# Patient Record
Sex: FEMALE | HISPANIC OR LATINO | Employment: UNEMPLOYED | URBAN - METROPOLITAN AREA
[De-identification: names, ages, dates, MRNs, and addresses within clinical notes are randomized per-mention and may not be internally consistent; named-entity substitution may affect disease eponyms.]

---
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
## 2020-01-16 ENCOUNTER — OFFICE VISIT (OUTPATIENT)
Dept: PEDIATRICS | Facility: CLINIC | Age: 20
End: 2020-01-16
Attending: PEDIATRICS
Payer: COMMERCIAL

## 2020-01-16 VITALS
HEIGHT: 64 IN | HEART RATE: 84 BPM | DIASTOLIC BLOOD PRESSURE: 66 MMHG | SYSTOLIC BLOOD PRESSURE: 102 MMHG | WEIGHT: 104.28 LBS | BODY MASS INDEX: 17.8 KG/M2

## 2020-01-16 DIAGNOSIS — R73.9 HYPERGLYCEMIA: ICD-10-CM

## 2020-01-16 DIAGNOSIS — E10.65 TYPE 1 DIABETES MELLITUS WITH HYPERGLYCEMIA (H): Primary | ICD-10-CM

## 2020-01-16 LAB — HBA1C MFR BLD: 14.3 % (ref 0–5.6)

## 2020-01-16 PROCEDURE — 83036 HEMOGLOBIN GLYCOSYLATED A1C: CPT | Mod: ZF | Performed by: PEDIATRICS

## 2020-01-16 PROCEDURE — G0463 HOSPITAL OUTPT CLINIC VISIT: HCPCS | Mod: ZF

## 2020-01-16 ASSESSMENT — PAIN SCALES - GENERAL: PAINLEVEL: NO PAIN (0)

## 2020-01-16 ASSESSMENT — MIFFLIN-ST. JEOR: SCORE: 1230.75

## 2020-01-16 ASSESSMENT — PATIENT HEALTH QUESTIONNAIRE - PHQ9: SUM OF ALL RESPONSES TO PHQ QUESTIONS 1-9: 16

## 2020-01-16 NOTE — NURSING NOTE
"Informant-    Kevin is accompanied by mother    Reason for Visit-  Diabetes    Vitals signs-  /66   Pulse 84   Ht 1.622 m (5' 3.86\")   Wt 47.3 kg (104 lb 4.4 oz)   BMI 17.98 kg/m      There are concerns about the child's exposure to violence in the home: No    Face to Face time: 5 minutes  Debbie Parikh MA      "

## 2020-01-16 NOTE — LETTER
1/16/2020      RE: Kevin Stephens  [address] [place] [address]       Pediatric Endocrinology Follow-up Consultation: Diabetes    Patient: Kevin Stephens MRN# [id number]   YOB: 2000 Age: 19 year old   Date of Visit: 1/16/20    Dear Ms. Wright:    I had the pleasure of seeing your patient, Kevin Stephens in the Pediatric Endocrinology Clinic, [facility], on 1/16/20 for a follow-up consultation of diabetes mellitus following recurrent pancreatitis and a history for GSD III.           Problem list:     Patient Active Problem List    Diagnosis Date Noted     Hyperglycemia 04/18/2018     Priority: Medium     Depression with suicidal ideation 05/18/2017     Priority: Medium     Secondary diabetes mellitus (H) 11/30/2014     Priority: Medium     Problem list name updated by automated process. Provider to review       Family history of congenital hearing loss 09/26/2012     Priority: Medium     Kevin's father has bilateral, congenital hearing loss. There is no known cause, and no genetic testing has been completed.       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  My last visit with Kevin was on 12/3/19. Kevin's diabetes care has been extremely poor for quite some time as indicated by her A1c values.  We have tried multiple different insulin types, approaches, and extra clinic visits which have not altered her course.   At our last visit, she had no monitoring data.  She was more depressed though she had no active SI.  She was not taking any insulin or  doing any testing.  I got her back in touch with her therapist and encouraged her to at least restart her basal insulin.      She did see her counselor.  She had an hour session.  She states she chose not to speak at the session so she sat their in silence with her therapist for 45 minutes.  Her therapist told her to come back only if she chose to communicate with her.  She started back on tresiba.  She states she is taking it 5/7 days.  She started the tresiba during the week of [place].  She randomly chose a dose of 29 units rather than the 22 units prescribed.  She started this because she had an appointment reminder and her mom told her to get on it.  She takes between 7-830.  She reports feeling no different.  Not testing her BG levels or taking any short acting insulin.     Feels safe today, no active SI.  No active SI for the past month.  No hypoglycemia.  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    Today's concerns include:needs needles    Hypoglycemia: Kevin is having 0 hypoglycemic readings per week.   Hyperglycemia:  DKA:   Elevated BG values tend to occur unknown    Exercise: None    Blood Glucose Data: none available to review.    A1c:  Today s hemoglobin A1c: 14.3  Lab Results   Component Value Date    A1C 14.3 01/16/2020    A1C 13.7 12/03/2019    A1C 12.0 08/27/2019    A1C 10.7 05/28/2019    A1C 13.4 10/30/2018         Result was discussed at today's visit.     Current insulin regimen:   Continue Tresiba 22 units daily in the morning  Not receiving any of her short acting insulin but this is her plan:  Must test blood glucose twice a day (morning and evening) and give correction doses as noted below using rapid acting insulin.  Carb ratio 1 per 15 grams    Blood Glucose    Units of Insulin             181 - 220         + 1 units             221 - 260         + 2 units             261 - 300         + 3 units             301 - 340         + 4 units             341 - 380          + 5 units             381 - 420         + 6 units             421 - 460         + 7 units     461-500  + 8 units            >500         + 9 units       Insulin administration site(s): arms/buttocks    I reviewed new history from the patient and the medical record.  I have reviewed previous lab results and records, patient BMI and the growth chart at today's visit.      History was obtained from patient and patient's mother.          Social History:     Social History     Social History Narrative    Lives with parents and younger brother.  Currently in 12th grade.  Loves to sing   Going back to school to finish high school - back at [employer].  Working in [employer] again in a month.  Living at home.         Family History:     Family History   Problem Relation Age of Onset     Hearing Loss Father      Family history was reviewed and is unchanged. Refer to the initial note.         Allergies:     Allergies   Allergen Reactions     Morphine Sulfate      No exposure but grandfather has the allergy to it     Tylenol [Acetaminophen]      Has glycogen storage disease and should not receive Tylenol, per GI.             Medications:     Current Outpatient Medications   Medication Sig Dispense Refill     acetone, Urine, test STRP Check urine ketones when two consecutive blood sugars are greater than 300 and at times of illness/vomiting. 100 each 11     blood glucose monitoring (ONE TOUCH DELICA) lancets Use to test blood sugars 6 times daily. 1 Box 12     blood glucose monitoring (ONETOUCH VERIO IQ) test strip Use to test blood sugars 6 times daily or as directed. 200 strip 11     Continuous Blood Gluc Sensor (DEXCOM G6 SENSOR) MISC 1 each See Admin Instructions Change every 10 days 9 each 3     Continuous Blood Gluc Transmit (DEXCOM G6 TRANSMITTER) MISC 1 each every 3 months 1 each 3     insulin aspart (NOVOLOG PEN) 100 UNIT/ML injection Inject 1 unit for every 10 grams of carbs, uses up to 50 units daily. 45 mL 3      "insulin degludec (TRESIBA FLEXTOUCH) 100 UNIT/ML pen Inject 30 units daily. 30 mL 3     insulin pen needle (BD CALLI U/F) 32G X 4 MM Use pen needles 6 times daily. 200 each 6     melatonin 3 MG tablet Take 1 tablet (3 mg) by mouth At Bedtime       sertraline (ZOLOFT) 50 MG tablet Take 1 tablet (50 mg) by mouth daily (Patient not taking: Reported on 10/30/2018) 30 tablet 0             Review of Systems:   GENERAL: Weight loss  EYE: No visual disturbance.  ENT: No hearing loss.  No nasal discharge.  No sore throat.  RESPIRATORY: No cough or wheezing  CARDIO: No chest pain. No palpitations.  No rapid heart rate. No hypertension.  GASTROINTESTINAL: No recent vomiting or diarrhea. No constipation. No abdominal pain.  HEMATOLOGIC: No bleeding disorders. No amemia.  GENITOURINARY: No dysuria or hematuria.  MUSCOLOSKELETAL: No joint pain. No muscular weakness.  PSYCHIATRIC: +depression  NEURO: No seizures.  No headaches. No focal deficits noted.  SKIN: No rashes or skin changes.  ENDOCRINE: see HPI - no metabolic crises         Physical Exam:   Blood pressure 102/66, pulse 84, height 1.622 m (5' 3.86\"), weight 47.3 kg (104 lb 4.4 oz).  Blood pressure percentiles are not available for patients who are 18 years or older.  Height: 5' 3.858\", 43 %ile based on CDC (Girls, 2-20 Years) Stature-for-age data based on Stature recorded on 1/16/2020.  Weight: 104 lbs 4.44 oz, 7 %ile based on CDC (Girls, 2-20 Years) weight-for-age data based on Weight recorded on 1/16/2020.  BMI: Body mass index is 17.98 kg/m ., 6 %ile based on CDC (Girls, 2-20 Years) BMI-for-age based on body measurements available as of 1/16/2020.      CONSTITUTIONAL:   Awake, alert, and in no apparent distress.  Very thin in appearance.    HEAD: Normocephalic, without obvious abnormality.  EYES: Lids and lashes normal, sclera clear, conjunctiva normal.  ENT: external ears without lesions, nares clear, oral pharynx with moist mucus membranes.  NECK: Supple, " symmetrical, trachea midline.  THYROID: symmetric, not enlarged and no tenderness.  HEMATOLOGIC/LYMPHATIC: No cervical lymphadenopathy.  LUNGS: No increased work of breathing, clear to auscultation bilaterally with good air entry.  CARDIOVASCULAR: Regular rate and rhythm, no murmurs.  ABDOMEN: Normal bowel sounds, soft, non-distended, non-tender, no masses palpated, + hepatomegaly  PSYCHIATRIC: Cooperative, no agitation.  SKIN: Insulin administration sites intact without lipohypertrophy. No acanthosis nigricans.  Recent cutting marks  MUSCULOSKELETAL: There is no redness, warmth, or swelling of the joints.  Full range of motion noted.  Motor strength and tone are normal.            Health Maintenance:     No DKA  Labs: 5/2017  Flu vaccine: [id number]  PHQ-2 Score: 4  PHQ-A: 16 (today)    PHQ-2 ( 1999 Pf[employer]) 1/16/2020 12/3/2019   Q1: Little interest or pleasure in doing things 2 3   Q2: Feeling down, depressed or hopeless 2 3   PHQ-2 Score 4 6     [facility] Follow-up to PHQ 8/27/2019 12/3/2019 1/16/2020   PHQ-9 9. Suicide Ideation past 2 weeks Several days More than half the days Not at all   Thoughts of suicide or self harm in past 2 weeks - No -   PHQ-A Suicide Ideation past month No - -   PHQ-A Previous suicide attempt in past year Yes - -     No active SI.  Safety plan discussed.  No need to go to ED.  Will reschedule with therapist.  Discussed my preference for her to restart her anti-depressants.        Laboratory results:     Hemoglobin A1C   Date Value Ref Range Status   01/16/2020 14.3 (A) 0 - 5.6 % Final     TSH   Date Value Ref Range Status   06/17/2019 2.81 mcU/mL Final     T4 Free   Date Value Ref Range Status   02/17/2012 1.03 0.70 - 1.85 ng/dL Final     Vitamin D 1,25   Date Value Ref Range Status   01/30/2009 26  Final     Comment:     Reference range: 15 to 75  Unit: pg/mL  (Note)  Performed by [facility],  [address] [place] [id number] [phone]  [email or web address], Nima Orellana MD - Lab.  Director     Insulin Antibodies   Date Value Ref Range Status   11/21/2014   Final    <0.4  Reference range: 0.0 to 0.4  Unit: U/mL  (Note)  INTERPRETIVE INFORMATION: Insulin Antibody  This assay quantitatively measures human serum  autoantibodies to endogenous insulin or antibodies to  exogenous insulin.  A value of greater than 0.4 Kronus  Units/mL is considered positive for Insulin Antibody.  Kronus Units are arbitrary. Kronus Units = U/mL  Performed by [facility],  [address] [facility] [place] [address] [phone]  [email or web address], Campos Mims MD, Lab. Director       Islet Cell Antibody IgG   Date Value Ref Range Status   11/21/2014   Final    <1:4  Reference range: <1:4  (Note)  INTERPRETIVE INFORMATION: Islet Cell Ab, IgG  Islet cell antibodies (ICAs) are associated with type 1  diabetes (TID), an autoimmune endocrine disorder. These  antibodies may be present in individuals years before the  onset of clinical symptoms. To calculate  Juvenile Diabetes Foundation (JDF) units: multiply the  titer x 5 (1:8  8 x 5 = 40 JDF Units).  Performed by [facility],  [address] [facility], [place] [address] [phone]  [email or web address], Campos Mims MD, Lab. Director       Cholesterol   Date Value Ref Range Status   06/17/2019 203 mg/dL Final     Albumin Urine mg/L   Date Value Ref Range Status   03/27/2018 <5 mg/L Final     Triglycerides   Date Value Ref Range Status   06/17/2019 383 mg/dL Final     HDL Cholesterol   Date Value Ref Range Status   06/17/2019 36 mg/dL Final     LDL Cholesterol Calculated   Date Value Ref Range Status   06/17/2019 112 mg/dL Final     Cholesterol/HDL Ratio   Date Value Ref Range Status   11/21/2014 7.6 (H) 0.0 - 5.0 Final     Non HDL Cholesterol   Date Value Ref Range Status   06/17/2019 167 mg/dl Final            Assessment and Plan:   Kevin  is a 19 year old female with diabetes mellitus associated with GSDIII following recurrent pancreatitis episodes.  I remain very concerned about  Kevin and her lack of attention to her overall health.  She again denied any active SI and we discussed a safety plan (talking with mom which she felt comfortable doing).  Kevin has not gotten to the stage of acceptance.  I am hopeful with her recent change in restarting basal insulin and going back to school.  She must continue to build off these changes.  I outlined 1-2 small things we can do to try and improve her health moving forward.    Patient Instructions     If not giving any corrections, you are probably ok using 30 units of tresiba each day.  If you start testing your blood and giving corrections, you should derease your dose down to at least 25 and possibly closer to 20 units  Please give your tresiba daily  Give Correction dose using Novolog insulin once a day  Test BG once a day  Blood Glucose    Units of Insulin             181 - 220         + 1 units             221 - 260         + 2 units             261 - 300         + 3 units             301 - 340         + 4 units             341 - 380         + 5 units             381 - 420         + 6 units             421 - 460         + 7 units     461-500  + 8 units            >500         + 9 units     Call your therapist and reschedule and be an  Active participant.  Follow-up in 3 months or we can transition you to an adult provider.    Sincerely,    Vincent Garner MD    Pager [phone]      CCPatient Care Team:  Vanessa Wright as PCP - General (Physician Assistant)  [facility] as PCP - Mental Health/Behavioral Medicine  Avani Oliver MD as MD (Pediatric Metabolism)  Vincent Garner MD as MD (Pediatric Endocrinology)  Pearl Reyes MD as MD (Pediatrics)  PEARL REYES    Copy to patient  [facility]  [address] [place] [address]    Vincent Garner MD

## 2020-01-16 NOTE — PATIENT INSTRUCTIONS
If not giving any corrections, you are probably ok using 30 units of tresiba each day.  If you start testing your blood and giving corrections, you should derease your dose down to at least 25 and possibly closer to 20 units  Please give your tresiba daily  Give Correction dose using Novolog insulin once a day  Test BG once a day  Blood Glucose    Units of Insulin             181 - 220         + 1 units             221 - 260         + 2 units             261 - 300         + 3 units             301 - 340         + 4 units             341 - 380         + 5 units             381 - 420         + 6 units             421 - 460         + 7 units     461-500  + 8 units            >500         + 9 units     Call your therapist and reschedule and be an  Active participant.  Follow-up in 3 months or we can transition you to an adult provider.

## 2020-01-16 NOTE — PROGRESS NOTES
Pediatric Endocrinology Follow-up Consultation: Diabetes    Patient: Kevin Stephens MRN# [id number]   YOB: 2000 Age: 19 year old   Date of Visit: 1/16/20    Dear Ms. Wright:    I had the pleasure of seeing your patient, Kevin Stephens in the Pediatric Endocrinology Clinic, [facility], on 1/16/20 for a follow-up consultation of diabetes mellitus following recurrent pancreatitis and a history for GSD III.           Problem list:     Patient Active Problem List    Diagnosis Date Noted     Hyperglycemia 04/18/2018     Priority: Medium     Depression with suicidal ideation 05/18/2017     Priority: Medium     Secondary diabetes mellitus (H) 11/30/2014     Priority: Medium     Problem list name updated by automated process. Provider to review       Family history of congenital hearing loss 09/26/2012     Priority: Medium     Kevin's father has bilateral, congenital hearing loss. There is no known cause, and no genetic testing has been completed.       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  My last visit with Kevin was on 12/3/19. Kevin's diabetes care has been extremely poor for quite some time as indicated by her A1c values.  We have tried multiple different insulin types, approaches, and extra clinic visits which have not altered her course.   At our last visit, she had no monitoring data.  She was more depressed though she had no active SI.  She was not taking any insulin or doing any testing.  I got her back in touch with her therapist and encouraged her  to at least restart her basal insulin.      She did see her counselor.  She had an hour session.  She states she chose not to speak at the session so she sat their in silence with her therapist for 45 minutes.  Her therapist told her to come back only if she chose to communicate with her.  She started back on tresiba.  She states she is taking it 5/7 days.  She started the tresiba during the week of Christmas.  She randomly chose a dose of 29 units rather than the 22 units prescribed.  She started this because she had an appointment reminder and her mom told her to get on it.  She takes between 7-830.  She reports feeling no different.  Not testing her BG levels or taking any short acting insulin.     Feels safe today, no active SI.  No active SI for the past month.  No hypoglycemia.  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    Today's concerns include:needs needles    Hypoglycemia: Kevin is having 0 hypoglycemic readings per week.   Hyperglycemia:  DKA:   Elevated BG values tend to occur unknown    Exercise: None    Blood Glucose Data: none available to review.    A1c:  Today s hemoglobin A1c: 14.3  Lab Results   Component Value Date    A1C 14.3 01/16/2020    A1C 13.7 12/03/2019    A1C 12.0 08/27/2019    A1C 10.7 05/28/2019    A1C 13.4 10/30/2018         Result was discussed at today's visit.     Current insulin regimen:   Continue Tresiba 22 units daily in the morning  Not receiving any of her short acting insulin but this is her plan:  Must test blood glucose twice a day (morning and evening) and give correction doses as noted below using rapid acting insulin.  Carb ratio 1 per 15 grams    Blood Glucose    Units of Insulin             181 - 220         + 1 units             221 - 260         + 2 units             261 - 300         + 3 units             301 - 340         + 4 units             341 - 380         + 5 units             381 - 420         + 6 units             421 - 460          + 7 units     461-500  + 8 units            >500         + 9 units       Insulin administration site(s): arms/buttocks    I reviewed new history from the patient and the medical record.  I have reviewed previous lab results and records, patient BMI and the growth chart at today's visit.      History was obtained from patient and patient's mother.          Social History:     Social History     Social History Narrative    Lives with parents and younger brother.  Currently in 12th grade.  Loves to sing   Going back to school to finish high school - back at [employer].  Working in [employer] again in a month.  Living at home.         Family History:     Family History   Problem Relation Age of Onset     Hearing Loss Father      Family history was reviewed and is unchanged. Refer to the initial note.         Allergies:     Allergies   Allergen Reactions     Morphine Sulfate      No exposure but grandfather has the allergy to it     Tylenol [Acetaminophen]      Has glycogen storage disease and should not receive Tylenol, per GI.             Medications:     Current Outpatient Medications   Medication Sig Dispense Refill     acetone, Urine, test STRP Check urine ketones when two consecutive blood sugars are greater than 300 and at times of illness/vomiting. 100 each 11     blood glucose monitoring (ONE TOUCH DELICA) lancets Use to test blood sugars 6 times daily. 1 Box 12     blood glucose monitoring (ONETOUCH VERIO IQ) test strip Use to test blood sugars 6 times daily or as directed. 200 strip 11     Continuous Blood Gluc Sensor (DEXCOM G6 SENSOR) MISC 1 each See Admin Instructions Change every 10 days 9 each 3     Continuous Blood Gluc Transmit (DEXCOM G6 TRANSMITTER) MISC 1 each every 3 months 1 each 3     insulin aspart (NOVOLOG PEN) 100 UNIT/ML injection Inject 1 unit for every 10 grams of carbs, uses up to 50 units daily. 45 mL 3     insulin degludec (TRESIBA FLEXTOUCH) 100 UNIT/ML pen Inject 30 units daily. 30  "mL 3     insulin pen needle (BD CALLI U/F) 32G X 4 MM Use pen needles 6 times daily. 200 each 6     melatonin 3 MG tablet Take 1 tablet (3 mg) by mouth At Bedtime       sertraline (ZOLOFT) 50 MG tablet Take 1 tablet (50 mg) by mouth daily (Patient not taking: Reported on 10/30/2018) 30 tablet 0             Review of Systems:   GENERAL: Weight loss  EYE: No visual disturbance.  ENT: No hearing loss.  No nasal discharge.  No sore throat.  RESPIRATORY: No cough or wheezing  CARDIO: No chest pain. No palpitations.  No rapid heart rate. No hypertension.  GASTROINTESTINAL: No recent vomiting or diarrhea. No constipation. No abdominal pain.  HEMATOLOGIC: No bleeding disorders. No amemia.  GENITOURINARY: No dysuria or hematuria.  MUSCOLOSKELETAL: No joint pain. No muscular weakness.  PSYCHIATRIC: +depression  NEURO: No seizures.  No headaches. No focal deficits noted.  SKIN: No rashes or skin changes.  ENDOCRINE: see HPI - no metabolic crises         Physical Exam:   Blood pressure 102/66, pulse 84, height 1.622 m (5' 3.86\"), weight 47.3 kg (104 lb 4.4 oz).  Blood pressure percentiles are not available for patients who are 18 years or older.  Height: 5' 3.858\", 43 %ile based on CDC (Girls, 2-20 Years) Stature-for-age data based on Stature recorded on 1/16/2020.  Weight: 104 lbs 4.44 oz, 7 %ile based on CDC (Girls, 2-20 Years) weight-for-age data based on Weight recorded on 1/16/2020.  BMI: Body mass index is 17.98 kg/m ., 6 %ile based on CDC (Girls, 2-20 Years) BMI-for-age based on body measurements available as of 1/16/2020.      CONSTITUTIONAL:   Awake, alert, and in no apparent distress.  Very thin in appearance.    HEAD: Normocephalic, without obvious abnormality.  EYES: Lids and lashes normal, sclera clear, conjunctiva normal.  ENT: external ears without lesions, nares clear, oral pharynx with moist mucus membranes.  NECK: Supple, symmetrical, trachea midline.  THYROID: symmetric, not enlarged and no " tenderness.  HEMATOLOGIC/LYMPHATIC: No cervical lymphadenopathy.  LUNGS: No increased work of breathing, clear to auscultation bilaterally with good air entry.  CARDIOVASCULAR: Regular rate and rhythm, no murmurs.  ABDOMEN: Normal bowel sounds, soft, non-distended, non-tender, no masses palpated, + hepatomegaly  PSYCHIATRIC: Cooperative, no agitation.  SKIN: Insulin administration sites intact without lipohypertrophy. No acanthosis nigricans.  Recent cutting marks  MUSCULOSKELETAL: There is no redness, warmth, or swelling of the joints.  Full range of motion noted.  Motor strength and tone are normal.            Health Maintenance:     No DKA  Labs: 5/2017  Flu vaccine: [id number]  PHQ-2 Score: 4  PHQ-A: 16 (today)    PHQ-2 ( 1999 Pf[employer]) 1/16/2020 12/3/2019   Q1: Little interest or pleasure in doing things 2 3   Q2: Feeling down, depressed or hopeless 2 3   PHQ-2 Score 4 6     [facility] Follow-up to PHQ 8/27/2019 12/3/2019 1/16/2020   PHQ-9 9. Suicide Ideation past 2 weeks Several days More than half the days Not at all   Thoughts of suicide or self harm in past 2 weeks - No -   PHQ-A Suicide Ideation past month No - -   PHQ-A Previous suicide attempt in past year Yes - -     No active SI.  Safety plan discussed.  No need to go to ED.  Will reschedule with therapist.  Discussed my preference for her to restart her anti-depressants.        Laboratory results:     Hemoglobin A1C   Date Value Ref Range Status   01/16/2020 14.3 (A) 0 - 5.6 % Final     TSH   Date Value Ref Range Status   06/17/2019 2.81 mcU/mL Final     T4 Free   Date Value Ref Range Status   02/17/2012 1.03 0.70 - 1.85 ng/dL Final     Vitamin D 1,25   Date Value Ref Range Status   01/30/2009 26  Final     Comment:     Reference range: 15 to 75  Unit: pg/mL  (Note)  Performed by [facility],  [address] [place] [id number] [phone]  [email or web address], Nima Orellana MD - Lab. Director     Insulin Antibodies   Date Value Ref Range Status   11/21/2014    Final    <0.4  Reference range: 0.0 to 0.4  Unit: U/mL  (Note)  INTERPRETIVE INFORMATION: Insulin Antibody  This assay quantitatively measures human serum  autoantibodies to endogenous insulin or antibodies to  exogenous insulin.  A value of greater than 0.4 Kronus  Units/mL is considered positive for Insulin Antibody.  Kronus Units are arbitrary. Kronus Units = U/mL  Performed by [facility],  [address] [place] [address] [phone]  [email or web address], Campos Mims MD, Lab. Director       Islet Cell Antibody IgG   Date Value Ref Range Status   11/21/2014   Final    <1:4  Reference range: <1:4  (Note)  INTERPRETIVE INFORMATION: Islet Cell Ab, IgG  Islet cell antibodies (ICAs) are associated with type 1  diabetes (TID), an autoimmune endocrine disorder. These  antibodies may be present in individuals years before the  onset of clinical symptoms. To calculate  Juvenile Diabetes Foundation (JDF) units: multiply the  titer x 5 (1:8  8 x 5 = 40 JDF Units).  Performed by [facility],  [address] [place] [place] [address] [phone]  [email or web address], Campos Mims MD, Lab. Director       Cholesterol   Date Value Ref Range Status   06/17/2019 203 mg/dL Final     Albumin Urine mg/L   Date Value Ref Range Status   03/27/2018 <5 mg/L Final     Triglycerides   Date Value Ref Range Status   06/17/2019 383 mg/dL Final     HDL Cholesterol   Date Value Ref Range Status   06/17/2019 36 mg/dL Final     LDL Cholesterol Calculated   Date Value Ref Range Status   06/17/2019 112 mg/dL Final     Cholesterol/HDL Ratio   Date Value Ref Range Status   11/21/2014 7.6 (H) 0.0 - 5.0 Final     Non HDL Cholesterol   Date Value Ref Range Status   06/17/2019 167 mg/dl Final            Assessment and Plan:   Kevin  is a 19 year old female with diabetes mellitus associated with GSDIII following recurrent pancreatitis episodes.  I remain very concerned about Kevin and her lack of attention to her overall health.  She again denied  any active SI and we discussed a safety plan (talking with mom which she felt comfortable doing).  Kevin has not gotten to the stage of acceptance.  I am hopeful with her recent change in restarting basal insulin and going back to school.  She must continue to build off these changes.  I outlined 1-2 small things we can do to try and improve her health moving forward.    Patient Instructions     If not giving any corrections, you are probably ok using 30 units of tresiba each day.  If you start testing your blood and giving corrections, you should derease your dose down to at least 25 and possibly closer to 20 units  Please give your tresiba daily  Give Correction dose using Novolog insulin once a day  Test BG once a day  Blood Glucose    Units of Insulin             181 - 220         + 1 units             221 - 260         + 2 units             261 - 300         + 3 units             301 - 340         + 4 units             341 - 380         + 5 units             381 - 420         + 6 units             421 - 460         + 7 units     461-500  + 8 units            >500         + 9 units     Call your therapist and reschedule and be an  Active participant.  Follow-up in 3 months or we can transition you to an adult provider.    Sincerely,    Vincent Garner MD    Pager [phone]      CCPatient Care Team:  Vanessa Wright as PCP - General (Physician Assistant)  [facility] as PCP - Mental Health/Behavioral Medicine  Avani Oliver MD as MD (Pediatric Metabolism)  Vincent Garner MD as MD (Pediatric Endocrinology)  Pearl Reyes MD as MD (Pediatrics)  PEARL REYES    Copy to patient  [facility]  [address] [place] [address]

## 2020-02-10 ENCOUNTER — HEALTH MAINTENANCE LETTER (OUTPATIENT)
Age: 20
End: 2020-02-10

## 2020-04-16 ENCOUNTER — VIRTUAL VISIT (OUTPATIENT)
Dept: PEDIATRICS | Facility: CLINIC | Age: 20
End: 2020-04-16
Attending: PEDIATRICS
Payer: COMMERCIAL

## 2020-04-16 DIAGNOSIS — E10.65 TYPE 1 DIABETES MELLITUS WITH HYPERGLYCEMIA (H): ICD-10-CM

## 2020-04-16 DIAGNOSIS — R73.9 HYPERGLYCEMIA: ICD-10-CM

## 2020-04-16 DIAGNOSIS — E74.03 GLYCOGEN STORAGE DISEASE III (H): Primary | ICD-10-CM

## 2020-04-16 NOTE — PATIENT INSTRUCTIONS
Restart Tresiba at 25 units daily  Restart CGM - Viviane to work on helping with supplies if needed  Give correction doses once CGM restarted  Follow-up phone call in 2 weeks with Tarsha and in 4 weeks with me.

## 2020-04-16 NOTE — PROGRESS NOTES
"Kevin Stephens is a 20 year old adult who is being evaluated via a billable telephone visit.      The patient has been notified of following:     \"This telephone visit will be conducted via a call between you and your physician/provider. We have found that certain health care needs can be provided without the need for a physical exam.  This service lets us provide the care you need with a short phone conversation.  If a prescription is necessary we can send it directly to your pharmacy.  If lab work is needed we can place an order for that and you can then stop by our lab to have the test done at a later time.    Telephone visits are billed at different rates depending on your insurance coverage. During this emergency period, for some insurers they may be billed the same as an in-person visit.  Please reach out to your insurance provider with any questions.    If during the course of the call the physician/provider feels a telephone visit is not appropriate, you will not be charged for this service.\"    Patient has given verbal consent for Telephone visit?  Yes    First Number to try (Mom): [phone]  Back up Number: Kevin's cell at [phone].    How would you like to obtain your AVS? E-Mail (inform patient AVS not encrypted)    Additional provider notes:   Pediatric Endocrinology Follow-up Consultation: Diabetes    Patient: Kevin Stephens MRN# [id number]   YOB: 2000 Age: 20 year old   Date of Visit: April 16, 2020    Dear Ms. Wright:    I had the pleasure of seeing your patient, Kevin Stephens for a phone visit through the Pediatric Endocrinology Clinic, [facility], on April 16, 2020  for a follow-up consultation of diabetes mellitus following recurrent pancreatitis and a history for GSD III.           Problem list:     Patient Active Problem List    Diagnosis Date Noted     Hyperglycemia 04/18/2018     Priority: Medium     Depression with " suicidal ideation 05/18/2017     Priority: Medium     Secondary diabetes mellitus (H) 11/30/2014     Priority: Medium     Problem list name updated by automated process. Provider to review       Family history of congenital hearing loss 09/26/2012     Priority: Medium     Kevin's father has bilateral, congenital hearing loss. There is no known cause, and no genetic testing has been completed.       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  My last visit with Kevin was on 1/16/20. Kevin's diabetes care has been extremely poor for quite some time as indicated by her past A1c values.  They has not reached the stage of acceptance of her diabetes.  We have tried multiple different insulin types, approaches, and extra clinic visits which have not altered her course.   At our last visit, they had no monitoring data.  I was encouraged that she had seen her counselor but they did not speak for her entire visit and she left (by report).  They had stopped her antidepressants on her own.  She reported that they was taking her long acting insulin 5/7 days (in am) at that visit.  They was not taking any rapid acting insulin.  We discussed having a goal of one test per day and giving correction at that time.     She states she has not been giving any insulin or testing BG levels.  She can't explain why this happened.  She states she was not staying on top of things.  She has not been using her dexcom.  She is waking frequently to urinate.  Increased urinary frerquency.  Increaed thirst.  Weight has gone down.  Last time she took any insulin  was a month ago (29 units).  She was not having any lows.  She never took any rapid acting insulin.  Feels like she is in an OK place from a depression.  She did restart her antidepressants.  They have a new therapist through [employer] - they are working on getting this set up virtually.    Today's concerns include:      Hypoglycemia: Kevin is having hypoglycemic readings per week.   Hyperglycemia:  DKA:   Elevated BG values tend to occur     Exercise: None    Blood Glucose Data: none available to review. Has not checked for two weeks    A1c:  Today s hemoglobin A1c: n/a  Lab Results   Component Value Date    A1C 14.3 01/16/2020    A1C 13.7 12/03/2019    A1C 12.0 08/27/2019    A1C 10.7 05/28/2019    A1C 13.4 10/30/2018       Result was discussed at today's visit.     Current insulin regimen:   Continue Tresiba 22 units daily in the morning  Not receiving any of her short acting insulin but this is her plan:  Must test blood glucose twice a day (morning and evening) and give correction doses as noted below using rapid acting insulin.  Carb ratio 1 per 15 grams    Blood Glucose    Units of Insulin             181 - 220         + 1 units             221 - 260         + 2 units             261 - 300         + 3 units             301 - 340         + 4 units             341 - 380         + 5 units             381 - 420         + 6 units             421 - 460         + 7 units     461-500  + 8 units            >500         + 9 units       Insulin administration site(s): arms/buttocks    I reviewed new history from the patient and the medical record.  I have reviewed previous lab results and records, patient BMI and the growth chart at today's visit.      History was obtained from patient and patient's mother.          Social History:     Social History     Social History Narrative    Lives with parents and younger brother.  Currently in 12th grade.  Loves to sing   Went back to finish high school - graduated  In March.  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         Family History:     Family History   Problem Relation Age of Onset     Hearing Loss Father      Family history was reviewed and is unchanged. Refer to the initial note.         Allergies:     Allergies   Allergen Reactions     Morphine Sulfate      No exposure but grandfather has the allergy to it     Tylenol [Acetaminophen]      Has glycogen storage disease and should not receive Tylenol, per GI.             Medications:     Current Outpatient Medications   Medication Sig Dispense Refill     acetone, Urine, test STRP Check urine ketones when two consecutive blood sugars are greater than 300 and at times of illness/vomiting. 100 each 11     blood glucose monitoring (ONE TOUCH DELICA) lancets Use to test blood sugars 6 times daily. 1 Box 12     blood glucose monitoring (ONETOUCH VERIO IQ) test strip Use to test blood sugars 6 times daily or as directed. 200 strip 11     Continuous Blood Gluc Sensor (DEXCOM G6 SENSOR) MISC 1 each See Admin Instructions Change every 10 days 9 each 3     Continuous Blood Gluc Transmit (DEXCOM G6 TRANSMITTER) MISC 1 each every 3 months 1 each 3     insulin aspart (NOVOLOG PEN) 100 UNIT/ML injection Inject 1 unit for every 10 grams of carbs, uses up to 50 units daily. 45 mL 3     insulin degludec (TRESIBA FLEXTOUCH) 100 UNIT/ML pen Inject 30 units daily. 15 mL 6     insulin pen needle (BD CALLI U/F) 32G X 4 MM miscellaneous Use pen needles 6 times daily. 200 each 6     melatonin 3 MG tablet Take 1 tablet (3 mg) by mouth At Bedtime       sertraline (ZOLOFT) 50 MG tablet Take 1 tablet (50 mg) by mouth daily (Patient not taking: Reported on 10/30/2018) 30 tablet 0             Review of Systems:   GENERAL: Weight loss  EYE: No visual disturbance.  ENT: No hearing loss.  No nasal discharge.  No sore throat.  RESPIRATORY: No cough or wheezing  CARDIO: No chest pain. No palpitations.  No rapid heart rate. No  hypertension.  GASTROINTESTINAL: No recent vomiting or diarrhea. No constipation. No abdominal pain.  HEMATOLOGIC: No bleeding disorders. No amemia.  GENITOURINARY: No dysuria or hematuria.  MUSCOLOSKELETAL: No joint pain. No muscular weakness.  PSYCHIATRIC: +depression - more stable on SSRI  NEURO: No seizures.  No headaches. No focal deficits noted.  SKIN: No rashes or skin changes.  ENDOCRINE: see HPI - no metabolic crises         Physical Exam:   There were no vitals taken for this visit.  Blood pressure percentiles are not available for patients who are 18 years or older.  Height: Data Unavailable, Normalized stature-for-age data not available for patients older than 20 years.  Weight: 0 lbs 0 oz, Normalized weight-for-age data not available for patients older than 20 years.  BMI: There is no height or weight on file to calculate BMI., No height and weight on file for this encounter.      No exam              Health Maintenance:     No DKA  Labs: 5/2017  Flu vaccine: [id number]    PHQ-2 ( 1999 Pfizer) 1/16/2020 12/3/2019   Q1: Little interest or pleasure in doing things 2 3   Q2: Feeling down, depressed or hopeless 2 3   PHQ-2 Score 4 6     [facility] [place] Follow-up to PHQ 8/27/2019 12/3/2019 1/16/2020   PHQ-9 9. Suicide Ideation past 2 weeks Several days More than half the days Not at all   Thoughts of suicide or self harm in past 2 weeks - No -   PHQ-A Suicide Ideation past month No - -   PHQ-A Previous suicide attempt in past year Yes - -         Laboratory results:     Hemoglobin A1C   Date Value Ref Range Status   01/16/2020 14.3 (A) 0 - 5.6 % Final     TSH   Date Value Ref Range Status   06/17/2019 2.81 mcU/mL Final     T4 Free   Date Value Ref Range Status   02/17/2012 1.03 0.70 - 1.85 ng/dL Final     Vitamin D 1,25   Date Value Ref Range Status   01/30/2009 26  Final     Comment:     Reference range: 15 to 75  Unit: pg/mL  (Note)  Performed by [facility],  [address] [place] [id number]  [phone]  [email or web address], Nima Orellana MD - Lab. Director     Insulin Antibodies   Date Value Ref Range Status   11/21/2014   Final    <0.4  Reference range: 0.0 to 0.4  Unit: U/mL  (Note)  INTERPRETIVE INFORMATION: Insulin Antibody  This assay quantitatively measures human serum  autoantibodies to endogenous insulin or antibodies to  exogenous insulin.  A value of greater than 0.4 Kronus  Units/mL is considered positive for Insulin Antibody.  Kronus Units are arbitrary. Kronus Units = U/mL  Performed by [facility],  [address] [place] [address] [phone]  [email or web address], Campos Mims MD, Lab. Director       Islet Cell Antibody IgG   Date Value Ref Range Status   11/21/2014   Final    <1:4  Reference range: <1:4  (Note)  INTERPRETIVE INFORMATION: Islet Cell Ab, IgG  Islet cell antibodies (ICAs) are associated with type 1  diabetes (TID), an autoimmune endocrine disorder. These  antibodies may be present in individuals years before the  onset of clinical symptoms. To calculate  Juvenile Diabetes Foundation (JDF) units: multiply the  titer x 5 (1:8  8 x 5 = 40 JDF Units).  Performed by [facility],  [address] [place] [address] [phone]  [email or web address], Campos Mims MD, Lab. Director       Cholesterol   Date Value Ref Range Status   06/17/2019 203 mg/dL Final     Albumin Urine mg/L   Date Value Ref Range Status   03/27/2018 <5 mg/L Final     Triglycerides   Date Value Ref Range Status   06/17/2019 383 mg/dL Final     HDL Cholesterol   Date Value Ref Range Status   06/17/2019 36 mg/dL Final     LDL Cholesterol Calculated   Date Value Ref Range Status   06/17/2019 112 mg/dL Final     Cholesterol/HDL Ratio   Date Value Ref Range Status   11/21/2014 7.6 (H) 0.0 - 5.0 Final     Non HDL Cholesterol   Date Value Ref Range Status   06/17/2019 167 mg/dl Final     Results for CLINT BETANCOURT (MRN [id number])    Ref. Range 6/17/2019 00:00   Albumin Urine mg/spec Unknown <0.2           Assessment and Plan:   Kevin  is a 20 year old female with diabetes mellitus associated with GSDIII following recurrent pancreatitis episodes.  Kevin has made no attempts at managing any component of her diabetes for the past month and is symptomatic (without Dka) of persistent hyperglycemia.  She agreed to try and get back on track with the changes we outlined below.  We have set up close phone follow-up to hopefully keep her on track.  I again reminded her of the consequences of long-term poor glycemic control.      Patient Instructions   Restart Tresiba at 25 units daily  Restart CGM - Viviane to work on helping with supplies if needed  Give correction doses once CGM restarted  Follow-up phone call in 2 weeks with Tarsha and in 4 weeks with me.    Sincerely,    Vincent Garner MD    Pager [phone]      CCPatient Care Team:  Vanessa Wright as PCP - General (Physician Assistant)  [facility] as PCP - Mental Health/Behavioral Medicine  Avani Oliver MD as MD (Pediatric Metabolism)  Vincent Garner MD as MD (Pediatric Endocrinology)  Pearl Reyes MD as MD (Pediatrics)  PEARL REYES    Copy to patient  [facility]ILLIP BETANCOURT [facility]  [address] [place] [address]      Phone call duration: 22 minutes    Vincent Garner MD    Pager [phone]

## 2020-04-16 NOTE — LETTER
"4/16/2020       RE: Kevin Stephens  [address] [place] [address]     Dear Colleague,    Thank you for referring your patient, Kevin Stephens, to the [facility] at [facility]. Please see a copy of my visit note below.    Kevin Stephens is a 20 year old adult who is being evaluated via a billable telephone visit.      The patient has been notified of following:     \"This telephone visit will be conducted via a call between you and your physician/provider. We have found that certain health care needs can be provided without the need for a physical exam.  This service lets us provide the care you need with a short phone conversation.  If a prescription is necessary we can send it directly to your pharmacy.  If lab work is needed we can place an order for that and you can then stop by our lab to have the test done at a later time.    Telephone visits are billed at different rates depending on your insurance coverage. During this emergency period, for some insurers they may be billed the same as an in-person visit.  Please reach out to your insurance provider with any questions.    If during the course of the call the physician/provider feels a telephone visit is not appropriate, you will not be charged for this service.\"    Patient has given verbal consent for Telephone visit?  Yes    First Number to try (Mom): [phone]  Back up Number: Kevin's cell at [phone].    How would you like to obtain your AVS? E-Mail (inform patient AVS not encrypted)    Additional provider notes:   Pediatric Endocrinology Follow-up Consultation: Diabetes    Patient: Kevin Stephens MRN# [id number]   YOB: 2000 Age: 20 year old   Date of Visit: April 16, 2020    Dear Ms. Wright:    I had the pleasure of seeing your patient, Kevin Stephens for a video visit through the Pediatric Endocrinology Clinic, [facility], on April 16, 2020  for a follow-up consultation of diabetes mellitus following recurrent pancreatitis and a history for GSD III.           Problem list:     Patient Active Problem List    Diagnosis Date Noted     Hyperglycemia 04/18/2018     Priority: Medium     Depression with suicidal ideation 05/18/2017     Priority: Medium     Secondary diabetes mellitus (H) 11/30/2014     Priority: Medium     Problem list name updated by automated process. Provider to review       Family history of congenital hearing loss 09/26/2012     Priority: Medium     Kevin's father has bilateral, congenital hearing loss. There is no known cause, and no genetic testing has been completed.       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  My last visit with Kevin was on 1/16/20. Kevin's diabetes care has been extremely poor for quite some time as indicated by her past A1c values.  They has not reached the stage of acceptance of her diabetes.  We have tried multiple different insulin types, approaches, and extra clinic visits which have not altered her course.   At our last visit, they had no monitoring data.  I was encouraged that she had seen her counselor but they did not speak for her entire visit and she left (by report).  They had stopped her antidepressants on her own.  She reported that they was taking her long acting insulin 5/7 days (in am) at that visit.  They was not taking any rapid acting insulin.  We discussed having a goal of one test per day and giving correction at that time.     Feels safe today, no active  SI.  No active SI for the past month.  No hypoglycemia. 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    Today's concerns include:    Hypoglycemia: Kevin is having hypoglycemic readings per week.   Hyperglycemia:  DKA:   Elevated BG values tend to occur     Exercise: None    Blood Glucose Data: none available to review. Has not checked for two weeks    A1c:  Today s hemoglobin A1c: n/a  Lab Results   Component Value Date    A1C 14.3 01/16/2020    A1C 13.7 12/03/2019    A1C 12.0 08/27/2019    A1C 10.7 05/28/2019    A1C 13.4 10/30/2018       Result was discussed at today's visit.     Current insulin regimen:   Continue Tresiba 22 units daily in the morning  Not receiving any of her short acting insulin but this is her plan:  Must test blood glucose twice a day (morning and evening) and give correction doses as noted below using rapid acting insulin.  Carb ratio 1 per 15 grams    Blood Glucose    Units of Insulin             181 - 220         + 1 units             221 - 260         + 2 units             261 - 300         + 3 units             301 - 340         + 4 units             341 - 380         + 5 units             381 - 420         + 6 units             421 - 460         + 7 units     461-500  + 8 units            >500         + 9 units       Insulin administration site(s): arms/buttocks    I reviewed new history from the patient and the medical record.  I have reviewed previous lab results and records, patient BMI and the growth chart at today's visit.      History was obtained from patient and patient's mother.          Social History:     Social History     Social History Narrative    Lives with parents and younger brother.  Currently in 12th grade.  Loves to sing   Going back to school to finish high school - back at [employer].  Was working in cafeteria  Living at home.         Family History:     Family History   Problem Relation Age of Onset     Hearing Loss Father      Family  history was reviewed and is unchanged. Refer to the initial note.         Allergies:     Allergies   Allergen Reactions     Morphine Sulfate      No exposure but grandfather has the allergy to it     Tylenol [Acetaminophen]      Has glycogen storage disease and should not receive Tylenol, per GI.             Medications:     Current Outpatient Medications   Medication Sig Dispense Refill     acetone, Urine, test STRP Check urine ketones when two consecutive blood sugars are greater than 300 and at times of illness/vomiting. 100 each 11     blood glucose monitoring (ONE TOUCH DELICA) lancets Use to test blood sugars 6 times daily. 1 Box 12     blood glucose monitoring (ONETOUCH VERIO IQ) test strip Use to test blood sugars 6 times daily or as directed. 200 strip 11     Continuous Blood Gluc Sensor (DEXCOM G6 SENSOR) MISC 1 each See Admin Instructions Change every 10 days 9 each 3     Continuous Blood Gluc Transmit (DEXCOM G6 TRANSMITTER) MISC 1 each every 3 months 1 each 3     insulin aspart (NOVOLOG PEN) 100 UNIT/ML injection Inject 1 unit for every 10 grams of carbs, uses up to 50 units daily. 45 mL 3     insulin degludec (TRESIBA FLEXTOUCH) 100 UNIT/ML pen Inject 30 units daily. 15 mL 6     insulin pen needle (BD CALLI U/F) 32G X 4 MM miscellaneous Use pen needles 6 times daily. 200 each 6     melatonin 3 MG tablet Take 1 tablet (3 mg) by mouth At Bedtime       sertraline (ZOLOFT) 50 MG tablet Take 1 tablet (50 mg) by mouth daily (Patient not taking: Reported on 10/30/2018) 30 tablet 0             Review of Systems:   GENERAL: Weight loss  EYE: No visual disturbance.  ENT: No hearing loss.  No nasal discharge.  No sore throat.  RESPIRATORY: No cough or wheezing  CARDIO: No chest pain. No palpitations.  No rapid heart rate. No hypertension.  GASTROINTESTINAL: No recent vomiting or diarrhea. No constipation. No abdominal pain.  HEMATOLOGIC: No bleeding disorders. No amemia.  GENITOURINARY: No dysuria or  hematuria.  MUSCOLOSKELETAL: No joint pain. No muscular weakness.  PSYCHIATRIC: +depression  NEURO: No seizures.  No headaches. No focal deficits noted.  SKIN: No rashes or skin changes.  ENDOCRINE: see HPI - no metabolic crises         Physical Exam:   There were no vitals taken for this visit.  Blood pressure percentiles are not available for patients who are 18 years or older.  Height: Data Unavailable, Normalized stature-for-age data not available for patients older than 20 years.  Weight: 0 lbs 0 oz, Normalized weight-for-age data not available for patients older than 20 years.  BMI: There is no height or weight on file to calculate BMI., No height and weight on file for this encounter.      CONSTITUTIONAL:   Awake, alert, and in no apparent distress.  Very thin in appearance.              Health Maintenance:     No DKA  Labs: 5/2017  Flu vaccine: [id number]    PHQ-2 ( 1999 Pfizer) 1/16/2020 12/3/2019   Q1: Little interest or pleasure in doing things 2 3   Q2: Feeling down, depressed or hopeless 2 3   PHQ-2 Score 4 6     [facility] Follow-up to PHQ 8/27/2019 12/3/2019 1/16/2020   PHQ-9 9. Suicide Ideation past 2 weeks Several days More than half the days Not at all   Thoughts of suicide or self harm in past 2 weeks - No -   PHQ-A Suicide Ideation past month No - -   PHQ-A Previous suicide attempt in past year Yes - -         Laboratory results:     Hemoglobin A1C   Date Value Ref Range Status   01/16/2020 14.3 (A) 0 - 5.6 % Final     TSH   Date Value Ref Range Status   06/17/2019 2.81 mcU/mL Final     T4 Free   Date Value Ref Range Status   02/17/2012 1.03 0.70 - 1.85 ng/dL Final     Vitamin D 1,25   Date Value Ref Range Status   01/30/2009 26  Final     Comment:     Reference range: 15 to 75  Unit: pg/mL  (Note)  Performed by [facility],  [address] [place] [id number] [phone]  www."[email or web address] Nima Orellana MD - Lab. Director     Insulin Antibodies   Date Value Ref Range Status   11/21/2014   Final     <0.4  Reference range: 0.0 to 0.4  Unit: U/mL  (Note)  INTERPRETIVE INFORMATION: Insulin Antibody  This assay quantitatively measures human serum  autoantibodies to endogenous insulin or antibodies to  exogenous insulin.  A value of greater than 0.4 Kronus  Units/mL is considered positive for Insulin Antibody.  Kronus Units are arbitrary. Kronus Units = U/mL  Performed by [facility],  [address] [place] [address] [phone]  [email or web address], Campos Mims MD, Lab. Director       Islet Cell Antibody IgG   Date Value Ref Range Status   11/21/2014   Final    <1:4  Reference range: <1:4  (Note)  INTERPRETIVE INFORMATION: Islet Cell Ab, IgG  Islet cell antibodies (ICAs) are associated with type 1  diabetes (TID), an autoimmune endocrine disorder. These  antibodies may be present in individuals years before the  onset of clinical symptoms. To calculate  Juvenile Diabetes Foundation (JDF) units: multiply the  titer x 5 (1:8  8 x 5 = 40 JDF Units).  Performed by [facility],  [address] [facility] [address] [phone]  [email or web address], Campos Mims MD, Lab. Director       Cholesterol   Date Value Ref Range Status   06/17/2019 203 mg/dL Final     Albumin Urine mg/L   Date Value Ref Range Status   03/27/2018 <5 mg/L Final     Triglycerides   Date Value Ref Range Status   06/17/2019 383 mg/dL Final     HDL Cholesterol   Date Value Ref Range Status   06/17/2019 36 mg/dL Final     LDL Cholesterol Calculated   Date Value Ref Range Status   06/17/2019 112 mg/dL Final     Cholesterol/HDL Ratio   Date Value Ref Range Status   11/21/2014 7.6 (H) 0.0 - 5.0 Final     Non HDL Cholesterol   Date Value Ref Range Status   06/17/2019 167 mg/dl Final     Results for CLINT BETANCOURT (MRN [id number])    Ref. Range 6/17/2019 00:00   Albumin Urine mg/spec Unknown <0.2          Assessment and Plan:   Clint  is a 20 year old female with diabetes mellitus associated with GSDIII following recurrent pancreatitis episodes.   "        Sincerely,    Vincent Garner MD    Pager [phone]      CCPatient Care Team:  Vanessa Wright as PCP - General (Physician Assistant)  Cl[facility], [facility] as PCP - Mental Health/Behavioral Medicine  Avani Oliver MD as MD (Pediatric Metabolism)  Vincent Garner MD as MD (Pediatric Endocrinology)  Pearl Reyes MD as MD (Pediatrics)  PEARL REYES    Copy to patient  PHILLIP STEPHENS EDUARDO  [address] [place] [address]      Phone call duration: *** minutes    Vincent Garner MD    Pager [phone]        Kevin Stephens is a 20 year old adult who is being evaluated via a billable telephone visit.      The patient has been notified of following:     \"This telephone visit will be conducted via a call between you and your physician/provider. We have found that certain health care needs can be provided without the need for a physical exam.  This service lets us provide the care you need with a short phone conversation.  If a prescription is necessary we can send it directly to your pharmacy.  If lab work is needed we can place an order for that and you can then stop by our lab to have the test done at a later time.    Telephone visits are billed at different rates depending on your insurance coverage. During this emergency period, for some insurers they may be billed the same as an in-person visit.  Please reach out to your insurance provider with any questions.    If during the course of the call the physician/provider feels a telephone visit is not appropriate, you will not be charged for this service.\"    Patient has given verbal consent for Telephone visit?  Yes    First Number to try (Mom): [phone]  Back up Number: Kevin's cell at [phone].    How would you like to obtain your AVS? E-Mail (inform patient AVS not encrypted)    Additional provider notes:   Pediatric Endocrinology Follow-up Consultation: Diabetes    Patient: " Kevin Stephens MRN# [id number]   YOB: 2000 Age: 20 year old   Date of Visit: April 16, 2020    Dear Ms. Wright:    I had the pleasure of seeing your patient, Kevin Stephens for a phone visit through the Pediatric Endocrinology Clinic, [facility], on April 16, 2020  for a follow-up consultation of diabetes mellitus following recurrent pancreatitis and a history for GSD III.           Problem list:     Patient Active Problem List    Diagnosis Date Noted     Hyperglycemia 04/18/2018     Priority: Medium     Depression with suicidal ideation 05/18/2017     Priority: Medium     Secondary diabetes mellitus (H) 11/30/2014     Priority: Medium     Problem list name updated by automated process. Provider to review       Family history of congenital hearing loss 09/26/2012     Priority: Medium     Kevin's father has bilateral, congenital hearing loss. There is no known cause, and no genetic testing has been completed.       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  My last visit with Kevin was on 1/16/20. Kevin's diabetes care has been extremely poor for quite some time as indicated by her past A1c values.  They has not reached the stage of acceptance of her diabetes.  We have tried multiple different insulin types, approaches, and extra clinic visits which have not altered her course.   At our last visit, they had no monitoring data.  I was encouraged that she had seen her counselor but they did not speak for her entire visit and she left (by report).  They had  stopped her antidepressants on her own.  She reported that they was taking her long acting insulin 5/7 days (in am) at that visit.  They was not taking any rapid acting insulin.  We discussed having a goal of one test per day and giving correction at that time.     She states she has not been giving any insulin or testing BG levels.  She can't explain why this happened.  She states she was not staying on top of things.  She has not been using her dexcom.  She is waking frequently to urinate.  Increased urinary frerquency.  Increaed thirst.  Weight has gone down.  Last time she took any insulin was a month ago (29 units).  She was not having any lows.  She never took any rapid acting insulin.  Feels like she is in an OK place from a depression.  She did restart her antidepressants.  They have a new therapist through [facility] - they are working on getting this set up virtually.    Today's concerns include:      Hypoglycemia: Kevin is having hypoglycemic readings per week.   Hyperglycemia:  DKA:   Elevated BG values tend to occur     Exercise: None    Blood Glucose Data: none available to review. Has not checked for two weeks    A1c:  Today s hemoglobin A1c: n/a  Lab Results   Component Value Date    A1C 14.3 01/16/2020    A1C 13.7 12/03/2019    A1C 12.0 08/27/2019    A1C 10.7 05/28/2019    A1C 13.4 10/30/2018       Result was discussed at today's visit.     Current insulin regimen:   Continue Tresiba 22 units daily in the morning  Not receiving any of her short acting insulin but this is her plan:  Must test blood glucose twice a day (morning and evening) and give correction doses as noted below using rapid acting insulin.  Carb ratio 1 per 15 grams    Blood Glucose    Units of Insulin             181 - 220         + 1 units             221 - 260         + 2 units             261 - 300         + 3 units             301 - 340         + 4 units             341 - 380         + 5 units             381 - 420         + 6  units             421 - 460         + 7 units     461-500  + 8 units            >500         + 9 units       Insulin administration site(s): arms/buttocks    I reviewed new history from the patient and the medical record.  I have reviewed previous lab results and records, patient BMI and the growth chart at today's visit.      History was obtained from patient and patient's mother.          Social History:     Social History     Social History Narrative    Lives with parents and younger brother.  Currently in 12th grade.  Loves to sing   Went back to finish high school - graduated  In March.  Has been able to continue working in the [employer]  Living at home.         Family History:     Family History   Problem Relation Age of Onset     Hearing Loss Father      Family history was reviewed and is unchanged. Refer to the initial note.         Allergies:     Allergies   Allergen Reactions     Morphine Sulfate      No exposure but grandfather has the allergy to it     Tylenol [Acetaminophen]      Has glycogen storage disease and should not receive Tylenol, per GI.             Medications:     Current Outpatient Medications   Medication Sig Dispense Refill     acetone, Urine, test STRP Check urine ketones when two consecutive blood sugars are greater than 300 and at times of illness/vomiting. 100 each 11     blood glucose monitoring (ONE TOUCH DELICA) lancets Use to test blood sugars 6 times daily. 1 Box 12     blood glucose monitoring (ONETOUCH VERIO IQ) test strip Use to test blood sugars 6 times daily or as directed. 200 strip 11     Continuous Blood Gluc Sensor (DEXCOM G6 SENSOR) MISC 1 each See Admin Instructions Change every 10 days 9 each 3     Continuous Blood Gluc Transmit (DEXCOM G6 TRANSMITTER) MISC 1 each every 3 months 1 each 3     insulin aspart (NOVOLOG PEN) 100 UNIT/ML injection Inject 1 unit for every 10 grams of carbs, uses up to 50 units daily. 45 mL 3     insulin degludec (TRESIBA FLEXTOUCH) 100  UNIT/ML pen Inject 30 units daily. 15 mL 6     insulin pen needle (BD CALLI U/F) 32G X 4 MM miscellaneous Use pen needles 6 times daily. 200 each 6     melatonin 3 MG tablet Take 1 tablet (3 mg) by mouth At Bedtime       sertraline (ZOLOFT) 50 MG tablet Take 1 tablet (50 mg) by mouth daily (Patient not taking: Reported on 10/30/2018) 30 tablet 0             Review of Systems:   GENERAL: Weight loss  EYE: No visual disturbance.  ENT: No hearing loss.  No nasal discharge.  No sore throat.  RESPIRATORY: No cough or wheezing  CARDIO: No chest pain. No palpitations.  No rapid heart rate. No hypertension.  GASTROINTESTINAL: No recent vomiting or diarrhea. No constipation. No abdominal pain.  HEMATOLOGIC: No bleeding disorders. No amemia.  GENITOURINARY: No dysuria or hematuria.  MUSCOLOSKELETAL: No joint pain. No muscular weakness.  PSYCHIATRIC: +depression - more stable on SSRI  NEURO: No seizures.  No headaches. No focal deficits noted.  SKIN: No rashes or skin changes.  ENDOCRINE: see HPI - no metabolic crises         Physical Exam:   There were no vitals taken for this visit.  Blood pressure percentiles are not available for patients who are 18 years or older.  Height: Data Unavailable, Normalized stature-for-age data not available for patients older than 20 years.  Weight: 0 lbs 0 oz, Normalized weight-for-age data not available for patients older than 20 years.  BMI: There is no height or weight on file to calculate BMI., No height and weight on file for this encounter.      No exam              Health Maintenance:     No DKA  Labs: 5/2017  Flu vaccine: [id number]    PHQ-2 ( 1999 Pfizer) 1/16/2020 12/3/2019   Q1: Little interest or pleasure in doing things 2 3   Q2: Feeling down, depressed or hopeless 2 3   PHQ-2 Score 4 6     [facility] Follow-up to PHQ 8/27/2019 12/3/2019 1/16/2020   PHQ-9 9. Suicide Ideation past 2 weeks Several days More than half the days Not at all   Thoughts of suicide or self harm in past 2 weeks -  No -   PHQ-A Suicide Ideation past month No - -   PHQ-A Previous suicide attempt in past year Yes - -         Laboratory results:     Hemoglobin A1C   Date Value Ref Range Status   01/16/2020 14.3 (A) 0 - 5.6 % Final     TSH   Date Value Ref Range Status   06/17/2019 2.81 mcU/mL Final     T4 Free   Date Value Ref Range Status   02/17/2012 1.03 0.70 - 1.85 ng/dL Final     Vitamin D 1,25   Date Value Ref Range Status   01/30/2009 26  Final     Comment:     Reference range: 15 to 75  Unit: pg/mL  (Note)  Performed by [facility],  [address] [facility] [place] [address] [phone]  [email or web address], Nima Orellana MD - Lab. Director     Insulin Antibodies   Date Value Ref Range Status   11/21/2014   Final    <0.4  Reference range: 0.0 to 0.4  Unit: U/mL  (Note)  INTERPRETIVE INFORMATION: Insulin Antibody  This assay quantitatively measures human serum  autoantibodies to endogenous insulin or antibodies to  exogenous insulin.  A value of greater than 0.4 Kronus  Units/mL is considered positive for Insulin Antibody.  Kronus Units are arbitrary. Kronus Units = U/mL  Performed by [facility],  [address] [facility] [place] [address] [phone]  [email or web address], Campos Mims MD, Lab. Director       Islet Cell Antibody IgG   Date Value Ref Range Status   11/21/2014   Final    <1:4  Reference range: <1:4  (Note)  INTERPRETIVE INFORMATION: Islet Cell Ab, IgG  Islet cell antibodies (ICAs) are associated with type 1  diabetes (TID), an autoimmune endocrine disorder. These  antibodies may be present in individuals years before the  onset of clinical symptoms. To calculate  Juvenile Diabetes Foundation (JDF) units: multiply the  titer x 5 (1:8  8 x 5 = 40 JDF Units).  Performed by [facility],  [address] [facility] [place] [address] [phone]  [email or web address], Campos Mims MD, Lab. Director       Cholesterol   Date Value Ref Range Status   06/17/2019 203 mg/dL Final     Albumin Urine mg/L   Date Value Ref Range Status    03/27/2018 <5 mg/L Final     Triglycerides   Date Value Ref Range Status   06/17/2019 383 mg/dL Final     HDL Cholesterol   Date Value Ref Range Status   06/17/2019 36 mg/dL Final     LDL Cholesterol Calculated   Date Value Ref Range Status   06/17/2019 112 mg/dL Final     Cholesterol/HDL Ratio   Date Value Ref Range Status   11/21/2014 7.6 (H) 0.0 - 5.0 Final     Non HDL Cholesterol   Date Value Ref Range Status   06/17/2019 167 mg/dl Final     Results for CLINT BETANCOURT (MRN [id number])    Ref. Range 6/17/2019 00:00   Albumin Urine mg/spec Unknown <0.2          Assessment and Plan:   Clint  is a 20 year old female with diabetes mellitus associated with GSDIII following recurrent pancreatitis episodes.  Clint has made no attempts at managing any component of her diabetes for the past month and is symptomatic (without Dka) of persistent hyperglycemia.  She agreed to try and get back on track with the changes we outlined below.  We have set up close phone follow-up to hopefully keep her on track.  I again reminded her of the consequences of long-term poor glycemic control.      Patient Instructions   Restart Tresiba at 25 units daily  Restart CGM - Viviane to work on helping with supplies if needed  Give correction doses once CGM restarted  Follow-up phone call in 2 weeks with Tarsha and in 4 weeks with me.    Sincerely,    Vincent Garner MD    Pager [phone]      CCPatient Care Team:  Vanessa Wright as PCP - General (Physician Assistant)  [facility] as PCP - Mental Health/Behavioral Medicine  Avani Oliver MD as MD (Pediatric Metabolism)  Vincent Garner MD as MD (Pediatric Endocrinology)  Pearl Reyes MD as MD (Pediatrics)  PEARL REYES    Copy to patient  [facility]IP BETANCOURT [facility]  [address] [place] [address]      Phone call duration: 21 minutes    Vincent Garner MD    Pager [phone]        Again, thank you for  allowing me to participate in the care of your patient.      Sincerely,    Vincent Garner MD

## 2020-04-16 NOTE — LETTER
"4/16/2020       RE: Kevin Stephens  [address]  [place] [address]     Dear Colleague,    Thank you for referring your patient, Kevin Stephens, to the [facility] at [facility]. Please see a copy of my visit note below.    duplicate    Kevin Stephens is a 20 year old adult who is being evaluated via a billable telephone visit.      The patient has been notified of following:     \"This telephone visit will be conducted via a call between you and your physician/provider. We have found that certain health care needs can be provided without the need for a physical exam.  This service lets us provide the care you need with a short phone conversation.  If a prescription is necessary we can send it directly to your pharmacy.  If lab work is needed we can place an order for that and you can then stop by our lab to have the test done at a later time.    Telephone visits are billed at different rates depending on your insurance coverage. During this emergency period, for some insurers they may be billed the same as an in-person visit.  Please reach out to your insurance provider with any questions.    If during the course of the call the physician/provider feels a telephone visit is not appropriate, you will not be charged for this service.\"    Patient has given verbal consent for Telephone visit?  Yes    First Number to try (Mom): [phone]  Back up Number: Kevin's cell at [phone].    How would you like to obtain your AVS? E-Mail (inform patient AVS not encrypted)    Additional provider notes:   Pediatric Endocrinology Follow-up Consultation: Diabetes    Patient: Kevin Stehpens MRN# [id number]   YOB: 2000 Age: 20 year old   Date of Visit: April 16, 2020    Dear Ms. Wright:    I had the pleasure of seeing your patient, Kevin Stephens for a phone visit through the Pediatric Endocrinology Clinic, [facility], on April 16, 2020  for a follow-up consultation of diabetes mellitus following recurrent pancreatitis and a history for GSD III.           Problem list:     Patient Active Problem List    Diagnosis Date Noted     Hyperglycemia 04/18/2018     Priority: Medium     Depression with suicidal ideation 05/18/2017     Priority: Medium     Secondary diabetes mellitus (H) 11/30/2014     Priority: Medium     Problem list name updated by automated process. Provider to review       Family history of congenital hearing loss 09/26/2012     Priority: Medium     Kevin's father has bilateral, congenital hearing loss. There is no known cause, and no genetic testing has been completed.       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  My last visit with Kevin was on 1/16/20. Kevin's diabetes care has been extremely poor for quite some time as indicated by her past A1c values.  They has not reached the stage of acceptance of her diabetes.  We have tried multiple different insulin types, approaches, and extra clinic visits which have not altered her course.   At our last visit, they had no monitoring data.  I was encouraged that she had seen her counselor but they did not speak for her entire visit and she left (by report).  They had stopped her antidepressants on her own.  She reported that they was taking her long acting insulin 5/7 days (in am) at that visit.  They was not taking any rapid acting insulin.  We discussed having a goal of one test per day and giving correction at that time.     She states she  has not been giving any insulin or testing BG levels.  She can't explain why this happened.  She states she was not staying on top of things.  She has not been using her dexcom.  She is waking frequently to urinate.  Increased urinary frerquency.  Increaed thirst.  Weight has gone down.  Last time she took any insulin was a month ago (29 units).  She was not having any lows.  She never took any rapid acting insulin.  Feels like she is in an OK place from a depression.  She did restart her antidepressants.  They have a new therapist through [facility] - they are working on getting this set up virtually.    Today's concerns include:      Hypoglycemia: Kevin is having hypoglycemic readings per week.   Hyperglycemia:  DKA:   Elevated BG values tend to occur     Exercise: None    Blood Glucose Data: none available to review. Has not checked for two weeks    A1c:  Today s hemoglobin A1c: n/a  Lab Results   Component Value Date    A1C 14.3 01/16/2020    A1C 13.7 12/03/2019    A1C 12.0 08/27/2019    A1C 10.7 05/28/2019    A1C 13.4 10/30/2018       Result was discussed at today's visit.     Current insulin regimen:   Continue Tresiba 22 units daily in the morning  Not receiving any of her short acting insulin but this is her plan:  Must test blood glucose twice a day (morning and evening) and give correction doses as noted below using rapid acting insulin.  Carb ratio 1 per 15 grams    Blood Glucose    Units of Insulin             181 - 220         + 1 units             221 - 260         + 2 units             261 - 300         + 3 units             301 - 340         + 4 units             341 - 380         + 5 units             381 - 420         + 6 units             421 - 460         + 7 units     461-500  + 8 units            >500         + 9 units       Insulin administration site(s): arms/buttocks    I reviewed new history from the patient and the medical record.  I have reviewed previous lab results and records, patient BMI  and the growth chart at today's visit.      History was obtained from patient and patient's mother.          Social History:     Social History     Social History Narrative    Lives with parents and younger brother.  Currently in 12th grade.  Loves to sing   Went back to finish high school - graduated  In March.  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         Family History:     Family History   Problem Relation Age of Onset     Hearing Loss Father      Family history was reviewed and is unchanged. Refer to the initial note.         Allergies:     Allergies   Allergen Reactions     Morphine Sulfate      No exposure but grandfather has the allergy to it     Tylenol [Acetaminophen]      Has glycogen storage disease and should not receive Tylenol, per GI.             Medications:     Current Outpatient Medications   Medication Sig Dispense Refill     acetone, Urine, test STRP Check urine ketones when two consecutive blood sugars are greater than 300 and at times of illness/vomiting. 100 each 11     blood glucose monitoring (ONE TOUCH DELICA) lancets Use to test blood sugars 6 times daily. 1 Box 12     blood glucose monitoring (ONETOUCH VERIO IQ) test strip Use to test blood sugars 6 times daily or as directed. 200 strip 11     Continuous Blood Gluc Sensor (DEXCOM G6 SENSOR) MISC 1 each See Admin Instructions Change every 10 days 9 each 3     Continuous Blood Gluc Transmit (DEXCOM G6 TRANSMITTER) MISC 1 each every 3 months 1 each 3     insulin aspart (NOVOLOG PEN) 100 UNIT/ML injection Inject 1 unit for every 10 grams of carbs, uses up to 50 units daily. 45 mL 3     insulin degludec (TRESIBA FLEXTOUCH) 100 UNIT/ML pen Inject 30 units daily. 15 mL 6     insulin pen needle (BD CALLI U/F) 32G X 4 MM miscellaneous Use pen needles 6 times daily. 200 each 6     melatonin 3 MG tablet Take 1 tablet (3 mg) by mouth At Bedtime       sertraline (ZOLOFT) 50 MG tablet Take 1 tablet (50 mg) by mouth daily  (Patient not taking: Reported on 10/30/2018) 30 tablet 0             Review of Systems:   GENERAL: Weight loss  EYE: No visual disturbance.  ENT: No hearing loss.  No nasal discharge.  No sore throat.  RESPIRATORY: No cough or wheezing  CARDIO: No chest pain. No palpitations.  No rapid heart rate. No hypertension.  GASTROINTESTINAL: No recent vomiting or diarrhea. No constipation. No abdominal pain.  HEMATOLOGIC: No bleeding disorders. No amemia.  GENITOURINARY: No dysuria or hematuria.  MUSCOLOSKELETAL: No joint pain. No muscular weakness.  PSYCHIATRIC: +depression - more stable on SSRI  NEURO: No seizures.  No headaches. No focal deficits noted.  SKIN: No rashes or skin changes.  ENDOCRINE: see HPI - no metabolic crises         Physical Exam:   There were no vitals taken for this visit.  Blood pressure percentiles are not available for patients who are 18 years or older.  Height: Data Unavailable, Normalized stature-for-age data not available for patients older than 20 years.  Weight: 0 lbs 0 oz, Normalized weight-for-age data not available for patients older than 20 years.  BMI: There is no height or weight on file to calculate BMI., No height and weight on file for this encounter.      No exam              Health Maintenance:     No DKA  Labs: 5/2017  Flu vaccine: [id number]    PHQ-2 ( 1999 Pfizer) 1/16/2020 12/3/2019   Q1: Little interest or pleasure in doing things 2 3   Q2: Feeling down, depressed or hopeless 2 3   PHQ-2 Score 4 6     [facility] Follow-up to PHQ 8/27/2019 12/3/2019 1/16/2020   PHQ-9 9. Suicide Ideation past 2 weeks Several days More than half the days Not at all   Thoughts of suicide or self harm in past 2 weeks - No -   PHQ-A Suicide Ideation past month No - -   PHQ-A Previous suicide attempt in past year Yes - -         Laboratory results:     Hemoglobin A1C   Date Value Ref Range Status   01/16/2020 14.3 (A) 0 - 5.6 % Final     TSH   Date Value Ref Range Status   06/17/2019 2.81 mcU/mL Final      T4 Free   Date Value Ref Range Status   02/17/2012 1.03 0.70 - 1.85 ng/dL Final     Vitamin D 1,25   Date Value Ref Range Status   01/30/2009 26  Final     Comment:     Reference range: 15 to 75  Unit: pg/mL  (Note)  Performed by [facility],  [address] [facility] [place] [address] [phone]  [email or web address], Nima Orellana MD - Lab. Director     Insulin Antibodies   Date Value Ref Range Status   11/21/2014   Final    <0.4  Reference range: 0.0 to 0.4  Unit: U/mL  (Note)  INTERPRETIVE INFORMATION: Insulin Antibody  This assay quantitatively measures human serum  autoantibodies to endogenous insulin or antibodies to  exogenous insulin.  A value of greater than 0.4 Kronus  Units/mL is considered positive for Insulin Antibody.  Kronus Units are arbitrary. Kronus Units = U/mL  Performed by [facility],  [address] [place] [address] [phone]  [email or web address], Campos Mims MD, Lab. Director       Islet Cell Antibody IgG   Date Value Ref Range Status   11/21/2014   Final    <1:4  Reference range: <1:4  (Note)  INTERPRETIVE INFORMATION: Islet Cell Ab, IgG  Islet cell antibodies (ICAs) are associated with type 1  diabetes (TID), an autoimmune endocrine disorder. These  antibodies may be present in individuals years before the  onset of clinical symptoms. To calculate  Juvenile Diabetes Foundation (JDF) units: multiply the  titer x 5 (1:8  8 x 5 = 40 JDF Units).  Performed by [facility],  [address] [place] [address] [phone]  [email or web address], Campos Mims MD, Lab. Director       Cholesterol   Date Value Ref Range Status   06/17/2019 203 mg/dL Final     Albumin Urine mg/L   Date Value Ref Range Status   03/27/2018 <5 mg/L Final     Triglycerides   Date Value Ref Range Status   06/17/2019 383 mg/dL Final     HDL Cholesterol   Date Value Ref Range Status   06/17/2019 36 mg/dL Final     LDL Cholesterol Calculated   Date Value Ref Range Status   06/17/2019 112 mg/dL Final      Cholesterol/HDL Ratio   Date Value Ref Range Status   11/21/2014 7.6 (H) 0.0 - 5.0 Final     Non HDL Cholesterol   Date Value Ref Range Status   06/17/2019 167 mg/dl Final     Results for CLINT BETANCOURT (MRN [id number])    Ref. Range 6/17/2019 00:00   Albumin Urine mg/spec Unknown <0.2          Assessment and Plan:   Clint  is a 20 year old female with diabetes mellitus associated with GSDIII following recurrent pancreatitis episodes.  Clint has made no attempts at managing any component of her diabetes for the past month and is symptomatic (without Dka) of persistent hyperglycemia.  She agreed to try and get back on track with the changes we outlined below.  We have set up close phone follow-up to hopefully keep her on track.  I again reminded her of the consequences of long-term poor glycemic control.      Patient Instructions   Restart Tresiba at 25 units daily  Restart CGM - Viviane to work on helping with supplies if needed  Give correction doses once CGM restarted  Follow-up phone call in 2 weeks with Tarsha and in 4 weeks with me.    Sincerely,    Vincent Garner MD    Pager [phone]      CCPatient Care Team:  Vanessa Wright as PCP - General (Physician Assistant)  [facility] as PCP - Mental Health/Behavioral Medicine  Avani Oliver MD as MD (Pediatric Metabolism)  Vincent Garner MD as MD (Pediatric Endocrinology)  Pearl Reyes MD as MD (Pediatrics)  PEARL REYES    Copy to patient  [facility]  [address] [place] [address]      Phone call duration: 22 minutes    Vincent Garner MD    Pager [phone]        Again, thank you for allowing me to participate in the care of your patient.      Sincerely,    Vincent Garner MD

## 2020-04-30 DIAGNOSIS — R73.9 HYPERGLYCEMIA: ICD-10-CM

## 2020-04-30 DIAGNOSIS — E10.65 TYPE 1 DIABETES MELLITUS WITH HYPERGLYCEMIA (H): ICD-10-CM

## 2020-04-30 RX ORDER — PROCHLORPERAZINE 25 MG/1
1 SUPPOSITORY RECTAL SEE ADMIN INSTRUCTIONS
Qty: 9 EACH | Refills: 3 | Status: SHIPPED | OUTPATIENT
Start: 2020-04-30

## 2020-04-30 RX ORDER — PEN NEEDLE, DIABETIC 32GX 5/32"
NEEDLE, DISPOSABLE MISCELLANEOUS
Qty: 200 EACH | Refills: 6 | Status: SHIPPED | OUTPATIENT
Start: 2020-04-30

## 2020-04-30 RX ORDER — PROCHLORPERAZINE 25 MG/1
1 SUPPOSITORY RECTAL
Qty: 1 EACH | Refills: 3 | Status: SHIPPED | OUTPATIENT
Start: 2020-04-30

## 2020-04-30 NOTE — TELEPHONE ENCOUNTER
Spoke with Kevin's mother Adenike by phone, she stated that Kevin was at work.  Adenike stated that she has been watching Kevin take her 25 units of Tresiba daily since the visit.  She stated that she has not added in Novolog correction scale yet, plan is to review Dexcom reports with Kevin and her mother next week and come up with plan to start taking Novolog corrections is Kevin has not started yet.    Kevin has also been wearing her Dexcom since received in mail after appointment on 4/16 Adenike stated.    Dexcom Clarity sharing capability sent to email and instructions for dexcom follow as well.  Will plan to check in next week.

## 2020-05-05 ENCOUNTER — VIRTUAL VISIT (OUTPATIENT)
Dept: PEDIATRICS | Facility: CLINIC | Age: 20
End: 2020-05-05
Attending: PEDIATRICS
Payer: COMMERCIAL

## 2020-05-05 DIAGNOSIS — R73.9 HYPERGLYCEMIA: Primary | ICD-10-CM

## 2020-05-05 DIAGNOSIS — E74.03 GLYCOGEN STORAGE DISEASE III (H): ICD-10-CM

## 2020-05-05 NOTE — PATIENT INSTRUCTIONS
No changes to current settings  Continue working on consistency with short acting insulin for meals and corrections  Keep up good work with long acting insulin  Treat with 5-10 grams if having symptoms  Follow-up in 2-4 weeks.

## 2020-05-05 NOTE — PROGRESS NOTES
Pediatric Endocrinology Follow-up Consultation: Diabetes    Patient: Kevin Stephens MRN# [id number]   YOB: 2000 Age: 20 year old   Date of Visit: 5/5/20    Dear Ms. Wright:    I had the pleasure of seeing your patient, Kevin Stephens for a phone visit through the Pediatric Endocrinology Clinic, [facility], on 5/5/20  for a follow-up consultation of diabetes mellitus following recurrent pancreatitis and a history for GSD III.           Problem list:     Patient Active Problem List    Diagnosis Date Noted     Hyperglycemia 04/18/2018     Priority: Medium     Depression with suicidal ideation 05/18/2017     Priority: Medium     Secondary diabetes mellitus (H) 11/30/2014     Priority: Medium     Problem list name updated by automated process. Provider to review       Family history of congenital hearing loss 09/26/2012     Priority: Medium     Kevin's father has bilateral, congenital hearing loss. There is no known cause, and no genetic testing has been completed.       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  My last visit with Kevin was a phone visit on 4/16/20. Kevin was not taking any insulin at all.  She was not monitoring herself.  I attempted to get her back to taking her long acting insulin and doing 1-2 corrections.  We also worked with her to get her CGM restarted.      Kevin's diabetes care has been extremely poor for quite some time as indicated by her past A1c values.  They has not reached the stage of acceptance of her diabetes.  We have tried  multiple different insulin types, approaches, and extra clinic visits which have not altered her course.   She had seen her counselor previously but they did not speak for her entire visit and she left (by report).  They had stopped her antidepressants on her own. At our last visit, Kevin reported that she was getting set up with a new therapist and had restarted her antidepressants.      She started dexcom last week.  She is using her short acting insulin at work now.  She is using it when she gets alarmed.  She is using the long acting insulin every morning.  She reports feeling good.  She states her numbers have been kind of low in the mornings - around 100.  Getting symptoms at that number.  She is occasionally getting insulin for meals.      Today's concerns include:      Hypoglycemia: Kevin is having 2-3 hypoglycemic readings per week.   Hyperglycemia:  DKA:   Elevated BG values tend to occur     Exercise: None    Blood Glucose Data:       CGM - reviewed last week's worth of data - wore 2/7 days  Mean: 349 +/- 69  99% high  1% in target    A1c:  Today s hemoglobin A1c: n/a  Lab Results   Component Value Date    A1C 14.3 01/16/2020    A1C 13.7 12/03/2019    A1C 12.0 08/27/2019    A1C 10.7 05/28/2019    A1C 13.4 10/30/2018       Result was discussed at today's visit.     Current insulin regimen:   Continue Tresiba 25 units daily in the morning  Not receiving any of her short acting insulin but this is her plan:  Must test blood glucose twice a day (morning and evening) and give correction doses as noted below using rapid acting insulin.  Carb ratio 1 per 15 grams    Blood Glucose    Units of Insulin             181 - 220         + 1 units             221 - 260         + 2 units             261 - 300         + 3 units             301 - 340         + 4 units             341 - 380         + 5 units             381 - 420         + 6 units             421 - 460         + 7 units     461-500  + 8 units             >500         + 9 units       Insulin administration site(s): stomach    I reviewed new history from the patient and the medical record.  I have reviewed previous lab results and records, patient BMI and the growth chart at today's visit.      History was obtained from patient and patient's mother.          Social History:     Social History     Social History Narrative    Lives with parents and younger brother.  Currently in 12th grade.  Loves to sing   Went back to finish high school - graduated  In March.  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         Family History:     Family History   Problem Relation Age of Onset     Hearing Loss Father      Family history was reviewed and is unchanged. Refer to the initial note.         Allergies:     Allergies   Allergen Reactions     Morphine Sulfate      No exposure but grandfather has the allergy to it     Tylenol [Acetaminophen]      Has glycogen storage disease and should not receive Tylenol, per GI.             Medications:     Current Outpatient Medications   Medication Sig Dispense Refill     acetone, Urine, test STRP Check urine ketones when two consecutive blood sugars are greater than 300 and at times of illness/vomiting. 100 each 11     blood glucose monitoring (ONE TOUCH DELICA) lancets Use to test blood sugars 6 times daily. 1 Box 12     blood glucose monitoring (ONETOUCH VERIO IQ) test strip Use to test blood sugars 6 times daily or as directed. 200 strip 11     Continuous Blood Gluc Sensor (DEXCOM G6 SENSOR) MISC 1 each See Admin Instructions Change every 10 days 9 each 3     Continuous Blood Gluc Transmit (DEXCOM G6 TRANSMITTER) MISC 1 each every 3 months 1 each 3     insulin aspart (NOVOLOG PEN) 100 UNIT/ML pen Correct 1 unit per 40 over 180 before breakfast, lunch, dinner and at bedtime. 45 mL 1     insulin degludec (TRESIBA FLEXTOUCH) 100 UNIT/ML pen Inject 25 units daily. 30 mL 1     insulin pen needle (BD CALLI U/F) 32G X 4  MM miscellaneous Use pen needles 6 times daily. 200 each 6     melatonin 3 MG tablet Take 1 tablet (3 mg) by mouth At Bedtime       sertraline (ZOLOFT) 50 MG tablet Take 1 tablet (50 mg) by mouth daily (Patient not taking: Reported on 10/30/2018) 30 tablet 0             Review of Systems:   GENERAL: Weight loss  EYE: No visual disturbance.  ENT: No hearing loss.  No nasal discharge.  No sore throat.  RESPIRATORY: No cough or wheezing  CARDIO: No chest pain. No palpitations.  No rapid heart rate. No hypertension.  GASTROINTESTINAL: No recent vomiting or diarrhea. No constipation. No abdominal pain.  HEMATOLOGIC: No bleeding disorders. No amemia.  GENITOURINARY: No dysuria or hematuria.  MUSCOLOSKELETAL: No joint pain. No muscular weakness.  PSYCHIATRIC: +depression - more stable on SSRI  NEURO: No seizures.  No headaches. No focal deficits noted.  SKIN: No rashes or skin changes.  ENDOCRINE: see HPI - no metabolic crises         Physical Exam:   There were no vitals taken for this visit.  Blood pressure percentiles are not available for patients who are 18 years or older.  Height: Data Unavailable, Normalized stature-for-age data not available for patients older than 20 years.  Weight: 0 lbs 0 oz, Normalized weight-for-age data not available for patients older than 20 years.  BMI: There is no height or weight on file to calculate BMI., No height and weight on file for this encounter.      GENERAL: Healthy, alert and no distress  EYES: Eyes grossly normal to inspection, conjunctivae and sclerae normal  RESP: no audible wheeze, cough, or visible cyanosis.  No visible retractions or increased work of breathing.  Able to speak fully in complete sentences.  NEURO: Cranial nerves grossly intact, mentation intact and speech normal  PSYCH: mentation appears normal, affect normal/bright, judgement and insight intact, normal speech and appearance well-groomed            Health Maintenance:     No DKA  Labs: 5/2017  Flu  vaccine: [id number]    PHQ-2 ( 1999 Pfizer) 1/16/2020 12/3/2019   Q1: Little interest or pleasure in doing things 2 3   Q2: Feeling down, depressed or hopeless 2 3   PHQ-2 Score 4 6     [facility] Follow-up to PHQ 8/27/2019 12/3/2019 1/16/2020   PHQ-9 9. Suicide Ideation past 2 weeks Several days More than half the days Not at all   Thoughts of suicide or self harm in past 2 weeks - No -   PHQ-A Suicide Ideation past month No - -   PHQ-A Previous suicide attempt in past year Yes - -         Laboratory results:     Hemoglobin A1C   Date Value Ref Range Status   01/16/2020 14.3 (A) 0 - 5.6 % Final     TSH   Date Value Ref Range Status   06/17/2019 2.81 mcU/mL Final     T4 Free   Date Value Ref Range Status   02/17/2012 1.03 0.70 - 1.85 ng/dL Final     Vitamin D 1,25   Date Value Ref Range Status   01/30/2009 26  Final     Comment:     Reference range: 15 to 75  Unit: pg/mL  (Note)  Performed by [facility],  [address] [place] [address] [phone]  [email or web address], Nima Orellana MD - Lab. Director     Insulin Antibodies   Date Value Ref Range Status   11/21/2014   Final    <0.4  Reference range: 0.0 to 0.4  Unit: U/mL  (Note)  INTERPRETIVE INFORMATION: Insulin Antibody  This assay quantitatively measures human serum  autoantibodies to endogenous insulin or antibodies to  exogenous insulin.  A value of greater than 0.4 Kronus  Units/mL is considered positive for Insulin Antibody.  Kronus Units are arbitrary. Kronus Units = U/mL  Performed by [facility],  [address] [place] [address] [phone]  [email or web address], Campos Mims MD, Lab. Director       Islet Cell Antibody IgG   Date Value Ref Range Status   11/21/2014   Final    <1:4  Reference range: <1:4  (Note)  INTERPRETIVE INFORMATION: Islet Cell Ab, IgG  Islet cell antibodies (ICAs) are associated with type 1  diabetes (TID), an autoimmune endocrine disorder. These  antibodies may be present in individuals years before the  onset of clinical  symptoms. To calculate  Juvenile Diabetes Foundation (JDF) units: multiply the  titer x 5 (1:8  8 x 5 = 40 JDF Units).  Performed by [facility],  [facility] [place] [address] [phone]  [email or web address], Campos Mims MD, Lab. Director       Cholesterol   Date Value Ref Range Status   06/17/2019 203 mg/dL Final     Albumin Urine mg/L   Date Value Ref Range Status   03/27/2018 <5 mg/L Final     Triglycerides   Date Value Ref Range Status   06/17/2019 383 mg/dL Final     HDL Cholesterol   Date Value Ref Range Status   06/17/2019 36 mg/dL Final     LDL Cholesterol Calculated   Date Value Ref Range Status   06/17/2019 112 mg/dL Final     Cholesterol/HDL Ratio   Date Value Ref Range Status   11/21/2014 7.6 (H) 0.0 - 5.0 Final     Non HDL Cholesterol   Date Value Ref Range Status   06/17/2019 167 mg/dl Final     Results for CLINT BETANCOURT (MRN [id number])    Ref. Range 6/17/2019 00:00   Albumin Urine mg/spec Unknown <0.2          Assessment and Plan:   Clint  is a 20 year old female with diabetes mellitus associated with GSDIII following recurrent pancreatitis episodes.  Clint has engaged much more readily in her diabetes care and I can see that she is using short acting insulin every now and then on the brief CGM download.  We will keep her doses the same and continue following up on a regular basis.    Patient Instructions   No changes to current settings  Continue working on consistency with short acting insulin for meals and corrections  Keep up good work with long acting insulin  Treat with 5-10 grams if having symptoms  Follow-up in 2-4 weeks.      Sincerely,    Vincent Garner MD    Pager [phone]      CCPatient Care Team:  Vanessa Wright as PCP - General (Physician Assistant)  [facility] as PCP - Mental Health/Behavioral Medicine  Avani Oliver MD as MD (Pediatric Metabolism)  Vincent Garner MD as MD (Pediatric Endocrinology)  Pearl Reyes MD as MD  (Pediatrics)  RADHA GARCIA    Copy to patient  PHILLIP BETANCOURT EDUARDO  [address] [place] [address]

## 2020-05-19 ENCOUNTER — TELEPHONE (OUTPATIENT)
Dept: PEDIATRICS | Facility: CLINIC | Age: 20
End: 2020-05-19

## 2020-05-19 NOTE — TELEPHONE ENCOUNTER
----- Message from Viviane Deleon sent at 5/12/2020 11:07 AM CDT -----  Regarding: Call to schedule  Call mom (Adenike) to schedule diabetes follow up in 2-3 weeks with Vincent or Elizabeth.    Thank you!!

## 2020-06-17 ENCOUNTER — TELEPHONE (OUTPATIENT)
Dept: ENDOCRINOLOGY | Facility: CLINIC | Age: 20
End: 2020-06-17

## 2020-06-17 NOTE — TELEPHONE ENCOUNTER
Attempted to contact the mother of Kevin to obtain blood glucose data prior to her video visit with Dr. Garner tomorrow 6/18. A detailed message was left asking mom to call or email back to discuss. It does not appear Kevin is wearing her Dexcom right now given no recent data available via [facility].     Both my direct number and email address were provided.     Nicole Chandra, CATHYN, RN  Pediatric Diabetes Educator  [phone]

## 2020-06-18 ENCOUNTER — VIRTUAL VISIT (OUTPATIENT)
Dept: PEDIATRICS | Facility: CLINIC | Age: 20
End: 2020-06-18
Attending: PEDIATRICS
Payer: COMMERCIAL

## 2020-06-18 DIAGNOSIS — E74.03 GLYCOGEN STORAGE DISEASE III (H): Primary | ICD-10-CM

## 2020-06-18 NOTE — PROGRESS NOTES
Pediatric Endocrinology Follow-up Consultation: Diabetes    Patient: Kevin Stephens MRN# [id number]   YOB: 2000 Age: 20 year old   Date of Visit: 6/18/20    Dear Ms. Wright:    I had the pleasure of seeing your patient, Kevin Stephens for a phone visit through the Pediatric Endocrinology Clinic, [facility], on 6/18/20 for a follow-up consultation of diabetes mellitus following recurrent pancreatitis and a history for GSD III.           Problem list:     Patient Active Problem List    Diagnosis Date Noted     Hyperglycemia 04/18/2018     Priority: Medium     Depression with suicidal ideation 05/18/2017     Priority: Medium     Secondary diabetes mellitus (H) 11/30/2014     Priority: Medium     Problem list name updated by automated process. Provider to review       Family history of congenital hearing loss 09/26/2012     Priority: Medium     Kevin's father has bilateral, congenital hearing loss. There is no known cause, and no genetic testing has been completed.       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  My last visit with Kevin was a phone visit on 5/1/20. Kevin had made nice progress at our last visit.  She was using a CGM fairly consistetnly.  She was giving rapid acting inuslin intermittently and was consistently giving her long acting insulin.    II did not make changes to her doses.   She had restarted her antidepressants.      She is not wearing her CGM.  She states she is not using rapid acting insulin.   She is using her antidepressants.  She  is taking her long acting insulin.  She states she just simply did not want to put her cgm back on.  Not testing her  BG    Today's concerns include:  none    Hypoglycemia: Kevin is having 0 hypoglycemic readings per week.   Hyperglycemia:  DKA:   Elevated BG values tend to occur     Exercise: None    Blood Glucose Data:         CGM - reviewed last week's worth of data - wore 2/14 days  Mean: 366 +/- 51  100% high    A1c:  Today s hemoglobin A1c: n/a  Lab Results   Component Value Date    A1C 14.3 01/16/2020    A1C 13.7 12/03/2019    A1C 12.0 08/27/2019    A1C 10.7 05/28/2019    A1C 13.4 10/30/2018       Result was discussed at today's visit.     Current insulin regimen:   Continue Tresiba 25 units daily in the morning  Must test blood glucose twice a day (morning and evening) and give correction doses as noted below using rapid acting insulin.  Carb ratio 1 per 15 grams    Blood Glucose    Units of Insulin             181 - 220         + 1 units             221 - 260         + 2 units             261 - 300         + 3 units             301 - 340         + 4 units             341 - 380         + 5 units             381 - 420         + 6 units             421 - 460         + 7 units     461-500  + 8 units            >500         + 9 units       Insulin administration site(s): stomach    I reviewed new history from the patient and the medical record.  I have reviewed previous lab results and records, patient BMI and the growth chart at today's visit.      History was obtained from patient and patient's mother.          Social History:     Social History     Social History Narrative    Lives with parents and younger brother.  Currently in 12th grade.  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         Family History:     Family History   Problem Relation Age of Onset     Hearing Loss Father      Family history was reviewed and is unchanged. Refer to the initial note.          Allergies:     Allergies   Allergen Reactions     Morphine Sulfate      No exposure but grandfather has the allergy to it     Tylenol [Acetaminophen]      Has glycogen storage disease and should not receive Tylenol, per GI.             Medications:     Current Outpatient Medications   Medication Sig Dispense Refill     acetone, Urine, test STRP Check urine ketones when two consecutive blood sugars are greater than 300 and at times of illness/vomiting. 100 each 11     blood glucose monitoring (ONE TOUCH DELICA) lancets Use to test blood sugars 6 times daily. 1 Box 12     blood glucose monitoring (ONETOUCH VERIO IQ) test strip Use to test blood sugars 6 times daily or as directed. 200 strip 11     Continuous Blood Gluc Sensor (DEXCOM G6 SENSOR) MISC 1 each See Admin Instructions Change every 10 days 9 each 3     Continuous Blood Gluc Transmit (DEXCOM G6 TRANSMITTER) MISC 1 each every 3 months 1 each 3     insulin aspart (NOVOLOG PEN) 100 UNIT/ML pen Correct 1 unit per 40 over 180 before breakfast, lunch, dinner and at bedtime. 45 mL 1     insulin degludec (TRESIBA FLEXTOUCH) 100 UNIT/ML pen Inject 25 units daily. 30 mL 1     insulin pen needle (BD CALLI U/F) 32G X 4 MM miscellaneous Use pen needles 6 times daily. 200 each 6     melatonin 3 MG tablet Take 1 tablet (3 mg) by mouth At Bedtime       sertraline (ZOLOFT) 50 MG tablet Take 1 tablet (50 mg) by mouth daily (Patient not taking: Reported on 10/30/2018) 30 tablet 0             Review of Systems:   GENERAL: Weight loss  EYE: No visual disturbance.  ENT: No hearing loss.  No nasal discharge.  No sore throat.  RESPIRATORY: No cough or wheezing  CARDIO: No chest pain. No palpitations.  No rapid heart rate. No hypertension.  GASTROINTESTINAL: No recent vomiting or diarrhea. No constipation. No abdominal pain.  HEMATOLOGIC: No bleeding disorders. No amemia.  GENITOURINARY: No dysuria or hematuria.  MUSCOLOSKELETAL: No joint pain. No muscular weakness.  PSYCHIATRIC:  +depression - no change - stable  NEURO: No seizures.  No headaches. No focal deficits noted.  SKIN: No rashes or skin changes.  ENDOCRINE: see HPI - no metabolic crises         Physical Exam:   There were no vitals taken for this visit.  Blood pressure percentiles are not available for patients who are 18 years or older.  Height: Data Unavailable, Normalized stature-for-age data not available for patients older than 20 years.  Weight: 0 lbs 0 oz, Normalized weight-for-age data not available for patients older than 20 years.  BMI: There is no height or weight on file to calculate BMI., No height and weight on file for this encounter.      GENERAL:  no distress  EYES: Eyes grossly normal to inspection, conjunctivae and sclerae normal  RESP: no audible wheeze, cough, or visible cyanosis.  No visible retractions or increased work of breathing.  Able to speak fully in complete sentences.  NEURO: Cranial nerves grossly intact, mentation intact and speech normal  PSYCH: mentation appears normal, affect normal/bright, judgement and insight intact, normal speech and appearance well-groomed            Health Maintenance:     No DKA  Labs: 5/2017  Flu vaccine: [id number]    PHQ-2 ( 1999 Pfizer) 1/16/2020 12/3/2019   Q1: Little interest or pleasure in doing things 2 3   Q2: Feeling down, depressed or hopeless 2 3   PHQ-2 Score 4 6     [facility] Follow-up to PHQ 8/27/2019 12/3/2019 1/16/2020   PHQ-9 9. Suicide Ideation past 2 weeks Several days More than half the days Not at all   Thoughts of suicide or self harm in past 2 weeks - No -   PHQ-A Suicide Ideation past month No - -   PHQ-A Previous suicide attempt in past year Yes - -         Laboratory results:     Hemoglobin A1C   Date Value Ref Range Status   01/16/2020 14.3 (A) 0 - 5.6 % Final     TSH   Date Value Ref Range Status   06/17/2019 2.81 mcU/mL Final     T4 Free   Date Value Ref Range Status   02/17/2012 1.03 0.70 - 1.85 ng/dL Final     Vitamin D 1,25   Date Value Ref Range  Status   01/30/2009 26  Final     Comment:     Reference range: 15 to 75  Unit: pg/mL  (Note)  Performed by [facility],  [address] [facility], [place] [address] [phone]  [email or web address], Niam Orellana MD - Lab. Director     Insulin Antibodies   Date Value Ref Range Status   11/21/2014   Final    <0.4  Reference range: 0.0 to 0.4  Unit: U/mL  (Note)  INTERPRETIVE INFORMATION: Insulin Antibody  This assay quantitatively measures human serum  autoantibodies to endogenous insulin or antibodies to  exogenous insulin.  A value of greater than 0.4 Kronus  Units/mL is considered positive for Insulin Antibody.  Kronus Units are arbitrary. Kronus Units = U/mL  Performed by [facility],  [address] [facility], [place] [address] [phone]  [email or web address], Campos Mims MD, Lab. Director       Islet Cell Antibody IgG   Date Value Ref Range Status   11/21/2014   Final    <1:4  Reference range: <1:4  (Note)  INTERPRETIVE INFORMATION: Islet Cell Ab, IgG  Islet cell antibodies (ICAs) are associated with type 1  diabetes (TID), an autoimmune endocrine disorder. These  antibodies may be present in individuals years before the  onset of clinical symptoms. To calculate  Juvenile Diabetes Foundation (JDF) units: multiply the  titer x 5 (1:8  8 x 5 = 40 JDF Units).  Performed by [facility],  [address] [facility], [place] [address] [phone]  [email or web address], Campos Mims MD, Lab. Director       Cholesterol   Date Value Ref Range Status   06/17/2019 203 mg/dL Final     Albumin Urine mg/L   Date Value Ref Range Status   03/27/2018 <5 mg/L Final     Triglycerides   Date Value Ref Range Status   06/17/2019 383 mg/dL Final     HDL Cholesterol   Date Value Ref Range Status   06/17/2019 36 mg/dL Final     LDL Cholesterol Calculated   Date Value Ref Range Status   06/17/2019 112 mg/dL Final     Cholesterol/HDL Ratio   Date Value Ref Range Status   11/21/2014 7.6 (H) 0.0 - 5.0 Final     Non HDL Cholesterol   Date Value Ref Range  Status   06/17/2019 167 mg/dl Final     Results for CLINT BETANCOURT (MRN [id number])    Ref. Range 6/17/2019 00:00   Albumin Urine mg/spec Unknown <0.2          Assessment and Plan:   Clint  is a 20 year old female with diabetes mellitus associated with GSDIII following recurrent pancreatitis episodes.  I have been unable to motivate Clint to care for herself after working with her for 4+ years.  I am gravely concerned about the development of microvascular complications at a young age and expressed that again to her today.  She does need to transition to adult care, but I am concerned she would never go.  I am hopeful that she will reach a level of acceptance prior to that transition, but she is running out of time.          Sincerely,    Vincent Garner MD    Pager [phone]      CCPatient Care Team:  Vanessa Wright as PCP - General (Physician Assistant)  [facility] as PCP - Mental Health/Behavioral Medicine  Avani Oliver MD as MD (Pediatric Metabolism)  Vincent Garner MD as MD (Pediatric Endocrinology)  Pearl Reyes MD as MD (Pediatrics)  PEARL REYES    Copy to patient  PHILLIP BETANCOURT EDUARDO  [address] [place] [address]

## 2020-06-18 NOTE — LETTER
6/18/2020       RE: Kevin Stephens  [address] [place] [address]     Dear Colleague,    Thank you for referring your patient, Kevin Stephens, to the [facility] at [facility]. Please see a copy of my visit note below.    Pediatric Endocrinology Follow-up Consultation: Diabetes    Patient: Kevin Stephens MRN# [id number]   YOB: 2000 Age: 20 year old   Date of Visit: 6/18/20    Dear Ms. Wright:    I had the pleasure of seeing your patient, Kevin Stephens for a phone visit through the Pediatric Endocrinology Clinic, [facility], on 6/18/20 for a follow-up consultation of diabetes mellitus following recurrent pancreatitis and a history for GSD III.           Problem list:     Patient Active Problem List    Diagnosis Date Noted     Hyperglycemia 04/18/2018     Priority: Medium     Depression with suicidal ideation 05/18/2017     Priority: Medium     Secondary diabetes mellitus (H) 11/30/2014     Priority: Medium     Problem list name updated by automated process. Provider to review       Family history of congenital hearing loss 09/26/2012     Priority: Medium     Kevin's father has bilateral, congenital hearing loss. There is no known cause, and no genetic testing has been completed.       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  My last visit with Kevin was a phone visit on 5/1/20. Kevin had made nice progress at our last visit.  She was using a CGM fairly consistetnly.  She was  giving rapid acting inuslin intermittently and was consistently giving her long acting insulin.    II did not make changes to her doses.   She had restarted her antidepressants.      She is not wearing her CGM.  She states she is not using rapid acting insulin.   She is using her antidepressants.  She is taking her long acting insulin.  She states she just simply did not want to put her cgm back on.  Not testing her  BG    Today's concerns include:  none    Hypoglycemia: Kevin is having 0 hypoglycemic readings per week.   Hyperglycemia:  DKA:   Elevated BG values tend to occur     Exercise: None    Blood Glucose Data:         CGM - reviewed last week's worth of data - wore 2/14 days  Mean: 366 +/- 51  100% high    A1c:  Today s hemoglobin A1c: n/a  Lab Results   Component Value Date    A1C 14.3 01/16/2020    A1C 13.7 12/03/2019    A1C 12.0 08/27/2019    A1C 10.7 05/28/2019    A1C 13.4 10/30/2018       Result was discussed at today's visit.     Current insulin regimen:   Continue Tresiba 25 units daily in the morning  Must test blood glucose twice a day (morning and evening) and give correction doses as noted below using rapid acting insulin.  Carb ratio 1 per 15 grams    Blood Glucose    Units of Insulin             181 - 220         + 1 units             221 - 260         + 2 units             261 - 300         + 3 units             301 - 340         + 4 units             341 - 380         + 5 units             381 - 420         + 6 units             421 - 460         + 7 units     461-500  + 8 units            >500         + 9 units       Insulin administration site(s): stomach    I reviewed new history from the patient and the medical record.  I have reviewed previous lab results and records, patient BMI and the growth chart at today's visit.      History was obtained from patient and patient's mother.          Social History:     Social History     Social History Narrative    Lives with parents and younger  brother.  Currently in 12th grade.  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         Family History:     Family History   Problem Relation Age of Onset     Hearing Loss Father      Family history was reviewed and is unchanged. Refer to the initial note.         Allergies:     Allergies   Allergen Reactions     Morphine Sulfate      No exposure but grandfather has the allergy to it     Tylenol [Acetaminophen]      Has glycogen storage disease and should not receive Tylenol, per GI.             Medications:     Current Outpatient Medications   Medication Sig Dispense Refill     acetone, Urine, test STRP Check urine ketones when two consecutive blood sugars are greater than 300 and at times of illness/vomiting. 100 each 11     blood glucose monitoring (ONE TOUCH DELICA) lancets Use to test blood sugars 6 times daily. 1 Box 12     blood glucose monitoring (ONETOUCH VERIO IQ) test strip Use to test blood sugars 6 times daily or as directed. 200 strip 11     Continuous Blood Gluc Sensor (DEXCOM G6 SENSOR) MISC 1 each See Admin Instructions Change every 10 days 9 each 3     Continuous Blood Gluc Transmit (DEXCOM G6 TRANSMITTER) MISC 1 each every 3 months 1 each 3     insulin aspart (NOVOLOG PEN) 100 UNIT/ML pen Correct 1 unit per 40 over 180 before breakfast, lunch, dinner and at bedtime. 45 mL 1     insulin degludec (TRESIBA FLEXTOUCH) 100 UNIT/ML pen Inject 25 units daily. 30 mL 1     insulin pen needle (BD CALLI U/F) 32G X 4 MM miscellaneous Use pen needles 6 times daily. 200 each 6     melatonin 3 MG tablet Take 1 tablet (3 mg) by mouth At Bedtime       sertraline (ZOLOFT) 50 MG tablet Take 1 tablet (50 mg) by mouth daily (Patient not taking: Reported on 10/30/2018) 30 tablet 0             Review of Systems:   GENERAL: Weight loss  EYE: No visual disturbance.  ENT: No hearing loss.  No nasal discharge.  No sore throat.  RESPIRATORY: No cough or wheezing  CARDIO: No chest  pain. No palpitations.  No rapid heart rate. No hypertension.  GASTROINTESTINAL: No recent vomiting or diarrhea. No constipation. No abdominal pain.  HEMATOLOGIC: No bleeding disorders. No amemia.  GENITOURINARY: No dysuria or hematuria.  MUSCOLOSKELETAL: No joint pain. No muscular weakness.  PSYCHIATRIC: +depression - no change - stable  NEURO: No seizures.  No headaches. No focal deficits noted.  SKIN: No rashes or skin changes.  ENDOCRINE: see HPI - no metabolic crises         Physical Exam:   There were no vitals taken for this visit.  Blood pressure percentiles are not available for patients who are 18 years or older.  Height: Data Unavailable, Normalized stature-for-age data not available for patients older than 20 years.  Weight: 0 lbs 0 oz, Normalized weight-for-age data not available for patients older than 20 years.  BMI: There is no height or weight on file to calculate BMI., No height and weight on file for this encounter.      GENERAL:  no distress  EYES: Eyes grossly normal to inspection, conjunctivae and sclerae normal  RESP: no audible wheeze, cough, or visible cyanosis.  No visible retractions or increased work of breathing.  Able to speak fully in complete sentences.  NEURO: Cranial nerves grossly intact, mentation intact and speech normal  PSYCH: mentation appears normal, affect normal/bright, judgement and insight intact, normal speech and appearance well-groomed            Health Maintenance:     No DKA  Labs: 5/2017  Flu vaccine: 2208-8321    PHQ-2 ( 1999 Pfizer) 1/16/2020 12/3/2019   Q1: Little interest or pleasure in doing things 2 3   Q2: Feeling down, depressed or hopeless 2 3   PHQ-2 Score 4 6     [facility] [place] Follow-up to PHQ 8/27/2019 12/3/2019 1/16/2020   PHQ-9 9. Suicide Ideation past 2 weeks Several days More than half the days Not at all   Thoughts of suicide or self harm in past 2 weeks - No -   PHQ-A Suicide Ideation past month No - -   PHQ-A Previous suicide attempt in past year Yes - -          Laboratory results:     Hemoglobin A1C   Date Value Ref Range Status   01/16/2020 14.3 (A) 0 - 5.6 % Final     TSH   Date Value Ref Range Status   06/17/2019 2.81 mcU/mL Final     T4 Free   Date Value Ref Range Status   02/17/2012 1.03 0.70 - 1.85 ng/dL Final     Vitamin D 1,25   Date Value Ref Range Status   01/30/2009 26  Final     Comment:     Reference range: 15 to 75  Unit: pg/mL  (Note)  Performed by [facility],  [address] [address] [phone]  [email or web address], Nima Orellana MD - Lab. Director     Insulin Antibodies   Date Value Ref Range Status   11/21/2014   Final    <0.4  Reference range: 0.0 to 0.4  Unit: U/mL  (Note)  INTERPRETIVE INFORMATION: Insulin Antibody  This assay quantitatively measures human serum  autoantibodies to endogenous insulin or antibodies to  exogenous insulin.  A value of greater than 0.4 Kronus  Units/mL is considered positive for Insulin Antibody.  Kronus Units are arbitrary. Kronus Units = U/mL  Performed by [facility],  [address] [address] [phone]  [email or web address], Campos Mims MD, Lab. Director       Islet Cell Antibody IgG   Date Value Ref Range Status   11/21/2014   Final    <1:4  Reference range: <1:4  (Note)  INTERPRETIVE INFORMATION: Islet Cell Ab, IgG  Islet cell antibodies (ICAs) are associated with type 1  diabetes (TID), an autoimmune endocrine disorder. These  antibodies may be present in individuals years before the  onset of clinical symptoms. To calculate  Juvenile Diabetes Foundation (JDF) units: multiply the  titer x 5 (1:8  8 x 5 = 40 JDF Units).  Performed by [facility],  [address] [address] [phone]  [email or web address], Campos Mmis MD, Lab. Director       Cholesterol   Date Value Ref Range Status   06/17/2019 203 mg/dL Final     Albumin Urine mg/L   Date Value Ref Range Status   03/27/2018 <5 mg/L Final     Triglycerides   Date Value Ref Range Status   06/17/2019 383 mg/dL Final      HDL Cholesterol   Date Value Ref Range Status   06/17/2019 36 mg/dL Final     LDL Cholesterol Calculated   Date Value Ref Range Status   06/17/2019 112 mg/dL Final     Cholesterol/HDL Ratio   Date Value Ref Range Status   11/21/2014 7.6 (H) 0.0 - 5.0 Final     Non HDL Cholesterol   Date Value Ref Range Status   06/17/2019 167 mg/dl Final     Results for CLINT BETANCOURT (MRN [id number])    Ref. Range 6/17/2019 00:00   Albumin Urine mg/spec Unknown <0.2          Assessment and Plan:   Clint  is a 20 year old female with diabetes mellitus associated with GSDIII following recurrent pancreatitis episodes.  I have been unable to motivate Clint to care for herself after working with her for 4+ years.  I am gravely concerned about the development of microvascular complications at a young age and expressed that again to her today.  She does need to transition to adult care, but I am concerned she would never go.  I am hopeful that she will reach a level of acceptance prior to that transition, but she is running out of time.          Sincerely,    Vincent Garner MD    Pager [phone]      CCPatient Care Team:  Vanessa Wright as PCP - General (Physician Assistant)  [facility] as PCP - Mental Health/Behavioral Medicine  Avani Oliver MD as MD (Pediatric Metabolism)  Vincent Garner MD as MD (Pediatric Endocrinology)  Pearl Reyes MD as MD (Pediatrics)  PEARL REYES    Copy to patient  PHILLIP BETANCOURT EDUARDO  [address] [place] [address]      Again, thank you for allowing me to participate in the care of your patient.      Sincerely,    Vincent Garner MD

## 2020-06-18 NOTE — PATIENT INSTRUCTIONS
Continue Tresiba 25 units daily in the morning  Must test blood glucose twice a day (morning and evening) and give correction doses as noted below using rapid acting insulin.  Carb ratio 1 per 15 grams    Blood Glucose    Units of Insulin             181 - 220         + 1 units             221 - 260         + 2 units             261 - 300         + 3 units             301 - 340         + 4 units             341 - 380         + 5 units             381 - 420         + 6 units             421 - 460         + 7 units     461-500  + 8 units            >500         + 9 units       Re-engage in your diabetes.    Will need to transition to adult diabetes practice in next 6 months.  Need visit with eye doctor to check for diabetes retinopathy.  Follow-up in 2 months

## 2020-06-18 NOTE — NURSING NOTE
"Chief Complaint   Patient presents with     RECHECK     diabetes follow up     Kevin Stephens is a 20 year old adult who is being evaluated via a billable video visit.      The patient has been notified of following:     \"This video visit will be conducted via a call between you and your physician/provider. We have found that certain health care needs can be provided without the need for an in-person physical exam.  This service lets us provide the care you need with a video conversation.  If a prescription is necessary we can send it directly to your pharmacy.  If lab work is needed we can place an order for that and you can then stop by our lab to have the test done at a later time.    Video visits are billed at different rates depending on your insurance coverage.  Please reach out to your insurance provider with any questions.    If during the course of the call the physician/provider feels a video visit is not appropriate, you will not be charged for this service.\"    Patient has given verbal consent for Video visit? Yes    Will anyone else be joining your video visit? No        Video-Visit Details    Type of service:  Video Visit      Originating Location (pt. Location): Home    Distant Location (provider location):  [facility]     Platform used for Video Visit: Red Yee RN        "

## 2020-07-09 ENCOUNTER — TRANSFERRED RECORDS (OUTPATIENT)
Dept: HEALTH INFORMATION MANAGEMENT | Facility: CLINIC | Age: 20
End: 2020-07-09

## 2020-07-14 ENCOUNTER — DOCUMENTATION ONLY (OUTPATIENT)
Dept: PEDIATRICS | Facility: CLINIC | Age: 20
End: 2020-07-14

## 2020-08-12 ENCOUNTER — VIRTUAL VISIT (OUTPATIENT)
Dept: PEDIATRICS | Facility: CLINIC | Age: 20
End: 2020-08-12
Attending: MEDICAL GENETICS
Payer: COMMERCIAL

## 2020-08-12 ENCOUNTER — VIRTUAL VISIT (OUTPATIENT)
Dept: PEDIATRICS | Facility: CLINIC | Age: 20
End: 2020-08-12
Attending: GENETIC COUNSELOR, MS
Payer: COMMERCIAL

## 2020-08-12 DIAGNOSIS — Z71.83 ENCOUNTER FOR NONPROCREATIVE GENETIC COUNSELING: ICD-10-CM

## 2020-08-12 DIAGNOSIS — E74.03 GLYCOGEN STORAGE DISEASE III (H): Primary | ICD-10-CM

## 2020-08-12 PROCEDURE — 40000072 ZZH STATISTIC GENETIC COUNSELING, < 16 MIN: Mod: ZF | Performed by: GENETIC COUNSELOR, MS

## 2020-08-12 RX ORDER — ESCITALOPRAM OXALATE 10 MG/1
10 TABLET ORAL
COMMUNITY
Start: 2020-02-18 | End: 2022-05-09

## 2020-08-12 NOTE — PROGRESS NOTES
Pediatric Metabolism Clinic Return Patient Video Visit  Name:  Kevin Stephens  :   2000  MRN:   [id number]  PCP:  Vanessa Wright  Date of Visit: Aug 12, 2020    Kevin is reported to be up to date on well child checkups.    Immunization status is: up to date - MMR given in  not documented in [facility]:  [facility]     Chief Complaint:  Kevin is a 20 year old whom I saw in follow up in the Pediatric Metabolism Clinic for Glycogen storage disease III complicated by diabetes mellitus. Kevin Stephens also saw our genetic counselor at this visit.      Assessment:    1. Glycogen Storage Disease type III - As Kevin gets older, this condition becomes easier to manage. However, Kevin endorses muscle weakness which can be a symptom of GSD III. Additionally, they do not take cornstarch anymore, and so we discussed how its important to space meals throughout the day so that there are not large fluctuations in blood sugar. They would not like to meet with a dietician today to discuss this. It was reviewed that when Kevin was diagnosed only one affected allele was seen, but Kevin definitely has the biochemical phenotype of GSD III. It was recommended to talk to our genetic counselor. While it would not affect Kevin's immediate management it would empower them for future choices regarding family planning, etc. Additionally, we would like to monitor Kevin's liver and heart given her GSD III. Kevin had a normal abdominal CT and abdominal US at [facility] in early July. We recommend an echocardiogram.   2. Diabetes - Worried that nausea discussed today is tied to issues with diabetes management. Kevin reports concerns about vision, and we went over how it is important for people with diabetes to get eye exams.   3. Depression/Anxiety - It was discussed how self care is important moving forward for their mental health, but also for the management of her GSD and  diabetes.  Plan:    1. Ordered at this visit:  Orders Placed This Encounter   Procedures     Comprehensive metabolic panel     CK total     INR     GENETIC COUNSELING SERVICES     OPHTHALMOLOGY PEDS REFERRAL     Echo Pediatric (TTE) Complete     2. If no cornstarch, space meals throughout day  3. Genetic counseling with Kiana Guidry GC to discuss desire for further genetic testing and inheritence.    4. Will discuss with [facility]'s endocrinologist, Dr. Garner, about transitioning to adult care.   5. Will likely transition [facility] metabolic care from the pediatric clinic to the adult clinic  5. Continue to observe emergency precautions as previously discussed.  Our on-call metabolic service is available 24 hours/day by calling the page  ([phone]) and asking for the Genetics and Metabolism doctor on call.    6. Set up MyChart  6. Return to the Pediatric Metabolism Clinic in 6 months for follow-up.        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 Since then they have continued to struggle with anxiety, depression and management of her medication conditions. Per chart review, Kevin was recently hospitalized in July 2020 at [facility] for dehydration, upper right quadrant pain, hyperglycemia, and abnormal liver tests (hospital alk phos 118, , , , amylase 358, lipase 118).     Kevin denies issues directly related to GSD, but reports they have stopped taking corn starch. They endorse having unexplained muscle weakness.    Kevin continues to  struggle with depression which has likely contributed to their issues with management of diabetes and glycogen storage disease. Kevin endorses having a lot of fear and anxiety about gender identity, but notes that they are feeling a little bit more comfort in their skin. Kevin reports their family is very supportive.     Kevin continues to follow with Dr. Garner for endocrinology. They note that Dr. Garner has started having conversations with them about transition to adult care. Kevin has not yet made this transition and reports not knowing the next steps. Kevin reports that diabetes management is not going well. Sometimes they will have motivation to take insulin, but most of the time they do not. Kevni endorses urinating a lot everyday and having frequent episodes of nausea. Their last A1C was 14.3 in January 2020.     In terms of Covid-19 Kevin just goes from work to home. People are wearing masks at work for employees and customers. Not more likely that anyone else to get the virus but more likely for complications because of diabetes and glycogen storage disease.    Other health services currently received: primary care with Dr. Wright, Endocrinology with Dr. Garner.  Current research study participation: no             Past Medical History  Past Medical History:   Diagnosis Date     Anxiety      Depressive disorder      Diabetes mellitus (H)      Glycogen storage disease IIIa - see Emergency Letter in EPIC dated 05/07/12 2/17/2012     Personal History:  Lives at home with parents and aunt, grandma, and aunt's fiance.     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 Working at a restaurant. Still figuring out what they would like to do next in terms of job/schooling.    Stressors for patient and family: Gender identity, depression, anxiety    Current insurance status commercial/private.       Family History Update: There was no new family history elicited at this visit.  Family History   Problem Relation Age of Onset      Hearing Loss Father      I have reviewed Kevin's past medical history, family history, social history, medications and allergies as documented in the patient's electronic medical record.  There were no additional findings except as noted.     Nutrition History:   Kevin does not take cornstarch anymore. Has days of nausea where they are not able to eat anything. Is not interested with meeting with a dietician today.     Review of Systems:   Constitional: negative  Eyes: Sometimes worried about vision, has not seen an eye doctor recently  Ears/Nose/Throat: negative - normal hearing  Respiratory: negative for SOB, asthma  Cardiovascular: negative for palpitations, chest pain  Gastrointestinal: endorses nausea - has days where they can't hold anything down, tries to stick to liquids on these days, does not seem to be associated with anything else. negative for vomiting, constipation  Genitourinary: negative  Hematologic/Lymphatic: negative  Allergy/Immunologic: negative - no drug allergies  Musculoskeletal: Muscle weakness, but no pain negative  Endocrine: negative for thyroid issues  Integument: gets patches of discolored skin (yellow-orange) on legs that come and go and do not hurt. negative for rashes, sensitive skin, break outs  Neurologic: negative  Psychiatric: Depression, anxiety    Medications:  Current Outpatient Medications   Medication Sig Dispense Refill     acetone, Urine, test STRP Check urine ketones when two consecutive blood sugars are greater than 300 and at times of illness/vomiting. 100 each 11     blood glucose monitoring (ONE TOUCH DELICA) lancets Use to test blood sugars 6 times daily. 1 Box 12     blood glucose monitoring (ONETOUCH VERIO IQ) test strip Use to test blood sugars 6 times daily or as directed. 200 strip 11     Continuous Blood Gluc Sensor (DEXCOM G6 SENSOR) MISC 1 each See Admin Instructions Change every 10 days 9 each 3     Continuous Blood Gluc Transmit (DEXCOM G6 TRANSMITTER)  MISC 1 each every 3 months 1 each 3     escitalopram (LEXAPRO) 10 MG tablet Take 10 mg by mouth       insulin aspart (NOVOLOG PEN) 100 UNIT/ML pen Correct 1 unit per 40 over 180 before breakfast, lunch, dinner and at bedtime. 45 mL 1     insulin degludec (TRESIBA FLEXTOUCH) 100 UNIT/ML pen Inject 25 units daily. 30 mL 1     insulin pen needle (BD CALLI U/F) 32G X 4 MM miscellaneous Use pen needles 6 times daily. 200 each 6     melatonin 3 MG tablet Take 1 tablet (3 mg) by mouth At Bedtime       sertraline (ZOLOFT) 50 MG tablet Take 1 tablet (50 mg) by mouth daily (Patient not taking: Reported on 10/30/2018) 30 tablet 0     Allergies:  Allergies   Allergen Reactions     Fluoxetine      Other reaction(s): Mental Status Change  Suicidal thoughts     Morphine Sulfate      No exposure but grandfather has the allergy to it     Tylenol [Acetaminophen]      Has glycogen storage disease and should not receive Tylenol, per GI.     Physical Examination:  There were no vitals taken for this visit.  Physical Exam limited given nature of video visit  General: only face is visible during exam, appears healthy, no acute distress  Head: normal hair distribution  ENT: voice is clear, hearing intact to voice, no obvious abnormalities on nose or lips.  Eyes: sclera appear clear; EOMI grossly intact  Resp: normal work of breathing, no gasping  Skin: face appears clear of acne, rashes, bruises, lesions  Neuro: alert, oriented, mentation and speech intact  Psych: mood and affect appear appropriate, clear train of thought, answers questions appropriately  --------------  Scribed by Laura Sullivan, MS4    I was present with the medical student who participated in the service and in the documentation of the note. I have verified the history and personally performed the physical exam and medical decision making. I agree with the assessment and plan of care as documented in the note    Electronically signed by:  HIRAL GRAVES M.D., FAAP,  ENMANUEL  Professor  Division of Genetics and Metabolism  Department of Pediatrics  [facility]    Routed to family in Comm [facility]gt  Also to  Vanessa Wright Dr  -------  Lenayady Stephens is a 20 year old adult who is being evaluated via a billable video visit.      Patient has given verbal consent for Video visit? Yes    Video-Visit Details    Type of service:  Video Visit    Video Start Time: 9:10 AM  Video End Time: 9:34 AM    Originating Location (pt. Location): Home    Distant Location (provider location):  [facility]     Platform used for Video Visit: [employer]

## 2020-08-12 NOTE — LETTER
8/12/2020      RE: Kevin Stephens  [address] [place] [address]       Presenting information:   Kevin Stephens is a 20 year old adult with a diagnosis of Glycogen Storage Disease (GSD) type III.  Kevin has previously undergone genetic testing and a single AGL mutation was identified by sequencing: c.2496insAT ([id number]).  A second mutation was not identified and deletion/duplication analysis has not been performed. Kevin was seen virtually today at the [facility] for follow up with Dr. Avani Oliver. I met with Kevin at the request of Dr. Oliver to review the genetics and inheritance of GSD, and the option of additional genetic testing. See past Genetics' notes for additional personal and family history details.    Family History:   A three generation pedigree was obtained previously and updated today. The family history was significant for the following updates:    Kevin has one brother, who has no major health concerns.     Kevin's maternal family history remains unchanged.    Kevin's father has a history of bilateral hearing loss from birth, as discussed previously. Kevin's extended paternal family history remains unchanged.    Discussion:   Genes are long stretches of DNA that are responsible for how our bodies look and how our bodies work. We all have two copies of every gene, one inherited from our mother and one inherited from our father. When there is a change, called a mutation, in a gene it can cause it to not do its job correctly which can cause the signs and symptoms of a genetic condition.      GSD type III is caused by mutations in a gene called AGL. Mutations cause the enzyme to not work as effectively at using glycogen, which is a major source of stored energy in the body. This causes the signs and symptoms of GSD type III.      We reviewed that GSD is inherited in an autosomal recessive pattern. This means that to be affected, an individual must inherit a  mutation in both copies of the AGL gene (one from each parent). We discussed that we would expect Kevin has a mutation on both copies of the AGL gene.     Individuals with just one mutation in the AGL gene are said to be carriers. Carriers do not have GSD, but can have an affected child if their partner is also a carrier.  When both parents are carriers, with each pregnancy there is a 25% chance for the child to have GSD, a 50% chance for the child to be an unaffected carrier, and a 25% chance for the child to be an unaffected non-carrier. We reviewed that we would expect both of Geovanis parents to be carriers for GSD type III.      We reviewed that other family members could also be a carrier for GSD type III and that it was important for this information be shared with extended family.  For example, eKvin's brother, who does not have GSD, would have around a 2 in 3 (~67%) chance to be a carrier of GSD type III.  We also discussed that Geovanis other relatives such as aunt/uncles are at increased risk to be carriers of GSD type III. If another family member is found to be a carrier, their partner could be tested to determine if they are also a carrier. If both members of the couple are carriers, then they could have a child with GSD type III.      We additionally discussed the chance for any future children for Kevin to have GSD type III. Because we assume both copies of Geovanis AGL gene have a mutation, no matter which copy is passed on, children will inherit a mutation.  Whether or not they are actually have GSD type III or are a carrier depends on whether or not Kevin's partner was a carrier.  If they were found to be a carrier, with each pregnancy there would be a 50% chance for a child to have GSD type III and a 50% chance for a child to be a carrier. Carrier testing for partner is available at any time if helpful.     Kevin has previously undergone sequencing of the AGL gene through [facility]  (10/25/06), and a single c.2496insAT ([id number]) mutation was identified. The numbers and letters in this result stand for exactly where in the gene the genetic change is located and what change was found. Deletion/duplication analysis, which looks for chunks of missing or added genetic material that can also affect a gene, was not performed at that time. As our genetic testing continues to improve and we learn more about genes, repeat sequencing + del/dup for this gene continues to be an option for Kevin. I reviewed this option and benefits/limitations with Kevin today. Although identifying a second AGL mutation would not change Kevin's diagnosis or care in the short time, it can still be helpful to know. We specifically discussed the importance for other family members. Identifying Kevin's second AGL mutation would be necessary in order to offer family members good carrier testing for GSD III. Current targeted carrier testing may only be available for one side of Kevin's family. Reanalysis of the AGL gene would be available at any time, and this process was briefly reviewed today. Kevin elected to think about the option of additional genetic testing and reach out if wanting to pursue it in the meantime. Kevin had no additional questions at this time. Contact information will be sent in my clinic note for future questions or concerns that arise, or for if Kevin wants to pursue additional genetic testing.      It was a pleasure to meet with Kevin today virtually.    Plan:   1. Genetics of GSD type III, Kevin's past genetic testing, and the option of additional genetic testing were reviewed briefly today. Kevin elected to think about pursuing additional genetic testing.  2. Follow up according to Dr. Oliver.  3. Contact information will be sent for any questions arise in the future.        May Guidry MS, [facility]  Genetic Counselor  Division of Genetics and Metabolism  [facility]    Phone: [phone]  Pager: [phone]      CC: Send copy to Kevin      Video-Visit Details  Type of service:  Video Visit  Video Start Time: 10:00 AM  Video End Time: 10:16 AM  Originating Location (pt. Location): Home  Distant Location (provider location):  PEDS METABOLISM   Platform used for Video Visit: Red

## 2020-08-12 NOTE — PROGRESS NOTES
"Kevin Stephens is a 20 year old adult who is being evaluated via a billable video visit.      The patient has been notified of following:     \"This video visit will be conducted via a call between you and your physician/provider. We have found that certain health care needs can be provided without the need for an in-person physical exam.  This service lets us provide the care you need with a video conversation.  If a prescription is necessary we can send it directly to your pharmacy.  If lab work is needed we can place an order for that and you can then stop by our lab to have the test done at a later time.    Video visits are billed at different rates depending on your insurance coverage.  Please reach out to your insurance provider with any questions.    If during the course of the call the physician/provider feels a video visit is not appropriate, you will not be charged for this service.\"    Patient has given verbal consent for Video visit? Yes  How would you like to obtain your AVS? Mail a copy  If you are dropped from the video visit, the video invite should be resent to: Text to cell phone: [phone]  Will anyone else be joining your video visit? No      Sanjuanita Sanchez LPN          "

## 2020-08-12 NOTE — Clinical Note
8/12/2020      RE: Kevin Stephens  [address] [place] [address]       No notes on file    Kiana Guidry

## 2020-09-08 NOTE — PROGRESS NOTES
Presenting information:   Kevin Stephens is a 20 year old adult with a diagnosis of Glycogen Storage Disease (GSD) type III.  Kevin has previously undergone genetic testing and a single AGL mutation was identified by sequencing: c.2496insAT ([id number]).  A second mutation was not identified and deletion/duplication analysis has not been performed. Kevin was seen virtually today at the [facility] for follow up with Dr. Avani Oliver. I met with Kevin at the request of Dr. Oliver to review the genetics and inheritance of GSD, and the option of additional genetic testing. See past Genetics' notes for additional personal and family history details.    Family History:   A three generation pedigree was obtained previously and updated today. The family history was significant for the following updates:    Kevin has one brother, who has no major health concerns.     Kevin's maternal family history remains unchanged.    Kevin's father has a history of bilateral hearing loss from birth, as discussed previously. Kevin's extended paternal family history remains unchanged.    Discussion:   Genes are long stretches of DNA that are responsible for how our bodies look and how our bodies work. We all have two copies of every gene, one inherited from our mother and one inherited from our father. When there is a change, called a mutation, in a gene it can cause it to not do its job correctly which can cause the signs and symptoms of a genetic condition.      GSD type III is caused by mutations in a gene called AGL. Mutations cause the enzyme to not work as effectively at using glycogen, which is a major source of stored energy in the body. This causes the signs and symptoms of GSD type III.      We reviewed that GSD is inherited in an autosomal recessive pattern. This means that to be affected, an individual must inherit a mutation in both copies of the AGL gene (one from each parent). We discussed that we  would expect Kevin has a mutation on both copies of the AGL gene.     Individuals with just one mutation in the AGL gene are said to be carriers. Carriers do not have GSD, but can have an affected child if their partner is also a carrier.  When both parents are carriers, with each pregnancy there is a 25% chance for the child to have GSD, a 50% chance for the child to be an unaffected carrier, and a 25% chance for the child to be an unaffected non-carrier. We reviewed that we would expect both of Geovanis parents to be carriers for GSD type III.      We reviewed that other family members could also be a carrier for GSD type III and that it was important for this information be shared with extended family.  For example, Kevin's brother, who does not have GSD, would have around a 2 in 3 (~67%) chance to be a carrier of GSD type III.  We also discussed that Geovanis other relatives such as aunt/uncles are at increased risk to be carriers of GSD type III. If another family member is found to be a carrier, their partner could be tested to determine if they are also a carrier. If both members of the couple are carriers, then they could have a child with GSD type III.      We additionally discussed the chance for any future children for Kevin to have GSD type III. Because we assume both copies of Geovanis AGL gene have a mutation, no matter which copy is passed on, children will inherit a mutation.  Whether or not they are actually have GSD type III or are a carrier depends on whether or not Kevin's partner was a carrier.  If they were found to be a carrier, with each pregnancy there would be a 50% chance for a child to have GSD type III and a 50% chance for a child to be a carrier. Carrier testing for partner is available at any time if helpful.     Kevin has previously undergone sequencing of the AGL gene through [employer] (10/25/06), and a single c.2496insAT ([id number]) mutation was identified. The numbers and  letters in this result stand for exactly where in the gene the genetic change is located and what change was found. Deletion/duplication analysis, which looks for chunks of missing or added genetic material that can also affect a gene, was not performed at that time. As our genetic testing continues to improve and we learn more about genes, repeat sequencing + del/dup for this gene continues to be an option for Kevin. I reviewed this option and benefits/limitations with Kevin today. Although identifying a second AGL mutation would not change Kevin's diagnosis or care in the short time, it can still be helpful to know. We specifically discussed the importance for other family members. Identifying Kevin's second AGL mutation would be necessary in order to offer family members good carrier testing for GSD III. Current targeted carrier testing may only be available for one side of Kevin's family. Reanalysis of the AGL gene would be available at any time, and this process was briefly reviewed today. Kevin elected to think about the option of additional genetic testing and reach out if wanting to pursue it in the meantime. Kevin had no additional questions at this time. Contact information will be sent in my clinic note for future questions or concerns that arise, or for if Kevin wants to pursue additional genetic testing.      It was a pleasure to meet with Kevin today virtually.    Plan:   1. Genetics of GSD type III, Kevin's past genetic testing, and the option of additional genetic testing were reviewed briefly today. Kevin elected to think about pursuing additional genetic testing.  2. Follow up according to Dr. Oliver.  3. Contact information will be sent for any questions arise in the future.        May Guidry MS, [facility]  Genetic Counselor  Division of Genetics and Metabolism  [facility]   Phone: [phone]  Pager: [phone]      CC: Send copy to Kevin      Video-Visit  Details  Type of service:  Video Visit  Video Start Time: 10:00 AM  Video End Time: 10:16 AM  Originating Location (pt. Location): Home  Distant Location (provider location):  PEDS METABOLISM   Platform used for Video Visit: Red

## 2020-09-09 ENCOUNTER — TELEPHONE (OUTPATIENT)
Dept: PEDIATRICS | Facility: CLINIC | Age: 20
End: 2020-09-09
Payer: COMMERCIAL

## 2020-09-09 NOTE — TELEPHONE ENCOUNTER
Callers Name: shannon  Callers Phone Number: [phone]  Relationship to Patient: [facility]  Best time of day to call: any  Is it ok to leave a detailed voicemail on this number: yes  Reason for Call: need Dr Pinto lab orders faxed ASAP to ely attn smitha lucero at [phone]. Apparently this was requested yesterday as well. Please and thanks.

## 2020-09-09 NOTE — LETTER
Date: 2020 Regarding: Kevin Stephens  [address] [place] [address]       MRN: [id number]     :  2000       Lab Request  Ordering Provider: Avani Graves MD  Diagnosis (ICD-10) Code: Glycogen storage disease III-a  [E74.03]             TEST:  CK Total, INR, Comprehensive Metabolic Panel    REASON:  Routine Survellience    FREQUENCY:  Once    EXPIRATION:  1 year    SPECIAL        INSTRUCTIONS:  None     CRITICAL RESULTS:  Please page the Pediatric Metabolism Provider on call at [phone] immediately.     Fax results once available to ATTENTION:  Metabolism [facility]  at  [phone].    If you or the family have questions regarding these orders, please contact our nurse care coordinator at [phone].    Thank you for assisting in the care of Kevin Stephens.    Sincerely,    AVANI GRAVES M.D., FAAP, [facility]  Professor  Division of Genetics and Metabolism  Department of Pediatrics  [facility]

## 2020-09-17 ENCOUNTER — OFFICE VISIT (OUTPATIENT)
Dept: PEDIATRICS | Facility: CLINIC | Age: 20
End: 2020-09-17
Attending: PEDIATRICS
Payer: COMMERCIAL

## 2020-09-17 ENCOUNTER — HOSPITAL ENCOUNTER (OUTPATIENT)
Dept: LAB | Facility: CLINIC | Age: 20
End: 2020-09-17
Attending: PEDIATRICS
Payer: COMMERCIAL

## 2020-09-17 VITALS
WEIGHT: 108.91 LBS | BODY MASS INDEX: 18.59 KG/M2 | HEIGHT: 64 IN | DIASTOLIC BLOOD PRESSURE: 72 MMHG | HEART RATE: 98 BPM | SYSTOLIC BLOOD PRESSURE: 118 MMHG

## 2020-09-17 DIAGNOSIS — E10.65 TYPE 1 DIABETES MELLITUS WITH HYPERGLYCEMIA (H): Primary | ICD-10-CM

## 2020-09-17 DIAGNOSIS — E74.03 GLYCOGEN STORAGE DISEASE III (H): ICD-10-CM

## 2020-09-17 DIAGNOSIS — Z23 NEED FOR PROPHYLACTIC VACCINATION AND INOCULATION AGAINST INFLUENZA: ICD-10-CM

## 2020-09-17 LAB
ALBUMIN SERPL-MCNC: 4.1 G/DL (ref 3.4–5)
ALP SERPL-CCNC: 103 U/L (ref 40–150)
ALT SERPL W P-5'-P-CCNC: 113 U/L (ref 0–50)
ANION GAP SERPL CALCULATED.3IONS-SCNC: 10 MMOL/L (ref 3–14)
AST SERPL W P-5'-P-CCNC: 99 U/L (ref 0–45)
BILIRUB SERPL-MCNC: 1.1 MG/DL (ref 0.2–1.3)
BUN SERPL-MCNC: 11 MG/DL (ref 7–30)
CALCIUM SERPL-MCNC: 8.8 MG/DL (ref 8.5–10.1)
CHLORIDE SERPL-SCNC: 102 MMOL/L (ref 94–109)
CK SERPL-CCNC: 205 U/L (ref 30–225)
CO2 SERPL-SCNC: 24 MMOL/L (ref 20–32)
CREAT SERPL-MCNC: 0.52 MG/DL (ref 0.52–1.04)
CREAT UR-MCNC: 121 MG/DL
GFR SERPL CREATININE-BSD FRML MDRD: >90 ML/MIN/{1.73_M2}
GLUCOSE SERPL-MCNC: 239 MG/DL (ref 70–99)
HBA1C MFR BLD: 14.4 % (ref 0–5.6)
INR PPP: 1.1 (ref 0.86–1.14)
MICROALBUMIN UR-MCNC: 14 MG/L
MICROALBUMIN/CREAT UR: 11.74 MG/G CR (ref 0–25)
POTASSIUM SERPL-SCNC: 4.2 MMOL/L (ref 3.4–5.3)
PROT SERPL-MCNC: 7.6 G/DL (ref 6.8–8.8)
SODIUM SERPL-SCNC: 136 MMOL/L (ref 133–144)

## 2020-09-17 PROCEDURE — 90682 RIV4 VACC RECOMBINANT DNA IM: CPT | Mod: ZF

## 2020-09-17 PROCEDURE — G0008 ADMIN INFLUENZA VIRUS VAC: HCPCS | Mod: ZF

## 2020-09-17 PROCEDURE — 90471 IMMUNIZATION ADMIN: CPT | Mod: ZF | Performed by: PEDIATRICS

## 2020-09-17 PROCEDURE — 83036 HEMOGLOBIN GLYCOSYLATED A1C: CPT | Mod: ZF | Performed by: PEDIATRICS

## 2020-09-17 PROCEDURE — 85610 PROTHROMBIN TIME: CPT | Performed by: MEDICAL GENETICS

## 2020-09-17 PROCEDURE — 82043 UR ALBUMIN QUANTITATIVE: CPT | Performed by: PEDIATRICS

## 2020-09-17 PROCEDURE — 82550 ASSAY OF CK (CPK): CPT | Performed by: MEDICAL GENETICS

## 2020-09-17 PROCEDURE — 36415 COLL VENOUS BLD VENIPUNCTURE: CPT | Performed by: MEDICAL GENETICS

## 2020-09-17 PROCEDURE — 80053 COMPREHEN METABOLIC PANEL: CPT | Performed by: MEDICAL GENETICS

## 2020-09-17 PROCEDURE — 25000128 H RX IP 250 OP 636: Mod: ZF

## 2020-09-17 PROCEDURE — G0463 HOSPITAL OUTPT CLINIC VISIT: HCPCS | Mod: ZF

## 2020-09-17 ASSESSMENT — PAIN SCALES - GENERAL: PAINLEVEL: NO PAIN (0)

## 2020-09-17 ASSESSMENT — MIFFLIN-ST. JEOR: SCORE: 1248.62

## 2020-09-17 NOTE — NURSING NOTE
"Informant-    Kevin is accompanied by Self    Reason for Visit-  Diabetes     Vitals signs-  /72   Pulse 98   Ht 1.625 m (5' 3.98\")   Wt 49.4 kg (108 lb 14.5 oz)   BMI 18.71 kg/m      There are concerns about the child's exposure to violence in the home: No    Face to Face time: 5 minutes  Debbie Parikh MA        "

## 2020-09-17 NOTE — LETTER
9/17/2020      RE: Kevin Stephens  [address] [place] [address]       Pediatric Endocrinology Follow-up Consultation: Diabetes    Patient: Kevin Stephens MRN# [id number]   YOB: 2000 Age: 20 year old   Date of Visit: September 17, 2020    Dear Ms. Wright:    I had the pleasure of seeing your patient, Kevin Stephens for a phone visit through the Pediatric Endocrinology Clinic, [facility], on September 17, 2020 for a follow-up consultation of diabetes mellitus following recurrent pancreatitis and a history for GSD III.           Problem list:     Patient Active Problem List    Diagnosis Date Noted     Hyperglycemia 04/18/2018     Priority: Medium     Depression with suicidal ideation 05/18/2017     Priority: Medium     Uncontrolled diabetes mellitus secondary to pancreatic insufficiency (H) 11/30/2014     Priority: Medium     Problem list name updated by automated process. Provider to review       Family history of congenital hearing loss 09/26/2012     Priority: Medium     Kevin's father has bilateral, congenital hearing loss. There is no known cause, and no genetic testing has been completed.       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  My last visit with Kevin was a phone visit on 6/18/20. Unfortunately, Kevin reverted back to no longer wearing her CGM and had stopped administering her rapid acting insulin and was only taking her long acting insulin once a day.   We have tried to set practical goals of two checks and two doses of rapid acting insulin daily.      Kevin was admitted to [facility] in July for  abdominal pain.  She had modest elevation in her transaminases.  SHe had moderate to large stool burden on her CT.  Her RUQ unit(s)/s was normal.  Her a1c was greater than 14%.  She did see Dr. Oliver in metabolic clinic and they are planning on transitioning to adult clinic.  She was scheduled for an echo to follow-up on her GSDIII.    They have no longer been experiencing the same abdominal pain.  Giving tresiba at 25 units every night.  She is not giving any rapid acting insulin.  Placed CGM on for the first time in 5 months last night.  Reports that her transimitter was  previously.  She reports she will use the CGM to help make decisions on when to give rpaid accting insulin.  She did not give any rapid acting insulin last night.    She reports she has not been taking the lexapro but thinks it would be a good idea to restart.  This has been monitored by Dr. Wright.  She is interested in starting on an insulin pump.      Today's concerns include:  none    Hypoglycemia: Kevin is having 0 hypoglycemic readings per week.   Hyperglycemia:  DKA:   Elevated BG values tend to occur unknown but often on a consistent basis.    Exercise: None    Blood Glucose Data:   CGM - only data that started late last night into the early morning today (>300)    A1c:  Today s hemoglobin A1c: 14.3  Lab Results   Component Value Date    A1C 14.3 2020    A1C 13.7 2019    A1C 12.0 2019    A1C 10.7 2019    A1C 13.4 10/30/2018       Result was discussed at today's visit.     Current insulin regimen:   Continue Tresiba 25 units daily in the morning  Must test blood glucose twice a day (morning and evening) and give correction doses as noted below using rapid acting insulin.  Carb ratio 1 per 15 grams    Blood Glucose    Units of Insulin             181 - 220         + 1 units             221 - 260         + 2 units             261 - 300         + 3 units             301 - 340         + 4 units             341 -  380         + 5 units             381 - 420         + 6 units             421 - 460         + 7 units     461-500  + 8 units            >500         + 9 units       Insulin administration site(s): stomach, thighs, arms    I reviewed new history from the patient and the medical record.  I have reviewed previous lab results and records, patient BMI and the growth chart at today's visit.      History was obtained from patient and patient's mother.          Social History:     Social History     Social History Narrative    Lives with parents and younger brother.  Currently in 12th grade.  Loves to sing     Has been able to continue working in the [employer] - no changes  No other changes at home  Living at home in [place].         Family History:     Family History   Problem Relation Age of Onset     Hearing Loss Father      Family history was reviewed and is unchanged. Refer to the initial note.         Allergies:     Allergies   Allergen Reactions     Fluoxetine      Other reaction(s): Mental Status Change  Suicidal thoughts     Morphine Sulfate      No exposure but grandfather has the allergy to it     Tylenol [Acetaminophen]      Has glycogen storage disease and should not receive Tylenol, per GI.             Medications:     Current Outpatient Medications   Medication Sig Dispense Refill     acetone, Urine, test STRP Check urine ketones when two consecutive blood sugars are greater than 300 and at times of illness/vomiting. 100 each 11     blood glucose monitoring (ONE TOUCH DELICA) lancets Use to test blood sugars 6 times daily. 1 Box 12     blood glucose monitoring (ONETOUCH VERIO IQ) test strip Use to test blood sugars 6 times daily or as directed. 200 strip 11     Continuous Blood Gluc Sensor (DEXCOM G6 SENSOR) MISC 1 each See Admin Instructions Change every 10 days 9 each 3     Continuous Blood Gluc Transmit (DEXCOM G6 TRANSMITTER) MISC 1 each every 3 months 1 each 3     escitalopram (LEXAPRO) 10 MG  "tablet Take 10 mg by mouth       insulin aspart (NOVOLOG PEN) 100 UNIT/ML pen Correct 1 unit per 40 over 180 before breakfast, lunch, dinner and at bedtime. 45 mL 1     insulin degludec (TRESIBA FLEXTOUCH) 100 UNIT/ML pen Inject 25 units daily. 30 mL 1     insulin pen needle (BD CALLI U/F) 32G X 4 MM miscellaneous Use pen needles 6 times daily. 200 each 6     melatonin 3 MG tablet Take 1 tablet (3 mg) by mouth At Bedtime       sertraline (ZOLOFT) 50 MG tablet Take 1 tablet (50 mg) by mouth daily (Patient not taking: Reported on 10/30/2018) 30 tablet 0             Review of Systems:   GENERAL: Weight loss  EYE: No visual disturbance.  ENT: No hearing loss.  No nasal discharge.  No sore throat.  RESPIRATORY: No cough or wheezing  CARDIO: No chest pain. No palpitations.  No rapid heart rate. No hypertension.  GASTROINTESTINAL: No recent vomiting or diarrhea. No constipation. No abdominal pain.  HEMATOLOGIC: No bleeding disorders. No amemia.  GENITOURINARY: No dysuria or hematuria.  MUSCOLOSKELETAL: No joint pain. No muscular weakness.  PSYCHIATRIC: +depression - not taking meds  NEURO: No seizures.  No headaches. No focal deficits noted.  SKIN: No rashes or skin changes.  ENDOCRINE: see HPI - no metabolic crises         Physical Exam:   Blood pressure 118/72, pulse 98, height 1.625 m (5' 3.98\"), weight 49.4 kg (108 lb 14.5 oz).  Growth percentile SmartLinks can only be used for patients less than 20 years old.  Height: 5' 3.976\", Facility age limit for growth percentiles is 20 years.  Weight: 108 lbs 14.52 oz, Facility age limit for growth percentiles is 20 years.  BMI: Body mass index is 18.71 kg/m ., Facility age limit for growth percentiles is 20 years.    Wt Readings from Last 4 Encounters:   09/17/20 49.4 kg (108 lb 14.5 oz)   01/16/20 47.3 kg (104 lb 4.4 oz) (7 %, Z= -1.46)*   12/03/19 47.6 kg (104 lb 15 oz) (8 %, Z= -1.40)*   08/27/19 49.7 kg (109 lb 9.1 oz) (15 %, Z= -1.03)*     * Growth percentiles are based on " [facility] (Girls, 2-20 Years) data.     CONSTITUTIONAL:   Awake, alert, and in no apparent distress.  HEAD: Normocephalic, without obvious abnormality.  EYES: Lids and lashes normal, sclera clear, conjunctiva normal.  ENT: external ears without lesions, nares clear, oral pharynx with moist mucus membranes.  NECK: Supple, symmetrical, trachea midline.  THYROID: symmetric, not enlarged and no tenderness.  HEMATOLOGIC/LYMPHATIC: No cervical lymphadenopathy.  LUNGS: No increased work of breathing, clear to auscultation bilaterally with good air entry.  CARDIOVASCULAR: Regular rate and rhythm, no murmurs.  ABDOMEN: Normal bowel sounds, soft, non-distended, non-tender, no masses palpated, + hepatomegaly  PSYCHIATRIC: Cooperative, no agitation.  SKIN: Insulin administration sites intact without lipohypertrophy. No acanthosis nigricans.  Recent cutting marks  MUSCULOSKELETAL: There is no redness, warmth, or swelling of the joints.  Full range of motion noted.  Motor strength and tone are normal.      Feet - atrophic first toe nailbed bilaterally.  10 point monofilament exam normal.      Health Maintenance:     No DKA  Labs: 5/2017  Flu vaccine: [id number]    PHQ-2 ( 1999 Pfizer) 1/16/2020 12/3/2019   Q1: Little interest or pleasure in doing things 2 3   Q2: Feeling down, depressed or hopeless 2 3   PHQ-2 Score 4 6     [facility] Follow-up to PHQ 8/27/2019 12/3/2019 1/16/2020   PHQ-9 9. Suicide Ideation past 2 weeks Several days More than half the days Not at all   Thoughts of suicide or self harm in past 2 weeks - No -   PHQ-A Suicide Ideation past month No - -   PHQ-A Previous suicide attempt in past year Yes - -         Laboratory results:     Hemoglobin A1C   Date Value Ref Range Status   01/16/2020 14.3 (A) 0 - 5.6 % Final     TSH   Date Value Ref Range Status   06/17/2019 2.81 mcU/mL Final     T4 Free   Date Value Ref Range Status   02/17/2012 1.03 0.70 - 1.85 ng/dL Final     Vitamin D 1,25   Date Value Ref Range Status   01/30/2009  26  Final     Comment:     Reference range: 15 to 75  Unit: pg/mL  (Note)  Performed by [facility],  [address] [facility] [place] [address] [phone]  [email or web address] Nima Orellana MD - Lab. Director     Insulin Antibodies   Date Value Ref Range Status   11/21/2014   Final    <0.4  Reference range: 0.0 to 0.4  Unit: U/mL  (Note)  INTERPRETIVE INFORMATION: Insulin Antibody  This assay quantitatively measures human serum  autoantibodies to endogenous insulin or antibodies to  exogenous insulin.  A value of greater than 0.4 Kronus  Units/mL is considered positive for Insulin Antibody.  Kronus Units are arbitrary. Kronus Units = U/mL  Performed by [facility],  [address] [facility] [place] [address] [phone]  [email or web address] Campos Mims MD, Lab. Director       Islet Cell Antibody IgG   Date Value Ref Range Status   11/21/2014   Final    <1:4  Reference range: <1:4  (Note)  INTERPRETIVE INFORMATION: Islet Cell Ab, IgG  Islet cell antibodies (ICAs) are associated with type 1  diabetes (TID), an autoimmune endocrine disorder. These  antibodies may be present in individuals years before the  onset of clinical symptoms. To calculate  Juvenile Diabetes Foundation (JDF) units: multiply the  titer x 5 (1:8  8 x 5 = 40 JDF Units).  Performed by [facility],  [address] [facility] [place] [address] [phone]  [email or web address] Campos Mims MD, Lab. Director       Cholesterol   Date Value Ref Range Status   06/17/2019 203 mg/dL Final     Albumin Urine mg/L   Date Value Ref Range Status   03/27/2018 <5 mg/L Final     Triglycerides   Date Value Ref Range Status   06/17/2019 383 mg/dL Final     HDL Cholesterol   Date Value Ref Range Status   06/17/2019 36 mg/dL Final     LDL Cholesterol Calculated   Date Value Ref Range Status   06/17/2019 112 mg/dL Final     Cholesterol/HDL Ratio   Date Value Ref Range Status   11/21/2014 7.6 (H) 0.0 - 5.0 Final     Non HDL Cholesterol   Date Value Ref Range Status   06/17/2019 167  mg/dl Final     Results for CLINT BETANCOURT (MRN [id number])    Ref. Range 6/17/2019 00:00   Albumin Urine mg/spec Unknown <0.2          Assessment and Plan:   Clint  is a 20 year old female with diabetes mellitus associated with GSDIII following recurrent pancreatitis episodes.   Am hopeful that Clint has made some firm changes in their acceptance of disease and willingness to manage themselves.  WE set some practical goals as outlined below.  I am encouraged that she was open to hearing about insulin pumps and this may be a more effective way to deliver insulin.  I will meet with her one final time prior to transitioning her to adult medicine.       Patient Instructions     Continue Tresiba 25 units daily in the morning  Must test blood glucose twice a day (morning and evening) and give correction doses as noted below using rapid acting insulin.  Carb ratio 1 per 15 grams - lets deliver this for meals twice a day.  Keep CGM on and use it to correct for hyperglycemia twice a day at the time of meals  Do not give hyperglycemia correction within three hours of last rapid acting insulin dose.  Urine test today  Flu shot today    Blood Glucose    Units of Insulin             181 - 220         + 1 units             221 - 260         + 2 units             261 - 300         + 3 units             301 - 340         + 4 units             341 - 380         + 5 units             381 - 420         + 6 units             421 - 460         + 7 units     461-500  + 8 units            >500         + 9 units       Will pursue insulin pump after education today.  Follow-up visit with me in 2 months - in-person      Sincerely,    Vincent Garner MD    Pager [phone]      CCPatient Care Team:  Vanessa Wright as PCP - General (Physician Assistant)  [facility] as PCP - Mental Health/Behavioral Medicine  Avani Oliver MD as MD (Pediatric Metabolism)  Vincent Garnre MD as MD (Pediatric  Endocrinology)  Pearl Reyes MD as MD (Pediatrics)  PEARL REYES    Copy to patient  [facility]  [address] [place] [address]    Vincent Garner MD

## 2020-09-17 NOTE — PROGRESS NOTES
Pediatric Endocrinology Follow-up Consultation: Diabetes    Patient: Kevin Stephens MRN# [id number]   YOB: 2000 Age: 20 year old   Date of Visit: September 17, 2020    Dear Ms. Wright:    I had the pleasure of seeing your patient, Kevin Stephens for a phone visit through the Pediatric Endocrinology Clinic, [facility], on September 17, 2020 for a follow-up consultation of diabetes mellitus following recurrent pancreatitis and a history for GSD III.           Problem list:     Patient Active Problem List    Diagnosis Date Noted     Hyperglycemia 04/18/2018     Priority: Medium     Depression with suicidal ideation 05/18/2017     Priority: Medium     Uncontrolled diabetes mellitus secondary to pancreatic insufficiency (H) 11/30/2014     Priority: Medium     Problem list name updated by automated process. Provider to review       Family history of congenital hearing loss 09/26/2012     Priority: Medium     Kevin's father has bilateral, congenital hearing loss. There is no known cause, and no genetic testing has been completed.       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  My last visit with Kevin was a phone visit on 6/18/20. Unfortunately, Kevin reverted back to no longer wearing her CGM and had stopped administering her rapid acting insulin and was only taking her long acting insulin once a day.   We have tried to set practical goals of two checks and two doses of rapid acting insulin daily.      [facility].  She had modest elevation in her transaminases.  SHe had moderate  to large stool burden on her CT.  Her RUQ unit(s)/s was normal.  Her a1c was greater than 14%.  She did see Dr. Oliver in metabolic clinic and they are planning on transitioning to adult clinic.  She was scheduled for an echo to follow-up on her GSDIII.    They have no longer been experiencing the same abdominal pain.  Giving tresiba at 25 units every night.  She is not giving any rapid acting insulin.  Placed CGM on for the first time in 5 months last night.  Reports that her transimitter was  previously.  She reports she will use the CGM to help make decisions on when to give rpaid accting insulin.  She did not give any rapid acting insulin last night.    She reports she has not been taking the lexapro but thinks it would be a good idea to restart.  This has been monitored by Dr. Wright.  She is interested in starting on an insulin pump.      Today's concerns include:  none    Hypoglycemia: Kevin is having 0 hypoglycemic readings per week.   Hyperglycemia:  DKA:   Elevated BG values tend to occur unknown but often on a consistent basis.    Exercise: None    Blood Glucose Data:   CGM - only data that started late last night into the early morning today (>300)    A1c:  Today s hemoglobin A1c: 14.3  Lab Results   Component Value Date    A1C 14.3 2020    A1C 13.7 2019    A1C 12.0 2019    A1C 10.7 2019    A1C 13.4 10/30/2018       Result was discussed at today's visit.     Current insulin regimen:   Continue Tresiba 25 units daily in the morning  Must test blood glucose twice a day (morning and evening) and give correction doses as noted below using rapid acting insulin.  Carb ratio 1 per 15 grams    Blood Glucose    Units of Insulin             181 - 220         + 1 units             221 - 260         + 2 units             261 - 300         + 3 units             301 - 340         + 4 units             341 - 380         + 5 units             381 - 420         + 6 units             421 -  460         + 7 units     461-500  + 8 units            >500         + 9 units       Insulin administration site(s): stomach, thighs, arms    I reviewed new history from the patient and the medical record.  I have reviewed previous lab results and records, patient BMI and the growth chart at today's visit.      History was obtained from patient and patient's mother.          Social History:     Social History     Social History Narrative    Lives with parents and younger brother.  Currently in 12th grade.  Loves to sing     Has been able to continue working in the [employer] - no changes  No other changes at home  Living at home in [place].         Family History:     Family History   Problem Relation Age of Onset     Hearing Loss Father      Family history was reviewed and is unchanged. Refer to the initial note.         Allergies:     Allergies   Allergen Reactions     Fluoxetine      Other reaction(s): Mental Status Change  Suicidal thoughts     Morphine Sulfate      No exposure but grandfather has the allergy to it     Tylenol [Acetaminophen]      Has glycogen storage disease and should not receive Tylenol, per GI.             Medications:     Current Outpatient Medications   Medication Sig Dispense Refill     acetone, Urine, test STRP Check urine ketones when two consecutive blood sugars are greater than 300 and at times of illness/vomiting. 100 each 11     blood glucose monitoring (ONE TOUCH DELICA) lancets Use to test blood sugars 6 times daily. 1 Box 12     blood glucose monitoring (ONETOUCH VERIO IQ) test strip Use to test blood sugars 6 times daily or as directed. 200 strip 11     Continuous Blood Gluc Sensor (DEXCOM G6 SENSOR) MISC 1 each See Admin Instructions Change every 10 days 9 each 3     Continuous Blood Gluc Transmit (DEXCOM G6 TRANSMITTER) MISC 1 each every 3 months 1 each 3     escitalopram (LEXAPRO) 10 MG tablet Take 10 mg by mouth       insulin aspart (NOVOLOG PEN) 100 UNIT/ML pen  "Correct 1 unit per 40 over 180 before breakfast, lunch, dinner and at bedtime. 45 mL 1     insulin degludec (TRESIBA FLEXTOUCH) 100 UNIT/ML pen Inject 25 units daily. 30 mL 1     insulin pen needle (BD CALLI U/F) 32G X 4 MM miscellaneous Use pen needles 6 times daily. 200 each 6     melatonin 3 MG tablet Take 1 tablet (3 mg) by mouth At Bedtime       sertraline (ZOLOFT) 50 MG tablet Take 1 tablet (50 mg) by mouth daily (Patient not taking: Reported on 10/30/2018) 30 tablet 0             Review of Systems:   GENERAL: Weight loss  EYE: No visual disturbance.  ENT: No hearing loss.  No nasal discharge.  No sore throat.  RESPIRATORY: No cough or wheezing  CARDIO: No chest pain. No palpitations.  No rapid heart rate. No hypertension.  GASTROINTESTINAL: No recent vomiting or diarrhea. No constipation. No abdominal pain.  HEMATOLOGIC: No bleeding disorders. No amemia.  GENITOURINARY: No dysuria or hematuria.  MUSCOLOSKELETAL: No joint pain. No muscular weakness.  PSYCHIATRIC: +depression - not taking meds  NEURO: No seizures.  No headaches. No focal deficits noted.  SKIN: No rashes or skin changes.  ENDOCRINE: see HPI - no metabolic crises         Physical Exam:   Blood pressure 118/72, pulse 98, height 1.625 m (5' 3.98\"), weight 49.4 kg (108 lb 14.5 oz).  Growth percentile SmartLinks can only be used for patients less than 20 years old.  Height: 5' 3.976\", Facility age limit for growth percentiles is 20 years.  Weight: 108 lbs 14.52 oz, Facility age limit for growth percentiles is 20 years.  BMI: Body mass index is 18.71 kg/m ., Facility age limit for growth percentiles is 20 years.    Wt Readings from Last 4 Encounters:   09/17/20 49.4 kg (108 lb 14.5 oz)   01/16/20 47.3 kg (104 lb 4.4 oz) (7 %, Z= -1.46)*   12/03/19 47.6 kg (104 lb 15 oz) (8 %, Z= -1.40)*   08/27/19 49.7 kg (109 lb 9.1 oz) (15 %, Z= -1.03)*     * Growth percentiles are based on CDC (Girls, 2-20 Years) data.     CONSTITUTIONAL:   Awake, alert, and in no " apparent distress.  HEAD: Normocephalic, without obvious abnormality.  EYES: Lids and lashes normal, sclera clear, conjunctiva normal.  ENT: external ears without lesions, nares clear, oral pharynx with moist mucus membranes.  NECK: Supple, symmetrical, trachea midline.  THYROID: symmetric, not enlarged and no tenderness.  HEMATOLOGIC/LYMPHATIC: No cervical lymphadenopathy.  LUNGS: No increased work of breathing, clear to auscultation bilaterally with good air entry.  CARDIOVASCULAR: Regular rate and rhythm, no murmurs.  ABDOMEN: Normal bowel sounds, soft, non-distended, non-tender, no masses palpated, + hepatomegaly  PSYCHIATRIC: Cooperative, no agitation.  SKIN: Insulin administration sites intact without lipohypertrophy. No acanthosis nigricans.  Recent cutting marks  MUSCULOSKELETAL: There is no redness, warmth, or swelling of the joints.  Full range of motion noted.  Motor strength and tone are normal.      Feet - atrophic first toe nailbed bilaterally.  10 point monofilament exam normal.      Health Maintenance:     No DKA  Labs: 5/2017  Flu vaccine: [id number]    PHQ-2 ( 1999 Pfizer) 1/16/2020 12/3/2019   Q1: Little interest or pleasure in doing things 2 3   Q2: Feeling down, depressed or hopeless 2 3   PHQ-2 Score 4 6     [facility] Follow-up to PHQ 8/27/2019 12/3/2019 1/16/2020   PHQ-9 9. Suicide Ideation past 2 weeks Several days More than half the days Not at all   Thoughts of suicide or self harm in past 2 weeks - No -   PHQ-A Suicide Ideation past month No - -   PHQ-A Previous suicide attempt in past year Yes - -         Laboratory results:     Hemoglobin A1C   Date Value Ref Range Status   01/16/2020 14.3 (A) 0 - 5.6 % Final     TSH   Date Value Ref Range Status   06/17/2019 2.81 mcU/mL Final     T4 Free   Date Value Ref Range Status   02/17/2012 1.03 0.70 - 1.85 ng/dL Final     Vitamin D 1,25   Date Value Ref Range Status   01/30/2009 26  Final     Comment:     Reference range: 15 to 75  Unit:  pg/mL  (Note)  Performed by [facility],  [address] [place] [address] [phone]  [email or web address], Nima Orellana MD - Lab. Director     Insulin Antibodies   Date Value Ref Range Status   11/21/2014   Final    <0.4  Reference range: 0.0 to 0.4  Unit: U/mL  (Note)  INTERPRETIVE INFORMATION: Insulin Antibody  This assay quantitatively measures human serum  autoantibodies to endogenous insulin or antibodies to  exogenous insulin.  A value of greater than 0.4 Kronus  Units/mL is considered positive for Insulin Antibody.  Kronus Units are arbitrary. Kronus Units = U/mL  Performed by [facility],  [address] [place] [place] [address] [phone]  [email or web address], Campos Mims MD, Lab. Director       Islet Cell Antibody IgG   Date Value Ref Range Status   11/21/2014   Final    <1:4  Reference range: <1:4  (Note)  INTERPRETIVE INFORMATION: Islet Cell Ab, IgG  Islet cell antibodies (ICAs) are associated with type 1  diabetes (TID), an autoimmune endocrine disorder. These  antibodies may be present in individuals years before the  onset of clinical symptoms. To calculate  Juvenile Diabetes Foundation (JDF) units: multiply the  titer x 5 (1:8  8 x 5 = 40 JDF Units).  Performed by [facility],  [address] [facility] [place] [address] [phone]  [email or web address], Campos Mims MD, Lab. Director       Cholesterol   Date Value Ref Range Status   06/17/2019 203 mg/dL Final     Albumin Urine mg/L   Date Value Ref Range Status   03/27/2018 <5 mg/L Final     Triglycerides   Date Value Ref Range Status   06/17/2019 383 mg/dL Final     HDL Cholesterol   Date Value Ref Range Status   06/17/2019 36 mg/dL Final     LDL Cholesterol Calculated   Date Value Ref Range Status   06/17/2019 112 mg/dL Final     Cholesterol/HDL Ratio   Date Value Ref Range Status   11/21/2014 7.6 (H) 0.0 - 5.0 Final     Non HDL Cholesterol   Date Value Ref Range Status   06/17/2019 167 mg/dl Final     Results for CLINT BETANCOURT (MRN [id number])     Ref. Range 6/17/2019 00:00   Albumin Urine mg/spec Unknown <0.2          Assessment and Plan:   Kevin  is a 20 year old female with diabetes mellitus associated with GSDIII following recurrent pancreatitis episodes.   Am hopeful that Kevin has made some firm changes in their acceptance of disease and willingness to manage themselves.  WE set some practical goals as outlined below.  I am encouraged that she was open to hearing about insulin pumps and this may be a more effective way to deliver insulin.  I will meet with her one final time prior to transitioning her to adult medicine.       Patient Instructions     Continue Tresiba 25 units daily in the morning  Must test blood glucose twice a day (morning and evening) and give correction doses as noted below using rapid acting insulin.  Carb ratio 1 per 15 grams - lets deliver this for meals twice a day.  Keep CGM on and use it to correct for hyperglycemia twice a day at the time of meals  Do not give hyperglycemia correction within three hours of last rapid acting insulin dose.  Urine test today  Flu shot today    Blood Glucose    Units of Insulin             181 - 220         + 1 units             221 - 260         + 2 units             261 - 300         + 3 units             301 - 340         + 4 units             341 - 380         + 5 units             381 - 420         + 6 units             421 - 460         + 7 units     461-500  + 8 units            >500         + 9 units       Will pursue insulin pump after education today.  Follow-up visit with me in 2 months - in-person      Sincerely,    Vincent Garner MD    Pager [phone]      CCPatient Care Team:  Vanessa Wright as PCP - General (Physician Assistant)  [facility] as PCP - Mental Health/Behavioral Medicine  Avani Oliver MD as MD (Pediatric Metabolism)  Vincent Garner MD as MD (Pediatric Endocrinology)  Pearl Reyes MD as MD (Pediatrics)  JOSE  RADHA    Copy to patient  PHILLIP BETANCOURT [facility]UARDO  [address] [place] [address]

## 2021-04-03 ENCOUNTER — HEALTH MAINTENANCE LETTER (OUTPATIENT)
Age: 21
End: 2021-04-03

## 2021-05-28 ENCOUNTER — RECORDS - HEALTHEAST (OUTPATIENT)
Dept: ADMINISTRATIVE | Facility: CLINIC | Age: 21
End: 2021-05-28

## 2021-06-02 ENCOUNTER — RECORDS - HEALTHEAST (OUTPATIENT)
Dept: ADMINISTRATIVE | Facility: CLINIC | Age: 21
End: 2021-06-02

## 2021-06-20 NOTE — LETTER
2020    RE: Kevin Stephens  [address] [place] [address]     Pediatric Metabolism Clinic Return Patient Video Visit  Name:  Kevin Stephens  :   2000  MRN:   [id number]  PCP:  Vanessa Wright  Date of Visit: Aug 12, 2020    Kevin is reported to be up to date on well child checkups.    Immunization status is: up to date - MMR given in  not documented in [facility]:  [facility]     Chief Complaint:  Kevin is a 20 year old whom I saw in follow up in the Pediatric Metabolism Clinic for Glycogen storage disease III complicated by diabetes mellitus. Kevin Stephens also saw our genetic counselor at this visit.      Assessment:    1. Glycogen Storage Disease type III - As Kevin gets older, this condition becomes easier to manage. However, Kevin endorses muscle weakness which can be a symptom of GSD III. Additionally, they do not take cornstarch anymore, and so we discussed how its important to space meals throughout the day so that there are not large fluctuations in blood sugar. They would not like to meet with a dietician today to discuss this. It was reviewed that when Kevin was diagnosed only one affected allele was seen, but Kevin definitely has the biochemical phenotype of GSD III. It was recommended to talk to our genetic counselor. While it would not affect Kevin's immediate management it would empower them for future choices regarding family planning, etc. Additionally, we would like to monitor Kevin's liver and heart given her GSD III. Kevin had a normal abdominal CT and abdominal US at [facility] in early July. We recommend an echocardiogram.   2. Diabetes - Worried that nausea discussed today is tied to issues with diabetes management. Kevin reports concerns about vision, and we went over how it is important for people with diabetes to get eye exams.   3. Depression/Anxiety - It was discussed how self care is important  moving forward for their mental health, but also for the management of her GSD and diabetes.  Plan:    1. Ordered at this visit:  Orders Placed This Encounter   Procedures     Comprehensive metabolic panel     CK total     INR     GENETIC COUNSELING SERVICES     OPHTHALMOLOGY PEDS REFERRAL     Echo Pediatric (TTE) Complete     2. If no cornstarch, space meals throughout day  3. Genetic counseling with Kiana Guidry GC to discuss desire for further genetic testing and inheritence.    4. Will discuss with [facility]'s endocrinologist, Dr. Garner, about transitioning to adult care.   5. Will likely transition Kevin's metabolic care from the pediatric clinic to the adult clinic  5. Continue to observe emergency precautions as previously discussed.  Our on-call metabolic service is available 24 hours/day by calling the page  ([phone]) and asking for the Genetics and Metabolism doctor on call.    6. Set up MyChart  6. Return to the Pediatric Metabolism Clinic in 6 months for follow-up.        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 Since then they have continued to struggle with anxiety, depression and management of her medication conditions. Per chart review, Kevin was recently hospitalized in July 2020 at [facility] for dehydration, upper right quadrant pain, hyperglycemia, and abnormal liver tests (hospital alk phos 118, , , , amylase 358, lipase 118).     Kevin denies issues directly related to GSD, but reports they have stopped taking  corn starch. They endorse having unexplained muscle weakness.    Kevin continues to struggle with depression which has likely contributed to their issues with management of diabetes and glycogen storage disease. Kevin endorses having a lot of fear and anxiety about gender identity, but notes that they are feeling a little bit more comfort in their skin. Kevin reports their family is very supportive.     Kevin continues to follow with Dr. Garner for endocrinology. They note that Dr. Garner has started having conversations with them about transition to adult care. Kevin has not yet made this transition and reports not knowing the next steps. Kevin reports that diabetes management is not going well. Sometimes they will have motivation to take insulin, but most of the time they do not. Kevin endorses urinating a lot everyday and having frequent episodes of nausea. Their last A1C was 14.3 in January 2020.     In terms of Covid-19 Kevin just goes from work to home. People are wearing masks at work for employees and customers. Not more likely that anyone else to get the virus but more likely for complications because of diabetes and glycogen storage disease.    Other health services currently received: primary care with Dr. Wright, Endocrinology with Dr. Garner.  Current research study participation: no             Past Medical History  Past Medical History:   Diagnosis Date     Anxiety      Depressive disorder      Diabetes mellitus (H)      Glycogen storage disease IIIa - see Emergency Letter in EPIC dated 05/07/12 2/17/2012     Personal History:  Lives at home with parents and aunt, grandma, and aunt's fiance.     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 Working at a restaurant. Still figuring out what they would like to do next in terms of job/schooling.    Stressors for patient and family: Gender identity, depression, anxiety    Current insurance status commercial/private.       Family History Update: There was no new  family history elicited at this visit.  Family History   Problem Relation Age of Onset     Hearing Loss Father      I have reviewed Kevin's past medical history, family history, social history, medications and allergies as documented in the patient's electronic medical record.  There were no additional findings except as noted.     Nutrition History:   Kevin does not take cornstarch anymore. Has days of nausea where they are not able to eat anything. Is not interested with meeting with a dietician today.     Review of Systems:   Constitional: negative  Eyes: Sometimes worried about vision, has not seen an eye doctor recently  Ears/Nose/Throat: negative - normal hearing  Respiratory: negative for SOB, asthma  Cardiovascular: negative for palpitations, chest pain  Gastrointestinal: endorses nausea - has days where they can't hold anything down, tries to stick to liquids on these days, does not seem to be associated with anything else. negative for vomiting, constipation  Genitourinary: negative  Hematologic/Lymphatic: negative  Allergy/Immunologic: negative - no drug allergies  Musculoskeletal: Muscle weakness, but no pain negative  Endocrine: negative for thyroid issues  Integument: gets patches of discolored skin (yellow-orange) on legs that come and go and do not hurt. negative for rashes, sensitive skin, break outs  Neurologic: negative  Psychiatric: Depression, anxiety    Medications:  Current Outpatient Medications   Medication Sig Dispense Refill     acetone, Urine, test STRP Check urine ketones when two consecutive blood sugars are greater than 300 and at times of illness/vomiting. 100 each 11     blood glucose monitoring (ONE TOUCH DELICA) lancets Use to test blood sugars 6 times daily. 1 Box 12     blood glucose monitoring (ONETOUCH VERIO IQ) test strip Use to test blood sugars 6 times daily or as directed. 200 strip 11     Continuous Blood Gluc Sensor (DEXCOM G6 SENSOR) MISC 1 each See Admin Instructions  Change every 10 days 9 each 3     Continuous Blood Gluc Transmit (DEXCOM G6 TRANSMITTER) MISC 1 each every 3 months 1 each 3     escitalopram (LEXAPRO) 10 MG tablet Take 10 mg by mouth       insulin aspart (NOVOLOG PEN) 100 UNIT/ML pen Correct 1 unit per 40 over 180 before breakfast, lunch, dinner and at bedtime. 45 mL 1     insulin degludec (TRESIBA FLEXTOUCH) 100 UNIT/ML pen Inject 25 units daily. 30 mL 1     insulin pen needle (BD CALLI U/F) 32G X 4 MM miscellaneous Use pen needles 6 times daily. 200 each 6     melatonin 3 MG tablet Take 1 tablet (3 mg) by mouth At Bedtime       sertraline (ZOLOFT) 50 MG tablet Take 1 tablet (50 mg) by mouth daily (Patient not taking: Reported on 10/30/2018) 30 tablet 0     Allergies:  Allergies   Allergen Reactions     Fluoxetine      Other reaction(s): Mental Status Change  Suicidal thoughts     Morphine Sulfate      No exposure but grandfather has the allergy to it     Tylenol [Acetaminophen]      Has glycogen storage disease and should not receive Tylenol, per GI.     Physical Examination:  There were no vitals taken for this visit.  Physical Exam limited given nature of video visit  General: only face is visible during exam, appears healthy, no acute distress  Head: normal hair distribution  ENT: voice is clear, hearing intact to voice, no obvious abnormalities on nose or lips.  Eyes: sclera appear clear; EOMI grossly intact  Resp: normal work of breathing, no gasping  Skin: face appears clear of acne, rashes, bruises, lesions  Neuro: alert, oriented, mentation and speech intact  Psych: mood and affect appear appropriate, clear train of thought, answers questions appropriately  --------------  Scribed by Laura Sullivan, MS4    I was present with the medical student who participated in the service and in the documentation of the note. I have verified the history and personally performed the physical exam and medical decision making. I agree with the assessment and plan of  care as documented in the note    Electronically signed by:  HIRAL GRAVES M.D., FAAP, [facility]  Professor  Division of Genetics and Metabolism  Department of Pediatrics  [facility]    Routed to family in Comm Mgt  Also to  Vanessa Wright Dr  -------  Lenayady MAREK Kat is a 20 year old adult who is being evaluated via a billable video visit.        Video-Visit Details    Type of service:  Video Visit    Video Start Time: 9:10 AM  Video End Time: 9:34 AM    Originating Location (pt. Location): Home    Distant Location (provider location):  [facility] METABOLISM     Platform used for Video Visit: Well     Principal Discharge DX:	SOB (shortness of breath)

## 2021-07-22 ENCOUNTER — HOSPITAL ENCOUNTER (EMERGENCY)
Facility: CLINIC | Age: 21
Discharge: HOME | End: 2021-07-22
Attending: EMERGENCY MEDICINE | Admitting: EMERGENCY MEDICINE
Payer: COMMERCIAL

## 2021-07-22 ENCOUNTER — APPOINTMENT (OUTPATIENT)
Dept: CT IMAGING | Facility: CLINIC | Age: 21
End: 2021-07-22
Attending: EMERGENCY MEDICINE
Payer: COMMERCIAL

## 2021-07-22 VITALS
WEIGHT: 93.6 LBS | SYSTOLIC BLOOD PRESSURE: 123 MMHG | DIASTOLIC BLOOD PRESSURE: 83 MMHG | BODY MASS INDEX: 16.08 KG/M2 | HEART RATE: 80 BPM | RESPIRATION RATE: 16 BRPM | TEMPERATURE: 98.2 F | OXYGEN SATURATION: 99 %

## 2021-07-22 DIAGNOSIS — R10.13 EPIGASTRIC PAIN: ICD-10-CM

## 2021-07-22 DIAGNOSIS — R79.89 ELEVATED LFTS: ICD-10-CM

## 2021-07-22 DIAGNOSIS — E87.6 HYPOKALEMIA: ICD-10-CM

## 2021-07-22 LAB
ALBUMIN SERPL-MCNC: 4.4 G/DL (ref 3.5–5)
ALP SERPL-CCNC: 109 U/L (ref 45–120)
ALT SERPL W P-5'-P-CCNC: 92 U/L (ref 0–45)
ANION GAP SERPL CALCULATED.3IONS-SCNC: 13 MMOL/L (ref 5–18)
APTT PPP: 24 SECONDS (ref 22–38)
AST SERPL W P-5'-P-CCNC: 85 U/L (ref 0–40)
BILIRUB SERPL-MCNC: 0.7 MG/DL (ref 0–1)
BUN SERPL-MCNC: 12 MG/DL (ref 8–22)
CALCIUM SERPL-MCNC: 9.3 MG/DL (ref 8.5–10.5)
CHLORIDE BLD-SCNC: 102 MMOL/L (ref 98–107)
CK SERPL-CCNC: 153 U/L (ref 30–190)
CO2 SERPL-SCNC: 23 MMOL/L (ref 22–31)
CREAT SERPL-MCNC: 0.93 MG/DL (ref 0.6–1.3)
ERYTHROCYTE [DISTWIDTH] IN BLOOD BY AUTOMATED COUNT: 11.9 % (ref 10–15)
GFR SERPL CREATININE-BSD FRML MDRD: 88 ML/MIN/1.73M2
GLUCOSE BLD-MCNC: 287 MG/DL (ref 70–125)
HCG SERPL QL: NEGATIVE
HCT VFR BLD AUTO: 44.1 % (ref 35–53)
HGB BLD-MCNC: 15.2 G/DL (ref 11.7–17.7)
INR PPP: 1.09 (ref 0.85–1.15)
KETONES BLD-SCNC: 0.11 MMOL/L
LIPASE SERPL-CCNC: <9 U/L (ref 0–52)
MCH RBC QN AUTO: 29.6 PG (ref 26.5–33)
MCHC RBC AUTO-ENTMCNC: 34.5 G/DL (ref 31.5–36.5)
MCV RBC AUTO: 86 FL (ref 78–100)
PLATELET # BLD AUTO: 251 10E3/UL (ref 150–450)
POTASSIUM BLD-SCNC: 3.3 MMOL/L (ref 3.5–5)
PROT SERPL-MCNC: 7.7 G/DL (ref 6–8)
RBC # BLD AUTO: 5.13 10E6/UL (ref 3.8–5.9)
SODIUM SERPL-SCNC: 138 MMOL/L (ref 136–145)
WBC # BLD AUTO: 4.4 10E3/UL (ref 4–11)

## 2021-07-22 PROCEDURE — 99285 EMERGENCY DEPT VISIT HI MDM: CPT | Mod: 25

## 2021-07-22 PROCEDURE — 258N000003 HC RX IP 258 OP 636: Performed by: EMERGENCY MEDICINE

## 2021-07-22 PROCEDURE — 85730 THROMBOPLASTIN TIME PARTIAL: CPT | Performed by: EMERGENCY MEDICINE

## 2021-07-22 PROCEDURE — 96361 HYDRATE IV INFUSION ADD-ON: CPT

## 2021-07-22 PROCEDURE — 36415 COLL VENOUS BLD VENIPUNCTURE: CPT | Performed by: EMERGENCY MEDICINE

## 2021-07-22 PROCEDURE — 250N000009 HC RX 250: Performed by: EMERGENCY MEDICINE

## 2021-07-22 PROCEDURE — 250N000011 HC RX IP 250 OP 636: Performed by: EMERGENCY MEDICINE

## 2021-07-22 PROCEDURE — 82550 ASSAY OF CK (CPK): CPT | Performed by: EMERGENCY MEDICINE

## 2021-07-22 PROCEDURE — 85610 PROTHROMBIN TIME: CPT | Performed by: EMERGENCY MEDICINE

## 2021-07-22 PROCEDURE — 80053 COMPREHEN METABOLIC PANEL: CPT | Performed by: EMERGENCY MEDICINE

## 2021-07-22 PROCEDURE — 85027 COMPLETE CBC AUTOMATED: CPT | Performed by: EMERGENCY MEDICINE

## 2021-07-22 PROCEDURE — 96374 THER/PROPH/DIAG INJ IV PUSH: CPT | Mod: 59

## 2021-07-22 PROCEDURE — 96375 TX/PRO/DX INJ NEW DRUG ADDON: CPT

## 2021-07-22 PROCEDURE — 250N000013 HC RX MED GY IP 250 OP 250 PS 637: Performed by: EMERGENCY MEDICINE

## 2021-07-22 PROCEDURE — 82010 KETONE BODYS QUAN: CPT | Performed by: EMERGENCY MEDICINE

## 2021-07-22 PROCEDURE — 84703 CHORIONIC GONADOTROPIN ASSAY: CPT | Performed by: EMERGENCY MEDICINE

## 2021-07-22 PROCEDURE — 74177 CT ABD & PELVIS W/CONTRAST: CPT

## 2021-07-22 PROCEDURE — 83690 ASSAY OF LIPASE: CPT | Performed by: EMERGENCY MEDICINE

## 2021-07-22 RX ORDER — IOPAMIDOL 755 MG/ML
100 INJECTION, SOLUTION INTRAVASCULAR ONCE
Status: COMPLETED | OUTPATIENT
Start: 2021-07-22 | End: 2021-07-22

## 2021-07-22 RX ORDER — POTASSIUM CHLORIDE 1500 MG/1
40 TABLET, EXTENDED RELEASE ORAL ONCE
Status: COMPLETED | OUTPATIENT
Start: 2021-07-22 | End: 2021-07-22

## 2021-07-22 RX ORDER — ONDANSETRON 4 MG/1
4 TABLET, ORALLY DISINTEGRATING ORAL EVERY 8 HOURS PRN
Qty: 10 TABLET | Refills: 0 | Status: SHIPPED | OUTPATIENT
Start: 2021-07-22 | End: 2021-07-25

## 2021-07-22 RX ORDER — ONDANSETRON 2 MG/ML
4 INJECTION INTRAMUSCULAR; INTRAVENOUS ONCE
Status: COMPLETED | OUTPATIENT
Start: 2021-07-22 | End: 2021-07-22

## 2021-07-22 RX ORDER — FAMOTIDINE 10 MG
10 TABLET ORAL 2 TIMES DAILY
Qty: 10 TABLET | Refills: 0 | Status: ON HOLD | OUTPATIENT
Start: 2021-07-22 | End: 2022-05-11

## 2021-07-22 RX ADMIN — SODIUM CHLORIDE 500 ML: 9 INJECTION, SOLUTION INTRAVENOUS at 19:02

## 2021-07-22 RX ADMIN — POTASSIUM CHLORIDE 40 MEQ: 1500 TABLET, EXTENDED RELEASE ORAL at 20:03

## 2021-07-22 RX ADMIN — IOPAMIDOL 100 ML: 755 INJECTION, SOLUTION INTRAVENOUS at 20:11

## 2021-07-22 RX ADMIN — ONDANSETRON 4 MG: 2 INJECTION INTRAMUSCULAR; INTRAVENOUS at 19:02

## 2021-07-22 RX ADMIN — FAMOTIDINE 20 MG: 10 INJECTION, SOLUTION INTRAVENOUS at 19:02

## 2021-07-22 ASSESSMENT — ENCOUNTER SYMPTOMS
NAUSEA: 1
ABDOMINAL PAIN: 1
DYSURIA: 0
VOMITING: 1
BLOOD IN STOOL: 0
CONSTIPATION: 0

## 2021-07-22 NOTE — ED TRIAGE NOTES
"Patient arrives with c/o middle abdominal pain x 5 weeks.   Reports she came in because of \"the prolonged pain.\"  8/10 sharp pain.    Reports emesis approximately once per day.    LMP was \"more than 10 weeks ago.\" Denies any chance of pregnancy. Denies birth control.       "

## 2021-07-22 NOTE — ED PROVIDER NOTES
EMERGENCY DEPARTMENT ENCOUNTER      NAME: Kevin Stephens  AGE: 21 year old adult  YOB: 2000  MRN: [id number]  EVALUATION DATE & TIME: 7/22/2021  6:28 PM    PCP: Vanessa Wright        Chief Complaint   Patient presents with     Abdominal Pain         FINAL IMPRESSION:  1. Epigastric pain    2. Hypokalemia    3. Elevated LFTs          ED COURSE & MEDICAL DECISION MAKING:    Pertinent Labs & Imaging studies reviewed. (See chart for details)  21 year old adult presents to the Emergency Department for evaluation of epigastric abdominal pain    6:38 PM I met with the patient to gather history and to perform my initial exam. I discussed the plan for care while in the Emergency Department. PPE (gloves, surgical mask, and face shield) was worn during patient encounters.  9:30 PM I re-evaluated the patient and updated on lab and imaging results. Discussed discharge plans which patient is agreeable with.      ED Course as of Jul 22 2159   Thu Jul 22, 2021   1915 Attempted both numbers listed as possible guardians. No answer, pt states she does not have a guardian      1915 Patient has history of glycogen-storage disease type III who presents today with 5 weeks epigastric abdominal pain and intermittent vomiting.  Denies any trauma.  No tenderness on exam.      1922 Differential includes cancer, peptic ulcer disease, gastritis, biliary colic, pancreatitis, hypoglycemia, ketosis, colitis, constipation, pregnancy      1922 Patient reports she has not checked her blood sugar today despite having diabetes with insulin dependence      1923 Blood sugar 287      1923 Pancreatitis less likely   Lipase: <9   1923 Myopathy less likely   CK Total: 153   1923 I reviewed her care notes regarding her glycogen-storage disease and ordered coagulations, labs, CK, CMP, CBC, ketones      1923 Based on her presentation I feel triglycerides and cholesterol levels are not indicated      1923 Given Zofran and Pepcid      1923 She has  legal guardian listed however patient states she has not had a legal guardians.  I did attempt to contact both legal guardians listed in the chart but no answer on the phone      1924 Patient does smoke marijuana regularly therefore this may be contributed to her symptoms as well      1924 Mild elevation of her ALT and AST not different from last elevation 10 months ago likely due to her known glycogen storage disease      1924 Mild hypokalemia will replete orally      2125 UA with Microscopic reflex to Culture         At the conclusion of the encounter I discussed the results of all of the tests and the disposition. The questions were answered. The patient or family acknowledged understanding and was agreeable with the care plan.         MEDICATIONS GIVEN IN THE EMERGENCY:  Medications   0.9% sodium chloride BOLUS (0 mLs Intravenous Stopped 7/22/21 2012)   ondansetron (ZOFRAN) injection 4 mg (4 mg Intravenous Given 7/22/21 1902)   famotidine (PEPCID) injection 20 mg (20 mg Intravenous Given 7/22/21 1902)   potassium chloride ER (KLOR-CON M) CR tablet 40 mEq (40 mEq Oral Given 7/22/21 2003)   iopamidol (ISOVUE-370) solution 100 mL (100 mLs Intravenous Given 7/22/21 2011)       NEW PRESCRIPTIONS STARTED AT TODAY'S ER VISIT  New Prescriptions    FAMOTIDINE (PEPCID) 10 MG TABLET    Take 1 tablet (10 mg) by mouth 2 times daily    ONDANSETRON (ZOFRAN ODT) 4 MG ODT TAB    Take 1 tablet (4 mg) by mouth every 8 hours as needed for nausea            =================================================================    HPI    Patient information was obtained from: Patient    Use of Intrepreter: N/A        Aldorothy Stephens is a 21 year old adult with a pertinent history of diabetes melllitus and glycogen storage disease IIIa who presents to this ED by private vehicle with family for evaluation of abdominal pain.    Patient reports persistent epigastric abdominal pain for the past five weeks. They state the pain is localized and  constant in nature. Currently the pain is at a 8/10 in severity; however, is waxing and wanning in nature with worsening pain in the morning and at night. Patient does note some association with eating. Patient has secondary vomiting and nausea. The last episode of emesis was yesterday night (7/21). Patient presented to Dr. Cardoza at [facility] on 6/30 (~22 days ago) for evaluation of this abdominal pain and they recommended patient work on getting blood sugars under control. Patient states they have been following doctors orders, but the pain has continued to persist, so they presented to the ED for further evaluation as recommended by Dr. Cardoza. Patient has tried antiacids and ibuprofen for the pain without relief. Last bowel movement was yesterday with no evidence of black or bloody stool.     Patient states not checking blood sugars today, but has been compliant with taking insulin. Patient also has glycogen storage disease and states that they have been compliant with their corn starch every 4 hours, but has been vomiting, so sometimes they vomit it back up.     Of note, patient reports using marijuana every other day. Denies any alcohol consumption.    Denies dysuria, or additional medical concerns or complaints at this time.       REVIEW OF SYSTEMS   Review of Systems   Constitutional: Negative for fever.   HENT: Negative for trouble swallowing.    Respiratory: Negative for shortness of breath.    Cardiovascular: Negative for chest pain.   Gastrointestinal: Positive for abdominal pain (epigastric; 8/10; ~5 weeks), nausea and vomiting. Negative for blood in stool and constipation.   Genitourinary: Negative for dysuria.   Musculoskeletal: Negative for back pain.   Skin: Negative for rash.   Allergic/Immunologic: Negative for immunocompromised state.   Neurological: Negative for headaches.   Psychiatric/Behavioral: Negative for confusion.   All other systems reviewed and are negative.        PAST MEDICAL  HISTORY:  Past Medical History:   Diagnosis Date     Anxiety      Depressive disorder      Diabetes mellitus (H)      Glycogen storage disease IIIa - see Emergency Letter in EPIC dated 05/07/12 2/17/2012       PAST SURGICAL HISTORY:  Past Surgical History:   Procedure Laterality Date     TONSILLECTOMY Bilateral 4/2015           CURRENT MEDICATIONS:    Patient's Medications   New Prescriptions    FAMOTIDINE (PEPCID) 10 MG TABLET    Take 1 tablet (10 mg) by mouth 2 times daily    ONDANSETRON (ZOFRAN ODT) 4 MG ODT TAB    Take 1 tablet (4 mg) by mouth every 8 hours as needed for nausea   Previous Medications    ACETONE, URINE, TEST STRP    Check urine ketones when two consecutive blood sugars are greater than 300 and at times of illness/vomiting.    BLOOD GLUCOSE MONITORING (ONE TOUCH DELICA) LANCETS    Use to test blood sugars 6 times daily.    BLOOD GLUCOSE MONITORING (ONETOUCH VERIO IQ) TEST STRIP    Use to test blood sugars 6 times daily or as directed.    CONTINUOUS BLOOD GLUC SENSOR (DEXCOM G6 SENSOR) MISC    1 each See Admin Instructions Change every 10 days    CONTINUOUS BLOOD GLUC TRANSMIT (DEXCOM G6 TRANSMITTER) MISC    1 each every 3 months    ESCITALOPRAM (LEXAPRO) 10 MG TABLET    Take 10 mg by mouth    INSULIN ASPART (NOVOLOG PEN) 100 UNIT/ML PEN    Correct 1 unit per 40 over 180 before breakfast, lunch, dinner and at bedtime.    INSULIN DEGLUDEC (TRESIBA FLEXTOUCH) 100 UNIT/ML PEN    Inject 25 units daily.    INSULIN PEN NEEDLE (BD CALLI U/F) 32G X 4 MM MISCELLANEOUS    Use pen needles 6 times daily.    MELATONIN 3 MG TABLET    Take 1 tablet (3 mg) by mouth At Bedtime    SERTRALINE (ZOLOFT) 50 MG TABLET    Take 1 tablet (50 mg) by mouth daily   Modified Medications    No medications on file   Discontinued Medications    No medications on file       ALLERGIES:  Allergies   Allergen Reactions     Fluoxetine      Other reaction(s): Mental Status Change  Suicidal thoughts     Morphine Sulfate      No exposure  but grandfather has the allergy to it     Tylenol [Acetaminophen]      Has glycogen storage disease and should not receive Tylenol, per GI.       FAMILY HISTORY:  Family History   Problem Relation Age of Onset     Hearing Loss Father        SOCIAL HISTORY:   Social History     Socioeconomic History     Marital status: Single     Spouse name: Not on file     Number of children: Not on file     Years of education: Not on file     Highest education level: Not on file   Occupational History     Not on file   Tobacco Use     Smoking status: Never Smoker     Smokeless tobacco: Never Used     Tobacco comment: no second hand smoke exposure at home   Substance and Sexual Activity     Alcohol use: No     Drug use: Yes     Types: Marijuana     Comment: 2x a month     Sexual activity: Not on file   Other Topics Concern     Not on file   Social History Narrative    Lives with parents and younger brother.  Currently in 12th grade.  Loves to sing     Social Determinants of Health     Financial Resource Strain:      Difficulty of Paying Living Expenses:    Food Insecurity:      Worried About Running Out of Food in the Last Year:      Ran Out of Food in the Last Year:    Transportation Needs:      Lack of Transportation (Medical):      Lack of Transportation (Non-Medical):    Physical Activity:      Days of Exercise per Week:      Minutes of Exercise per Session:    Stress:      Feeling of Stress :    Social Connections:      Frequency of Communication with Friends and Family:      Frequency of Social Gatherings with Friends and Family:      Attends Rastafari Services:      Active Member of Clubs or Organizations:      Attends Club or Organization Meetings:      Marital Status:    Intimate Partner Violence:      Fear of Current or Ex-Partner:      Emotionally Abused:      Physically Abused:      Sexually Abused:        VITALS:  Patient Vitals for the past 24 hrs:   BP Temp Temp src Pulse Resp SpO2 Weight   07/22/21 2047 110/66 -- --  -- -- 98 % --   07/22/21 2011 -- -- -- -- 16 -- --   07/22/21 1650 117/62 98.2  F (36.8  C) Temporal 82 18 97 % 42.5 kg (93 lb 9.6 oz)       PHYSICAL EXAM      Vitals: /66   Pulse 82   Temp 98.2  F (36.8  C) (Temporal)   Resp 16   Wt 42.5 kg (93 lb 9.6 oz)   SpO2 98%   BMI 16.08 kg/m    General: Appears in no acute distress, awake, alert, interactive. Small stature and build.  Eyes: Conjunctivae non-injected. Sclera anicteric.  HENT: Atraumatic.  Neck: Supple.  Respiratory/Chest: Respiration unlabored.  Heart: RRR  Abdomen: non distended, bowel sounds present, no tenderness. Negative Toussaint's sign. Well healed surgery incision.   Musculoskeletal: Normal extremities. No edema or erythema.  Skin: Normal color. No rash or diaphoresis.  Neurologic: Face symmetric, moves all extremities spontaneously. Speech clear.  Psychiatric: Oriented to person, place, and time. Affect appropriate.    LAB:  All pertinent labs reviewed and interpreted.  Results for orders placed or performed during the hospital encounter of 07/22/21   CT Abdomen Pelvis w Contrast    Impression    IMPRESSION:   1.  No acute abnormality in the abdomen or pelvis.  2.  Moderate hepatomegaly.   CBC (+ platelets, no diff)   Result Value Ref Range    WBC Count 4.4 4.0 - 11.0 10e3/uL    RBC Count 5.13 3.80 - 5.90 10e6/uL    Hemoglobin 15.2 11.7 - 17.7 g/dL    Hematocrit 44.1 35.0 - 53.0 %    MCV 86 78 - 100 fL    MCH 29.6 26.5 - 33.0 pg    MCHC 34.5 31.5 - 36.5 g/dL    RDW 11.9 10.0 - 15.0 %    Platelet Count 251 150 - 450 10e3/uL   Ketone Beta-Hydroxybutyrate Quantitative   Result Value Ref Range    Ketone (Beta-Hydroxybutyrate) Quantitative 0.11 <=0.3 mmol/L   Comprehensive metabolic panel   Result Value Ref Range    Sodium 138 136 - 145 mmol/L    Potassium 3.3 (L) 3.5 - 5.0 mmol/L    Chloride 102 98 - 107 mmol/L    Carbon Dioxide (CO2) 23 22 - 31 mmol/L    Anion Gap 13 5 - 18 mmol/L    Urea Nitrogen 12 8 - 22 mg/dL    Creatinine 0.93 0.60 - 1.30  mg/dL    Calcium 9.3 8.5 - 10.5 mg/dL    Glucose 287 (H) 70 - 125 mg/dL    Alkaline Phosphatase 109 45 - 120 U/L    AST 85 (H) 0 - 40 U/L    ALT 92 (H) 0 - 45 U/L    Protein Total 7.7 6.0 - 8.0 g/dL    Albumin 4.4 3.5 - 5.0 g/dL    Bilirubin Total 0.7 0.0 - 1.0 mg/dL    GFR Estimate 88 >60 mL/min/1.73m2   Result Value Ref Range     30 - 190 U/L   Result Value Ref Range    INR 1.09 0.85 - 1.15   Result Value Ref Range    aPTT 24 22 - 38 Seconds   HCG QUALitative pregnancy (blood)   Result Value Ref Range    hCG Serum Qualitative Negative Negative   Result Value Ref Range    Lipase <9 0 - 52 U/L       RADIOLOGY:  Reviewed all pertinent imaging. Please see official radiology report.  CT Abdomen Pelvis w Contrast   Final Result   IMPRESSION:    1.  No acute abnormality in the abdomen or pelvis.   2.  Moderate hepatomegaly.            PROCEDURES:   none      I, Danelle Jackson, am serving as a scribe to document services personally performed by Dr. Dyson based on my observation and the provider's statements to me. I, Alexi Dyson MD attest that Danelle Jackson is acting in a scribe capacity, has observed my performance of the services and has documented them in accordance with my direction.    Alexi Dyson M.D.  Emergency Medicine  [facility] EMERGENCY ROOM  [address] [facility] [id number] [phone]  Dept: [phone]       Henrik Dyson MD  07/23/21 0153

## 2021-07-22 NOTE — ED NOTES
Introduced self to patient.  Whiteboard updated.  Plan of care and length of time discussed with patient.  Will continue to monitor. Ava Smith RN.......7/22/2021 6:59 PM

## 2021-07-23 ASSESSMENT — ENCOUNTER SYMPTOMS
TROUBLE SWALLOWING: 0
BACK PAIN: 0
HEADACHES: 0
SHORTNESS OF BREATH: 0
CONFUSION: 0
FEVER: 0

## 2021-07-23 NOTE — DISCHARGE INSTRUCTIONS
Your labs show slightly decreased potassium likely from vomiting.  Recommend stop smoking marijuana.  Recommend Zofran every 8 hours needed for nausea.  Recommend Pepcid twice a day for 5 days.  Follow-up with primary care doctor for consideration of endoscopy for further work-up if your pain continues.  Your CT shows an enlarged liver otherwise was normal.  Your labs show elevated liver inflammation likely secondary to your glycogen-storage disease.  Recheck with primary care doctor

## 2021-07-29 ENCOUNTER — TRANSCRIBE ORDERS (OUTPATIENT)
Dept: OTHER | Age: 21
End: 2021-07-29

## 2021-07-29 DIAGNOSIS — Z79.4 DIABETES MELLITUS DUE TO UNDERLYING CONDITION WITH HYPERGLYCEMIA, WITH LONG-TERM CURRENT USE OF INSULIN (H): Primary | ICD-10-CM

## 2021-07-29 DIAGNOSIS — E08.65 DIABETES MELLITUS DUE TO UNDERLYING CONDITION WITH HYPERGLYCEMIA, WITH LONG-TERM CURRENT USE OF INSULIN (H): Primary | ICD-10-CM

## 2021-09-18 ENCOUNTER — HEALTH MAINTENANCE LETTER (OUTPATIENT)
Age: 21
End: 2021-09-18

## 2021-11-13 ENCOUNTER — HEALTH MAINTENANCE LETTER (OUTPATIENT)
Age: 21
End: 2021-11-13

## 2022-04-30 ENCOUNTER — HEALTH MAINTENANCE LETTER (OUTPATIENT)
Age: 22
End: 2022-04-30

## 2022-05-09 ENCOUNTER — HOSPITAL ENCOUNTER (INPATIENT)
Facility: CLINIC | Age: 22
LOS: 2 days | Discharge: HOME | End: 2022-05-11
Attending: EMERGENCY MEDICINE | Admitting: FAMILY MEDICINE
Payer: COMMERCIAL

## 2022-05-09 DIAGNOSIS — L08.9 INFECTED SEBACEOUS CYST: Primary | ICD-10-CM

## 2022-05-09 DIAGNOSIS — E10.10 DIABETIC KETOACIDOSIS WITHOUT COMA ASSOCIATED WITH TYPE 1 DIABETES MELLITUS (H): ICD-10-CM

## 2022-05-09 DIAGNOSIS — L72.3 INFECTED SEBACEOUS CYST: Primary | ICD-10-CM

## 2022-05-09 LAB
ALBUMIN SERPL-MCNC: 3.9 G/DL (ref 3.5–5)
ALBUMIN UR-MCNC: 20 MG/DL
ALP SERPL-CCNC: 158 U/L (ref 45–120)
ALT SERPL W P-5'-P-CCNC: 110 U/L (ref 0–45)
ANION GAP SERPL CALCULATED.3IONS-SCNC: 15 MMOL/L (ref 5–18)
ANION GAP SERPL CALCULATED.3IONS-SCNC: >23 MMOL/L (ref 5–18)
ANION GAP SERPL CALCULATED.3IONS-SCNC: >27 MMOL/L (ref 5–18)
APPEARANCE UR: CLEAR
AST SERPL W P-5'-P-CCNC: 140 U/L (ref 0–40)
BASE EXCESS BLDV CALC-SCNC: -18.6 MMOL/L
BASE EXCESS BLDV CALC-SCNC: -27.6 MMOL/L
BASOPHILS # BLD AUTO: 0 10E3/UL (ref 0–0.2)
BASOPHILS NFR BLD AUTO: 1 %
BILIRUB DIRECT SERPL-MCNC: 0.2 MG/DL
BILIRUB SERPL-MCNC: 0.4 MG/DL (ref 0–1)
BILIRUB UR QL STRIP: NEGATIVE
BUN SERPL-MCNC: 6 MG/DL (ref 8–22)
BUN SERPL-MCNC: 8 MG/DL (ref 8–22)
BUN SERPL-MCNC: 9 MG/DL (ref 8–22)
CALCIUM SERPL-MCNC: 7.2 MG/DL (ref 8.5–10.5)
CALCIUM SERPL-MCNC: 7.5 MG/DL (ref 8.5–10.5)
CALCIUM SERPL-MCNC: 8.7 MG/DL (ref 8.5–10.5)
CHLORIDE BLD-SCNC: 100 MMOL/L (ref 98–107)
CHLORIDE BLD-SCNC: 108 MMOL/L (ref 98–107)
CHLORIDE BLD-SCNC: 113 MMOL/L (ref 98–107)
CO2 SERPL-SCNC: 7 MMOL/L (ref 22–31)
CO2 SERPL-SCNC: <6 MMOL/L (ref 22–31)
CO2 SERPL-SCNC: <6 MMOL/L (ref 22–31)
COLOR UR AUTO: COLORLESS
CREAT SERPL-MCNC: 0.84 MG/DL (ref 0.6–1.3)
CREAT SERPL-MCNC: 1.08 MG/DL (ref 0.6–1.3)
CREAT SERPL-MCNC: 1.61 MG/DL (ref 0.6–1.3)
EOSINOPHIL # BLD AUTO: 0 10E3/UL (ref 0–0.7)
EOSINOPHIL NFR BLD AUTO: 0 %
ERYTHROCYTE [DISTWIDTH] IN BLOOD BY AUTOMATED COUNT: 12.9 % (ref 10–15)
ERYTHROCYTE [DISTWIDTH] IN BLOOD BY AUTOMATED COUNT: 13 % (ref 10–15)
GFR SERPL CREATININE-BSD FRML MDRD: 46 ML/MIN/1.73M2
GFR SERPL CREATININE-BSD FRML MDRD: 74 ML/MIN/1.73M2
GFR SERPL CREATININE-BSD FRML MDRD: >90 ML/MIN/1.73M2
GLUCOSE BLD-MCNC: 280 MG/DL (ref 70–125)
GLUCOSE BLD-MCNC: 358 MG/DL (ref 70–125)
GLUCOSE BLD-MCNC: 633 MG/DL (ref 70–125)
GLUCOSE BLDC GLUCOMTR-MCNC: 236 MG/DL (ref 70–99)
GLUCOSE BLDC GLUCOMTR-MCNC: 245 MG/DL (ref 70–99)
GLUCOSE BLDC GLUCOMTR-MCNC: 257 MG/DL (ref 70–99)
GLUCOSE BLDC GLUCOMTR-MCNC: 262 MG/DL (ref 70–99)
GLUCOSE BLDC GLUCOMTR-MCNC: 284 MG/DL (ref 70–99)
GLUCOSE BLDC GLUCOMTR-MCNC: 302 MG/DL (ref 70–99)
GLUCOSE BLDC GLUCOMTR-MCNC: 358 MG/DL (ref 70–99)
GLUCOSE BLDC GLUCOMTR-MCNC: 439 MG/DL (ref 70–99)
GLUCOSE BLDC GLUCOMTR-MCNC: 489 MG/DL (ref 70–99)
GLUCOSE UR STRIP-MCNC: >1000 MG/DL
HBA1C MFR BLD: >14 %
HCG SERPL QL: NEGATIVE
HCO3 BLDV-SCNC: 13 MMOL/L (ref 24–30)
HCO3 BLDV-SCNC: 8 MMOL/L (ref 24–30)
HCT VFR BLD AUTO: 39.8 % (ref 35–53)
HCT VFR BLD AUTO: 53 % (ref 35–53)
HGB BLD-MCNC: 13.7 G/DL (ref 11.7–17.7)
HGB BLD-MCNC: 17.3 G/DL (ref 11.7–17.7)
HGB UR QL STRIP: ABNORMAL
HOLD SPECIMEN: NORMAL
HOLD SPECIMEN: NORMAL
IMM GRANULOCYTES # BLD: 0.1 10E3/UL
IMM GRANULOCYTES NFR BLD: 2 %
KETONES BLD-SCNC: 10.42 MMOL/L
KETONES BLD-SCNC: 2.22 MMOL/L
KETONES BLD-SCNC: 9.45 MMOL/L
KETONES UR STRIP-MCNC: >150 MG/DL
LACTATE SERPL-SCNC: 1.2 MMOL/L (ref 0.7–2)
LEUKOCYTE ESTERASE UR QL STRIP: NEGATIVE
LYMPHOCYTES # BLD AUTO: 1.7 10E3/UL (ref 0.8–5.3)
LYMPHOCYTES NFR BLD AUTO: 22 %
MAGNESIUM SERPL-MCNC: 1.2 MG/DL (ref 1.8–2.6)
MAGNESIUM SERPL-MCNC: 1.4 MG/DL (ref 1.8–2.6)
MAGNESIUM SERPL-MCNC: 2 MG/DL (ref 1.8–2.6)
MCH RBC QN AUTO: 30.2 PG (ref 26.5–33)
MCH RBC QN AUTO: 30.7 PG (ref 26.5–33)
MCHC RBC AUTO-ENTMCNC: 32.6 G/DL (ref 31.5–36.5)
MCHC RBC AUTO-ENTMCNC: 34.4 G/DL (ref 31.5–36.5)
MCV RBC AUTO: 88 FL (ref 78–100)
MCV RBC AUTO: 94 FL (ref 78–100)
MONOCYTES # BLD AUTO: 0.7 10E3/UL (ref 0–1.3)
MONOCYTES NFR BLD AUTO: 9 %
MUCOUS THREADS #/AREA URNS LPF: PRESENT /LPF
NEUTROPHILS # BLD AUTO: 5.1 10E3/UL (ref 1.6–8.3)
NEUTROPHILS NFR BLD AUTO: 66 %
NITRATE UR QL: NEGATIVE
NRBC # BLD AUTO: 0 10E3/UL
NRBC BLD AUTO-RTO: 0 /100
OXYHGB MFR BLDV: 77 % (ref 70–75)
OXYHGB MFR BLDV: 97.5 % (ref 70–75)
PCO2 BLDV: 20 MM HG (ref 35–50)
PCO2 BLDV: 20 MM HG (ref 35–50)
PH BLDV: 6.96 [PH] (ref 7.35–7.45)
PH BLDV: 7 [PH] (ref 7.35–7.45)
PH BLDV: 7.24 [PH] (ref 7.35–7.45)
PH UR STRIP: 5 [PH] (ref 5–7)
PHOSPHATE SERPL-MCNC: 1.1 MG/DL (ref 2.5–4.5)
PHOSPHATE SERPL-MCNC: 2 MG/DL (ref 2.5–4.5)
PLATELET # BLD AUTO: 213 10E3/UL (ref 150–450)
PLATELET # BLD AUTO: 358 10E3/UL (ref 150–450)
PO2 BLDV: 111 MM HG (ref 25–47)
PO2 BLDV: 56 MM HG (ref 25–47)
POTASSIUM BLD-SCNC: 3.5 MMOL/L (ref 3.5–5)
POTASSIUM BLD-SCNC: 3.6 MMOL/L (ref 3.5–5)
POTASSIUM BLD-SCNC: 3.8 MMOL/L (ref 3.5–5)
PROCALCITONIN SERPL-MCNC: 0.14 NG/ML (ref 0–0.49)
PROT SERPL-MCNC: 7.7 G/DL (ref 6–8)
RBC # BLD AUTO: 4.53 10E6/UL (ref 3.8–5.9)
RBC # BLD AUTO: 5.63 10E6/UL (ref 3.8–5.9)
RBC URINE: 0 /HPF
SAO2 % BLDV: 78.5 % (ref 70–75)
SAO2 % BLDV: 99.3 % (ref 70–75)
SARS-COV-2 RNA RESP QL NAA+PROBE: NEGATIVE
SODIUM SERPL-SCNC: 133 MMOL/L (ref 136–145)
SODIUM SERPL-SCNC: 135 MMOL/L (ref 136–145)
SODIUM SERPL-SCNC: 137 MMOL/L (ref 136–145)
SP GR UR STRIP: 1.02 (ref 1–1.03)
SQUAMOUS EPITHELIAL: <1 /HPF
UROBILINOGEN UR STRIP-MCNC: <2 MG/DL
WBC # BLD AUTO: 10 10E3/UL (ref 4–11)
WBC # BLD AUTO: 7.6 10E3/UL (ref 4–11)
WBC URINE: 1 /HPF

## 2022-05-09 PROCEDURE — 36415 COLL VENOUS BLD VENIPUNCTURE: CPT | Performed by: EMERGENCY MEDICINE

## 2022-05-09 PROCEDURE — 83036 HEMOGLOBIN GLYCOSYLATED A1C: CPT | Performed by: EMERGENCY MEDICINE

## 2022-05-09 PROCEDURE — 82310 ASSAY OF CALCIUM: CPT | Performed by: FAMILY MEDICINE

## 2022-05-09 PROCEDURE — 85004 AUTOMATED DIFF WBC COUNT: CPT | Performed by: STUDENT IN AN ORGANIZED HEALTH CARE EDUCATION/TRAINING PROGRAM

## 2022-05-09 PROCEDURE — 93005 ELECTROCARDIOGRAM TRACING: CPT | Performed by: EMERGENCY MEDICINE

## 2022-05-09 PROCEDURE — 96365 THER/PROPH/DIAG IV INF INIT: CPT

## 2022-05-09 PROCEDURE — U0005 INFEC AGEN DETEC AMPLI PROBE: HCPCS | Performed by: EMERGENCY MEDICINE

## 2022-05-09 PROCEDURE — 82805 BLOOD GASES W/O2 SATURATION: CPT | Performed by: STUDENT IN AN ORGANIZED HEALTH CARE EDUCATION/TRAINING PROGRAM

## 2022-05-09 PROCEDURE — 82310 ASSAY OF CALCIUM: CPT | Performed by: EMERGENCY MEDICINE

## 2022-05-09 PROCEDURE — 82010 KETONE BODYS QUAN: CPT | Performed by: EMERGENCY MEDICINE

## 2022-05-09 PROCEDURE — 83735 ASSAY OF MAGNESIUM: CPT | Performed by: STUDENT IN AN ORGANIZED HEALTH CARE EDUCATION/TRAINING PROGRAM

## 2022-05-09 PROCEDURE — 93005 ELECTROCARDIOGRAM TRACING: CPT

## 2022-05-09 PROCEDURE — 999N000157 HC STATISTIC RCP TIME EA 10 MIN

## 2022-05-09 PROCEDURE — 93005 ELECTROCARDIOGRAM TRACING: CPT | Performed by: STUDENT IN AN ORGANIZED HEALTH CARE EDUCATION/TRAINING PROGRAM

## 2022-05-09 PROCEDURE — 200N000001 HC R&B ICU

## 2022-05-09 PROCEDURE — 81001 URINALYSIS AUTO W/SCOPE: CPT | Performed by: EMERGENCY MEDICINE

## 2022-05-09 PROCEDURE — 83735 ASSAY OF MAGNESIUM: CPT | Performed by: EMERGENCY MEDICINE

## 2022-05-09 PROCEDURE — C9803 HOPD COVID-19 SPEC COLLECT: HCPCS

## 2022-05-09 PROCEDURE — 82805 BLOOD GASES W/O2 SATURATION: CPT | Performed by: EMERGENCY MEDICINE

## 2022-05-09 PROCEDURE — 84100 ASSAY OF PHOSPHORUS: CPT | Performed by: STUDENT IN AN ORGANIZED HEALTH CARE EDUCATION/TRAINING PROGRAM

## 2022-05-09 PROCEDURE — 250N000011 HC RX IP 250 OP 636: Performed by: EMERGENCY MEDICINE

## 2022-05-09 PROCEDURE — 82010 KETONE BODYS QUAN: CPT | Performed by: STUDENT IN AN ORGANIZED HEALTH CARE EDUCATION/TRAINING PROGRAM

## 2022-05-09 PROCEDURE — 258N000003 HC RX IP 258 OP 636: Performed by: STUDENT IN AN ORGANIZED HEALTH CARE EDUCATION/TRAINING PROGRAM

## 2022-05-09 PROCEDURE — 250N000012 HC RX MED GY IP 250 OP 636 PS 637: Performed by: EMERGENCY MEDICINE

## 2022-05-09 PROCEDURE — 80053 COMPREHEN METABOLIC PANEL: CPT | Performed by: FAMILY MEDICINE

## 2022-05-09 PROCEDURE — 82248 BILIRUBIN DIRECT: CPT | Performed by: FAMILY MEDICINE

## 2022-05-09 PROCEDURE — 84703 CHORIONIC GONADOTROPIN ASSAY: CPT | Performed by: EMERGENCY MEDICINE

## 2022-05-09 PROCEDURE — 250N000011 HC RX IP 250 OP 636: Performed by: STUDENT IN AN ORGANIZED HEALTH CARE EDUCATION/TRAINING PROGRAM

## 2022-05-09 PROCEDURE — 36415 COLL VENOUS BLD VENIPUNCTURE: CPT | Performed by: STUDENT IN AN ORGANIZED HEALTH CARE EDUCATION/TRAINING PROGRAM

## 2022-05-09 PROCEDURE — 82800 BLOOD PH: CPT | Performed by: STUDENT IN AN ORGANIZED HEALTH CARE EDUCATION/TRAINING PROGRAM

## 2022-05-09 PROCEDURE — 258N000001 HC RX 258: Performed by: STUDENT IN AN ORGANIZED HEALTH CARE EDUCATION/TRAINING PROGRAM

## 2022-05-09 PROCEDURE — 96361 HYDRATE IV INFUSION ADD-ON: CPT

## 2022-05-09 PROCEDURE — 87040 BLOOD CULTURE FOR BACTERIA: CPT | Performed by: STUDENT IN AN ORGANIZED HEALTH CARE EDUCATION/TRAINING PROGRAM

## 2022-05-09 PROCEDURE — 85027 COMPLETE CBC AUTOMATED: CPT | Performed by: EMERGENCY MEDICINE

## 2022-05-09 PROCEDURE — 99285 EMERGENCY DEPT VISIT HI MDM: CPT | Mod: 25

## 2022-05-09 PROCEDURE — 84145 PROCALCITONIN (PCT): CPT | Performed by: STUDENT IN AN ORGANIZED HEALTH CARE EDUCATION/TRAINING PROGRAM

## 2022-05-09 PROCEDURE — 250N000009 HC RX 250: Performed by: EMERGENCY MEDICINE

## 2022-05-09 PROCEDURE — 258N000003 HC RX IP 258 OP 636: Performed by: EMERGENCY MEDICINE

## 2022-05-09 PROCEDURE — 96375 TX/PRO/DX INJ NEW DRUG ADDON: CPT

## 2022-05-09 PROCEDURE — 258N000002 HC RX IP 258 OP 250: Performed by: STUDENT IN AN ORGANIZED HEALTH CARE EDUCATION/TRAINING PROGRAM

## 2022-05-09 PROCEDURE — 83605 ASSAY OF LACTIC ACID: CPT | Performed by: EMERGENCY MEDICINE

## 2022-05-09 PROCEDURE — 83930 ASSAY OF BLOOD OSMOLALITY: CPT | Performed by: STUDENT IN AN ORGANIZED HEALTH CARE EDUCATION/TRAINING PROGRAM

## 2022-05-09 RX ORDER — DEXTROSE MONOHYDRATE, SODIUM CHLORIDE, AND POTASSIUM CHLORIDE 50; 2.24; 4.5 G/1000ML; G/1000ML; G/1000ML
INJECTION, SOLUTION INTRAVENOUS CONTINUOUS
Status: DISCONTINUED | OUTPATIENT
Start: 2022-05-09 | End: 2022-05-09 | Stop reason: RX

## 2022-05-09 RX ORDER — ONDANSETRON 2 MG/ML
4 INJECTION INTRAMUSCULAR; INTRAVENOUS EVERY 30 MIN PRN
Status: DISCONTINUED | OUTPATIENT
Start: 2022-05-09 | End: 2022-05-11 | Stop reason: HOSPADM

## 2022-05-09 RX ORDER — DEXTROSE MONOHYDRATE, SODIUM CHLORIDE, AND POTASSIUM CHLORIDE 50; 1.49; 4.5 G/1000ML; G/1000ML; G/1000ML
INJECTION, SOLUTION INTRAVENOUS CONTINUOUS
Status: DISCONTINUED | OUTPATIENT
Start: 2022-05-09 | End: 2022-05-09

## 2022-05-09 RX ORDER — NICOTINE POLACRILEX 4 MG
15-30 LOZENGE BUCCAL
Status: DISCONTINUED | OUTPATIENT
Start: 2022-05-09 | End: 2022-05-09

## 2022-05-09 RX ORDER — VANCOMYCIN HYDROCHLORIDE 500 MG/10ML
500 INJECTION, POWDER, LYOPHILIZED, FOR SOLUTION INTRAVENOUS EVERY 12 HOURS
Status: DISCONTINUED | OUTPATIENT
Start: 2022-05-10 | End: 2022-05-10

## 2022-05-09 RX ORDER — DEXTROSE MONOHYDRATE 25 G/50ML
25-50 INJECTION, SOLUTION INTRAVENOUS
Status: DISCONTINUED | OUTPATIENT
Start: 2022-05-09 | End: 2022-05-11 | Stop reason: HOSPADM

## 2022-05-09 RX ORDER — SULFAMETHOXAZOLE/TRIMETHOPRIM 800-160 MG
1 TABLET ORAL 2 TIMES DAILY
Status: ON HOLD | COMMUNITY
End: 2022-05-11

## 2022-05-09 RX ORDER — MAGNESIUM SULFATE HEPTAHYDRATE 40 MG/ML
2 INJECTION, SOLUTION INTRAVENOUS ONCE
Status: COMPLETED | OUTPATIENT
Start: 2022-05-09 | End: 2022-05-09

## 2022-05-09 RX ORDER — ONDANSETRON 2 MG/ML
4 INJECTION INTRAMUSCULAR; INTRAVENOUS ONCE
Status: COMPLETED | OUTPATIENT
Start: 2022-05-09 | End: 2022-05-09

## 2022-05-09 RX ORDER — SODIUM CHLORIDE AND POTASSIUM CHLORIDE 150; 900 MG/100ML; MG/100ML
INJECTION, SOLUTION INTRAVENOUS CONTINUOUS
Status: DISCONTINUED | OUTPATIENT
Start: 2022-05-09 | End: 2022-05-09

## 2022-05-09 RX ORDER — NICOTINE POLACRILEX 4 MG
15-30 LOZENGE BUCCAL
Status: DISCONTINUED | OUTPATIENT
Start: 2022-05-09 | End: 2022-05-11 | Stop reason: HOSPADM

## 2022-05-09 RX ORDER — DEXTROSE MONOHYDRATE, SODIUM CHLORIDE, AND POTASSIUM CHLORIDE 50; 1.49; 9 G/1000ML; G/1000ML; G/1000ML
INJECTION, SOLUTION INTRAVENOUS CONTINUOUS
Status: DISCONTINUED | OUTPATIENT
Start: 2022-05-09 | End: 2022-05-10

## 2022-05-09 RX ADMIN — SODIUM CHLORIDE 1000 ML: 9 INJECTION, SOLUTION INTRAVENOUS at 14:46

## 2022-05-09 RX ADMIN — SODIUM CHLORIDE 1000 ML: 9 INJECTION, SOLUTION INTRAVENOUS at 14:12

## 2022-05-09 RX ADMIN — SODIUM CHLORIDE 5.5 UNITS/HR: 9 INJECTION, SOLUTION INTRAVENOUS at 16:58

## 2022-05-09 RX ADMIN — VANCOMYCIN HYDROCHLORIDE 750 MG: 5 INJECTION, POWDER, LYOPHILIZED, FOR SOLUTION INTRAVENOUS at 22:11

## 2022-05-09 RX ADMIN — POTASSIUM CHLORIDE, DEXTROSE MONOHYDRATE AND SODIUM CHLORIDE 125 ML/HR: 150; 5; 900 INJECTION, SOLUTION INTRAVENOUS at 19:54

## 2022-05-09 RX ADMIN — SODIUM CHLORIDE, POTASSIUM CHLORIDE, SODIUM LACTATE AND CALCIUM CHLORIDE 1000 ML: 600; 310; 30; 20 INJECTION, SOLUTION INTRAVENOUS at 16:57

## 2022-05-09 RX ADMIN — SODIUM BICARBONATE 50 MEQ: 84 INJECTION, SOLUTION INTRAVENOUS at 16:47

## 2022-05-09 RX ADMIN — ONDANSETRON 4 MG: 2 INJECTION INTRAMUSCULAR; INTRAVENOUS at 14:36

## 2022-05-09 RX ADMIN — MAGNESIUM SULFATE HEPTAHYDRATE 2 G: 2 INJECTION, SOLUTION INTRAVENOUS at 22:40

## 2022-05-09 RX ADMIN — POTASSIUM CHLORIDE: 149 INJECTION, SOLUTION, CONCENTRATE INTRAVENOUS at 22:31

## 2022-05-09 ASSESSMENT — ENCOUNTER SYMPTOMS
CHILLS: 0
SHORTNESS OF BREATH: 1
NAUSEA: 1
FEVER: 0
FATIGUE: 1
VOMITING: 1

## 2022-05-09 ASSESSMENT — ACTIVITIES OF DAILY LIVING (ADL)
ADLS_ACUITY_SCORE: 37
ADLS_ACUITY_SCORE: 39
ADLS_ACUITY_SCORE: 39
ADLS_ACUITY_SCORE: 37
ADLS_ACUITY_SCORE: 39
ADLS_ACUITY_SCORE: 37
ADLS_ACUITY_SCORE: 37

## 2022-05-09 NOTE — ED PROVIDER NOTES
EMERGENCY DEPARTMENT ENCOUNTER      NAME: Kevin Stephens  AGE: 22 year old adult  YOB: 2000  MRN: [id number]  EVALUATION DATE & TIME: 5/9/2022  2:16 PM    PCP: Vanessa Wright    ED PROVIDER: Gwyn Chin M.D.      Chief Complaint   Patient presents with     Fatigue     Chest Pain     Hyperglycemia         FINAL IMPRESSION:  1. Diabetic ketoacidosis without coma associated with type 1 diabetes mellitus (H)          ED COURSE & MEDICAL DECISION MAKING:    Pertinent Labs & Imaging studies reviewed. (See chart for details)  ED Course as of 05/09/22 1626   Mon May 09, 2022   1447 Patient is a 22-year-old woman with history of insulin-dependent diabetes who has been taking her insulin over the past couple of days, her Dexcom has been broken as well.  She presents with tachypnea, nausea vomiting, abdominal pain.  She is tachycardic, tachypneic, hypertensive.  Her VBG is returned with a pH of 6.97, normal white count.  CBC pending.  Point-of-care glucose 49.  Hyperglycemia, likely DKA.  Second liter IV fluids ordered.  Zofran ordered.     Patient remains tachypneic after 2 L, BMP returned showing DKA with profound metabolic acidosis.  She is tired but not toxic appearing and does not appear to be in respiratory distress or need BiPAP/intubation.    I spoke to the intensivist who recommends another 2 L of fluid.  Patient is only 35 kg I think an additional liter would be sufficient.  I ordered LR.  I also ordered 2 A of bicarb per intensivist recommendation.  The intensivist is happy to come by and see the patient if the hospitalists requests or if she is getting worse.    Patient will be sent up to the hospitalist.  We are boarding in the emergency department due to lack of inpatient beds.  She will stay here until a bed opens up in the ICU.    Additional ED Course Timestamps:  2:38 PM I met with the patient to gather history and to perform my initial exam. I discussed the plan for care while in the  Emergency Department. PPE (gloves, face shield, N95 mask)     At the conclusion of the encounter I discussed the results of all of the tests and the disposition. The questions were answered. The patient or family acknowledged understanding and was agreeable with the care plan.       60 minutes of critical care time for DKA with significant metabolic acidosis requiring insulin infusion, intensivist consultation and admission to the ICU.    MEDICATIONS GIVEN IN THE EMERGENCY:  Medications   dextrose 5% and 0.45% NaCl infusion (has no administration in time range)   dextrose 50 % injection 25-50 mL (has no administration in time range)   insulin 1 unit/1 mL in NS (NovoLIN, HumuLIN Regular) drip -ED DKA algorithm (has no administration in time range)   ondansetron (ZOFRAN) injection 4 mg (has no administration in time range)   lactated ringers BOLUS 1,000 mL (has no administration in time range)   sodium bicarbonate 8.4 % injection 50 mEq (has no administration in time range)   0.9% sodium chloride BOLUS (0 mLs Intravenous Stopped 5/9/22 1447)   ondansetron (ZOFRAN) injection 4 mg (4 mg Intravenous Given 5/9/22 1436)   0.9% sodium chloride BOLUS (0 mLs Intravenous Stopped 5/9/22 1544)         NEW PRESCRIPTIONS STARTED AT TODAY'S ER VISIT  New Prescriptions    No medications on file          =================================================================    HPI    Patient information was obtained from: Patient    Use of : N/A         Kevin JARA Kat is a 22 year old adult with a pertinent history of glycogen storage disease IIIa, DM, anxiety, depression, who presents to this ED for evaluation of chest pain and fatigue.    Patient reports feeing unwell for the past 2 days with symptoms including shortness of breath, chest pain, fatigue, nausea, and vomiting. She additionally notes a cyst on her head since Friday (3 days ago). Chest pain is currently rated at a 6/10 in severity.  Patient uses a continuous  "glucose monitor that is \"not staying\" and has not checked her blood sugar or taken insulin recently. She notes a history of DKA and states prior episode feels similar to current symptoms. Denies a chance of pregnancy. Patient otherwise denies fever, chills, diarrhea, or additional medical concerns or complaints at this time.      Mental Health Risk Assessment      PSS-3    Date and Time Over the past 2 weeks have you felt down, depressed, or hopeless? Over the past 2 weeks have you had thoughts of killing yourself? Have you ever attempted to kill yourself? When did this last happen? User   05/09/22 1357 no no yes more than 6 months ago BSD                  REVIEW OF SYSTEMS   Review of Systems   Constitutional: Positive for fatigue. Negative for chills and fever.   Respiratory: Positive for shortness of breath.    Cardiovascular: Positive for chest pain.   Gastrointestinal: Positive for nausea and vomiting.   Skin:        Positive for cyst on head.   All other systems reviewed and are negative.     PAST MEDICAL HISTORY:  Past Medical History:   Diagnosis Date     Anxiety      Depressive disorder      Diabetes mellitus (H)      Glycogen storage disease IIIa - see Emergency Letter in EPIC dated 05/07/12 2/17/2012       PAST SURGICAL HISTORY:  Past Surgical History:   Procedure Laterality Date     TONSILLECTOMY Bilateral 4/2015           CURRENT MEDICATIONS:    Current Facility-Administered Medications   Medication     dextrose 5% and 0.45% NaCl infusion     dextrose 50 % injection 25-50 mL     insulin 1 unit/1 mL in NS (NovoLIN, HumuLIN Regular) drip -ED DKA algorithm     lactated ringers BOLUS 1,000 mL     ondansetron (ZOFRAN) injection 4 mg     sodium bicarbonate 8.4 % injection 50 mEq     Current Outpatient Medications   Medication     famotidine (PEPCID) 10 MG tablet     insulin aspart (NOVOLOG PEN) 100 UNIT/ML pen     insulin glargine (LANTUS PEN) 100 UNIT/ML pen     sulfamethoxazole-trimethoprim (BACTRIM DS) " "800-160 MG tablet     acetone, Urine, test STRP     blood glucose monitoring (ONE TOUCH DELICA) lancets     blood glucose monitoring (ONETOUCH VERIO IQ) test strip     Continuous Blood Gluc Sensor (DEXCOM G6 SENSOR) MISC     Continuous Blood Gluc Transmit (DEXCOM G6 TRANSMITTER) MISC     insulin pen needle (BD CALLI U/F) 32G X 4 MM miscellaneous       ALLERGIES:  Allergies   Allergen Reactions     Fluoxetine      Other reaction(s): Mental Status Change  Suicidal thoughts     Morphine Sulfate      No exposure but grandfather has the allergy to it     Tylenol [Acetaminophen]      Has glycogen storage disease and should not receive Tylenol, per GI.       FAMILY HISTORY:  Family History   Problem Relation Age of Onset     Hearing Loss Father        SOCIAL HISTORY:   Social History     Socioeconomic History     Marital status: Single   Tobacco Use     Smoking status: Never Smoker     Smokeless tobacco: Never Used     Tobacco comment: no second hand smoke exposure at home   Substance and Sexual Activity     Alcohol use: No     Drug use: Yes     Types: Marijuana     Comment: every other day user   Social History Narrative    Lives with parents and younger brother.  Currently in 12th grade.  Loves to sing       VITALS:  /62   Pulse 101   Temp (!) 96.5  F (35.8  C) (Temporal)   Resp 27   Ht 1.6 m (5' 3\")   Wt 34.9 kg (77 lb)   SpO2 100%   BMI 13.64 kg/m      PHYSICAL EXAM    Constitutional: Well developed, cachectic and frail. Comfortable appearing.  HENT: Normocephalic, atraumatic, mucous membranes moist, nose normal. Neck- Supple, gross ROM intact.   Eyes: Pupils mid-range, conjunctiva without injection, no discharge.   Respiratory: Tachypneic, Clear to auscultation bilaterally, no respiratory distress, no wheezing, speaks full sentences easily. No cough.  Cardiovascular: tachycardic, regular rhythm, no murmurs. No pedal edema, 2+ DP pulses.   GI: Soft, no tenderness to deep palpation in all quadrants, no " masses.  Musculoskeletal: Moving all 4 extremities intentionally and without pain. No obvious deformity.  Skin: Warm, dry, no rash.  Neurologic: Alert & oriented x 3, cranial nerves grossly intact.  Psychiatric: Affect normal, cooperative.       LAB:  All pertinent labs reviewed and interpreted.  Labs Ordered and Resulted from Time of ED Arrival to Time of ED Departure   BASIC METABOLIC PANEL - Abnormal       Result Value    Sodium 133 (*)     Potassium 3.8      Chloride 100      Carbon Dioxide (CO2) <6 (*)     Anion Gap >27 (*)     Urea Nitrogen 9      Creatinine 1.61 (*)     Calcium 8.7      Glucose 633 (*)     GFR Estimate 46 (*)    KETONE BETA-HYDROXYBUTYRATE QUANTITATIVE, RAPID - Abnormal    Ketone (Beta-Hydroxybutyrate) Quantitative 10.42 (*)    BLOOD GAS VENOUS - Abnormal    pH Venous 6.96 (*)     pCO2 Venous 20 (*)     pO2 Venous 56 (*)     Bicarbonate Venous 8 (*)     Base Excess/Deficit (+/-) -27.6      Oxyhemoglobin Venous 77.0 (*)     O2 Sat, Venous 78.5 (*)    GLUCOSE BY METER - Abnormal    GLUCOSE BY METER POCT 489 (*)    CBC WITH PLATELETS - Normal    WBC Count 10.0      RBC Count 5.63      Hemoglobin 17.3      Hematocrit 53.0      MCV 94      MCH 30.7      MCHC 32.6      RDW 13.0      Platelet Count 358     LACTIC ACID WHOLE BLOOD - Normal    Lactic Acid 1.2     HCG QUALITATIVE PREGNANCY - Normal    hCG Serum Qualitative Negative     MAGNESIUM - Normal    Magnesium 2.0     ROUTINE UA WITH MICROSCOPIC REFLEX TO CULTURE   COVID-19 VIRUS (CORONAVIRUS) BY PCR   GLUCOSE MONITOR NURSING POCT   HEMOGLOBIN A1C       RADIOLOGY:  Reviewed all pertinent imaging. Please see official radiology report.  No orders to display       EKG:    All EKG interpretations will be found in ED course above.      I, Penny Bocanegra am serving as a scribe to document services personally performed by Dr. Gwyn Chin based on my observation and the provider's statements to me. I, Gwyn Chin MD attest that Penny Bocanegra  is acting in a scribe capacity, has observed my performance of the services and has documented them in accordance with my direction.    Gwyn Chin M.D.  Emergency Medicine  [facility] EMERGENCY ROOM  [address] [id number]  [phone]  Dept: [phone]     Gwyn Chin MD  05/09/22 8025

## 2022-05-09 NOTE — PHARMACY-ADMISSION MEDICATION HISTORY
Pharmacy Note - Admission Medication History    Pertinent Provider Information: Patient states only took 1/2 tablet of Bactrim DS due to the size of tablet.   ______________________________________________________________________    Prior To Admission (PTA) med list completed and updated in EMR.       PTA Med List   Medication Sig Last Dose     famotidine (PEPCID) 10 MG tablet Take 1 tablet (10 mg) by mouth 2 times daily (Patient taking differently: Take 10 mg by mouth 2 times daily as needed) More than a month at Unknown time     insulin aspart (NOVOLOG PEN) 100 UNIT/ML pen Correct 1 unit per 40 over 180 before breakfast, lunch, dinner and at bedtime.      insulin glargine (LANTUS PEN) 100 UNIT/ML pen Inject 32 Units Subcutaneous every morning 5/6/2022     sulfamethoxazole-trimethoprim (BACTRIM DS) 800-160 MG tablet Take 1 tablet by mouth 2 times daily X 7days (started 5/8) 5/8/2022 at Unknown time       Information source(s): [facility]    Method of interview communication: in-person    Patient was asked about OTC/herbal products specifically.  PTA med list reflects this.    Based on the pharmacist's assessment, the PTA med list information appears reliable    Allergies were reviewed, assessed, and updated with the patient.      Patient did not bring any medications to the hospital and can't retrieve from home. No multi-dose medications are available for use during hospital stay.      Thank you for the opportunity to participate in the care of this patient.      Thang Aldana [facility]     5/9/2022     3:50 PM

## 2022-05-09 NOTE — H&P
[facility]    History and Physical - Hospitalist Service       Date of Admission:  5/9/2022    Assessment & Plan      Kevin Stephens is a 22 year old adult who has a history of type 1 diabetes, sebaceous cyst complicated by abscess, glycogen storage disorder, GERD, admitted for DKA.    DKA  Hx T1DM  Pseudohyponatremia  AG metabolic acidosis  Elevated ketones  Hx, labs and physical exam are most consistent with DKA, likely due to medication nonadherance. Though she has cyst/possible abscess, could consider infectious contribution. Does not appear to have another source of infection at this time, UA demonstrates changes related to DKA.  She has been followed by endocrinology and diabetes education. Noted that she has had issues with her Dexcom, and therefore, checking her sugars has been inconsistent. PTA, she is supposed to be on 32u lantus and novolog 1u per 25g + correction 1u per 50mg/dl >150mg/dl. She has had multiple episodes of DKA in the past, related to pancreatitis, UTI, medication non-adherence. Antibody labs per her endocrinologist were consistent with T1DM. Last A1c was 12/2021, >14%.   - Admit to ICU  - ICU consulted in ED, appreciate recommendations  - NPO  - hold PTA meds  - A1c pending  - PRN O2  - neuro checks  - cardiac monitoring  - BMP, CBC, ketones, vbg q4h  - completed 2L bolus in ED + sodium bicarb  - 0.45%NS + KCl 20mEq/L infusion 125ml/hr  - insulin 1u/mL drip  - blood cultures pending  - diabetes educator consulted    Hypomg  - Mg replacement protocol    Sebaceous cyst complicated by abscess  She had this removed from her scalp on 3/29/22, and had inflammation and irritation after this. She was treated initially with keflex, and had redemonstration of pain. Required I&D and she was started on Bactrim BID x7d. Does not appear to be a source of infection that could have precipitated DKA.  -hold bactrim  -start vancomycin    Elevated LFTs  Can be related to  dehydration  - fluids as above  - continue to monitor  - AM recheck    Prolonged QTc- 582  -avoid QTc prolonging meds    Glycogen-storage disease  Noted per her endocrinologist that she has this history, notable for recurrent pancreatitis leading to episodes of hyperglycemia. She is not having abdominal pain. Notable of this disease process type is hypoglycemia. Supposed to be eating 2 tablespoons cornstarch QID.  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 Her endocrinologist started the work up for this, noting hat her growth hormone level is low and she was recommended to have additional lab work up for this. Does not appear she was able to follow up on this.  - consider IGF-1, T4, estradiol and FSH  - cortisol check in AM    GERD  - IV PPI    Female to male transition  Patient identifies as non-binary, does not appear to be on HRT         Diet: NPO for Medical/Clinical Reasons Except for: Meds, Ice Chips  DVT Prophylaxis: Pneumatic Compression Devices  Byrne Catheter: Not present  Fluids: IV fluids  Central Lines: None  Cardiac Monitoring: ACTIVE order. Indication: DKA  Code Status:  TBD    Clinically Significant Risk Factors Present on Admission          # Hypocalcemia: Ca = 7.5 mg/dL (Ref range: 8.5 - 10.5 mg/dL) and/or iCa = N/A on admission, will replace as needed  # Hypomagnesemia: Mg = 1.4 mg/dL (Ref range: 1.8 - 2.6 mg/dL) on admission, will replace as needed  # Anion Gap Metabolic Acidosis: AG = >23 mmol/L (Ref range: 5 - 18 mmol/L) on admission, will monitor and treat as appropriate           Disposition Plan   Expected Discharge:   Pending medical stabilization   Anticipated discharge location:  Awaiting care coordination huddle  Delays:            The patient's care was discussed with the Attending Physician, Dr. Landeros, Dr. Robertson to assess patient in AM.    Eleonora Zacarias MD  "[id number]  Hospitalist Service  [facility]  Securely message with the [employer] Web Console (learn more here)  Text page via [employer] Paging/Directory       ______________________________________________________________________    Chief Complaint   Chest pain and fatigue    History is obtained from the patient and the patient's parent(s)    History of Present Illness   Kevin Stephens is a 22 year old adult who has a history of type 1 diabetes, sebaceous cyst complicated by abscess, glycogen storage disorder, GERD. She presented with generalized weakness, fatigue, nausea, vomiting, and abdominal pain. She was found to be tachycardic, tachpneic, hypertensive and acidotic with glucose in the 400s. Admitted for DKA.     Per patient and mother, she has not been feeling well during the past week, notably feeling weaker, having increased irritation around the cyst on her head, and developed nausea and fevers within the past few days. Due to feeling ill, she has not been using insulin, which consists of 32 lantus, novolog and correction.    Moreover, she mentions having glycogen storage disorder and is supposed to be consuming cornstarch daily, and she has not been keeping up with this diet.    With regard to this cyst, patient notes that this removed and she later developed an abscess that required I&D and she was given bactrim on 5/6; however, she only took \"3/4 of a dose\" total. Mother says she removed the packing on 5/7 and reported seeing purulent drainage. She says to her the cyst looks improved compared to prior starting the bactrim.     In the ED, glucose noted to be 489, CBC wnl,     Currently, patient continues to feel weak and nauseated    Review of Systems    Complete ROS negative aside noted in HPI    Past Medical History    I have reviewed this patient's medical history and updated it with pertinent information if needed.   Past Medical History:   Diagnosis Date     Anxiety      Depressive " disorder      Diabetes mellitus (H)      Glycogen storage disease IIIa - see Emergency Letter in EPIC dated 12        Past Surgical History   I have reviewed this patient's surgical history and updated it with pertinent information if needed.  Past Surgical History:   Procedure Laterality Date     TONSILLECTOMY Bilateral 2015        Social History   I have reviewed this patient's social history and updated it with pertinent information if needed. Kevin Stephens  reports that Kevin Stephens has never smoked. Kevin Stephens has never used smokeless tobacco. Kevin Stephens reports current drug use. Drug: Marijuana. Kevin Stephens reports that Kevin Stephens does not drink alcohol.    Family History   I have reviewed this patient's family history and updated it with pertinent information if needed.  Family History   Problem Relation Age of Onset     Hearing Loss Father        Prior to Admission Medications   Prior to Admission Medications   Prescriptions Last Dose Informant Patient Reported? Taking?   Continuous Blood Gluc Sensor (DEXCOM G6 SENSOR) MISC   No No   Si each See Admin Instructions Change every 10 days   Continuous Blood Gluc Transmit (DEXCOM G6 TRANSMITTER) MISC   No No   Si each every 3 months   acetone, Urine, test STRP   No No   Sig: Check urine ketones when two consecutive blood sugars are greater than 300 and at times of illness/vomiting.   blood glucose monitoring (ONE TOUCH DELICA) lancets   No No   Sig: Use to test blood sugars 6 times daily.   blood glucose monitoring (ONETOUCH VERIO IQ) test strip   No No   Sig: Use to test blood sugars 6 times daily or as directed.   famotidine (PEPCID) 10 MG tablet More than a month at Unknown time  No Yes   Sig: Take 1 tablet (10 mg) by mouth 2 times daily   Patient taking differently: Take 10 mg by mouth 2 times daily as needed   insulin aspart (NOVOLOG PEN) 100 UNIT/ML pen   No Yes   Sig: Correct 1 unit per 40 over 180 before  breakfast, lunch, dinner and at bedtime.   insulin glargine (LANTUS PEN) 100 UNIT/ML pen 5/6/2022  Yes Yes   Sig: Inject 32 Units Subcutaneous every morning   insulin pen needle (BD CALLI U/F) 32G X 4 MM miscellaneous   No No   Sig: Use pen needles 6 times daily.   sulfamethoxazole-trimethoprim (BACTRIM DS) 800-160 MG tablet 5/8/2022 at Unknown time  Yes Yes   Sig: Take 1 tablet by mouth 2 times daily X 7days (started 5/8)      Facility-Administered Medications: None     Allergies   Allergies   Allergen Reactions     Fluoxetine      Other reaction(s): Mental Status Change  Suicidal thoughts     Morphine Sulfate      No exposure but grandfather has the allergy to it     Tylenol [Acetaminophen]      Has glycogen storage disease and should not receive Tylenol, per GI.       Physical Exam   Vital Signs: Temp: (!) 96.5  F (35.8  C) Temp src: Temporal BP: 110/72 Pulse: 96   Resp: 25 SpO2: 100 % O2 Device: None (Room air)    Weight: 80 lbs 3.2 oz    Constitutional: awake, somewhat fatigued appearing, cooperative, no apparent distress, and appears stated age  Eyes: Lids and lashes normal, pupils equal, round and reactive to light, extra ocular muscles intact, sclera clear, conjunctiva normal  Respiratory: No increased work of breathing, good air exchange, clear to auscultation bilaterally, no crackles or wheezing  Cardiovascular: Normal apical impulse, regular rate and rhythm, normal S1 and S2, no S3 or S4, and no murmur noted  GI: No scars, normal bowel sounds, soft, non-distended, non-tender, no masses palpated, no hepatosplenomegally  Skin: approx 2cm well circmscribed mass on head. Mildly TTP, no drainage, active bleeding noted. Mass is erythematous and normal skin color, texture, turgor  Musculoskeletal: There is no redness, warmth, or swelling of the joints.  Full range of motion noted.  Motor strength is 5 out of 5 all extremities bilaterally.  Tone is normal.  Neurologic: Awake, alert, oriented to name, place and  time.  Cranial nerves II-XII are grossly intact.   Neuropsychiatric: General: normal, calm and normal eye contact  Level of consciousness: alert / normal  Affect: normal  Memory and insight: normal, memory for past and recent events intact and thought process normal    Data   Data reviewed today: I reviewed all medications, new labs and imaging results over the last 24 hours. I personally reviewed the EKG tracing showing prolonged QTc, NSR.    Recent Labs   Lab 05/09/22 2008 05/09/22  1932 05/09/22  1900 05/09/22  1803 05/09/22  1624 05/09/22  1409   WBC  --   --   --   --   --  10.0   HGB  --   --   --   --   --  17.3   MCV  --   --   --   --   --  94   PLT  --   --   --   --   --  358   NA  --   --   --  137  --  133*   POTASSIUM  --   --   --  3.5  --  3.8   CHLORIDE  --   --   --  108*  --  100   CO2  --   --   --  <6*  --  <6*   BUN  --   --   --  8  --  9   CR  --   --   --  1.08  --  1.61*   ANIONGAP  --   --   --  >23*  --  >27*   LUIS  --   --   --  7.5*  --  8.7   * 302* 284* 358*   < > 633*   ALBUMIN  --   --   --  3.9  --   --    PROTTOTAL  --   --   --  7.7  --   --    BILITOTAL  --   --   --  0.4  --   --    ALKPHOS  --   --   --  158*  --   --    ALT  --   --   --  110*  --   --    AST  --   --   --  140*  --   --     < > = values in this interval not displayed.

## 2022-05-09 NOTE — ED TRIAGE NOTES
Patient reports generalized weakness, fatigue, chest pain. Unable to check blood glucose recently as continuous monitor fell off. Glucose on arrival 489mg/dl.     Triage Assessment     Row Name 05/09/22 5881       Triage Assessment (Adult)    Airway WDL WDL       Respiratory WDL    Respiratory WDL X  tachypnea       Skin Circulation/Temperature WDL    Skin Circulation/Temperature WDL WDL       Cardiac WDL    Cardiac WDL X  chest pain, tachycardia       Peripheral/Neurovascular WDL    Peripheral Neurovascular WDL WDL       Cognitive/Neuro/Behavioral WDL    Cognitive/Neuro/Behavioral WDL WDL       [place] Coma Scale    Best Eye Response 4-->(E4) spontaneous    Best Motor Response 6-->(M6) obeys commands    Best Verbal Response 5-->(V5) oriented    [place] Coma Scale Score 15

## 2022-05-10 LAB
ALBUMIN SERPL-MCNC: 3.1 G/DL (ref 3.5–5)
ALP SERPL-CCNC: 112 U/L (ref 45–120)
ALT SERPL W P-5'-P-CCNC: 88 U/L (ref 0–45)
ANION GAP SERPL CALCULATED.3IONS-SCNC: 10 MMOL/L (ref 5–18)
ANION GAP SERPL CALCULATED.3IONS-SCNC: 8 MMOL/L (ref 5–18)
ANION GAP SERPL CALCULATED.3IONS-SCNC: 9 MMOL/L (ref 5–18)
ANION GAP SERPL CALCULATED.3IONS-SCNC: 9 MMOL/L (ref 5–18)
AST SERPL W P-5'-P-CCNC: 109 U/L (ref 0–40)
ATRIAL RATE - MUSE: 98 BPM
BASE EXCESS BLDV CALC-SCNC: -11.2 MMOL/L
BASE EXCESS BLDV CALC-SCNC: -11.3 MMOL/L
BASE EXCESS BLDV CALC-SCNC: -6.8 MMOL/L
BASE EXCESS BLDV CALC-SCNC: -8.3 MMOL/L
BASE EXCESS BLDV CALC-SCNC: -8.5 MMOL/L
BASE EXCESS BLDV CALC-SCNC: -9.9 MMOL/L
BILIRUB DIRECT SERPL-MCNC: 0.2 MG/DL
BILIRUB SERPL-MCNC: 0.4 MG/DL (ref 0–1)
BUN SERPL-MCNC: 4 MG/DL (ref 8–22)
BUN SERPL-MCNC: 5 MG/DL (ref 8–22)
CALCIUM SERPL-MCNC: 7.5 MG/DL (ref 8.5–10.5)
CALCIUM SERPL-MCNC: 7.5 MG/DL (ref 8.5–10.5)
CALCIUM SERPL-MCNC: 7.8 MG/DL (ref 8.5–10.5)
CALCIUM SERPL-MCNC: 7.9 MG/DL (ref 8.5–10.5)
CALCIUM SERPL-MCNC: 8.1 MG/DL (ref 8.5–10.5)
CALCIUM SERPL-MCNC: 8.2 MG/DL (ref 8.5–10.5)
CHLORIDE BLD-SCNC: 110 MMOL/L (ref 98–107)
CHLORIDE BLD-SCNC: 111 MMOL/L (ref 98–107)
CHLORIDE BLD-SCNC: 111 MMOL/L (ref 98–107)
CHLORIDE BLD-SCNC: 113 MMOL/L (ref 98–107)
CHLORIDE BLD-SCNC: 114 MMOL/L (ref 98–107)
CHLORIDE BLD-SCNC: 114 MMOL/L (ref 98–107)
CO2 SERPL-SCNC: 13 MMOL/L (ref 22–31)
CO2 SERPL-SCNC: 14 MMOL/L (ref 22–31)
CO2 SERPL-SCNC: 15 MMOL/L (ref 22–31)
CO2 SERPL-SCNC: 16 MMOL/L (ref 22–31)
CO2 SERPL-SCNC: 16 MMOL/L (ref 22–31)
CO2 SERPL-SCNC: 17 MMOL/L (ref 22–31)
CORTIS SERPL-MCNC: 14.8 UG/DL
CREAT SERPL-MCNC: 0.72 MG/DL (ref 0.6–1.3)
CREAT SERPL-MCNC: 0.73 MG/DL (ref 0.6–1.3)
CREAT SERPL-MCNC: 0.79 MG/DL (ref 0.6–1.3)
CREAT SERPL-MCNC: 0.8 MG/DL (ref 0.6–1.3)
CREAT SERPL-MCNC: 0.82 MG/DL (ref 0.6–1.3)
CREAT SERPL-MCNC: 0.95 MG/DL (ref 0.6–1.3)
DIASTOLIC BLOOD PRESSURE - MUSE: NORMAL MMHG
GFR SERPL CREATININE-BSD FRML MDRD: 86 ML/MIN/1.73M2
GFR SERPL CREATININE-BSD FRML MDRD: >90 ML/MIN/1.73M2
GLUCOSE BLD-MCNC: 193 MG/DL (ref 70–125)
GLUCOSE BLD-MCNC: 226 MG/DL (ref 70–125)
GLUCOSE BLD-MCNC: 248 MG/DL (ref 70–125)
GLUCOSE BLD-MCNC: 261 MG/DL (ref 70–125)
GLUCOSE BLD-MCNC: 369 MG/DL (ref 70–125)
GLUCOSE BLD-MCNC: 410 MG/DL (ref 70–125)
GLUCOSE BLDC GLUCOMTR-MCNC: 185 MG/DL (ref 70–99)
GLUCOSE BLDC GLUCOMTR-MCNC: 186 MG/DL (ref 70–99)
GLUCOSE BLDC GLUCOMTR-MCNC: 192 MG/DL (ref 70–99)
GLUCOSE BLDC GLUCOMTR-MCNC: 192 MG/DL (ref 70–99)
GLUCOSE BLDC GLUCOMTR-MCNC: 197 MG/DL (ref 70–99)
GLUCOSE BLDC GLUCOMTR-MCNC: 198 MG/DL (ref 70–99)
GLUCOSE BLDC GLUCOMTR-MCNC: 203 MG/DL (ref 70–99)
GLUCOSE BLDC GLUCOMTR-MCNC: 208 MG/DL (ref 70–99)
GLUCOSE BLDC GLUCOMTR-MCNC: 224 MG/DL (ref 70–99)
GLUCOSE BLDC GLUCOMTR-MCNC: 225 MG/DL (ref 70–99)
GLUCOSE BLDC GLUCOMTR-MCNC: 228 MG/DL (ref 70–99)
GLUCOSE BLDC GLUCOMTR-MCNC: 238 MG/DL (ref 70–99)
GLUCOSE BLDC GLUCOMTR-MCNC: 244 MG/DL (ref 70–99)
GLUCOSE BLDC GLUCOMTR-MCNC: 257 MG/DL (ref 70–99)
GLUCOSE BLDC GLUCOMTR-MCNC: 266 MG/DL (ref 70–99)
GLUCOSE BLDC GLUCOMTR-MCNC: 299 MG/DL (ref 70–99)
GLUCOSE BLDC GLUCOMTR-MCNC: 404 MG/DL (ref 70–99)
HCO3 BLDV-SCNC: 16 MMOL/L (ref 24–30)
HCO3 BLDV-SCNC: 17 MMOL/L (ref 24–30)
HCO3 BLDV-SCNC: 17 MMOL/L (ref 24–30)
HCO3 BLDV-SCNC: 18 MMOL/L (ref 24–30)
HCO3 BLDV-SCNC: 19 MMOL/L (ref 24–30)
HCO3 BLDV-SCNC: 19 MMOL/L (ref 24–30)
INTERPRETATION ECG - MUSE: NORMAL
KETONES BLD-SCNC: 0.11 MMOL/L
KETONES BLD-SCNC: 0.13 MMOL/L
KETONES BLD-SCNC: 0.28 MMOL/L
KETONES BLD-SCNC: 0.3 MMOL/L
KETONES BLD-SCNC: 0.69 MMOL/L
KETONES BLD-SCNC: <0.1 MMOL/L
MAGNESIUM SERPL-MCNC: 2.2 MG/DL (ref 1.8–2.6)
OSMOLALITY SERPL: 315 MMOL/KG (ref 275–295)
OXYHGB MFR BLDV: 81.8 % (ref 70–75)
OXYHGB MFR BLDV: 86.6 % (ref 70–75)
OXYHGB MFR BLDV: 91.2 % (ref 70–75)
OXYHGB MFR BLDV: 92.2 % (ref 70–75)
OXYHGB MFR BLDV: 92.8 % (ref 70–75)
OXYHGB MFR BLDV: 95.5 % (ref 70–75)
P AXIS - MUSE: 71 DEGREES
PCO2 BLDV: 33 MM HG (ref 35–50)
PCO2 BLDV: 33 MM HG (ref 35–50)
PCO2 BLDV: 34 MM HG (ref 35–50)
PCO2 BLDV: 34 MM HG (ref 35–50)
PCO2 BLDV: 35 MM HG (ref 35–50)
PCO2 BLDV: 35 MM HG (ref 35–50)
PH BLDV: 7.27 [PH] (ref 7.35–7.45)
PH BLDV: 7.29 [PH] (ref 7.35–7.45)
PH BLDV: 7.29 [PH] (ref 7.35–7.45)
PH BLDV: 7.31 [PH] (ref 7.35–7.45)
PH BLDV: 7.34 [PH] (ref 7.35–7.45)
PH BLDV: 7.35 [PH] (ref 7.35–7.45)
PHOSPHATE SERPL-MCNC: 0.8 MG/DL (ref 2.5–4.5)
PHOSPHATE SERPL-MCNC: 1.7 MG/DL (ref 2.5–4.5)
PO2 BLDV: 41 MM HG (ref 25–47)
PO2 BLDV: 46 MM HG (ref 25–47)
PO2 BLDV: 56 MM HG (ref 25–47)
PO2 BLDV: 59 MM HG (ref 25–47)
PO2 BLDV: 60 MM HG (ref 25–47)
PO2 BLDV: 78 MM HG (ref 25–47)
POTASSIUM BLD-SCNC: 3.4 MMOL/L (ref 3.5–5)
POTASSIUM BLD-SCNC: 3.5 MMOL/L (ref 3.5–5)
POTASSIUM BLD-SCNC: 3.5 MMOL/L (ref 3.5–5)
POTASSIUM BLD-SCNC: 3.7 MMOL/L (ref 3.5–5)
PR INTERVAL - MUSE: 132 MS
PROT SERPL-MCNC: 5.9 G/DL (ref 6–8)
QRS DURATION - MUSE: 82 MS
QT - MUSE: 456 MS
QTC - MUSE: 582 MS
R AXIS - MUSE: 74 DEGREES
SAO2 % BLDV: 83.2 % (ref 70–75)
SAO2 % BLDV: 88.4 % (ref 70–75)
SAO2 % BLDV: 92.9 % (ref 70–75)
SAO2 % BLDV: 94 % (ref 70–75)
SAO2 % BLDV: 94.4 % (ref 70–75)
SAO2 % BLDV: 97.5 % (ref 70–75)
SODIUM SERPL-SCNC: 135 MMOL/L (ref 136–145)
SODIUM SERPL-SCNC: 135 MMOL/L (ref 136–145)
SODIUM SERPL-SCNC: 136 MMOL/L (ref 136–145)
SODIUM SERPL-SCNC: 136 MMOL/L (ref 136–145)
SODIUM SERPL-SCNC: 137 MMOL/L (ref 136–145)
SODIUM SERPL-SCNC: 137 MMOL/L (ref 136–145)
SYSTOLIC BLOOD PRESSURE - MUSE: NORMAL MMHG
T AXIS - MUSE: 63 DEGREES
VENTRICULAR RATE- MUSE: 98 BPM

## 2022-05-10 PROCEDURE — 80053 COMPREHEN METABOLIC PANEL: CPT | Performed by: STUDENT IN AN ORGANIZED HEALTH CARE EDUCATION/TRAINING PROGRAM

## 2022-05-10 PROCEDURE — 250N000012 HC RX MED GY IP 250 OP 636 PS 637: Performed by: STUDENT IN AN ORGANIZED HEALTH CARE EDUCATION/TRAINING PROGRAM

## 2022-05-10 PROCEDURE — 36415 COLL VENOUS BLD VENIPUNCTURE: CPT | Performed by: STUDENT IN AN ORGANIZED HEALTH CARE EDUCATION/TRAINING PROGRAM

## 2022-05-10 PROCEDURE — 258N000003 HC RX IP 258 OP 636: Performed by: STUDENT IN AN ORGANIZED HEALTH CARE EDUCATION/TRAINING PROGRAM

## 2022-05-10 PROCEDURE — 258N000003 HC RX IP 258 OP 636: Performed by: INTERNAL MEDICINE

## 2022-05-10 PROCEDURE — 250N000013 HC RX MED GY IP 250 OP 250 PS 637: Performed by: STUDENT IN AN ORGANIZED HEALTH CARE EDUCATION/TRAINING PROGRAM

## 2022-05-10 PROCEDURE — 82248 BILIRUBIN DIRECT: CPT | Performed by: STUDENT IN AN ORGANIZED HEALTH CARE EDUCATION/TRAINING PROGRAM

## 2022-05-10 PROCEDURE — 99223 1ST HOSP IP/OBS HIGH 75: CPT | Mod: AI | Performed by: STUDENT IN AN ORGANIZED HEALTH CARE EDUCATION/TRAINING PROGRAM

## 2022-05-10 PROCEDURE — 84100 ASSAY OF PHOSPHORUS: CPT | Performed by: FAMILY MEDICINE

## 2022-05-10 PROCEDURE — 82805 BLOOD GASES W/O2 SATURATION: CPT | Performed by: STUDENT IN AN ORGANIZED HEALTH CARE EDUCATION/TRAINING PROGRAM

## 2022-05-10 PROCEDURE — 84100 ASSAY OF PHOSPHORUS: CPT | Performed by: INTERNAL MEDICINE

## 2022-05-10 PROCEDURE — 82310 ASSAY OF CALCIUM: CPT | Performed by: STUDENT IN AN ORGANIZED HEALTH CARE EDUCATION/TRAINING PROGRAM

## 2022-05-10 PROCEDURE — 82010 KETONE BODYS QUAN: CPT | Performed by: STUDENT IN AN ORGANIZED HEALTH CARE EDUCATION/TRAINING PROGRAM

## 2022-05-10 PROCEDURE — 250N000013 HC RX MED GY IP 250 OP 250 PS 637: Performed by: FAMILY MEDICINE

## 2022-05-10 PROCEDURE — 87040 BLOOD CULTURE FOR BACTERIA: CPT | Performed by: STUDENT IN AN ORGANIZED HEALTH CARE EDUCATION/TRAINING PROGRAM

## 2022-05-10 PROCEDURE — 82533 TOTAL CORTISOL: CPT | Performed by: STUDENT IN AN ORGANIZED HEALTH CARE EDUCATION/TRAINING PROGRAM

## 2022-05-10 PROCEDURE — 83735 ASSAY OF MAGNESIUM: CPT | Performed by: STUDENT IN AN ORGANIZED HEALTH CARE EDUCATION/TRAINING PROGRAM

## 2022-05-10 PROCEDURE — 250N000009 HC RX 250: Performed by: INTERNAL MEDICINE

## 2022-05-10 PROCEDURE — 99221 1ST HOSP IP/OBS SF/LOW 40: CPT | Performed by: INTERNAL MEDICINE

## 2022-05-10 PROCEDURE — 250N000011 HC RX IP 250 OP 636: Performed by: STUDENT IN AN ORGANIZED HEALTH CARE EDUCATION/TRAINING PROGRAM

## 2022-05-10 PROCEDURE — 93010 ELECTROCARDIOGRAM REPORT: CPT | Mod: RTG | Performed by: INTERNAL MEDICINE

## 2022-05-10 PROCEDURE — C9113 INJ PANTOPRAZOLE SODIUM, VIA: HCPCS | Performed by: STUDENT IN AN ORGANIZED HEALTH CARE EDUCATION/TRAINING PROGRAM

## 2022-05-10 PROCEDURE — 120N000013 HC R&B IMCU

## 2022-05-10 RX ORDER — DOXYCYCLINE 100 MG/1
100 CAPSULE ORAL EVERY 12 HOURS SCHEDULED
Status: DISCONTINUED | OUTPATIENT
Start: 2022-05-10 | End: 2022-05-11 | Stop reason: HOSPADM

## 2022-05-10 RX ORDER — IBUPROFEN 400 MG/1
400 TABLET, FILM COATED ORAL EVERY 6 HOURS PRN
Status: DISCONTINUED | OUTPATIENT
Start: 2022-05-10 | End: 2022-05-11 | Stop reason: HOSPADM

## 2022-05-10 RX ORDER — POTASSIUM CHLORIDE 1500 MG/1
20 TABLET, EXTENDED RELEASE ORAL ONCE
Status: COMPLETED | OUTPATIENT
Start: 2022-05-10 | End: 2022-05-10

## 2022-05-10 RX ORDER — IBUPROFEN 600 MG/1
600 TABLET, FILM COATED ORAL EVERY 6 HOURS PRN
Status: DISCONTINUED | OUTPATIENT
Start: 2022-05-10 | End: 2022-05-10

## 2022-05-10 RX ORDER — DOXYCYCLINE HYCLATE 20 MG
20 TABLET ORAL EVERY 12 HOURS SCHEDULED
Status: DISCONTINUED | OUTPATIENT
Start: 2022-05-10 | End: 2022-05-10

## 2022-05-10 RX ORDER — DEXTROSE MONOHYDRATE, SODIUM CHLORIDE, AND POTASSIUM CHLORIDE 50; 1.49; 4.5 G/1000ML; G/1000ML; G/1000ML
INJECTION, SOLUTION INTRAVENOUS CONTINUOUS
Status: DISCONTINUED | OUTPATIENT
Start: 2022-05-10 | End: 2022-05-10

## 2022-05-10 RX ADMIN — PANTOPRAZOLE SODIUM 40 MG: 40 INJECTION, POWDER, FOR SOLUTION INTRAVENOUS at 08:08

## 2022-05-10 RX ADMIN — POTASSIUM CHLORIDE, DEXTROSE MONOHYDRATE AND SODIUM CHLORIDE: 150; 5; 450 INJECTION, SOLUTION INTRAVENOUS at 11:07

## 2022-05-10 RX ADMIN — POTASSIUM CHLORIDE, DEXTROSE MONOHYDRATE AND SODIUM CHLORIDE: 150; 5; 450 INJECTION, SOLUTION INTRAVENOUS at 03:02

## 2022-05-10 RX ADMIN — VANCOMYCIN HYDROCHLORIDE 500 MG: 500 INJECTION, POWDER, LYOPHILIZED, FOR SOLUTION INTRAVENOUS at 11:07

## 2022-05-10 RX ADMIN — INSULIN ASPART 4 UNITS: 100 INJECTION, SOLUTION INTRAVENOUS; SUBCUTANEOUS at 16:23

## 2022-05-10 RX ADMIN — SODIUM PHOSPHATE, MONOBASIC, MONOHYDRATE 9 MMOL: 276; 142 INJECTION, SOLUTION INTRAVENOUS at 13:07

## 2022-05-10 RX ADMIN — POTASSIUM CHLORIDE 20 MEQ: 1500 TABLET, EXTENDED RELEASE ORAL at 13:07

## 2022-05-10 RX ADMIN — DOXYCYCLINE 100 MG: 100 CAPSULE ORAL at 19:49

## 2022-05-10 RX ADMIN — INSULIN GLARGINE 20 UNITS: 100 INJECTION, SOLUTION SUBCUTANEOUS at 13:06

## 2022-05-10 ASSESSMENT — ACTIVITIES OF DAILY LIVING (ADL)
ADLS_ACUITY_SCORE: 22
CONCENTRATING,_REMEMBERING_OR_MAKING_DECISIONS_DIFFICULTY: NO
ADLS_ACUITY_SCORE: 39
WEAR_GLASSES_OR_BLIND: NO
ADLS_ACUITY_SCORE: 39
DRESSING/BATHING_DIFFICULTY: NO
WALKING_OR_CLIMBING_STAIRS_DIFFICULTY: NO
DIFFICULTY_EATING/SWALLOWING: NO
ADLS_ACUITY_SCORE: 39
ADLS_ACUITY_SCORE: 22
FALL_HISTORY_WITHIN_LAST_SIX_MONTHS: NO
TOILETING_ISSUES: NO
CHANGE_IN_FUNCTIONAL_STATUS_SINCE_ONSET_OF_CURRENT_ILLNESS/INJURY: NO
DOING_ERRANDS_INDEPENDENTLY_DIFFICULTY: NO
ADLS_ACUITY_SCORE: 39

## 2022-05-10 NOTE — CONSULTS
Critical Care Consult Note  Date of Service: 05/10/2022    Reason for Consultation: Severe acidosis, DKA      Assessment/Plan:   Kevin Stephens is a 22 year old adult with DM type I, glycogen-storage disorder, GERD, recent sebaceous cyst complicated by abscess for which patient required incision and drainage and antibiotics with Bactrim admitted with DKA and metabolic acidosis. Pt was started on insulin drip.    Recommendations:  Insulin drip per protocol  IVF  Serial electrolytes  K/Mg. Added phos replacement protocol  Follow-up cultures   On IV abx  Diabetes education    PH improving. Anion gap closed. Pt is stable at this time. pulm and critical care will sign off however don't hesitate to call us with questions.    TTS > 45 min, > 50% in counseling and coordination of care.    Ana Sands MD  Pulmonary and Critical Care Medicine      History:     HPI: Patient is a pleasant 20-year-old adult with DM type I, glycogen-storage disorder, GERD, recent sebaceous cyst complicated by abscess for which patient required incision and drainage and antibiotics with Bactrim.  Patient was admitted with abdominal pain, nausea, vomiting and found to have a glucose in the 400 range.  Patient was tachycardic and tachypneic.  Patient was in    renal failure.  pH on admission was 6.96 with elevated anion gap.  Bicarb was less than 6.  Hemoglobin A1c was elevated more than 14.    PMHx/PSHx:  Past Medical History:   Diagnosis Date     Anxiety      Depressive disorder      Diabetes mellitus (H)      Glycogen storage disease IIIa - see Emergency Letter in EPIC dated 05/07/12 2/17/2012     Past Surgical History:   Procedure Laterality Date     TONSILLECTOMY Bilateral 4/2015     Social History     Socioeconomic History     Marital status: Single     Spouse name: Not on file     Number of children: Not on file     Years of education: Not on file     Highest education level: Not on file   Occupational History     Not on file   Tobacco Use  "    Smoking status: Never Smoker     Smokeless tobacco: Never Used     Tobacco comment: no second hand smoke exposure at home   Substance and Sexual Activity     Alcohol use: No     Drug use: Yes     Types: Marijuana     Comment: every other day user     Sexual activity: Not on file   Other Topics Concern     Not on file   Social History Narrative    Lives with parents and younger brother.  Currently in 12th grade.  Loves to sing     Social Determinants of Health     Financial Resource Strain: Not on file   Food Insecurity: Not on file   Transportation Needs: Not on file   Physical Activity: Not on file   Stress: Not on file   Social Connections: Not on file   Intimate Partner Violence: Not on file   Housing Stability: Not on file       Review of Systems - 10 point review of system negative except for what is mentioned in the HPI.    Exam/Data:   BP 99/62 (BP Location: Right arm)   Pulse 88   Temp 98.1  F (36.7  C) (Oral)   Resp 20   Ht 1.6 m (5' 2.99\")   Wt 35.6 kg (78 lb 8 oz)   SpO2 99%   BMI 13.91 kg/m   Body mass index is 13.91 kg/m .    05/05 0700 - 05/10 0659  In: 872.92 [I.V.:872.92]  Out: -   Net: 872.92     04/10 1500 - 05/10 1459  In: 872.92 [I.V.:872.92]  Out: -   Net: 872.92    BP 99/62 (BP Location: Right arm)   Pulse 88   Temp 98.1  F (36.7  C) (Oral)   Resp 20   Ht 1.6 m (5' 2.99\")   Wt 35.6 kg (78 lb 8 oz)   SpO2 99%   BMI 13.91 kg/m    BP - Mean:  [] 80  I/O last 3 completed shifts:  In: 872.92 [I.V.:872.92]  Out: -   Weight change:   Resp: 20      Physical Exam:  GEN: comfortable, NAD  HEENT: NCAT, EMOI, mmm  CVS: S1S2, RRR  Lung: good air entry bilaterally  Abd: Soft, NT, ND,  + BS.   Ext: no c/c/e  Neuro: nonfocal  Skin: no visible rash  Musculoskeletal: FROM all extremities  Psych: appropriate      DATA    Labs: reviewed in EMR and outside records where pertinent.     BP 99/62 (BP Location: Right arm)   Pulse 88   Temp 98.1  F (36.7  C) (Oral)   Resp 20   Ht 1.6 m (5' " "2.99\")   Wt 35.6 kg (78 lb 8 oz)   SpO2 99%   BMI 13.91 kg/m    BP - Mean:  [] 80  I/O last 3 completed shifts:  In: 872.92 [I.V.:872.92]  Out: -   Weight change:   Resp: 20      IMAGING: personally reviewed images. Formal radiology interpretation noted below.  No results found.    Clinically Significant Risk Factors Present on Admission          # Hypocalcemia: corrected calcium <8.5 on admission, will replace as needed    # Hypoalbuminemia: Albumin = 3.1 g/dL (Ref range: 3.5 - 5.0 g/dL) on admission, will monitor as appropriate                     Family History   Problem Relation Age of Onset     Hearing Loss Father      Allergies   Allergen Reactions     Fluoxetine      Other reaction(s): Mental Status Change  Suicidal thoughts     Morphine Sulfate      No exposure but grandfather has the allergy to it     Tylenol [Acetaminophen]      Has glycogen storage disease and should not receive Tylenol, per GI.       Medications:     No current outpatient medications on file.       Much or all of the text in this note was generated through the use of the Dragon Dictate voice-to-text software. Errors in spelling or words which seem out of context are unintentional. Sound alike errors, in particular, may have escaped editing.  "

## 2022-05-10 NOTE — PHARMACY-VANCOMYCIN DOSING SERVICE
Pharmacy Vancomycin Initial Note  Date of Service May 9, 2022  Patient's  2000  22 year old, adult    Indication: Abscess    Current estimated CrCl = Estimated Creatinine Clearance (based on SCr of 1.08 mg/dL)  Female: 47 mL/min  Male: 55.2 mL/min    Creatinine for last 3 days  2022:  2:09 PM Creatinine 1.61 mg/dL;  6:03 PM Creatinine 1.08 mg/dL    Recent Vancomycin Level(s) for last 3 days  No results found for requested labs within last 72 hours.      Vancomycin IV Administrations (past 72 hours)      No vancomycin orders with administrations in past 72 hours.                Nephrotoxins and other renal medications (From now, onward)    Start     Dose/Rate Route Frequency Ordered Stop    05/10/22 1000  vancomycin (VANCOCIN) 500 mg vial to attach to  mL bag         500 mg  over 1 Hours Intravenous EVERY 12 HOURS 22  vancomycin 750 mg in 0.9% NaCl 250 mL intermittent infusion 750 mg         750 mg  over 60 Minutes Intravenous ONCE 22            Contrast Orders - past 72 hours (72h ago, onward)    None          InsightRX Prediction of Planned Initial Vancomycin Regimen:  Loading dose: 750 mg at 22:00 2022.  Regimen: 500 mg IV every 12 hours.  Start time: 21:27 on 2022  Exposure target: AUC24 (range)400-600 mg/L.hr   AUC24,ss: 454 mg/L.hr  Probability of AUC24 > 400: 64 %  Ctrough,ss: 14.5 mg/L  Probability of Ctrough,ss > 20: 22 %  Probability of nephrotoxicity (Lodise KERMIT ): 10 %          Plan:  1. Start vancomycin  750 mg IV x1 dose; then 500mg IV q12h.   2. Vancomycin monitoring method: AUC  3. Vancomycin therapeutic monitoring goal: 400-600 mg*h/L  4. Pharmacy will check vancomycin levels as appropriate in 1-3 Days.    5. Serum creatinine levels will be ordered daily for the first week of therapy and at least twice weekly for subsequent weeks.      Glenys Guzman [facility]

## 2022-05-10 NOTE — CONSULTS
DIABETES CARE  Situation:  Consulted by Provider for Diabetes Education  Kevin Stephens is a 22 year old adult with DM type I, glycogen-storage disorder, GERD, recent sebaceous cyst complicated by abscess for which patient required incision and drainage and antibiotics with Bactrim admitted with DKA and metabolic acidosis. Pt was started on insulin drip    Background:  PCP: Vanessa Wright  Endocrinologist: Dr Jewell, last seen 2/27/22  Saw OP CDE 3/28/22  Social: Works    Diabetes History:   8 year history per patient; had a number of pancreatitis episodes prior  Antibodies tests were negative, C peptide was low.  Per Dr Jewell: Patient likely has type 1 diabetes mellitus. Secondary diabetes from pancreatic insufficiency is also in the differential although she does not have any exocrine pancreatic dysfunction. Glycogen-storage disease puts her at significant risk of hypoglycemia. Patient was on growth hormone treatment for about 4 years from age [age]. She has not been on any such treatment since.    Patient is wearing a Dexcom G6 most of the time but has had trouble with it. She will contact their help line for troubleshooting and replacement of sensors not lasting the 10 days.  She will be aiming for getting the Tandem Control IQ pump which communicates with the Dexcom G6. She will meet with Dr. Jewell again in June and then after with CDE for this process.     Meds for BG Management PTA:  32 units of Lantus every am  1:10 I:C ratio at meals, Novolog to carb grams per patient  Also has a 1/50 > 150 Novolog correction scale    Current Inpatient Meds for BG Management:  Insulin drip for DKA    Labs:  Hemoglobin A1C: >14               GFR: >90   Blood Glucose POC: ON admission 633, last value on drip 193    Diet Order:  Regular diet   Intake: 100% this am   Weight: 35.6 kg    BMI: 13.91    DM EDUCATION/COUNSELING:  Barriers to Learning and/or DM Self-Management: Doesn't take insulin or check glucose (unless the Dexcom is  "on  Previous DM Education: has had, 3/28/22  Current Education and/or visit with Patient and aunt Mayra  Educated/reviewed diabetes basics: pathophysiology, hyperglycemia, DKA, prevention and treatment, when to call provider. Had DKA in [place] when didn't take insulin with. Also had a UTI at that time. Talked to her about how illness can trigger DKA as well.   Educated/reviewed hypoglycemia: sx, causes, treatment. Denies having but is aware of sx, trt. Emphasized with glycogen storage disease she is more at risk for hypoglycemia so ever more important to have the CGMS on with its alerts.  Explained normal/goals of blood sugar control, A1C. I asked her why she felt her A1C was so high and she answered: I miss insulin. Reinforced need for the basal insulin always to help prevent DKA.  Patient doesn't use a BG meter. She will only know her glucose if she has the sensor on.  Patient uses an insulin pen.  Also discussed site rotation, proper storage  Carbohydrates were briefly discussed and their effect on BG. Denies problems with carb counting, talked about healthy eating with OP CDE in March.    Assessment:  Patient aware of what she needs to do to better manage her DM. Seems motivated to get the insulin pump going.  Will need to figure out CGMS concerns as it will be very important the two devices work together to help her improve her DM. Of course she still will need to bolus for her carb intake but it will be easier with a pump.    Recommendations:  1. Lantus will be given 2 hours prior insulin drip ending.  2. Should have mealtime insulin ordered now if she is eating  3. Assess BG pattern daily and adjust doses as needed once subcutaneous insulin started  4. Patient to Follow-up with endo 6/15 and CDE after to get pump process rolling    Refer to \"Guidelines for Insulin Initiation and Care in Hospitalized Adults\"  link in Diabetes Management Order set for dosing guidelines    Hospital BG goals for blood glucose " levels are < 180 for improved health outcomes.      Thank you,     Sophie Raya RN, Certified Diabetes Care and     [address] [address]  [email or web address]   Office: [phone]  Pager: [phone]

## 2022-05-10 NOTE — PROGRESS NOTES
Resident/Fellow Attestation   I, Geetha Ballard MD, was present with the medical/VENKAT student who participated in the service and in the documentation of the note.  I have verified the history and personally performed the physical exam and medical decision making.  I agree with the assessment and plan of care as documented in the note.        Geetha Ballard MD  PGY1  Date of Service (when I saw the patient): 05/10/22    [facility]    Progress Note - Hospitalist Service       Date of Admission:  5/9/2022    Assessment & Plan          Kevin Stephens is a 22 year old adult who has a history of type 1 diabetes, sebaceous cyst complicated by abscess, glycogen storage disorder, GERD, admitted for DKA. Transitioning to subcutaneous insulin today.     DKA  Hx T1DM  Pseudohyponatremia  AG metabolic acidosis, improving  Elevated ketones  Hx, labs and physical exam are most consistent with DKA, likely due to medication nonadherance. Though she has cyst/possible abscess, could consider infectious contribution. Does not appear to have another source of infection at this time, UA demonstrates changes related to DKA.  She has been followed by endocrinology and diabetes education. Noted that she has had issues with her Dexcom, and therefore, checking her sugars has been inconsistent. PTA, she is supposed to be on 32u lantus and novolog 1u per 25g + correction 1u per 50mg/dl >150mg/dl. She has had multiple episodes of DKA in the past, related to pancreatitis, UTI, medication non-adherence. Antibody labs per her endocrinologist were consistent with T1DM. Last A1c was 12/2021, >14%. Completed 2L bolus in ED + sodium bicarb, and started on IV insulin drip. Blood glucose decreased with IV insulin (<200) and high anion gap resolved (although remains acidotic), so will transition to subcutaneous insulin regimen.  - ICU consulted in ED, signed off 5/10  - Decrease insulin drip (1u/mL-> 0.5u/mL) (discontinue 2  hours after resuming subcutaneous insulin)  - Start subcutaneous insulin   - 20u basal, novolog 1u per 25g + correction 1u per 50mg/dl >150mg/dl.   - Resume normal diet  - Stop IVF  - K+, mag, Phos replacement  - BMP, CBC, ketones, vbg q4h  - PRN O2  - neuro checks  - cardiac monitoring  - blood cultures pending, NGTD  - diabetes educator consulted. Per note, aiming for getting Tandem Control IQ pump which communicates with the Dexcom G6; will meet with Dr. Jewell again in June and then after with CDE for this process    Hypomagnesemia  Hypokalemia  - Replacement protocols     Sebaceous cyst complicated by abscess  She had this removed from her scalp on 3/29/22, and had inflammation and irritation after this. She was treated initially with keflex, and had redemonstration of pain. Required I&D and she was started on Bactrim BID x7d. Does not appear to be a source of infection that could have precipitated DKA. Will resume p.o. antibiotics now that tolerating oral intake. Bactrim has been associated with QTc prolongation, so will start doxycycline given that Pt's QTc was prolonged on admission.  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 Disease noted to have liver, muscle, and cardiac involvement. Noted per her endocrinologist that she has this history, notable for recurrent pancreatitis leading to episodes of hyperglycemia. She is not having abdominal pain. Last echocardiogram in 12/2017 with an EF of 67%. Echo was ordered in 2020, but was not completed. Also notable of this disease process type is hypoglycemia. Supposed to be eating 2 tablespoons cornstarch QID.     -normal diet  -nutrition consult  -follow up outpatient for echocardiogram    Elevated LFTs, improving  Can be related to dehydration or glycogen storage disease, as  this as been associated with transaminitis. Improved with IVF.  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 Her endocrinologist started the work up for this, noting hat her growth hormone level is low and she was recommended to have additional lab work up for this. Does not appear she was able to follow up on this. AM cortisol normal.   - consider checking IGF-1, T4, estradiol and FSH as outpatient      GERD  - IV PPI     Female to male transition  Patient identifies as non-binary, does not appear to be on HRT         Diet: Regular Diet Adult    DVT Prophylaxis: Pneumatic Compression Devices  Byrne Catheter: Not present  Fluids: PO   Central Lines: None  Cardiac Monitoring: ACTIVE order. Indication: DKA  Code Status: Full Code      Disposition Plan   Expected Discharge: 1-2 days  Anticipated discharge location:  Awaiting care coordination huddle  Delays: pending resolution of DKA, diabetes education consult     The patient's care was discussed with the Attending Physician, Dr. oRbertson.    ALEJANDRA RANDHAWA  Medical Student  Hospitalist Service  [facility]  Securely message with the Vocera Web Console (learn more here)  Text page via [employer] Paging/Directory         Clinically Significant Risk Factors Present on Admission             # Hypoalbuminemia: Albumin = 3.1 g/dL (Ref range: 3.5 - 5.0 g/dL) on admission, will monitor as appropriate      # Severe Malnutrition: based on nutrition assessment     ______________________________________________________________________    Interval History     No overnight events.    This morning, he feels less nauseous/weak than previously. No longer having chest pain.  States he has been having difficulty with Dexcom at home, the sensor has not been working well.     Works at the local [employer]. Uses pronouns  he/them.    Data reviewed today: I reviewed all medications, new labs and imaging results over the last 24 hours. I personally reviewed no images or EKG's today.     Physical Exam   Vital Signs: Temp: 98.7  F (37.1  C) Temp src: Oral BP: 106/70 Pulse: 87   Resp: 20 SpO2: 100 % O2 Device: None (Room air)    Weight: 78 lbs 8 oz  Constitutional: awake, somewhat fatigued appearing, cooperative, no apparent distress, and appears stated age  Respiratory: No increased work of breathing, good air exchange, clear to auscultation bilaterally, no crackles or wheezing  Cardiovascular: Normal apical impulse, regular rate and rhythm, normal S1 and S2, no S3 or S4, and no murmur noted  GI: normal bowel sounds, soft, non-distended, non-tender, no masses palpated  Skin: approx 2cm well circmscribed mass on head. Mildly TTP, no drainage, active bleeding noted. Mass is erythematous and normal skin color, texture, turgor  Musculoskeletal: There is no redness, warmth, or swelling of the joints.  Neuropsychiatric: General: normal, calm and normal eye contact  Level of consciousness: alert / normal  Affect: normal  Memory and insight: normal, memory for past and recent events intact and thought process normal    Data   Recent Labs   Lab 05/10/22  1338 05/10/22  1201 05/10/22  1056 05/10/22  1034 05/10/22  0557 05/10/22  0554 05/10/22  0203 05/10/22  0201 05/09/22  2311 05/09/22  2205 05/09/22  1900 05/09/22  1803 05/09/22  1624 05/09/22  1409   WBC  --   --   --   --   --   --   --   --   --  7.6  --   --   --  10.0   HGB  --   --   --   --   --   --   --   --   --  13.7  --   --   --  17.3   MCV  --   --   --   --   --   --   --   --   --  88  --   --   --  94   PLT  --   --   --   --   --   --   --   --   --  213  --   --   --  358   *  --   --  137  --  135*  --  136  --  135*  --  137  --  133*   POTASSIUM 3.4*  --   --  3.4*  --  3.7  --  3.4*  --  3.6  --  3.5  --  3.8   CHLORIDE 111*  --   --  114*  --  114*  --  113*  --   113*  --  108*  --  100   CO2 16*  --   --  14*  --  13*  --  15*  --  7*  --  <6*  --  <6*   BUN 4*  --   --  5*  --  5*  --  5*  --  6*  --  8  --  9   CR 0.72  --   --  0.73  --  0.80  --  0.79  --  0.84  --  1.08  --  1.61*   ANIONGAP 8  --   --  9  --  8  --  8  --  15  --  >23*  --  >27*   LUIS 7.8*  --   --  8.1*  --  7.5*  --  7.5*  --  7.2*  --  7.5*  --  8.7   * 192* 186* 193*   < > 261*   < > 226*   < > 280*   < > 358*   < > 633*   ALBUMIN  --   --   --   --   --   --   --  3.1*  --   --   --  3.9  --   --    PROTTOTAL  --   --   --   --   --   --   --  5.9*  --   --   --  7.7  --   --    BILITOTAL  --   --   --   --   --   --   --  0.4  --   --   --  0.4  --   --    ALKPHOS  --   --   --   --   --   --   --  112  --   --   --  158*  --   --    ALT  --   --   --   --   --   --   --  88*  --   --   --  110*  --   --    AST  --   --   --   --   --   --   --  109*  --   --   --  140*  --   --     < > = values in this interval not displayed.       Component      Latest Ref Rng & Units 5/9/2022 5/9/2022 5/9/2022 5/10/2022           2:09 PM  6:03 PM 10:06 PM  2:01 AM   Ph Venous      7.35 - 7.45 6.96 (LL) 7.00 (LL) 7.24 (LL) 7.27 (L)   PCO2 Venous      35 - 50 mm Hg 20 (L)  20 (L) 34 (L)   PO2 Venous      25 - 47 mm Hg 56 (H)  111 (H) 60 (H)   Bicarbonate Venous      24 - 30 mmol/L 8 (L)  13 (L) 16 (L)   Base Excess Venous      mmol/L -27.6  -18.6 -11.3   Oxyhemoglobin Venous      70.0 - 75.0 % 77.0 (H)  97.5 (H) 92.2 (H)   O2 Sat, Venous      70.0 - 75.0 % 78.5 (H)  99.3 (H) 94.0 (H)     Component      Latest Ref Rng & Units 5/10/2022 5/10/2022           5:54 AM 10:34 AM   Ph Venous      7.35 - 7.45 7.29 (L) 7.29 (L)   PCO2 Venous      35 - 50 mm Hg 35 33 (L)   PO2 Venous      25 - 47 mm Hg 78 (H) 56 (H)   Bicarbonate Venous      24 - 30 mmol/L 17 (L) 17 (L)   Base Excess Venous      mmol/L -9.9 -11.2   Oxyhemoglobin Venous      70.0 - 75.0 % 95.5 (H) 91.2 (H)   O2 Sat, Venous      70.0 - 75.0 % 97.5 (H)  92.9 (H)

## 2022-05-10 NOTE — PROGRESS NOTES
"CLINICAL NUTRITION SERVICES - ASSESSMENT NOTE     Nutrition Prescription    RECOMMENDATIONS FOR MDs/PROVIDERS TO ORDER:   Pt may benefit from resuming corn starch treatment especially if low blood sugars become a concern. If possible to get an endocrine consult during this visit, even virtual, I would recommend that for proper dosage of cornstarch therapy now that pt is more of an adult vs adolescent.     Malnutrition Status:    % Weight Loss:  Up to 20% in 1 year (moderate malnutrition)  % Intake:  <75% for >/= 3 months (moderate malnutrition)  Subcutaneous Fat Loss:  Upper arm region moderate depletion  Muscle Loss:  None observed. Suspect further fat and muscle loss but pt was wrapped up  Fluid Retention:  None noted    Malnutrition Diagnosis: Severe malnutrition  In Context of:  Chronic illness or disease    Recommendations already ordered by Registered Dietitian (RD):  Will trial Glucerna chocolate 2 x daily when diet advances     Future/Additional Recommendations:  none     REASON FOR ASSESSMENT  Kevin Stephens is a/an 22 year old adult assessed by the dietitian for provider order.     Pt admitted with DKA, DM type 1, pseudohyponatremia, glycogen storage disorder     NUTRITION HISTORY  NKFA  Pt is to be on 2TB raw corn starch every 4 hrs. She has not been doing for the past year. She used to mix it with sprite or milk, but doesn't care for it.     She carb counts with a  1 unit insulin to 10g CHO ratio    She states her appetite has been fair to poor with no nutrition supplements    She does not drink alcohol. She does not feel she has any disordered eating behaviors.     CURRENT NUTRITION ORDERS  Diet: NPO      LABS  Labs reviewed, Na-135, Ca-7.5, Gluc-257, 244, Hgb A1c->14    MEDICATIONS  Medications reviewed, insulin drip    ANTHROPOMETRICS  Height: 160 cm (5' 2.992\")  Most Recent Weight: 35.6 kg (78 lb 8 oz)    IBW: 50 kg  BMI: Underweight BMI <18.5  Weight History:   Wt Readings from Last 5 Encounters: "   05/10/22 35.6 kg (78 lb 8 oz)   07/22/21 42.5 kg (93 lb 9.6 oz)   09/17/20 49.4 kg (108 lb 14.5 oz)   01/16/20 47.3 kg (104 lb 4.4 oz) (7 %, Z= -1.46)*   12/03/19 47.6 kg (104 lb 15 oz) (8 %, Z= -1.40)*     * Growth percentiles are based on CDC (Girls, 2-20 Years) data.   pt is down 15# (16%) in the past year     Dosing Weight: 35 kg    ASSESSED NUTRITION NEEDS  Estimated Energy Needs: 3835-0203 kcals/day (30 - 35 kcals/kg )  Justification: Underweight  Estimated Protein Needs: 35-42 grams protein/day (1 - 1.2 grams of pro/kg)  Justification: Maintenance  Estimated Fluid Needs: 875-1050 mL/day (25 - 30 mL/kg)   Justification: Maintenance    PHYSICAL FINDINGS  See malnutrition section below.  Per flowsheet  Edema-none noted   GI-nausea  Skin-no skin breakdown      MALNUTRITION:  % Weight Loss:  Up to 20% in 1 year (moderate malnutrition)  % Intake:  <75% for >/= 3 months (moderate malnutrition)  Subcutaneous Fat Loss:  Upper arm region moderate depletion  Muscle Loss:  None observed  Suspect further fat and muscle loss but pt was wrapped up  Fluid Retention:  None noted    Malnutrition Diagnosis: Severe malnutrition  In Context of:  Chronic illness or disease    NUTRITION DIAGNOSIS  Malnutrition related to chronic as evidenced by wt loss of 16% in the past year, ongoing reduced po, VLBW    INTERVENTIONS  Implementation  Pt may benefit from resuming corn starch treatment especially if low blood sugars become a concern. If possible to get an endocrine consult during this visit, even virtual, I would recommend that for proper dosage of cornstarch therapy now that pt is more of an adult vs adolescent.     Will trial Glucerna chocolate 2 x daily when diet advances     Goals  Diet advancement vs nutrition support within 2-3 days.  Patient to consume % of nutritionally adequate meals three times per day, or the equivalent with supplements/snacks.  Glycemic control wnl   Promote appropriate wt gain       Monitoring/Evaluation  Progress toward goals will be monitored and evaluated per protocol.

## 2022-05-10 NOTE — PROGRESS NOTES
Care Management Follow Up    Length of Stay (days): 1    Expected Discharge Date: 1/2 days      Concerns to be Addressed:  ICU progression     Patient plan of care discussed at interdisciplinary rounds: Yes     Anticipated Discharge Disposition:  Home      Anticipated Discharge Services:  None anticipated   Anticipated Discharge DME:  SHAD     Additional Information:  Chart reviewed. No CM needs identified at this time. Family to transport.       JACKSON Soni

## 2022-05-11 VITALS
SYSTOLIC BLOOD PRESSURE: 110 MMHG | HEIGHT: 63 IN | HEART RATE: 93 BPM | RESPIRATION RATE: 18 BRPM | TEMPERATURE: 98 F | BODY MASS INDEX: 14.79 KG/M2 | DIASTOLIC BLOOD PRESSURE: 76 MMHG | OXYGEN SATURATION: 100 % | WEIGHT: 83.5 LBS

## 2022-05-11 PROBLEM — R94.31 PROLONGED QT INTERVAL: Status: ACTIVE | Noted: 2022-05-11

## 2022-05-11 PROBLEM — R74.01 TRANSAMINITIS: Status: ACTIVE | Noted: 2022-05-11

## 2022-05-11 PROBLEM — N17.9 ACUTE KIDNEY INJURY (H): Status: ACTIVE | Noted: 2022-05-11

## 2022-05-11 LAB
ANION GAP SERPL CALCULATED.3IONS-SCNC: 8 MMOL/L (ref 5–18)
ANION GAP SERPL CALCULATED.3IONS-SCNC: 8 MMOL/L (ref 5–18)
ANION GAP SERPL CALCULATED.3IONS-SCNC: 9 MMOL/L (ref 5–18)
BASE EXCESS BLDV CALC-SCNC: -0.7 MMOL/L
BASE EXCESS BLDV CALC-SCNC: -2 MMOL/L
BASE EXCESS BLDV CALC-SCNC: -2.7 MMOL/L
BUN SERPL-MCNC: 4 MG/DL (ref 8–22)
BUN SERPL-MCNC: 5 MG/DL (ref 8–22)
BUN SERPL-MCNC: 6 MG/DL (ref 8–22)
CALCIUM SERPL-MCNC: 7.9 MG/DL (ref 8.5–10.5)
CALCIUM SERPL-MCNC: 8.1 MG/DL (ref 8.5–10.5)
CALCIUM SERPL-MCNC: 8.2 MG/DL (ref 8.5–10.5)
CHLORIDE BLD-SCNC: 110 MMOL/L (ref 98–107)
CHLORIDE BLD-SCNC: 111 MMOL/L (ref 98–107)
CHLORIDE BLD-SCNC: 111 MMOL/L (ref 98–107)
CO2 SERPL-SCNC: 20 MMOL/L (ref 22–31)
CO2 SERPL-SCNC: 22 MMOL/L (ref 22–31)
CO2 SERPL-SCNC: 22 MMOL/L (ref 22–31)
CREAT SERPL-MCNC: 0.58 MG/DL (ref 0.6–1.3)
CREAT SERPL-MCNC: 0.63 MG/DL (ref 0.6–1.3)
CREAT SERPL-MCNC: 0.68 MG/DL (ref 0.6–1.3)
GFR SERPL CREATININE-BSD FRML MDRD: >90 ML/MIN/1.73M2
GLUCOSE BLD-MCNC: 163 MG/DL (ref 70–125)
GLUCOSE BLD-MCNC: 169 MG/DL (ref 70–125)
GLUCOSE BLD-MCNC: 228 MG/DL (ref 70–125)
GLUCOSE BLDC GLUCOMTR-MCNC: 165 MG/DL (ref 70–99)
GLUCOSE BLDC GLUCOMTR-MCNC: 173 MG/DL (ref 70–99)
GLUCOSE BLDC GLUCOMTR-MCNC: 185 MG/DL (ref 70–99)
GLUCOSE BLDC GLUCOMTR-MCNC: 190 MG/DL (ref 70–99)
GLUCOSE BLDC GLUCOMTR-MCNC: 213 MG/DL (ref 70–99)
GLUCOSE BLDC GLUCOMTR-MCNC: 292 MG/DL (ref 70–99)
HCO3 BLDV-SCNC: 22 MMOL/L (ref 24–30)
HCO3 BLDV-SCNC: 23 MMOL/L (ref 24–30)
HCO3 BLDV-SCNC: 23 MMOL/L (ref 24–30)
KETONES BLD-SCNC: 0.13 MMOL/L
KETONES BLD-SCNC: 0.13 MMOL/L
KETONES BLD-SCNC: 0.74 MMOL/L
MAGNESIUM SERPL-MCNC: 1.6 MG/DL (ref 1.8–2.6)
OXYHGB MFR BLDV: 79.5 % (ref 70–75)
OXYHGB MFR BLDV: 87.5 % (ref 70–75)
OXYHGB MFR BLDV: 90.9 % (ref 70–75)
PCO2 BLDV: 38 MM HG (ref 35–50)
PCO2 BLDV: 39 MM HG (ref 35–50)
PCO2 BLDV: 43 MM HG (ref 35–50)
PH BLDV: 7.37 [PH] (ref 7.35–7.45)
PH BLDV: 7.38 [PH] (ref 7.35–7.45)
PH BLDV: 7.38 [PH] (ref 7.35–7.45)
PHOSPHATE SERPL-MCNC: 2.2 MG/DL (ref 2.5–4.5)
PO2 BLDV: 40 MM HG (ref 25–47)
PO2 BLDV: 50 MM HG (ref 25–47)
PO2 BLDV: 55 MM HG (ref 25–47)
POTASSIUM BLD-SCNC: 2.8 MMOL/L (ref 3.5–5)
POTASSIUM BLD-SCNC: 3 MMOL/L (ref 3.5–5)
POTASSIUM BLD-SCNC: 3.6 MMOL/L (ref 3.5–5)
SAO2 % BLDV: 81 % (ref 70–75)
SAO2 % BLDV: 89.2 % (ref 70–75)
SAO2 % BLDV: 92.7 % (ref 70–75)
SODIUM SERPL-SCNC: 139 MMOL/L (ref 136–145)
SODIUM SERPL-SCNC: 140 MMOL/L (ref 136–145)
SODIUM SERPL-SCNC: 142 MMOL/L (ref 136–145)

## 2022-05-11 PROCEDURE — 250N000009 HC RX 250: Performed by: FAMILY MEDICINE

## 2022-05-11 PROCEDURE — 250N000011 HC RX IP 250 OP 636: Performed by: STUDENT IN AN ORGANIZED HEALTH CARE EDUCATION/TRAINING PROGRAM

## 2022-05-11 PROCEDURE — 258N000003 HC RX IP 258 OP 636: Performed by: FAMILY MEDICINE

## 2022-05-11 PROCEDURE — 250N000013 HC RX MED GY IP 250 OP 250 PS 637: Performed by: FAMILY MEDICINE

## 2022-05-11 PROCEDURE — 82805 BLOOD GASES W/O2 SATURATION: CPT | Performed by: STUDENT IN AN ORGANIZED HEALTH CARE EDUCATION/TRAINING PROGRAM

## 2022-05-11 PROCEDURE — 36415 COLL VENOUS BLD VENIPUNCTURE: CPT | Performed by: STUDENT IN AN ORGANIZED HEALTH CARE EDUCATION/TRAINING PROGRAM

## 2022-05-11 PROCEDURE — 80048 BASIC METABOLIC PNL TOTAL CA: CPT | Performed by: STUDENT IN AN ORGANIZED HEALTH CARE EDUCATION/TRAINING PROGRAM

## 2022-05-11 PROCEDURE — 82010 KETONE BODYS QUAN: CPT | Performed by: STUDENT IN AN ORGANIZED HEALTH CARE EDUCATION/TRAINING PROGRAM

## 2022-05-11 PROCEDURE — 99238 HOSP IP/OBS DSCHRG MGMT 30/<: CPT | Mod: GC

## 2022-05-11 PROCEDURE — 82310 ASSAY OF CALCIUM: CPT | Performed by: STUDENT IN AN ORGANIZED HEALTH CARE EDUCATION/TRAINING PROGRAM

## 2022-05-11 PROCEDURE — 84100 ASSAY OF PHOSPHORUS: CPT | Performed by: FAMILY MEDICINE

## 2022-05-11 PROCEDURE — C9113 INJ PANTOPRAZOLE SODIUM, VIA: HCPCS | Performed by: STUDENT IN AN ORGANIZED HEALTH CARE EDUCATION/TRAINING PROGRAM

## 2022-05-11 PROCEDURE — 83735 ASSAY OF MAGNESIUM: CPT | Performed by: FAMILY MEDICINE

## 2022-05-11 PROCEDURE — 250N000013 HC RX MED GY IP 250 OP 250 PS 637: Performed by: STUDENT IN AN ORGANIZED HEALTH CARE EDUCATION/TRAINING PROGRAM

## 2022-05-11 RX ORDER — GLUCOSAMINE HCL/CHONDROITIN SU 500-400 MG
CAPSULE ORAL
Qty: 100 EACH | Refills: 3 | Status: SHIPPED | OUTPATIENT
Start: 2022-05-11 | End: 2023-11-30

## 2022-05-11 RX ORDER — LANCETS
EACH MISCELLANEOUS
Qty: 100 EACH | Refills: 6 | Status: SHIPPED | OUTPATIENT
Start: 2022-05-11

## 2022-05-11 RX ORDER — DOXYCYCLINE 100 MG/1
100 CAPSULE ORAL EVERY 12 HOURS
Qty: 9 CAPSULE | Refills: 0 | Status: SHIPPED | OUTPATIENT
Start: 2022-05-11 | End: 2022-05-16

## 2022-05-11 RX ORDER — POTASSIUM CHLORIDE 1.5 G/1.58G
40 POWDER, FOR SOLUTION ORAL ONCE
Status: COMPLETED | OUTPATIENT
Start: 2022-05-11 | End: 2022-05-11

## 2022-05-11 RX ADMIN — INSULIN ASPART 1 UNITS: 100 INJECTION, SOLUTION INTRAVENOUS; SUBCUTANEOUS at 08:17

## 2022-05-11 RX ADMIN — INSULIN ASPART 4 UNITS: 100 INJECTION, SOLUTION INTRAVENOUS; SUBCUTANEOUS at 16:20

## 2022-05-11 RX ADMIN — PANTOPRAZOLE SODIUM 40 MG: 40 INJECTION, POWDER, FOR SOLUTION INTRAVENOUS at 08:18

## 2022-05-11 RX ADMIN — INSULIN ASPART 1 UNITS: 100 INJECTION, SOLUTION INTRAVENOUS; SUBCUTANEOUS at 11:40

## 2022-05-11 RX ADMIN — POTASSIUM PHOSPHATE, MONOBASIC AND POTASSIUM PHOSPHATE, DIBASIC 9 MMOL: 224; 236 INJECTION, SOLUTION, CONCENTRATE INTRAVENOUS at 08:26

## 2022-05-11 RX ADMIN — POTASSIUM CHLORIDE 40 MEQ: 1.5 POWDER, FOR SOLUTION ORAL at 06:49

## 2022-05-11 RX ADMIN — DOXYCYCLINE 100 MG: 100 CAPSULE ORAL at 08:26

## 2022-05-11 ASSESSMENT — ACTIVITIES OF DAILY LIVING (ADL)
ADLS_ACUITY_SCORE: 22
DEPENDENT_IADLS:: INDEPENDENT

## 2022-05-11 NOTE — PLAN OF CARE
"Patient educated on discharge instructions. Requesting a glucometer for discharge. MD paged and new order for glucometer placed for pick-up at discharge pharmacy. All questions answered. Patient left with all her belongings. Nestor present in room and giving the patient a ride home.    Leny Cantor RN on 5/11/2022 at 4:27 PM        Problem: Plan of Care - These are the overarching goals to be used throughout the patient stay.    Goal: Plan of Care Review/Shift Note  Description: The Plan of Care Review/Shift note should be completed every shift.  The Outcome Evaluation is a brief statement about your assessment that the patient is improving, declining, or no change.  This information will be displayed automatically on your shift note.  Outcome: Met  Goal: Patient-Specific Goal (Individualized)  Description: You can add care plan individualizations to a care plan. Examples of Individualization might be:  \"Parent requests to be called daily at 9am for status\", \"I have a hard time hearing out of my right ear\", or \"Do not touch me to wake me up as it startles me\".  Outcome: Met  Goal: Absence of Hospital-Acquired Illness or Injury  Outcome: Met  Goal: Optimal Comfort and Wellbeing  Outcome: Met  Goal: Readiness for Transition of Care  Outcome: Met     Problem: Risk for Delirium  Goal: Optimal Coping  Outcome: Met  Goal: Improved Behavioral Control  Outcome: Met  Goal: Improved Attention and Thought Clarity  Outcome: Met  Goal: Improved Sleep  Outcome: Met   Goal Outcome Evaluation:                      "

## 2022-05-11 NOTE — DISCHARGE SUMMARY
[facility]  Discharge Summary - Medicine & Pediatrics       Date of Admission:  5/9/2022  Date of Discharge:  5/11/2022  Discharging Provider: Olga Armstrong  Discharge Service: Hospitalist Service    Discharge Diagnoses   (L72.3,  L08.9) Infected sebaceous cyst  (primary encounter diagnosis)  (E10.10) Diabetic ketoacidosis without coma associated with type 1 diabetes mellitus (H)  Transaminitis   Prolonged QTc  Glycogen storage disease  Severe malnutrition  Acute Kidney Injury        Follow-ups Needed After Discharge   Follow-up Appointments    Follow-up and recommended labs and tests:  -Follow up with primary care provider, DANIEL GALVEZ, within 7 days for hospital follow- up.  The following labs/tests are recommended: removal of sebaceous cyst capsule, BMP to check electrolytes     -Follow up with endocrinology in June as scheduled for insulin pump discussion      Unresulted Labs Ordered in the Past 30 Days of this Admission       Date and Time Order Name Status Description    5/9/2022  6:05 PM Blood Culture Peripheral Blood Preliminary     5/9/2022  6:05 PM Blood Culture Arm, Left Preliminary         These results will be followed up by PCP    Discharge Disposition   Discharged to home  Condition at discharge: Stable      Hospital Course     Kevin Stephens is a 22 year old adult who has a history of type 1 diabetes, infected sebaceous cyst, glycogen storage disorder, GERD, admitted for DKA. The following problems were addressed during Kevin Stephnes's hospitalization:    DKA  Hx T1DM  Pseudohyponatremia  AG metabolic acidosis, improving  Elevated ketones  Hx, labs and physical exam are most consistent with DKA, likely due to medication nonadherance. Felt ill secondary to infected cyst and was not taking insulin. Does not appear to have another source of infection at this time, UA demonstrates changes related to DKA. Noted that she has had issues with her Dexcom, and therefore, checking  her sugars has been inconsistent. PTA, she is supposed to be on 32u lantus and novolog 1u per 25g + correction 1u per 50mg/dl >150mg/dl. She has had multiple episodes of DKA in the past, related to pancreatitis, UTI, medication non-adherence. Antibody labs per her endocrinologist were consistent with T1DM. A1c >14% this admission.  Blood glucose decreased with IV insulin (<200) and high anion gap resolved, therefore transitioned to subcutaneous insulin regimen. Discharged with glucometer and supplies, information for Dexcom support, with plans to follow up with endocrinology in June to discuss insulin pump. Glucoses 160-180s at discharge and electrolytes wnl.     Sebaceous cyst complicated by abscess  Prolonged QTc- 582  She had this removed from her scalp on 3/29/22, and had inflammation and irritation after this. She was treated initially with keflex, and had redemonstration of pain. Required I&D and she was started on Bactrim BID x7d. Given ceftriaxone x1 on admission. Bactrim has been associated with QTc prolongation, so switched this for doxycycline given that Pt's QTc was prolonged on admission. Patient to complete 7 day course of abx.    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 Disease noted to have liver, muscle, and cardiac involvement. Noted per her endocrinologist that she has this history, notable for recurrent pancreatitis leading to episodes of hyperglycemia. She is not having abdominal pain. Last echocardiogram in 12/2017 with an EF of 67%. Echo was ordered in 2020, but was not completed. Also notable of this disease process type is hypoglycemia. Supposed to be eating 2 tablespoons cornstarch QID.   Patient to continue PTA diet and follow up outpatient.     Elevated LFTs, improving  Can be related to dehydration or glycogen storage disease, as this as been associated with transaminitis. Improved  with IVF.    Acute Kidney Injury, resolved  Cr 1.61 on admission. Cr wnl after treatment with IVF.    Severe Malnutrition  Nutrition consulted, appreciated recs. BMI 13 in the setting of malnutrition, hypopituitarism. Glycogen storage disease may also be contributing. Thin appearing.     Consultations This Hospital Stay   DIABETES EDUCATION IP CONSULT  PHARMACY TO DOSE VANCO  NUTRITION SERVICES ADULT IP CONSULT  SOCIAL WORK IP CONSULT    Code Status   Full Code       The patient was discussed with Dr. Patti Ballard MD PGY-1  [facility]  [facility] 3 ICU  [address] [place] [id number]  Phone: [phone]  Fax: [phone]  ______________________________________________________________________    Physical Exam   Vital Signs: Temp: 98.5  F (36.9  C) Temp src: Oral BP: 120/76 Pulse: 97   Resp: 20 SpO2: 100 % O2 Device: None (Room air)    Weight: 83 lbs 8 oz  Constitutional: awake, alert, cooperative, no apparent distress, and appears stated age  Respiratory: No increased work of breathing, good air exchange, clear to auscultation bilaterally, no crackles or wheezing  Cardiovascular: Normal apical impulse, regular rate and rhythm, normal S1 and S2, no S3 or S4, and no murmur noted  GI: normal bowel sounds, soft, non-distended, non-tender, no masses palpated  Skin: approx 2cm well circmscribed mass on head. Mildly TTP, no drainage or active bleeding noted. Mass is erythematous and fluctuant.  Musculoskeletal: There is no redness, warmth, or swelling of the joints.  Neuropsychiatric: General: normal, calm and normal eye contact  Level of consciousness: alert / normal  Affect: normal  Memory and insight: normal, memory for past and recent events intact and thought process normal      Primary Care Physician   DANIEL GALVEZ    Discharge Orders      Reason for your hospital stay    DKA, now resolved     Follow-up and recommended labs and tests      Follow up with primary care provider, DANIEL GALVEZ, within 7 days for hospital follow- up.  The following labs/tests are recommended: removal of sebaceous cyst capsule, BMP to check electrolytes     Activity    Your activity upon discharge: activity as tolerated     Diet    Follow this diet upon discharge: Orders Placed This Encounter      Snacks/Supplements Adult: Glucerna; With Meals      Moderate Consistent Carb (60 g CHO per Meal) Diet       Significant Results and Procedures   Most Recent 3 CBC's:Recent Labs   Lab Test 05/09/22  2205 05/09/22  1409 07/22/21  1858   WBC 7.6 10.0 4.4   HGB 13.7 17.3 15.2   MCV 88 94 86    358 251     Most Recent 3 BMP's:Recent Labs   Lab Test 05/11/22  1114 05/11/22  1034 05/11/22  0749 05/11/22  0606 05/11/22  0332 05/11/22  0159   NA  --  140  --  142  --  139   POTASSIUM  --  3.6  --  2.8*  --  3.0*   CHLORIDE  --  110*  --  111*  --  111*   CO2  --  22  --  22  --  20*   BUN  --  6*  --  5*  --  4*   CR  --  0.58*  --  0.63  --  0.68   ANIONGAP  --  8  --  9  --  8   LUIS  --  8.1*  --  8.2*  --  7.9*   * 163* 165* 169*   < > 228*    < > = values in this interval not displayed.     Most Recent 2 LFT's:Recent Labs   Lab Test 05/10/22  0201 05/09/22  1803   * 140*   ALT 88* 110*   ALKPHOS 112 158*   BILITOTAL 0.4 0.4     Most Recent Urinalysis:Recent Labs   Lab Test 05/09/22  1629   COLOR Colorless   APPEARANCE Clear   URINEGLC >1000*   URINEBILI Negative   URINEKETONE >150*   SG 1.019   UBLD 0.03 mg/dL*   URINEPH 5.0   PROTEIN 20 *   NITRITE Negative   LEUKEST Negative   RBCU 0   WBCU 1   ,   Results for orders placed or performed during the hospital encounter of 07/22/21   CT Abdomen Pelvis w Contrast    Narrative    EXAM: CT ABDOMEN PELVIS W CONTRAST  LOCATION: [facility]  DATE/TIME: 7/22/2021 8:10 PM    INDICATION: Epigastric pain for 5 weeks, history of glycogen storage disease  COMPARISON: 02/28/2008  TECHNIQUE: CT scan of  the abdomen and pelvis was performed following injection of IV contrast. Multiplanar reformats were obtained. Dose reduction techniques were used.  CONTRAST: Isovue-370 100mL    FINDINGS:   LOWER CHEST: Normal.    HEPATOBILIARY: Moderate hepatomegaly. No suspicious liver lesion. No calcified gallstones.    PANCREAS: Normal.    SPLEEN: Normal.    ADRENAL GLANDS: Normal.    KIDNEYS/BLADDER: Normal.    BOWEL: Normal.    LYMPH NODES: Normal.    VASCULATURE: Unremarkable.    PELVIC ORGANS: Normal.    MUSCULOSKELETAL: Normal.      Impression    IMPRESSION:   1.  No acute abnormality in the abdomen or pelvis.  2.  Moderate hepatomegaly.       Discharge Medications   Current Discharge Medication List        START taking these medications    Details   doxycycline hyclate (VIBRAMYCIN) 100 MG capsule Take 1 capsule (100 mg) by mouth every 12 hours for 5 days Take one dose night of 5/11  Qty: 9 capsule, Refills: 0    Associated Diagnoses: Infected sebaceous cyst           CONTINUE these medications which have NOT CHANGED    Details   insulin aspart (NOVOLOG PEN) 100 UNIT/ML pen Correct 1 unit per 40 over 180 before breakfast, lunch, dinner and at bedtime.  Qty: 45 mL, Refills: 1    Comments: Using up to 50 units daily.  Associated Diagnoses: Hyperglycemia      insulin glargine (LANTUS PEN) 100 UNIT/ML pen Inject 32 Units Subcutaneous every morning      acetone, Urine, test STRP Check urine ketones when two consecutive blood sugars are greater than 300 and at times of illness/vomiting.  Qty: 100 each, Refills: 11    Associated Diagnoses: Type 1 diabetes mellitus with hyperglycemia (H)      blood glucose monitoring (ONE TOUCH DELICA) lancets Use to test blood sugars 6 times daily.  Qty: 1 Box, Refills: 12    Associated Diagnoses: Type I (juvenile type) diabetes mellitus without mention of complication, not stated as uncontrolled      blood glucose monitoring (ONETOUCH VERIO IQ) test strip Use to test blood sugars 6 times daily or  as directed.  Qty: 200 strip, Refills: 11    Associated Diagnoses: Hyperglycemia      Continuous Blood Gluc Sensor (DEXCOM G6 SENSOR) MISC 1 each See Admin Instructions Change every 10 days  Qty: 9 each, Refills: 3    Associated Diagnoses: Type 1 diabetes mellitus with hyperglycemia (H)      Continuous Blood Gluc Transmit (DEXCOM G6 TRANSMITTER) MISC 1 each every 3 months  Qty: 1 each, Refills: 3    Associated Diagnoses: Type 1 diabetes mellitus with hyperglycemia (H)      insulin pen needle (BD CALLI U/F) 32G X 4 MM miscellaneous Use pen needles 6 times daily.  Qty: 200 each, Refills: 6    Associated Diagnoses: Hyperglycemia           STOP taking these medications       famotidine (PEPCID) 10 MG tablet Comments:   Reason for Stopping:         sulfamethoxazole-trimethoprim (BACTRIM DS) 800-160 MG tablet Comments:   Reason for Stopping:             Allergies   Allergies   Allergen Reactions    Fluoxetine      Other reaction(s): Mental Status Change  Suicidal thoughts    Morphine Sulfate      No exposure but grandfather has the allergy to it    Tylenol [Acetaminophen]      Has glycogen storage disease and should not receive Tylenol, per GI.

## 2022-05-12 ENCOUNTER — PATIENT OUTREACH (OUTPATIENT)
Dept: CARE COORDINATION | Facility: CLINIC | Age: 22
End: 2022-05-12
Payer: COMMERCIAL

## 2022-05-12 DIAGNOSIS — Z71.89 OTHER SPECIFIED COUNSELING: ICD-10-CM

## 2022-05-12 NOTE — PROGRESS NOTES
Clinic Care Coordination Contact  [facility]/Voicemail       Clinical Data: Care Coordinator Outreach  Reason for referral: TCM outreach  Outreach attempted x 1.  Left message on patient's voicemail with call back information and requested return call.  Plan:  Care Coordinator will try to reach patient again in 1-2 business days.       Zo Marie  Community Health Worker  [facility] [place]  Ph: [phone]

## 2022-05-13 NOTE — PROGRESS NOTES
Clinic Care Coordination Contact  [facility]/Voicemail       Clinical Data: Care Coordinator Outreach  Reason for referral: TCM outreach  Outreach attempted x 2.  Left message on patient's voicemail with call back information and requested return call.  Plan:  Care Coordinator will do no further outreaches at this time.     Zo Marie  Community Health Worker  [facility]  Ph: [phone]

## 2022-05-15 LAB
BACTERIA BLD CULT: NO GROWTH
BACTERIA BLD CULT: NO GROWTH

## 2022-05-16 LAB
ATRIAL RATE - MUSE: 115 BPM
DIASTOLIC BLOOD PRESSURE - MUSE: NORMAL MMHG
INTERPRETATION ECG - MUSE: NORMAL
P AXIS - MUSE: 76 DEGREES
PR INTERVAL - MUSE: 128 MS
QRS DURATION - MUSE: 78 MS
QT - MUSE: 390 MS
QTC - MUSE: 539 MS
R AXIS - MUSE: 78 DEGREES
SYSTOLIC BLOOD PRESSURE - MUSE: NORMAL MMHG
T AXIS - MUSE: 58 DEGREES
VENTRICULAR RATE- MUSE: 115 BPM

## 2022-11-19 ENCOUNTER — HEALTH MAINTENANCE LETTER (OUTPATIENT)
Age: 22
End: 2022-11-19

## 2023-01-12 ENCOUNTER — APPOINTMENT (OUTPATIENT)
Dept: CT IMAGING | Facility: CLINIC | Age: 23
End: 2023-01-12
Attending: STUDENT IN AN ORGANIZED HEALTH CARE EDUCATION/TRAINING PROGRAM
Payer: COMMERCIAL

## 2023-01-12 ENCOUNTER — TELEPHONE (OUTPATIENT)
Dept: CONSULT | Facility: CLINIC | Age: 23
End: 2023-01-12

## 2023-01-12 ENCOUNTER — APPOINTMENT (OUTPATIENT)
Dept: RADIOLOGY | Facility: CLINIC | Age: 23
End: 2023-01-12
Attending: STUDENT IN AN ORGANIZED HEALTH CARE EDUCATION/TRAINING PROGRAM
Payer: COMMERCIAL

## 2023-01-12 ENCOUNTER — HOSPITAL ENCOUNTER (INPATIENT)
Facility: CLINIC | Age: 23
LOS: 4 days | Discharge: HOME | End: 2023-01-16
Attending: STUDENT IN AN ORGANIZED HEALTH CARE EDUCATION/TRAINING PROGRAM | Admitting: FAMILY MEDICINE
Payer: COMMERCIAL

## 2023-01-12 DIAGNOSIS — E10.10 DIABETIC KETOACIDOSIS WITHOUT COMA ASSOCIATED WITH TYPE 1 DIABETES MELLITUS (H): ICD-10-CM

## 2023-01-12 DIAGNOSIS — L72.3 INFECTED SEBACEOUS CYST: Primary | ICD-10-CM

## 2023-01-12 DIAGNOSIS — R78.81 BACTEREMIA: ICD-10-CM

## 2023-01-12 DIAGNOSIS — L08.9 INFECTED SEBACEOUS CYST: Primary | ICD-10-CM

## 2023-01-12 LAB
ALBUMIN SERPL-MCNC: 3.8 G/DL (ref 3.5–5)
ALBUMIN UR-MCNC: 30 MG/DL
ALP SERPL-CCNC: 304 U/L (ref 45–120)
ALT SERPL W P-5'-P-CCNC: 55 U/L (ref 0–45)
ANION GAP SERPL CALCULATED.3IONS-SCNC: >16 MMOL/L (ref 5–18)
ANION GAP SERPL CALCULATED.3IONS-SCNC: >25 MMOL/L (ref 5–18)
APPEARANCE UR: CLEAR
APTT PPP: 27 SECONDS (ref 22–38)
AST SERPL W P-5'-P-CCNC: 55 U/L (ref 0–40)
BACTERIA #/AREA URNS HPF: ABNORMAL /HPF
BASE EXCESS BLDV CALC-SCNC: -24.6 MMOL/L
BASE EXCESS BLDV CALC-SCNC: -27.8 MMOL/L
BILIRUB DIRECT SERPL-MCNC: 0.3 MG/DL
BILIRUB SERPL-MCNC: 0.4 MG/DL (ref 0–1)
BILIRUB UR QL STRIP: NEGATIVE
BUN SERPL-MCNC: 11 MG/DL (ref 8–22)
BUN SERPL-MCNC: 9 MG/DL (ref 8–22)
C REACTIVE PROTEIN LHE: 7 MG/DL (ref 0–?)
CALCIUM SERPL-MCNC: 7.6 MG/DL (ref 8.5–10.5)
CALCIUM SERPL-MCNC: 9.7 MG/DL (ref 8.5–10.5)
CHLORIDE BLD-SCNC: 102 MMOL/L (ref 98–107)
CHLORIDE BLD-SCNC: 115 MMOL/L (ref 98–107)
CHOLEST SERPL-MCNC: 181 MG/DL
CO2 SERPL-SCNC: <6 MMOL/L (ref 22–31)
CO2 SERPL-SCNC: <6 MMOL/L (ref 22–31)
COLOR UR AUTO: COLORLESS
CREAT SERPL-MCNC: 0.86 MG/DL (ref 0.6–1.3)
CREAT SERPL-MCNC: 1.46 MG/DL (ref 0.6–1.3)
ERYTHROCYTE [DISTWIDTH] IN BLOOD BY AUTOMATED COUNT: 12.7 % (ref 10–15)
GFR SERPL CREATININE-BSD FRML MDRD: 52 ML/MIN/1.73M2
GFR SERPL CREATININE-BSD FRML MDRD: >90 ML/MIN/1.73M2
GLUCOSE BLD-MCNC: 331 MG/DL (ref 70–125)
GLUCOSE BLD-MCNC: 518 MG/DL (ref 70–125)
GLUCOSE BLDC GLUCOMTR-MCNC: 320 MG/DL (ref 70–99)
GLUCOSE BLDC GLUCOMTR-MCNC: 324 MG/DL (ref 70–99)
GLUCOSE BLDC GLUCOMTR-MCNC: 345 MG/DL (ref 70–99)
GLUCOSE BLDC GLUCOMTR-MCNC: 366 MG/DL (ref 70–99)
GLUCOSE BLDC GLUCOMTR-MCNC: 391 MG/DL (ref 70–99)
GLUCOSE BLDC GLUCOMTR-MCNC: 420 MG/DL (ref 70–99)
GLUCOSE BLDC GLUCOMTR-MCNC: 475 MG/DL (ref 70–99)
GLUCOSE UR STRIP-MCNC: >1000 MG/DL
GRANULAR CAST: 1 /LPF
HCO3 BLDV-SCNC: 4 MMOL/L (ref 24–30)
HCO3 BLDV-SCNC: 6 MMOL/L (ref 24–30)
HCT VFR BLD AUTO: 41.4 % (ref 35–53)
HDLC SERPL-MCNC: 50 MG/DL
HGB BLD-MCNC: 13.9 G/DL (ref 11.7–17.7)
HGB UR QL STRIP: ABNORMAL
HYALINE CASTS: 1 /LPF
INR PPP: 1.22 (ref 0.85–1.15)
KETONES BLD-SCNC: 10.36 MMOL/L
KETONES UR STRIP-MCNC: >150 MG/DL
LACTATE SERPL-SCNC: 1.2 MMOL/L (ref 0.7–2)
LDLC SERPL CALC-MCNC: 83 MG/DL
LEUKOCYTE ESTERASE UR QL STRIP: NEGATIVE
MAGNESIUM SERPL-MCNC: 2 MG/DL (ref 1.8–2.6)
MCH RBC QN AUTO: 31.1 PG (ref 26.5–33)
MCHC RBC AUTO-ENTMCNC: 33.6 G/DL (ref 31.5–36.5)
MCV RBC AUTO: 93 FL (ref 78–100)
MUCOUS THREADS #/AREA URNS LPF: PRESENT /LPF
NITRATE UR QL: NEGATIVE
OXYHGB MFR BLDV: 70.8 % (ref 70–75)
OXYHGB MFR BLDV: 75.9 % (ref 70–75)
PCO2 BLDV: 20 MM HG (ref 35–50)
PCO2 BLDV: 22 MM HG (ref 35–50)
PH BLDV: 6.94 [PH] (ref 7.35–7.45)
PH BLDV: 7.04 [PH] (ref 7.35–7.45)
PH UR STRIP: 5 [PH] (ref 5–7)
PHOSPHATE SERPL-MCNC: 4.2 MG/DL (ref 2.5–4.5)
PLATELET # BLD AUTO: 245 10E3/UL (ref 150–450)
PO2 BLDV: 42 MM HG (ref 25–47)
PO2 BLDV: 45 MM HG (ref 25–47)
POTASSIUM BLD-SCNC: 2.8 MMOL/L (ref 3.5–5)
POTASSIUM BLD-SCNC: 3.8 MMOL/L (ref 3.5–5)
PROT SERPL-MCNC: 9.3 G/DL (ref 6–8)
RBC # BLD AUTO: 4.47 10E6/UL (ref 3.8–5.9)
RBC URINE: <1 /HPF
SAO2 % BLDV: 72.3 % (ref 70–75)
SAO2 % BLDV: 77.4 % (ref 70–75)
SODIUM SERPL-SCNC: 133 MMOL/L (ref 136–145)
SODIUM SERPL-SCNC: 137 MMOL/L (ref 136–145)
SP GR UR STRIP: 1.02 (ref 1–1.03)
SQUAMOUS EPITHELIAL: 1 /HPF
TRIGL SERPL-MCNC: 240 MG/DL
UROBILINOGEN UR STRIP-MCNC: <2 MG/DL
WBC # BLD AUTO: 12.6 10E3/UL (ref 4–11)
WBC URINE: 3 /HPF

## 2023-01-12 PROCEDURE — 250N000012 HC RX MED GY IP 250 OP 636 PS 637: Performed by: STUDENT IN AN ORGANIZED HEALTH CARE EDUCATION/TRAINING PROGRAM

## 2023-01-12 PROCEDURE — 83036 HEMOGLOBIN GLYCOSYLATED A1C: CPT | Performed by: STUDENT IN AN ORGANIZED HEALTH CARE EDUCATION/TRAINING PROGRAM

## 2023-01-12 PROCEDURE — 87149 DNA/RNA DIRECT PROBE: CPT | Performed by: STUDENT IN AN ORGANIZED HEALTH CARE EDUCATION/TRAINING PROGRAM

## 2023-01-12 PROCEDURE — 85027 COMPLETE CBC AUTOMATED: CPT

## 2023-01-12 PROCEDURE — 96365 THER/PROPH/DIAG IV INF INIT: CPT

## 2023-01-12 PROCEDURE — 83735 ASSAY OF MAGNESIUM: CPT | Performed by: STUDENT IN AN ORGANIZED HEALTH CARE EDUCATION/TRAINING PROGRAM

## 2023-01-12 PROCEDURE — 87077 CULTURE AEROBIC IDENTIFY: CPT | Performed by: STUDENT IN AN ORGANIZED HEALTH CARE EDUCATION/TRAINING PROGRAM

## 2023-01-12 PROCEDURE — 82962 GLUCOSE BLOOD TEST: CPT

## 2023-01-12 PROCEDURE — 86140 C-REACTIVE PROTEIN: CPT

## 2023-01-12 PROCEDURE — 85610 PROTHROMBIN TIME: CPT | Performed by: STUDENT IN AN ORGANIZED HEALTH CARE EDUCATION/TRAINING PROGRAM

## 2023-01-12 PROCEDURE — 85730 THROMBOPLASTIN TIME PARTIAL: CPT | Performed by: STUDENT IN AN ORGANIZED HEALTH CARE EDUCATION/TRAINING PROGRAM

## 2023-01-12 PROCEDURE — 83605 ASSAY OF LACTIC ACID: CPT | Performed by: STUDENT IN AN ORGANIZED HEALTH CARE EDUCATION/TRAINING PROGRAM

## 2023-01-12 PROCEDURE — 36415 COLL VENOUS BLD VENIPUNCTURE: CPT | Performed by: STUDENT IN AN ORGANIZED HEALTH CARE EDUCATION/TRAINING PROGRAM

## 2023-01-12 PROCEDURE — 210N000002 HC R&B HEART CARE

## 2023-01-12 PROCEDURE — 96375 TX/PRO/DX INJ NEW DRUG ADDON: CPT

## 2023-01-12 PROCEDURE — 82805 BLOOD GASES W/O2 SATURATION: CPT | Performed by: STUDENT IN AN ORGANIZED HEALTH CARE EDUCATION/TRAINING PROGRAM

## 2023-01-12 PROCEDURE — 80061 LIPID PANEL: CPT | Performed by: STUDENT IN AN ORGANIZED HEALTH CARE EDUCATION/TRAINING PROGRAM

## 2023-01-12 PROCEDURE — 84100 ASSAY OF PHOSPHORUS: CPT | Performed by: STUDENT IN AN ORGANIZED HEALTH CARE EDUCATION/TRAINING PROGRAM

## 2023-01-12 PROCEDURE — 81001 URINALYSIS AUTO W/SCOPE: CPT | Performed by: STUDENT IN AN ORGANIZED HEALTH CARE EDUCATION/TRAINING PROGRAM

## 2023-01-12 PROCEDURE — 258N000003 HC RX IP 258 OP 636: Performed by: STUDENT IN AN ORGANIZED HEALTH CARE EDUCATION/TRAINING PROGRAM

## 2023-01-12 PROCEDURE — 36415 COLL VENOUS BLD VENIPUNCTURE: CPT

## 2023-01-12 PROCEDURE — 96366 THER/PROPH/DIAG IV INF ADDON: CPT

## 2023-01-12 PROCEDURE — 82010 KETONE BODYS QUAN: CPT | Performed by: STUDENT IN AN ORGANIZED HEALTH CARE EDUCATION/TRAINING PROGRAM

## 2023-01-12 PROCEDURE — 82248 BILIRUBIN DIRECT: CPT | Performed by: STUDENT IN AN ORGANIZED HEALTH CARE EDUCATION/TRAINING PROGRAM

## 2023-01-12 PROCEDURE — 250N000011 HC RX IP 250 OP 636: Performed by: STUDENT IN AN ORGANIZED HEALTH CARE EDUCATION/TRAINING PROGRAM

## 2023-01-12 PROCEDURE — 71045 X-RAY EXAM CHEST 1 VIEW: CPT

## 2023-01-12 PROCEDURE — 96361 HYDRATE IV INFUSION ADD-ON: CPT

## 2023-01-12 PROCEDURE — 99285 EMERGENCY DEPT VISIT HI MDM: CPT | Mod: 25

## 2023-01-12 PROCEDURE — 70450 CT HEAD/BRAIN W/O DYE: CPT

## 2023-01-12 PROCEDURE — 82310 ASSAY OF CALCIUM: CPT | Performed by: STUDENT IN AN ORGANIZED HEALTH CARE EDUCATION/TRAINING PROGRAM

## 2023-01-12 PROCEDURE — 80053 COMPREHEN METABOLIC PANEL: CPT | Performed by: STUDENT IN AN ORGANIZED HEALTH CARE EDUCATION/TRAINING PROGRAM

## 2023-01-12 RX ORDER — DEXTROSE MONOHYDRATE, SODIUM CHLORIDE, AND POTASSIUM CHLORIDE 50; 2.24; 4.5 G/1000ML; G/1000ML; G/1000ML
INJECTION, SOLUTION INTRAVENOUS CONTINUOUS
Status: DISCONTINUED | OUTPATIENT
Start: 2023-01-12 | End: 2023-01-12

## 2023-01-12 RX ORDER — SULFAMETHOXAZOLE/TRIMETHOPRIM 800-160 MG
1 TABLET ORAL 2 TIMES DAILY
Status: ON HOLD | COMMUNITY
End: 2023-01-16

## 2023-01-12 RX ORDER — DEXTROSE MONOHYDRATE 25 G/50ML
25-50 INJECTION, SOLUTION INTRAVENOUS
Status: DISCONTINUED | OUTPATIENT
Start: 2023-01-12 | End: 2023-01-13

## 2023-01-12 RX ADMIN — SODIUM CHLORIDE 1000 ML: 9 INJECTION, SOLUTION INTRAVENOUS at 18:52

## 2023-01-12 RX ADMIN — VANCOMYCIN HYDROCHLORIDE 750 MG: 5 INJECTION, POWDER, LYOPHILIZED, FOR SOLUTION INTRAVENOUS at 20:53

## 2023-01-12 RX ADMIN — SODIUM CHLORIDE 9 UNITS/HR: 9 INJECTION, SOLUTION INTRAVENOUS at 23:07

## 2023-01-12 RX ADMIN — SODIUM CHLORIDE 1000 ML: 9 INJECTION, SOLUTION INTRAVENOUS at 19:53

## 2023-01-12 RX ADMIN — SODIUM CHLORIDE 5.5 UNITS/HR: 9 INJECTION, SOLUTION INTRAVENOUS at 19:49

## 2023-01-12 RX ADMIN — SODIUM CHLORIDE 12 UNITS/HR: 9 INJECTION, SOLUTION INTRAVENOUS at 21:28

## 2023-01-12 ASSESSMENT — ACTIVITIES OF DAILY LIVING (ADL)
ADLS_ACUITY_SCORE: 37
ADLS_ACUITY_SCORE: 37
ADLS_ACUITY_SCORE: 35

## 2023-01-13 PROBLEM — L08.9 INFECTED SEBACEOUS CYST: Status: ACTIVE | Noted: 2022-05-11

## 2023-01-13 PROBLEM — L72.3 INFECTED SEBACEOUS CYST: Status: ACTIVE | Noted: 2022-05-11

## 2023-01-13 PROBLEM — F50.89 BINGE-PURGE BEHAVIOR: Status: ACTIVE | Noted: 2023-01-13

## 2023-01-13 PROBLEM — E43 SEVERE MALNUTRITION (H): Status: ACTIVE | Noted: 2022-05-11

## 2023-01-13 LAB
ANION GAP SERPL CALCULATED.3IONS-SCNC: 10 MMOL/L (ref 5–18)
ANION GAP SERPL CALCULATED.3IONS-SCNC: 12 MMOL/L (ref 5–18)
ANION GAP SERPL CALCULATED.3IONS-SCNC: 14 MMOL/L (ref 5–18)
ANION GAP SERPL CALCULATED.3IONS-SCNC: 14 MMOL/L (ref 5–18)
ANION GAP SERPL CALCULATED.3IONS-SCNC: 7 MMOL/L (ref 5–18)
ANION GAP SERPL CALCULATED.3IONS-SCNC: 9 MMOL/L (ref 5–18)
B-OH-BUTYR SERPL-MCNC: 1.1 MMOL/L
BASOPHILS # BLD AUTO: 0 10E3/UL (ref 0–0.2)
BASOPHILS NFR BLD AUTO: 0 %
BUN SERPL-MCNC: 4 MG/DL (ref 8–22)
BUN SERPL-MCNC: 5 MG/DL (ref 8–22)
BUN SERPL-MCNC: 6 MG/DL (ref 8–22)
BUN SERPL-MCNC: 7 MG/DL (ref 8–22)
BUN SERPL-MCNC: 8 MG/DL (ref 8–22)
C REACTIVE PROTEIN LHE: 4.5 MG/DL (ref 0–?)
CALCIUM SERPL-MCNC: 7.5 MG/DL (ref 8.5–10.5)
CALCIUM SERPL-MCNC: 7.6 MG/DL (ref 8.5–10.5)
CALCIUM SERPL-MCNC: 7.7 MG/DL (ref 8.5–10.5)
CALCIUM SERPL-MCNC: 7.8 MG/DL (ref 8.5–10.5)
CALCIUM SERPL-MCNC: 7.9 MG/DL (ref 8.5–10.5)
CALCIUM SERPL-MCNC: 8 MG/DL (ref 8.5–10.5)
CALCIUM SERPL-MCNC: 8.2 MG/DL (ref 8.5–10.5)
CALCIUM SERPL-MCNC: 8.4 MG/DL (ref 8.5–10.5)
CHLORIDE BLD-SCNC: 107 MMOL/L (ref 98–107)
CHLORIDE BLD-SCNC: 110 MMOL/L (ref 98–107)
CHLORIDE BLD-SCNC: 111 MMOL/L (ref 98–107)
CHLORIDE BLD-SCNC: 111 MMOL/L (ref 98–107)
CHLORIDE BLD-SCNC: 112 MMOL/L (ref 98–107)
CHLORIDE BLD-SCNC: 112 MMOL/L (ref 98–107)
CHLORIDE BLD-SCNC: 113 MMOL/L (ref 98–107)
CHLORIDE BLD-SCNC: 115 MMOL/L (ref 98–107)
CO2 SERPL-SCNC: 11 MMOL/L (ref 22–31)
CO2 SERPL-SCNC: 13 MMOL/L (ref 22–31)
CO2 SERPL-SCNC: 13 MMOL/L (ref 22–31)
CO2 SERPL-SCNC: 14 MMOL/L (ref 22–31)
CO2 SERPL-SCNC: 14 MMOL/L (ref 22–31)
CO2 SERPL-SCNC: 17 MMOL/L (ref 22–31)
CO2 SERPL-SCNC: 8 MMOL/L (ref 22–31)
CO2 SERPL-SCNC: 9 MMOL/L (ref 22–31)
CREAT SERPL-MCNC: 0.63 MG/DL (ref 0.6–1.3)
CREAT SERPL-MCNC: 0.75 MG/DL (ref 0.6–1.3)
CREAT SERPL-MCNC: 0.76 MG/DL (ref 0.6–1.3)
CREAT SERPL-MCNC: 0.77 MG/DL (ref 0.6–1.3)
CREAT SERPL-MCNC: 0.79 MG/DL (ref 0.6–1.3)
CREAT SERPL-MCNC: 0.8 MG/DL (ref 0.6–1.3)
CREAT SERPL-MCNC: 0.82 MG/DL (ref 0.6–1.3)
CREAT SERPL-MCNC: 0.85 MG/DL (ref 0.6–1.3)
ENTEROCOCCUS FAECALIS: NOT DETECTED
ENTEROCOCCUS FAECIUM: NOT DETECTED
EOSINOPHIL # BLD AUTO: 0 10E3/UL (ref 0–0.7)
EOSINOPHIL NFR BLD AUTO: 0 %
ERYTHROCYTE [DISTWIDTH] IN BLOOD BY AUTOMATED COUNT: 12.6 % (ref 10–15)
GFR SERPL CREATININE-BSD FRML MDRD: >90 ML/MIN/1.73M2
GLUCOSE BLD-MCNC: 200 MG/DL (ref 70–125)
GLUCOSE BLD-MCNC: 236 MG/DL (ref 70–125)
GLUCOSE BLD-MCNC: 245 MG/DL (ref 70–125)
GLUCOSE BLD-MCNC: 251 MG/DL (ref 70–125)
GLUCOSE BLD-MCNC: 297 MG/DL (ref 70–125)
GLUCOSE BLD-MCNC: 300 MG/DL (ref 70–125)
GLUCOSE BLD-MCNC: 312 MG/DL (ref 70–125)
GLUCOSE BLD-MCNC: 312 MG/DL (ref 70–125)
GLUCOSE BLD-MCNC: 318 MG/DL (ref 70–125)
GLUCOSE BLD-MCNC: 331 MG/DL (ref 70–125)
GLUCOSE BLDC GLUCOMTR-MCNC: 154 MG/DL (ref 70–99)
GLUCOSE BLDC GLUCOMTR-MCNC: 167 MG/DL (ref 70–99)
GLUCOSE BLDC GLUCOMTR-MCNC: 194 MG/DL (ref 70–99)
GLUCOSE BLDC GLUCOMTR-MCNC: 198 MG/DL (ref 70–99)
GLUCOSE BLDC GLUCOMTR-MCNC: 210 MG/DL (ref 70–99)
GLUCOSE BLDC GLUCOMTR-MCNC: 219 MG/DL (ref 70–99)
GLUCOSE BLDC GLUCOMTR-MCNC: 223 MG/DL (ref 70–99)
GLUCOSE BLDC GLUCOMTR-MCNC: 253 MG/DL (ref 70–99)
GLUCOSE BLDC GLUCOMTR-MCNC: 271 MG/DL (ref 70–99)
GLUCOSE BLDC GLUCOMTR-MCNC: 272 MG/DL (ref 70–99)
GLUCOSE BLDC GLUCOMTR-MCNC: 274 MG/DL (ref 70–99)
GLUCOSE BLDC GLUCOMTR-MCNC: 274 MG/DL (ref 70–99)
GLUCOSE BLDC GLUCOMTR-MCNC: 280 MG/DL (ref 70–99)
GLUCOSE BLDC GLUCOMTR-MCNC: 289 MG/DL (ref 70–99)
GLUCOSE BLDC GLUCOMTR-MCNC: 289 MG/DL (ref 70–99)
GLUCOSE BLDC GLUCOMTR-MCNC: 293 MG/DL (ref 70–99)
GLUCOSE BLDC GLUCOMTR-MCNC: 294 MG/DL (ref 70–99)
GLUCOSE BLDC GLUCOMTR-MCNC: 319 MG/DL (ref 70–99)
GLUCOSE BLDC GLUCOMTR-MCNC: 322 MG/DL (ref 70–99)
HBA1C MFR BLD: NORMAL %
HCT VFR BLD AUTO: 36.6 % (ref 35–53)
HGB BLD-MCNC: 12.4 G/DL (ref 11.7–17.7)
HOLD SPECIMEN: NORMAL
IMM GRANULOCYTES # BLD: 0.1 10E3/UL
IMM GRANULOCYTES NFR BLD: 1 %
KETONES BLD-SCNC: 1.01 MMOL/L
KETONES BLD-SCNC: 2.08 MMOL/L
KETONES BLD-SCNC: 2.85 MMOL/L
KETONES BLD-SCNC: 3.05 MMOL/L
KETONES BLD-SCNC: <0.1 MMOL/L
LACTATE SERPL-SCNC: 2 MMOL/L (ref 0.7–2)
LISTERIA SPECIES (DETECTED/NOT DETECTED): NOT DETECTED
LYMPHOCYTES # BLD AUTO: 1 10E3/UL (ref 0.8–5.3)
LYMPHOCYTES NFR BLD AUTO: 12 %
MAGNESIUM SERPL-MCNC: 1.4 MG/DL (ref 1.8–2.6)
MAGNESIUM SERPL-MCNC: 1.9 MG/DL (ref 1.8–2.6)
MCH RBC QN AUTO: 31.5 PG (ref 26.5–33)
MCHC RBC AUTO-ENTMCNC: 33.9 G/DL (ref 31.5–36.5)
MCV RBC AUTO: 93 FL (ref 78–100)
MONOCYTES # BLD AUTO: 0.6 10E3/UL (ref 0–1.3)
MONOCYTES NFR BLD AUTO: 7 %
NEUTROPHILS # BLD AUTO: 6.8 10E3/UL (ref 1.6–8.3)
NEUTROPHILS NFR BLD AUTO: 80 %
NRBC # BLD AUTO: 0 10E3/UL
NRBC BLD AUTO-RTO: 0 /100
PH BLDV: 7.17 [PH] (ref 7.35–7.45)
PH BLDV: 7.27 [PH] (ref 7.35–7.45)
PH BLDV: 7.31 [PH] (ref 7.35–7.45)
PHOSPHATE SERPL-MCNC: 1.1 MG/DL (ref 2.5–4.5)
PLATELET # BLD AUTO: 222 10E3/UL (ref 150–450)
POTASSIUM BLD-SCNC: 3.2 MMOL/L (ref 3.5–5)
POTASSIUM BLD-SCNC: 3.2 MMOL/L (ref 3.5–5)
POTASSIUM BLD-SCNC: 3.3 MMOL/L (ref 3.5–5)
POTASSIUM BLD-SCNC: 3.7 MMOL/L (ref 3.5–5)
POTASSIUM BLD-SCNC: 3.8 MMOL/L (ref 3.5–5)
POTASSIUM BLD-SCNC: 3.8 MMOL/L (ref 3.5–5)
POTASSIUM BLD-SCNC: 4 MMOL/L (ref 3.5–5)
POTASSIUM BLD-SCNC: 4 MMOL/L (ref 3.5–5)
RBC # BLD AUTO: 3.94 10E6/UL (ref 3.8–5.9)
SODIUM SERPL-SCNC: 132 MMOL/L (ref 136–145)
SODIUM SERPL-SCNC: 132 MMOL/L (ref 136–145)
SODIUM SERPL-SCNC: 134 MMOL/L (ref 136–145)
SODIUM SERPL-SCNC: 135 MMOL/L (ref 136–145)
SODIUM SERPL-SCNC: 136 MMOL/L (ref 136–145)
STAPHYLOCOCCUS AUREUS: DETECTED
STAPHYLOCOCCUS EPIDERMIDIS: NOT DETECTED
STAPHYLOCOCCUS LUGDUNENSIS: NOT DETECTED
STREPTOCOCCUS AGALACTIAE: NOT DETECTED
STREPTOCOCCUS ANGINOSUS GROUP: NOT DETECTED
STREPTOCOCCUS PNEUMONIAE: NOT DETECTED
STREPTOCOCCUS PYOGENES: NOT DETECTED
STREPTOCOCCUS SPECIES: NOT DETECTED
WBC # BLD AUTO: 8.5 10E3/UL (ref 4–11)

## 2023-01-13 PROCEDURE — 82010 KETONE BODYS QUAN: CPT

## 2023-01-13 PROCEDURE — 85025 COMPLETE CBC W/AUTO DIFF WBC: CPT

## 2023-01-13 PROCEDURE — 250N000009 HC RX 250

## 2023-01-13 PROCEDURE — 83605 ASSAY OF LACTIC ACID: CPT | Performed by: FAMILY MEDICINE

## 2023-01-13 PROCEDURE — 82947 ASSAY GLUCOSE BLOOD QUANT: CPT | Performed by: FAMILY MEDICINE

## 2023-01-13 PROCEDURE — 99223 1ST HOSP IP/OBS HIGH 75: CPT | Mod: AI

## 2023-01-13 PROCEDURE — 82947 ASSAY GLUCOSE BLOOD QUANT: CPT | Performed by: STUDENT IN AN ORGANIZED HEALTH CARE EDUCATION/TRAINING PROGRAM

## 2023-01-13 PROCEDURE — 84100 ASSAY OF PHOSPHORUS: CPT

## 2023-01-13 PROCEDURE — 200N000001 HC R&B ICU

## 2023-01-13 PROCEDURE — 36415 COLL VENOUS BLD VENIPUNCTURE: CPT | Performed by: FAMILY MEDICINE

## 2023-01-13 PROCEDURE — 250N000012 HC RX MED GY IP 250 OP 636 PS 637

## 2023-01-13 PROCEDURE — 82947 ASSAY GLUCOSE BLOOD QUANT: CPT

## 2023-01-13 PROCEDURE — 258N000002 HC RX IP 258 OP 250

## 2023-01-13 PROCEDURE — 258N000003 HC RX IP 258 OP 636: Performed by: FAMILY MEDICINE

## 2023-01-13 PROCEDURE — 36415 COLL VENOUS BLD VENIPUNCTURE: CPT

## 2023-01-13 PROCEDURE — 250N000011 HC RX IP 250 OP 636

## 2023-01-13 PROCEDURE — 250N000011 HC RX IP 250 OP 636: Performed by: FAMILY MEDICINE

## 2023-01-13 PROCEDURE — 82800 BLOOD PH: CPT

## 2023-01-13 PROCEDURE — 83735 ASSAY OF MAGNESIUM: CPT | Performed by: FAMILY MEDICINE

## 2023-01-13 PROCEDURE — 82310 ASSAY OF CALCIUM: CPT

## 2023-01-13 PROCEDURE — 258N000003 HC RX IP 258 OP 636

## 2023-01-13 PROCEDURE — 83735 ASSAY OF MAGNESIUM: CPT

## 2023-01-13 PROCEDURE — 250N000013 HC RX MED GY IP 250 OP 250 PS 637: Performed by: FAMILY MEDICINE

## 2023-01-13 PROCEDURE — 258N000001 HC RX 258

## 2023-01-13 PROCEDURE — 86140 C-REACTIVE PROTEIN: CPT

## 2023-01-13 PROCEDURE — 87040 BLOOD CULTURE FOR BACTERIA: CPT

## 2023-01-13 PROCEDURE — 36415 COLL VENOUS BLD VENIPUNCTURE: CPT | Performed by: STUDENT IN AN ORGANIZED HEALTH CARE EDUCATION/TRAINING PROGRAM

## 2023-01-13 RX ORDER — NICOTINE POLACRILEX 4 MG
15-30 LOZENGE BUCCAL
Status: DISCONTINUED | OUTPATIENT
Start: 2023-01-13 | End: 2023-01-16 | Stop reason: HOSPADM

## 2023-01-13 RX ORDER — GINSENG 100 MG
CAPSULE ORAL 2 TIMES DAILY
Status: DISCONTINUED | OUTPATIENT
Start: 2023-01-13 | End: 2023-01-16 | Stop reason: HOSPADM

## 2023-01-13 RX ORDER — MAGNESIUM SULFATE HEPTAHYDRATE 40 MG/ML
2 INJECTION, SOLUTION INTRAVENOUS ONCE
Status: COMPLETED | OUTPATIENT
Start: 2023-01-13 | End: 2023-01-13

## 2023-01-13 RX ORDER — IBUPROFEN 600 MG/1
600 TABLET, FILM COATED ORAL EVERY 6 HOURS PRN
Status: DISCONTINUED | OUTPATIENT
Start: 2023-01-13 | End: 2023-01-16 | Stop reason: HOSPADM

## 2023-01-13 RX ORDER — AMOXICILLIN 250 MG
1 CAPSULE ORAL 2 TIMES DAILY PRN
Status: DISCONTINUED | OUTPATIENT
Start: 2023-01-13 | End: 2023-01-16 | Stop reason: HOSPADM

## 2023-01-13 RX ORDER — LIDOCAINE 40 MG/G
CREAM TOPICAL
Status: DISCONTINUED | OUTPATIENT
Start: 2023-01-13 | End: 2023-01-16 | Stop reason: HOSPADM

## 2023-01-13 RX ORDER — POTASSIUM CHLORIDE 1500 MG/1
20 TABLET, EXTENDED RELEASE ORAL ONCE
Status: COMPLETED | OUTPATIENT
Start: 2023-01-13 | End: 2023-01-13

## 2023-01-13 RX ORDER — AMOXICILLIN 250 MG
2 CAPSULE ORAL 2 TIMES DAILY PRN
Status: DISCONTINUED | OUTPATIENT
Start: 2023-01-13 | End: 2023-01-16 | Stop reason: HOSPADM

## 2023-01-13 RX ORDER — VANCOMYCIN HYDROCHLORIDE 500 MG/10ML
500 INJECTION, POWDER, LYOPHILIZED, FOR SOLUTION INTRAVENOUS EVERY 12 HOURS
Status: DISCONTINUED | OUTPATIENT
Start: 2023-01-13 | End: 2023-01-13

## 2023-01-13 RX ORDER — SODIUM CHLORIDE 9 MG/ML
INJECTION, SOLUTION INTRAVENOUS CONTINUOUS
Status: DISCONTINUED | OUTPATIENT
Start: 2023-01-13 | End: 2023-01-14

## 2023-01-13 RX ADMIN — POTASSIUM & SODIUM PHOSPHATES POWDER PACK 280-160-250 MG 2 PACKET: 280-160-250 PACK at 15:44

## 2023-01-13 RX ADMIN — POTASSIUM & SODIUM PHOSPHATES POWDER PACK 280-160-250 MG 2 PACKET: 280-160-250 PACK at 22:05

## 2023-01-13 RX ADMIN — SODIUM BICARBONATE: 84 INJECTION, SOLUTION INTRAVENOUS at 00:16

## 2023-01-13 RX ADMIN — INSULIN GLARGINE 32 UNITS: 100 INJECTION, SOLUTION SUBCUTANEOUS at 18:35

## 2023-01-13 RX ADMIN — INSULIN ASPART 1 UNITS: 100 INJECTION, SOLUTION INTRAVENOUS; SUBCUTANEOUS at 19:42

## 2023-01-13 RX ADMIN — POTASSIUM CHLORIDE 20 MEQ: 1500 TABLET, EXTENDED RELEASE ORAL at 18:18

## 2023-01-13 RX ADMIN — VANCOMYCIN HYDROCHLORIDE 750 MG: 5 INJECTION, POWDER, LYOPHILIZED, FOR SOLUTION INTRAVENOUS at 20:33

## 2023-01-13 RX ADMIN — BACITRACIN: 500 OINTMENT TOPICAL at 12:51

## 2023-01-13 RX ADMIN — VANCOMYCIN HYDROCHLORIDE 500 MG: 500 INJECTION, POWDER, LYOPHILIZED, FOR SOLUTION INTRAVENOUS at 09:31

## 2023-01-13 RX ADMIN — INSULIN HUMAN 100 UNITS: 1 INJECTION, SOLUTION INTRAVENOUS at 14:53

## 2023-01-13 RX ADMIN — INSULIN HUMAN 3 UNITS/HR: 1 INJECTION, SOLUTION INTRAVENOUS at 03:39

## 2023-01-13 RX ADMIN — POTASSIUM CHLORIDE: 149 INJECTION, SOLUTION, CONCENTRATE INTRAVENOUS at 00:13

## 2023-01-13 RX ADMIN — MAGNESIUM SULFATE HEPTAHYDRATE 2 G: 40 INJECTION, SOLUTION INTRAVENOUS at 15:44

## 2023-01-13 RX ADMIN — POTASSIUM CHLORIDE: 149 INJECTION, SOLUTION, CONCENTRATE INTRAVENOUS at 07:05

## 2023-01-13 RX ADMIN — BACITRACIN: 500 OINTMENT TOPICAL at 22:05

## 2023-01-13 RX ADMIN — POTASSIUM & SODIUM PHOSPHATES POWDER PACK 280-160-250 MG 2 PACKET: 280-160-250 PACK at 18:18

## 2023-01-13 RX ADMIN — SODIUM CHLORIDE: 9 INJECTION, SOLUTION INTRAVENOUS at 22:05

## 2023-01-13 RX ADMIN — POTASSIUM CHLORIDE: 149 INJECTION, SOLUTION, CONCENTRATE INTRAVENOUS at 19:44

## 2023-01-13 ASSESSMENT — ACTIVITIES OF DAILY LIVING (ADL)
ADLS_ACUITY_SCORE: 37
ADLS_ACUITY_SCORE: 33
ADLS_ACUITY_SCORE: 37
ADLS_ACUITY_SCORE: 33

## 2023-01-13 NOTE — PLAN OF CARE
Problem: Diabetic Ketoacidosis  Goal: Optimal Coping  Outcome: Not Progressing  Goal: Fluid and Electrolyte Balance with Absence of Ketosis  Outcome: Not Progressing     Problem: Wound Healing Progression  Goal: Optimal Wound Healing  Outcome: Not Progressing  Intervention: Promote Wound Healing  Recent Flowsheet Documentation  Taken 1/13/2023 1200 by Kanwal Price RN  Activity Management: activity adjusted per tolerance  Taken 1/13/2023 0800 by Kanwal Price RN  Pain Management Interventions: aromatherapy  Activity Management: activity adjusted per tolerance     Problem: Acute Kidney Injury/Impairment  Goal: Fluid and Electrolyte Balance  Outcome: Not Progressing  Goal: Effective Renal Function  Intervention: Monitor and Support Renal Function  Recent Flowsheet Documentation  Taken 1/13/2023 1200 by Kanwal Price RN  Medication Review/Management: medications reviewed  Taken 1/13/2023 0800 by Kanwal Price RN  Medication Review/Management: medications reviewed   Goal Outcome Evaluation:  Pt was restarted on the insulin gtt this morning per order Dr. OLVIN Storm;  restarted at previous rate 4units/hr. Titrating per DKA adult protocals; currently at 16units/ hr ; updated Lucila tellez MD.  Lab drawn and await results of BMP.  Last bedside blood sugar 293;  pt currently on cl liq diet, eating fair.  Also, large amt of serous/ tan drainage from wound to right scalp. Cleansed with sterile NS and applied bacitracin oint to wound/ covered with gauze. MD aware of + blood culture results; continues to receive IV Vancomycin as ordered.   Received orders for K+, Mg++ and phosphorus replacement; blood drawn, results pending.

## 2023-01-13 NOTE — ED TRIAGE NOTES
Pt reports they had an infected cyst lanced on their head two days ago. Pt reports yesterday began to have SOB, nausea, and increasing fatigue. Seen in clinic today and sent here for further treatment.     Triage Assessment     Row Name 01/12/23 0781       Triage Assessment (Adult)    Airway WDL WDL       Respiratory WDL    Respiratory WDL X;rhythm/pattern    Rhythm/Pattern, Respiratory tachypneic;shortness of breath       Skin Circulation/Temperature WDL    Skin Circulation/Temperature WDL WDL       Cardiac WDL    Cardiac WDL X;rhythm    Pulse Rate & Regularity tachycardic       Peripheral/Neurovascular WDL    Peripheral Neurovascular WDL WDL       Cognitive/Neuro/Behavioral WDL    Cognitive/Neuro/Behavioral WDL WDL

## 2023-01-13 NOTE — DISCHARGE INSTRUCTIONS
DIABETES REMINDERS:  1) Check your blood sugar 4x times per day (fasting, before eating, before bedtime) or utilize continuous glucose monitor (Yuan).  Always bring your blood sugar log and meter to your diabetes-related appointments.  2) Your blood sugar goals:  mg/dL before eating and  mg/dL 2 hours after eating (or per your doctor).  3) Always be prepared to treat a low blood sugar should it happen. Keep a sugar-containing beverage or food nearby.  4) When to call your clinic:   Blood sugar over 400 mg/dL.   If you have 2 to 3 low blood sugars (under 70 mg/dL) in a row,   Low reading the same time of day several days in a row,  Blood sugars elevated and you can not get them down with your usual diabetes regimen,  You are ill and can't keep blood sugars controlled.   5) When to call 911: If your blood glucose does not get better with treatment, or if you/someone else is unable to give you treatment.  6) Talk to your primary care doctor about an appointment with a Certified Diabetes Educator to assist you with your blood sugar management.  7) Follow insulin regimen on discharge orders until able to see provider where doses may be adjusted based on blood sugar patterns.        Eating Disorder Programs    [facility] ([phone])  - Outpatient Clinic for children/adolescents ([address] [place] [address], [address] [place] [address], [address], [place], [facility])  - Inpatient hospitalization program for children/adolescents at [facility]  - [email or web address]    [facility] ([phone])  - Multiple locations in [place], one location in [place] ([address], [place] [address])  - Adult programs: outpatient services, intensive outpatient program, partial hospitalization, partial hospitalization with lodging (women only), residential program  - Children/adolescent programs: outpatient services, intensive outpatient  program, partial hospitalization, residential program  - [email or web address]    [facility] ([phone])  - Locations in [facility] ([address] [place])  - Serve adolescents and adults with outpatient and residential programs  - Specialties in co-occurring chemical dependency issues, diabetes, athletes, compulsive exercise, binge eating, and family-based therapy  - [email or web address]    [facility] ([phone])  - Location in [place] ([address] [place] [address])  - Outpatient services for adults and adolescents including individual therapy/nutrition and intensive outpatient program  - [email or web address]

## 2023-01-13 NOTE — PHARMACY-VANCOMYCIN DOSING SERVICE
Pharmacy Vancomycin Initial Note  Date of Service 2023  Patient's  2000  22 year old, adult    Indication: Abscess    Current estimated CrCl = Estimated Creatinine Clearance (based on SCr of 1.46 mg/dL (H))  Female: 31.6 mL/min (A)  Male: 37.2 mL/min (A)    Creatinine for last 3 days  2023:  6:46 PM Creatinine 1.46 mg/dL    Recent Vancomycin Level(s) for last 3 days  No results found for requested labs within last 72 hours.      Vancomycin IV Administrations (past 72 hours)      No vancomycin orders with administrations in past 72 hours.                Nephrotoxins and other renal medications (From now, onward)    Start     Dose/Rate Route Frequency Ordered Stop    23  vancomycin (VANCOCIN) 750 mg in 0.9% NaCl 250 mL intermittent infusion         750 mg  over 60 Minutes Intravenous ONCE 23            Contrast Orders - past 72 hours (72h ago, onward)    None        }        Plan:  1. Start vancomycin 750mg (22mg/kg) x1 in ED  2. Reconsult pharmacy IF vanco therapy to continue  3. Serum creatinine levels will be ordered qday x 6    Brayan Mora [facility]

## 2023-01-13 NOTE — TELEPHONE ENCOUNTER
Kevin is a 22 yr old with GSD type III and DM. She presented to [facility] emergency with SOB, nausea and fatigue. Work up led to the diagnosis of DKA. Her liver enzymes and lactic acid remain stable as before. Her BG at the time of arrival was 475.    The plan is to admit her to [facility] to manage her DKA. She is on D5NS IVF. Recommended to avoid LR during the treatment process. To watch out for hypoglycemia and to avoid fast reduction of hyperglycemia since she is unable to mobilize glycogen due to her glycogen storage disease.    Team to contact the on call physician for any questions re: management of GSD.    Adam Cohen MD    Genetics and Metabolism  Pager: [phone]

## 2023-01-13 NOTE — PROGRESS NOTES
[facility]    Progress Note - Hospitalist Service       Date of Admission:  1/12/2023    Assessment & Plan   Kevin Stephens is a 22 year old adult admitted on 1/12/2023. Brianna Stephens has a past medical history of type 1 diabetes, infected sebaceous cyst status post removal, and glycogen storage disease type IIIa.  He is admitted on 1/12/2023 diabetic ketoacidosis.    Diabetic ketoacidosis  Type 1 diabetes mellitus  Patient has not taken long-acting insulin since 1/8. Last short acting insulin was given on 1/12 around 130 for a blood glucose of 277.  Home regimen includes Lantus 32 units every morning and NovoLog 1 unit per 40/180.  In ED patient is noted to have kussmaul breathing. On recheck potassium was 2.8, so insulin drip held and potassium replaced.  Potassium was replaced and corrected, and insulin drip was restarted.  While insulin drip was off, blood glucose and ketones increased.  These began to correct after insulin drip was restarted.  - Continuous pulse ox  - Telemetry  - Glucose checks every 2 hours  - Insulin drip, titrate per protocol   - Will start subQ long-acting insulin at PTA dose (32 units of Lantus) when blood glucose is less than 250 and bicarb improves.   - Will also put patient on PTA sliding scale (40/180)  - Potassium replacement, per protocol  - Phosphorus replacement, per protocol  - Magnesium replacement, per protocol  - NPO.  Okay to restart diet and advance as tolerated once subQ insulin is delivered  - Consult inpatient diabetic educator, appreciate recs and cares   - Prescribe freestyle miguel and urine ketone test strips upon discharge  - Follow-up outpatient with endocrinologist.      If hypoglycemic, glucagon will NOT be effective  - D10 at 1.5 times maintenance  - Wean slowly as tolerated; no abrupt stops     Sebaceous cyst complicated by abscess  MSSA bacteremia  Patient seen at PCP clinic today for increased drainage from cyst removal on 1/10/2023.  Patient was prescribed Bactrim. CT of head shows right parietal scalp swelling with a tiny focus of gas suggesting a superficial tract. Patient started on vancomycin in the emergency department.  - Blood cultures x2 positive for MSSA.  Repeat blood cultures drawn  - Continue vancomycin. May transition to Bactrim or cephalosporin pending susceptibilities.     Glycogen-storage disease type IIIa (Cori disease)  Patient diagnosed at 18 months of age.  Disease noted to have liver, muscle, and cardiac involvement.  Patient is at high risk for hypoglycemic events.  Patient has not followed up with a specialty provider for disease since the age of 18 years old due to graduating from pediatrics.  Patient previously was taking cornstarch 4 times a day.  However, patient has not done this for 4 years now. Lactated ringers should be AVOIDED.  AST and ALT with slightly elevated at 55 and alkaline phosphatase elevated at 304.  Cholesterol levels within normal other than slightly elevated triglycerides at 240.  - Recommend outpatient follow-up with new GI specialist  - Genetics and Metabolism physician on call available 24 hours/day via the page  ([phone]).     Binging-purging behavior  Mother reports history of binge eating-purging behavior that has been present for at least the past 6 months, may have been present for up to 1 year.  Patient appears malnourished.  - Social work and nutrition consulted     Acute kidney injury, resolved  On admission, creatinine 1.46 and GFR of 52.  - Corrected with fluids     Diet: NPO for Medical/Clinical Reasons Except for: Meds, Ice Chips    DVT Prophylaxis: Pneumatic compression devices  Byrne Catheter: Not present  Fluids: D5 half NS with potassium  Lines: None     Cardiac Monitoring: ACTIVE order. Indication: DKA  Code Status: Full Code      Clinically Significant Risk Factors Present on Admission        # Hypokalemia: Lowest K = 2.8 mmol/L in last 2 days, will replace as needed    # Hypocalcemia: Lowest Ca = 7.5 mg/dL in last 2 days, will monitor and replace as appropriate   # Hypomagnesemia: Lowest Mg = 1.4 mg/dL in last 2 days, will replace as needed  # Anion Gap Metabolic Acidosis: Highest Anion Gap = 27.5 mmol/L in last 2 days, will monitor and treat as appropriate   # Coagulation Defect: INR = 1.22 (Ref range: 0.85 - 1.15) and/or PTT = 27 Seconds (Ref range: 22 - 38 Seconds), will monitor for bleeding                 Disposition Plan      Expected Discharge Date: 01/15/2023      Destination: home;home with family          The patient's care was discussed with the Attending Physician, Dr. Evans Todd MD.    Thomas Kwon DO  Hospitalist Service  [facility]  Securely message with [employer] (more info)  Text page via [facility] Paging/Directory   ______________________________________________________________________    Interval History   Patient was admitted last night for DKA management.  Patient was laying in bed comfortably during my encounter today.  They said that they were feeling a little better compared to when they presented to the hospital.  They had no complaints at this time.  They denied any shortness of breath, chest pain, abdominal pain.    Physical Exam   Vital Signs: Temp: 99  F (37.2  C) Temp src: Oral BP: 104/74 Pulse: 97   Resp: 20 SpO2: 100 % O2 Device: None (Room air)    Weight: 76 lbs .94 oz    General appearance: Fatigued, in no distress, cooperative, answering questions appropriately, thin  Head: Erythematic and swelling of right parietal scalp from previous procedural incision, small amount of drainage which is nonpurulent, scarred area on left frontal scalp, multiple inflamed sebaceous cysts diffusely on scalp  Eyes: conjunctivae/corneas clear  Ears: hearing grossly intact  Nose: nares normal, no drainage  Throat: lips with piercings, mucosa, and tongue normal but dry  Lung: clear to auscultation bilaterally, no wheezing, coughing  Chest wall:  no tenderness  Heart: regular rate and rhythm, S1, S2 normal, no murmur, click, rub, or gallop  Abdomen: soft, non-tender, bowel sounds present in all four quadrants, hepatomegaly  Extremities: warm, dry, atraumatic, no cyanosis, no edema  Pulses: 2+ and symmetric  Skin: Mild pallor, texture, and turgor. No rashes or lesions  Neurologic: Ambulatory, sensation grossly intact in bilateral lower extremities  Psychologic: Appropriate mood        Data     I have personally reviewed the following data over the past 24 hrs:    8.5  \   12.4   / 222     136 111 (H) 6 (L) /  200 (H); 198 (H)   3.2 (L) 13 (L) 0.76 \       ALT: 55 (H) AST: 55 (H) AP: 304 (H) TBILI: 0.4   ALB: 3.8 TOT PROTEIN: 9.3 (H) LIPASE: N/A       Procal: N/A CRP: 4.5 (H) Lactic Acid: 2.0       INR:  1.22 (H) PTT:  27   D-dimer:  N/A Fibrinogen:  N/A       Imaging results reviewed over the past 24 hrs:   Recent Results (from the past 24 hour(s))   XR Chest Port 1 View    Narrative    EXAM: XR CHEST PORT 1 VIEW  LOCATION: [facility]  DATE/TIME: 1/12/2023 7:20 PM    INDICATION: Diabetic ketoacidosis.  COMPARISON: None available.      Impression    IMPRESSION: Negative chest.   Head CT w/o contrast    Narrative    EXAM: CT HEAD W/O CONTRAST  LOCATION: [facility]  DATE/TIME: 1/12/2023 7:30 PM    INDICATION: Recurrent scalp abscesses, hx of DM 1 and glycogen storage disease  COMPARISON: None.  TECHNIQUE: Routine CT Head without IV contrast. Multiplanar reformats. Dose reduction techniques were used.    FINDINGS:  INTRACRANIAL CONTENTS: No intracranial hemorrhage, extraaxial collection, or mass effect.  No CT evidence of acute infarct. Normal parenchymal attenuation. Mild generalized volume loss, advanced for age. No hydrocephalus.     VISUALIZED ORBITS/SINUSES/MASTOIDS: No intraorbital abnormality. No paranasal sinus mucosal disease. No middle ear or mastoid effusion.    BONES/SOFT TISSUES: Right parietal  scalp swelling with a tiny focus of gas suggesting a tract. No skull fracture or cortical periostitis.      Impression    IMPRESSION:  1.  Right parietal scalp swelling with a tiny focus of gas suggesting a superficial tract. No underlying cortical periostitis.  2.  No acute intracranial abnormality.  3.  Mild global volume loss, advanced for age.

## 2023-01-13 NOTE — PROGRESS NOTES
3:03 PM  SW was informed that Pt's mother was requesting resources for eating disorder programs. SW added a list of clinics and resources to Pt's AVS. CM will continue to follow the progression of Pt's care.      JACKSON Austin

## 2023-01-13 NOTE — H&P
[facility]    History and Physical - Hospitalist Service       Date of Admission:  1/12/2023    Assessment & Plan      Kevin Stephens is a 22 year old adult with a past medical history of type 1 diabetes, infected sebaceous cyst status post removal, and glycogen storage disease type IIIa.  He is admitted on 1/12/2023 diabetic ketoacidosis.    Diabetic ketoacidosis  Metabolic acidosis, anion gap >25  Type 1 diabetes mellitus  Patient has not taken long-acting insulin since seventh or 8 January.  Last short acting insulin was given on 1/12 around 130 for a blood glucose of 277.  Home regimen includes Lantus 32 units every morning and NovoLog 1 unit per 40/180.  In ED patient is noted to have kussmaul breathing.  Blood glucose is 518.  pH on admission was 6.94 with bicarb of 4.  Anion gap >25.  Ketone quantitative 10.36.  2 L bolus normal saline given in the ED.  Sodium corrects to 140.  Magnesium and phosphorus within normal limits.  On recheck potassium was 2.8, so insulin drip held and potassium replaced.  - Continuous pulse ox  - Telemetry  - Glucose checks every hour  - Insulin drip, on hold due to potassium  - Potassium replacement  - Bicarb replacement  - NPO  - Consult inpatient diabetic educator  - Encouraging patient to try DEXCOM again for continuous glucose monitoring.  Patient states freestyle miguel is not covered by insurance.  - Follow-up outpatient with endocrinologist.     If hypoglycemic, glucagon will NOT be effective  - D10 at 1.5 times maintenance  - Wean slowly as tolerated; no abrupt stops    Sebaceous cyst complicated by abscess  Patient seen at PCP clinic today for increased drainage from cyst removal on 1/10/2023.  Patient prescribed Bactrim.  Area is erythematic with edema and small amount of drainage.  CT of head shows right parietal scalp swelling with a tiny focus of gas suggesting a superficial tract.  CRP 7.0.  Lactic acid within normal limits.  Afebrile.   Patient started on vancomycin in the emergency department.  - Continue vancomycin.  May transition to Bactrim as outpatient.  - Trend CRP    Glycogen-storage disease type IIIa (Cori disease)  Patient diagnosed at 18 months of age.  Disease noted to have liver, muscle, and cardiac involvement.  Patient is at high risk for hypoglycemic events.  Patient has not followed up with a specialty provider for disease since the age of 18 years old due to graduating from pediatrics.  Patient previously was taking cornstarch 4 times a day.  However, patient has not done this for 4 years now.  ED provider, Baldomero, discussed patient with genetics and metabolism physician, Dr. Cohen, who recommends every hour glucose checks due to high risk of hypoglycemia.  Also, lactate ringer should be AVOIDED.  AST and ALT with slightly elevated at 55 and alkaline phosphatase elevated at 304.  Cholesterol levels within normal other than slightly elevated triglycerides at 240.  - Recommend outpatient follow-up with new GI specialist  - Genetics and Metabolism physician on call available 24 hours/day via the page  ([phone]).     Acute kidney injury  On admission, creatinine 1.46 and GFR of 52.  - Continue to monitor.  Likely to correct with fluids.        Diet: NPO for Medical/Clinical Reasons Except for: Meds, Ice Chips  Advance Diet as Tolerated: Clear Liquid Diet  DVT Prophylaxis: Pneumatic Compression Devices  Byrne Catheter: Not present  Fluids: Per DKA protocol  Lines: None     Cardiac Monitoring: None  Code Status: Full Code      # Hypokalemia: Lowest K = 2.8 mmol/L in last 2 days, will replace as needed   # Hypocalcemia: Lowest Ca = 7.6 mg/dL in last 2 days, will monitor and replace as appropriate    # Anion Gap Metabolic Acidosis: Highest Anion Gap = 27.5 mmol/L in last 2 days, will monitor and treat as appropriate   # Coagulation Defect: INR = 1.22 (Ref range: 0.85 - 1.15) and/or PTT = 27 Seconds (Ref range: 22 - 38  Seconds), will monitor for bleeding                 Disposition Plan      Expected Discharge Date: 01/14/2023                The patient's care was discussed with the Attending Physician, Dr. Filippo Mcfadden.  Patient will be seen in the morning by attending physician, Evans Todd.      Keshia Aiken MD  Hospitalist Service  [facility]  Securely message with [employer] (more info)  Text page via [facility] Paging/Directory   ______________________________________________________________________    Chief Complaint   Shortness of breath and elevated blood sugars.    History is obtained from the patient and mother, Adenike.    History of Present Illness   Kevin Stephens is a 22 year old adult with a past medical history of type 2 diabetes, infected sebaceous cyst status post removal, and glycogen storage disease type IIIa.  He is admitted on 1/12/2023 diabetic ketoacidosis.    Patient was seen at his primary care clinic today for follow-up of sebaceous cyst removal of his scalp.  He has been having increased drainage of mostly blood from the area.  Primary care provider is suspicious of an infection.  Patient does have history of sebaceous cyst abscess formation.  Patient was given Bactrim and recommended to follow-up in the emergency department due to shortness of breath and elevated blood sugars.  Patient has not picked up Bactrim from the pharmacy yet.    Patient states for the last 2 days he has been feeling more short of breath and fatigued.  He endorses nausea to the point where he has a decreased appetite.  Due to this, he has not been taking his insulin.  He states the last time he took his long-acting insulin was January 7 or 8.  He took his short acting insulin today around 1:30 PM for a blood sugar of 277.  He endorses vomiting but is unsure of the last time he did so.  He endorses shortness of breath.  He denies any pain throughout his body, recent illness, or fever.      Past Medical History     Past Medical History:   Diagnosis Date     Anxiety      Depressive disorder      Diabetes mellitus (H)      Glycogen storage disease IIIa - see Emergency Letter in EPIC dated 12       Past Surgical History   Past Surgical History:   Procedure Laterality Date     TONSILLECTOMY Bilateral 2015       Prior to Admission Medications   Prior to Admission Medications   Prescriptions Last Dose Informant Patient Reported? Taking?   Continuous Blood Gluc Sensor (DEXCOM G6 SENSOR) MISC   No No   Si each See Admin Instructions Change every 10 days   Continuous Blood Gluc Transmit (DEXCOM G6 TRANSMITTER) MISC   No No   Si each every 3 months   acetone, Urine, test STRP   No No   Sig: Check urine ketones when two consecutive blood sugars are greater than 300 and at times of illness/vomiting.   alcohol swab prep pads   No No   Sig: Use to swab area of injection/nigel as directed.   blood glucose (NO BRAND SPECIFIED) test strip   No No   Sig: Use to test blood sugar 4 times daily or as directed. To accompany: Blood Glucose Monitor Brands: per insurance.   blood glucose calibration (NO BRAND SPECIFIED) solution   No No   Sig: To accompany: Blood Glucose Monitor Brands: per insurance.   blood glucose monitoring (NO BRAND SPECIFIED) meter device kit   No No   Sig: Use to test blood sugar 4 times daily or as directed. Preferred blood glucose meter OR supplies to accompany: Blood Glucose Monitor Brands: per insurance.   blood glucose monitoring (ONE TOUCH DELICA) lancets   No No   Sig: Use to test blood sugars 6 times daily.   blood glucose monitoring (ONETOUCH VERIO IQ) test strip   No No   Sig: Use to test blood sugars 6 times daily or as directed.   insulin aspart (NOVOLOG PEN) 100 UNIT/ML pen 2023 at 1530  No Yes   Sig: Correct 1 unit per 40 over 180 before breakfast, lunch, dinner and at bedtime.   insulin glargine (LANTUS PEN) 100 UNIT/ML pen Past Week at weekend  Yes Yes   Sig: Inject 32 Units  Subcutaneous every morning   insulin pen needle (BD CALLI U/F) 32G X 4 MM miscellaneous   No No   Sig: Use pen needles 6 times daily.   sulfamethoxazole-trimethoprim (BACTRIM DS) 800-160 MG tablet has not started  Yes Yes   Sig: Take 1 tablet by mouth 2 times daily   thin (NO BRAND SPECIFIED) lancets   No No   Sig: Use with lanceting device. To accompany: Blood Glucose Monitor Brands: per insurance.      Facility-Administered Medications: None        Review of Systems    ROS is negative other than what is listed in the HPI.    Social History   I have reviewed this patient's social history and updated it with pertinent information if needed.  Social History     Tobacco Use     Smoking status: Never     Smokeless tobacco: Never     Tobacco comments:     no second hand smoke exposure at home   Substance Use Topics     Alcohol use: No     Drug use: Yes     Types: Marijuana     Comment: every other day user       Family History   I have reviewed this patient's family history and updated it with pertinent information if needed.  Family History   Problem Relation Age of Onset     Hearing Loss Father        Allergies   Allergies   Allergen Reactions     Fluoxetine      Other reaction(s): Mental Status Change  Suicidal thoughts     Morphine Sulfate      No exposure but grandfather has the allergy to it     Tylenol [Acetaminophen]      Has glycogen storage disease and should not receive Tylenol, per GI.        Physical Exam   Vital Signs: Temp: (!) 96.4  F (35.8  C) Temp src: Temporal BP: 102/73 Pulse: 83   Resp: 27 SpO2: 100 % O2 Device: None (Room air)    Weight: 74 lbs 14.4 oz    General appearance: Fatigued, in no distress, cooperative, answering questions appropriately, thin  Head: Erythematic and swelling of right parietal scalp from previous procedural incision, small amount of drainage which is nonpurulent, scarred area on left frontal scalp, multiple inflamed sebaceous cysts diffusely on scalp  Eyes:  conjunctivae/corneas clear  Ears: hearing grossly intact  Nose: nares normal, no drainage  Throat: lips, mucosa, and tongue normal but dry  Lung: clear to auscultation bilaterally, no wheezing, coughing, slight use of accessory muscles  Chest wall: no tenderness  Heart: regular rate and rhythm, S1, S2 normal, no murmur, click, rub, or gallop  Abdomen: soft, non-tender, bowel sounds present in all four quadrants, hepatomegaly  Extremities: warm, dry, atraumatic, no cyanosis, no edema  Pulses: 2+ and symmetric  Skin: normal color, texture, and turgor. No rashes or lesions  Neurologic: Ambulatory, sensation grossly intact in bilateral lower extremities  Psychologic: Appropriate mood    Data     I have personally reviewed the following data over the past 24 hrs:    12.6 (H)  \   13.9   / 245     137 115 (H) 9 /  320 (H)   2.8 (LL) <6 (LL) 0.86 \       ALT: 55 (H) AST: 55 (H) AP: 304 (H) TBILI: 0.4   ALB: 3.8 TOT PROTEIN: 9.3 (H) LIPASE: N/A       Procal: N/A CRP: 7.0 (H) Lactic Acid: 1.2       INR:  1.22 (H) PTT:  27   D-dimer:  N/A Fibrinogen:  N/A       Imaging results reviewed over the past 24 hrs:   Recent Results (from the past 24 hour(s))   XR Chest Port 1 View    Narrative    EXAM: XR CHEST PORT 1 VIEW  LOCATION: [facility]  DATE/TIME: 1/12/2023 7:20 PM    INDICATION: Diabetic ketoacidosis.  COMPARISON: None available.      Impression    IMPRESSION: Negative chest.   Head CT w/o contrast    Narrative    EXAM: CT HEAD W/O CONTRAST  LOCATION: [facility]  DATE/TIME: 1/12/2023 7:30 PM    INDICATION: Recurrent scalp abscesses, hx of DM 1 and glycogen storage disease  COMPARISON: None.  TECHNIQUE: Routine CT Head without IV contrast. Multiplanar reformats. Dose reduction techniques were used.    FINDINGS:  INTRACRANIAL CONTENTS: No intracranial hemorrhage, extraaxial collection, or mass effect.  No CT evidence of acute infarct. Normal parenchymal attenuation. Mild  generalized volume loss, advanced for age. No hydrocephalus.     VISUALIZED ORBITS/SINUSES/MASTOIDS: No intraorbital abnormality. No paranasal sinus mucosal disease. No middle ear or mastoid effusion.    BONES/SOFT TISSUES: Right parietal scalp swelling with a tiny focus of gas suggesting a tract. No skull fracture or cortical periostitis.      Impression    IMPRESSION:  1.  Right parietal scalp swelling with a tiny focus of gas suggesting a superficial tract. No underlying cortical periostitis.  2.  No acute intracranial abnormality.  3.  Mild global volume loss, advanced for age.

## 2023-01-13 NOTE — PROGRESS NOTES
Care Management Follow Up    Length of Stay (days): 1    Expected Discharge Date: 01/14/2023     Concerns to be Addressed:     IV abx, telemetry, monitor glucose, diabetic educator consult  Patient plan of care discussed at interdisciplinary rounds: Yes    Anticipated Discharge Disposition: Home     Anticipated Discharge Services:  To be determined  Anticipated Discharge DME:      Education Provided on the Discharge Plan:  Per Care Team  Patient/Family in Agreement with the Plan: yes    Referrals Placed by CM/SW:    Private pay costs discussed: Not applicable    Additional Information:  Chart reviewed.  Patient lives with parents.  No CM needs identified at this time.  Family will transport.  Care Management with continue to follow and assist as needed for discharge planning.      Talita Graham RN

## 2023-01-13 NOTE — ED PROVIDER NOTES
EMERGENCY DEPARTMENT ENCOUNTER       ED Course & Medical Decision Making     6:22 PM I introduced myself to the patient, obtained patient history, performed a physical exam, and discussed plan for ED workup including potential diagnostic laboratory/imaging studies and interventions.  8:13 PM I rechecked the patient and updated them on laboratory and imaging results.  8:41 PM Spoke with [facility], will page  Genetics and Metabolism physician on call available 24 hours/day via the page  ([phone]).   8:47 PM I spoke with Genetics and Metabolism Physician Dr. Cohen regarding patient.  She will be on-call today and tomorrow if questions arise, though largely agreed with plan, specifically wanted to make sure that we were getting very frequent every hour glucose checks as patient's glucose can drop precipitously , should also avoid LR.    Final Impression  22 year old adult brought in by mother for evaluation of elevated blood sugar, increased respiratory rate, concern of DKA.  Patient has a past medical history significant for DM-1, as well as glycogen-storage disorder IIIa.  Mother is providing majority of history as patient is fairly ill-appearing, kussmaul breathing in triage.  Patient was seen at the [facility] medicine clinic earlier today for follow-up of recurrent scalp abscesses of which it sounds like she has had several drained in the past several weeks, including one drained on the top of her scalp in the clinic today.  This latest cyst first appeared on 1/5 and then grew rapidly over the last several days before being drained in clinic earlier today.  Patient found to have severely elevated glucose (> 400) in the clinic and was sent here for evaluation of possible DKA.  Per chart review it looks like patient was last admitted for DKA in May/2022, also had an infected sebaceous cyst at that time.  Clinical appearance highly consistent with DKA, dry mucous membranes, tachycardic, kussmaul breathing  in triage.  Given initial 1 L NS bolus while labs pending.  Labs returned showing glucose of 518, CO2 less than 6, anion gap greater than 25, creatinine 1.46 (baseline about 0.6), VBG pH 6.94, serum ketones greater than 10. Initial potassium here 3.8, insulin drip started.  We will continue every hour glucose checks. Second liter of NS ordered.  Will be admitted to intermediate care/stepdown unit.  Findings and plan discussed with patient and mother.  Chest x-ray clear.  Urinalysis does not show any overt signs of infection.  CT head performed as she has had recurrent scalp abscesses though I do not see any head imaging on file.  Will be started on empiric vancomycin for the scalp abscess that was drained in clinic.  All questions answered.  Also did contact the genetics and metabolism physician on-call, patient does have an extensive care plan documented in her problem list that was extremely helpful in managing this patient.    Prior to making a final disposition on this patient the results of patient's tests and other diagnostic studies were discussed with the patient. All questions were answered. Patient expressed understanding of the plan and was amenable to it.    Medical Decision Making    History:    Supplemental history from: Family Member/Significant Other    External Record(s) reviewed: Other: Family Med visit earlier today    Work Up:    Chart documentation includes differential considered and any EKGs or imaging independently interpreted by provider.    In additional to work up documented, I considered the following work up: See chart documentation, if applicable.    External consultation:    Discussion of management with another provider: Other: Genetics and Metabolism Physican    Complicating factors:    Care impacted by chronic illness: Other: DM-1, glycogen storage disorder    Care affected by social determinants of health: N/A    Disposition considerations: Admit.      Medications   dextrose 50 %  injection 25-50 mL (has no administration in time range)   insulin 1 unit/1 mL in NS (NovoLIN, HumuLIN Regular) drip -ED DKA algorithm (5.5 Units/hr Intravenous New Bag 1/12/23 1949)   dextrose 5% and 0.45% NaCl infusion (has no administration in time range)   vancomycin (VANCOCIN) 750 mg in 0.9% NaCl 250 mL intermittent infusion (has no administration in time range)   0.9% sodium chloride BOLUS (0 mLs Intravenous Stopped 1/12/23 1949)   0.9% sodium chloride BOLUS (1,000 mLs Intravenous New Bag 1/12/23 1953)       Final Impression     1. Diabetic ketoacidosis without coma associated with type 1 diabetes mellitus (H)        Critical Care     Performed by: Román Alexandre MD  Authorized by: Román Alexandre MD                   Total critical care time: 45 minutes  Critical care was necessary to treat or prevent imminent or life-threatening deterioration of the following conditions:  DKA  Critical care was time spent personally by me on the following activities: development of treatment plan with patient or surrogate, discussions with consultants, examination of patient, evaluation of patient's response to treatment, obtaining history from patient or surrogate, ordering and performing treatments and interventions, ordering and review of laboratory studies, ordering and review of radiographic studies, re-evaluation of patient's condition and monitoring for potential decompensation.  Critical care time was exclusive of separately billable procedures and treating other patients.    Chief Complaint     Chief Complaint   Patient presents with     Shortness of Breath     Hyperglycemia     Pt reports they had an infected cyst lanced on their head two days ago. Pt reports yesterday began to have SOB, nausea, and increasing fatigue. Seen in clinic today and sent here for further treatment.      HPI     Kevin Stephens is a 22 year old adult with a history of diabetes mellitus type 1, hyperglycemia, glycogen storage  disease, severe malnutrition, infected sebaceous cyst and depressive disorder who presents for evaluation of shortness of breath and hyperglycemia.     Per chart review, the patient was seen just prior to ED arrival at [facility]  presenting with follow up for scalp culture and shortness of breath. Patient did not take long acting insulin today. Had 8 units of Novolog at 1:30pm today. Blood sugar 277 at that time. Potassium is 3.6 and Glucose >444 at clinic. Patient was directed to seek further evaluation at the emergency department for IV antibiotics and fluids to address high glucose. Patient was transferred to the ED by Vanessa LUNA.    Per nurse, the patient was at [facility] today and presented to the ED after having elevated glucose levels. Patient endorses fatigue, nausea and shortness of breath. Patient is arousable and able to answer questions.     Per patient's mother, the patient has a history of cysts on her head and went to her primary care provider today to get it drained. The cyst appeared a week ago on 1/5/2022, started small and grew. Patient has a history of frequent cysts around their head that began a few months ago. Patient was referred here from their primary care provider for further treatment due to elevated blood glucose levels at the clinic visit. The patient last had insulin this afternoon, but sometimes misses their insulin. Patient had DKA once a month ago in December. Patient denies any fever or cough. They have not gotten a CT scan yet. Patient's blood sugar was 475.     I, Oscar Geller am serving as a scribe to document services personally performed by Dr. Román Alexandre MD, based on my observation and the provider's statements to me. I, Dr. Román Alexandre MD attest that Oscar Geller is acting in a scribe capacity, has observed my performance of the services and has documented them in accordance with my  direction.    Past Medical History     Past Medical History:   Diagnosis Date     Anxiety      Depressive disorder      Diabetes mellitus (H)      Glycogen storage disease IIIa - see Emergency Letter in EPIC dated 05/07/12 2/17/2012     Past Surgical History:   Procedure Laterality Date     TONSILLECTOMY Bilateral 4/2015     Family History   Problem Relation Age of Onset     Hearing Loss Father       Social History     Tobacco Use     Smoking status: Never     Smokeless tobacco: Never     Tobacco comments:     no second hand smoke exposure at home   Substance Use Topics     Alcohol use: No     Drug use: Yes     Types: Marijuana     Comment: every other day user     Allergies   Allergen Reactions     Fluoxetine      Other reaction(s): Mental Status Change  Suicidal thoughts     Morphine Sulfate      No exposure but grandfather has the allergy to it     Tylenol [Acetaminophen]      Has glycogen storage disease and should not receive Tylenol, per GI.       Relevant past medical, surgical, family and social history as documented above, has been reviewed and discussed with patient. No changes or additions, unless otherwise noted in the HPI.    Current Medications     insulin aspart (NOVOLOG PEN) 100 UNIT/ML pen  insulin glargine (LANTUS PEN) 100 UNIT/ML pen  sulfamethoxazole-trimethoprim (BACTRIM DS) 800-160 MG tablet  acetone, Urine, test STRP  alcohol swab prep pads  blood glucose (NO BRAND SPECIFIED) test strip  blood glucose calibration (NO BRAND SPECIFIED) solution  blood glucose monitoring (NO BRAND SPECIFIED) meter device kit  blood glucose monitoring (ONE TOUCH DELICA) lancets  blood glucose monitoring (ONETOUCH VERIO IQ) test strip  Continuous Blood Gluc Sensor (DEXCOM G6 SENSOR) MISC  Continuous Blood Gluc Transmit (DEXCOM G6 TRANSMITTER) MISC  insulin pen needle (BD CALLI U/F) 32G X 4 MM miscellaneous  thin (NO BRAND SPECIFIED) lancets        Review of Systems     Constitutional: Denies fever. Positive for  "fatigue.      Respiratory:  Positive for shortness of breath.      GI: Positive for nausea.       Remainder of systems reviewed, unless noted in HPI all others negative.    Physical Exam     /66   Pulse 96   Temp (!) 96.4  F (35.8  C) (Temporal)   Resp 25   Ht 1.626 m (5' 4\")   Wt 33.1 kg (73 lb)   SpO2 100%   BMI 12.53 kg/m    Constitutional: Awake, though fairly somnolent appearing, kussmaul breathing  Head: Normocephalic, atraumatic.      ENT: Mucous membranes extremely dry      Eyes: Conjunctiva normal.      Respiratory: Tachypneic with kussmaul breathing, the lungs clear to auscultation bilaterally, no coarseness or wheezing.  Cardiovascular: Sinus tachycardia, good pulses in bilateral extremities  GI: Abdomen soft, nondistended, nontender  Musculoskeletal: Moves all 4 extremities equally, strength symmetrical on bilateral uppers and lowers.      Integument: Warm, dry.     Neurologic: Alert & oriented. Normal speech. Grossly normal motor and sensory function. No focal deficits noted.      Psychiatric: Flattened affect    Labs & Imaging     Results for orders placed or performed during the hospital encounter of 01/12/23   XR Chest Port 1 View    Impression    IMPRESSION: Negative chest.   Head CT w/o contrast    Impression    IMPRESSION:  1.  Right parietal scalp swelling with a tiny focus of gas suggesting a superficial tract. No underlying cortical periostitis.  2.  No acute intracranial abnormality.  3.  Mild global volume loss, advanced for age.   Glucose by meter   Result Value Ref Range    GLUCOSE BY METER POCT 475 (H) 70 - 99 mg/dL   Basic metabolic panel   Result Value Ref Range    Sodium 133 (L) 136 - 145 mmol/L    Potassium 3.8 3.5 - 5.0 mmol/L    Chloride 102 98 - 107 mmol/L    Carbon Dioxide (CO2) <6 (LL) 22 - 31 mmol/L    Anion Gap >25 (H) 5 - 18 mmol/L    Urea Nitrogen 11 8 - 22 mg/dL    Creatinine 1.46 (H) 0.60 - 1.30 mg/dL    Calcium 9.7 8.5 - 10.5 mg/dL    Glucose 518 (HH) 70 - 125 " mg/dL    GFR Estimate 52 (L) >60 mL/min/1.73m2   Result Value Ref Range    Phosphorus 4.2 2.5 - 4.5 mg/dL   Result Value Ref Range    Magnesium 2.0 1.8 - 2.6 mg/dL   Hepatic panel   Result Value Ref Range    Bilirubin Total 0.4 0.0 - 1.0 mg/dL    Bilirubin Direct 0.3 <=0.5 mg/dL    Protein Total 9.3 (H) 6.0 - 8.0 g/dL    Albumin 3.8 3.5 - 5.0 g/dL    Alkaline Phosphatase 304 (H) 45 - 120 U/L    AST 55 (H) 0 - 40 U/L    ALT 55 (H) 0 - 45 U/L   Lactic acid whole blood   Result Value Ref Range    Lactic Acid 1.2 0.7 - 2.0 mmol/L   Blood gas venous   Result Value Ref Range    pH Venous 6.94 (LL) 7.35 - 7.45    pCO2 Venous 20 (L) 35 - 50 mm Hg    pO2 Venous 45 25 - 47 mm Hg    Bicarbonate Venous 4 (L) 24 - 30 mmol/L    Base Excess/Deficit (+/-) -27.8   mmol/L    Oxyhemoglobin Venous 70.8 70.0 - 75.0 %    O2 Sat, Venous 72.3 70.0 - 75.0 %   Ketone Beta-Hydroxybutyrate Quantitative   Result Value Ref Range    Ketone (Beta-Hydroxybutyrate) Quantitative 10.36 (H) <=0.3 mmol/L   Result Value Ref Range    INR 1.22 (H) 0.85 - 1.15   Partial thromboplastin time   Result Value Ref Range    aPTT 27 22 - 38 Seconds   Lipid Profile   Result Value Ref Range    Cholesterol 181 <=199 mg/dL    Triglycerides 240 (H) <=149 mg/dL    Direct Measure HDL 50 >=40 mg/dL    LDL Cholesterol Calculated 83 <=129 mg/dL          Román Alexandre MD  01/12/23 6923

## 2023-01-13 NOTE — CONSULTS
DIABETES CARE    Situation:  Consulted by Provider for Diabetes Education.  22 year old with Type 1 Diabetes vs secondary Diabetes. Patient was admitted for DKA.     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 Because of this, at high risk for hypoglycemic events. Given glucosidase deficiency, glucagon will not be effective. Patient has expressed interest in an insulin pump. Has used Dexcom CGM in the past, though sensor kept failing. Patient has had DKA before.     Chart Review: Patient has not taken long acting insulin since January 7th/8th. Last short acting insulin was given on January 12th around 1:30 for a blood glucose of 277 mg/dL.     Meds for BG Management PTA:  32 units Lantus q AM  1 unit for every 40 mg/dL over 180 mg/dL (at meals and bed)  *Dexcom G6 -- not using    Current Inpatient Meds for BG Management:  Insulin gtt    Labs:  Hemoglobin A1C: 15.5% Nov 2022 (estimated average  mg/dL)  Hgb: 12.4 g/dL  SCr: 0.85 mg/dL   GFR: >90 mL/min/1.73m^2     Latest Reference Range & Units 01/12/23   Anion Gap 5 - 18 mmol/L >25 (H)      Latest Reference Range & Units 01/12/23    Ketone Quantitative <=0.3 mmol/L 10.36 (H)      Latest Reference Range & Units 01/12/23   CRP 0.0 - <0.8 mg/dL 7.0 (H)      Latest Reference Range & Units 01/12/23   Cholesterol <=199 mg/dL 181   HDL Cholesterol >=40 mg/dL 50   LDL Cholesterol Calculated <=129 mg/dL 83   Triglycerides <=149 mg/dL 240 (H)     Blood Glucose POC:  mg/dL on admission   01/13/23 04:26 01/13/23 04:34 01/13/23 05:24 01/13/23 06:06 01/13/23 06:31 01/13/23 07:56 01/13/23 08:03 01/13/23 09:11   Glucose 297 (H)   236 (H)   251 (H)    GLUCOSE BY METER POCT  272 (H) 223 (H)  194 (H) 253  "(H)  274 (H)     Diet Order: Clear liquids   Weight: 34.5 kg    BMI: 13.0 kg/m^2    DM EDUCATION/COUNSELING:  Barriers to Learning and/or DM Self-Management: None noted. Mother of good support. Patient seems discouraged / down about the chronicity of DM and its management.   Previous DM Education: Yes, many times. Last November 2022. No show to December 2022 appointment.  Current education and/or visit with patient and mother:  Educated/reviewed diabetes basics: DKA, pathophysiology, hyperglycemia, long term complications, treatment.  Educated/reviewed hypoglycemia: sx, causes, treatment. Has not had a low BG in years.   Reviewed use of home glucose meter.  Specific medications were explained, including how they each acted on blood sugar, their timing and differences in dosing.   Discussed site rotation, proper storage.     Created goals with patient:  Use of Yuan 3 CGM. Bring data to follow-up appointments to adjust insulins. Take Lantus every day to avoid DKA.    Assessment:  Met with patient and mother - mother quite supportive. Patient expressed that they do not always take insulin as prescribed - has had DKA in the past. Anticipate DKA brought on by infection and non-adherence to basal insulin. Also admits to not regularly checking BG unless feeling sick and encouraged by mother to do so. Patient was interested in Yuan CGM in the past, but insurance only covered Dexcom. Now, insurance is able to cover 100% costs of Yuan 2 or 3. Patient seems open to using CGM after discharge. Knowledgeable about diabetes and importance for BG control.     Recommendations:  1. Target BG Range: pre-meal  mg/dL, 2 hr pp < 160 mg/dL, bedtime 100-140 mg/dL.  2. Patient to use CGM for BG monitoring.   3. Patient to take Lantus insulin daily. Will use meal insulin when eating.     Refer to \"Guidelines for Insulin Initiation and Care in Hospitalized Adults\" link in Diabetes Management Order set for dosing guidelines.  Hospital " goals for blood glucose levels are < 180 mg/dL for improved health outcomes.    F/U Plans:  Patient should follow-up with Endocrinology and/or Diabetes Educator within the next 2-4 weeks for ongoing monitoring and evaluation.     DISCHARGE NEEDS:  RX needed at [place] (preferred by patient's insurance):  1. 28-day supply of FreeStyle Yuan 3 sensors (2 sensors)  2. Urine ketone test strips  Refills: PRN    Thank you,   Eleonora Oates RDN, CSPCC, LD, Diabetes Educator    [facility]  [address] [place] [address]  [email or web address]   Office: [phone]  Pager: [phone]

## 2023-01-13 NOTE — PHARMACY-ADMISSION MEDICATION HISTORY
Pharmacy Note - Admission Medication History    Pertinent Provider Information:     Talked to mom and patient together the patient was taking amoxicillin but at clinic today was switched to bactrim. Has not started the bactrim yet.     ______________________________________________________________________    Prior To Admission (PTA) med list completed and updated in EMR.       PTA Med List   Medication Sig Note Last Dose     insulin aspart (NOVOLOG PEN) 100 UNIT/ML pen Correct 1 unit per 40 over 180 before breakfast, lunch, dinner and at bedtime.  1/12/2023 at 1530     insulin glargine (LANTUS PEN) 100 UNIT/ML pen Inject 32 Units Subcutaneous every morning  Past Week at weekend     sulfamethoxazole-trimethoprim (BACTRIM DS) 800-160 MG tablet Take 1 tablet by mouth 2 times daily 1/12/2023: Hasn't started yet - prescribed today        Information source(s): Patient, Family member and [facility]  Method of interview communication: in-person    Summary of Changes to PTA Med List  New: bactrim   Discontinued: none  Changed: none    Patient was asked about OTC/herbal products specifically.  PTA med list reflects this.    In the past week, patient estimated taking medication this percent of the time:  greater than 90%.    Allergies were reviewed, assessed, and updated with the patient.      Medications currently not available for use during hospital stay. Family/Patient representative states they will bring insulin pen (novolog and lantus) to [facility].    The information provided in this note is only as accurate as the sources available at the time of the update(s).    Thank you for the opportunity to participate in the care of this patient.    Jayla Smart  1/12/2023 8:36 PM

## 2023-01-13 NOTE — PHARMACY-VANCOMYCIN DOSING SERVICE
Pharmacy Vancomycin Initial Note  Date of Service 2023  Patient's  2000  22 year old, adult    Indication: Skin and Soft Tissue Infection    Current estimated CrCl = Estimated Creatinine Clearance (based on SCr of 0.86 mg/dL)  Female: 55.1 mL/min  Male: 64.8 mL/min    Creatinine for last 3 days  2023:  6:46 PM Creatinine 1.46 mg/dL; 10:49 PM Creatinine 0.86 mg/dL    Recent Vancomycin Level(s) for last 3 days  No results found for requested labs within last 72 hours.      Vancomycin IV Administrations (past 72 hours)                   vancomycin (VANCOCIN) 750 mg in 0.9% NaCl 250 mL intermittent infusion (mg) 750 mg Given 23                Nephrotoxins and other renal medications (From now, onward)    Start     Dose/Rate Route Frequency Ordered Stop    23 0900  vancomycin (VANCOCIN) 500 mg vial to attach to  mL bag         500 mg  over 1 Hours Intravenous EVERY 12 HOURS 23 0055            Contrast Orders - past 72 hours (72h ago, onward)    None          [facility] Prediction of Planned Initial Vancomycin Regimen_initial order   Regimen: 500 mg IV every 12 hours.  Start time: 0900 on 2023  Exposure target: AUC24 (range)400-600 mg/L.hr   AUC24,ss: 458 mg/L.hr  Probability of AUC24 > 400: 65 %  Ctrough,ss: 14.5 mg/L  Probability of Ctrough,ss > 20: 22 %  Probability of nephrotoxicity (Lodise KERMIT ): 10 %    Insight[facility] Prediction _chng in renal function  Current Regimen: 500 mg IV every 12 hours.  Start time: 14:25 on 2023  Exposure target: AUC24 (range)400-600 mg/L.hr   AUC24,ss: 375 mg/L.hr  Probability of AUC24 > 400: 43 %  Ctrough,ss: 11.1 mg/L  Probability of Ctrough,ss > 20: 10 %  Probability of nephrotoxicity (Lodise KERMIT ): 7 %  --------------------------------------------------------------------------------------   Due to change in pt's scr and Crcl will increase vancomycin dose  to get better AUC goal    InsightRX NEW regimen Prediction  _   Regimen: 750 mg IV every 12 hours.  Start time: 14:25 on 01/13/2023  Exposure target: AUC24 (range)400-600 mg/L.hr   AUC24,ss: 558 mg/L.hr  Probability of AUC24 > 400: 82 %  Ctrough,ss: 16.5 mg/L  Probability of Ctrough,ss > 20: 35 %  Probability of nephrotoxicity (Lodise KERMIT 2009): 12 %    Plan:  1.  Increase vancomycin 750mg IV q12h (dose increased to getter better AUC, see above Inish[employer] prediction for the previous and current regimen)  2. Vancomycin monitoring method: AUC  3. Vancomycin therapeutic monitoring goal: 400-600 mg*h/L  4. Pharmacy will check vancomycin levels as appropriate in 1-3 Days.    5. Serum creatinine levels will be ordered daily for the first week of therapy and at least twice weekly for subsequent weeks.      Edenilson Ryan, [facility]

## 2023-01-13 NOTE — CONSULTS
CLINICAL NUTRITION SERVICES    Received nutrition consult for DKA, glycogen storage disease, malnutrition, binging and purging hx    Patient is 22yrs old and non binary. They were with their parent during RD visit. Patient has type 1 DM. They have been seen by the diabetes educator and the  has provided resources for eating disorder recovery facilities.     Patient declined RD visit today and did not want to talk about food or eating. I let the patient and parent know that the RD is available if they have any nutrition needs in the future.      Patient is currently NPO and has a BMI of 13.06. They appear very malnourished.    RD will monitor patient per protocol.     Kelli Hu RDN, LD

## 2023-01-14 ENCOUNTER — HOME INFUSION (PRE-WILLOW HOME INFUSION) (OUTPATIENT)
Dept: PHARMACY | Facility: CLINIC | Age: 23
End: 2023-01-14

## 2023-01-14 LAB
ANION GAP SERPL CALCULATED.3IONS-SCNC: 10 MMOL/L (ref 5–18)
ANION GAP SERPL CALCULATED.3IONS-SCNC: 10 MMOL/L (ref 5–18)
ANION GAP SERPL CALCULATED.3IONS-SCNC: 11 MMOL/L (ref 5–18)
ANION GAP SERPL CALCULATED.3IONS-SCNC: 13 MMOL/L (ref 5–18)
ANION GAP SERPL CALCULATED.3IONS-SCNC: 15 MMOL/L (ref 5–18)
ANION GAP SERPL CALCULATED.3IONS-SCNC: 15 MMOL/L (ref 5–18)
BUN SERPL-MCNC: 5 MG/DL (ref 8–22)
BUN SERPL-MCNC: 8 MG/DL (ref 8–22)
BUN SERPL-MCNC: 9 MG/DL (ref 8–22)
C REACTIVE PROTEIN LHE: 2.1 MG/DL (ref 0–?)
CALCIUM SERPL-MCNC: 7.7 MG/DL (ref 8.5–10.5)
CALCIUM SERPL-MCNC: 7.8 MG/DL (ref 8.5–10.5)
CALCIUM SERPL-MCNC: 7.9 MG/DL (ref 8.5–10.5)
CALCIUM SERPL-MCNC: 8 MG/DL (ref 8.5–10.5)
CALCIUM SERPL-MCNC: 8.1 MG/DL (ref 8.5–10.5)
CALCIUM SERPL-MCNC: 8.3 MG/DL (ref 8.5–10.5)
CHLORIDE BLD-SCNC: 101 MMOL/L (ref 98–107)
CHLORIDE BLD-SCNC: 102 MMOL/L (ref 98–107)
CHLORIDE BLD-SCNC: 102 MMOL/L (ref 98–107)
CHLORIDE BLD-SCNC: 105 MMOL/L (ref 98–107)
CHLORIDE BLD-SCNC: 107 MMOL/L (ref 98–107)
CHLORIDE BLD-SCNC: 108 MMOL/L (ref 98–107)
CO2 SERPL-SCNC: 16 MMOL/L (ref 22–31)
CO2 SERPL-SCNC: 16 MMOL/L (ref 22–31)
CO2 SERPL-SCNC: 17 MMOL/L (ref 22–31)
CO2 SERPL-SCNC: 18 MMOL/L (ref 22–31)
CO2 SERPL-SCNC: 19 MMOL/L (ref 22–31)
CO2 SERPL-SCNC: 19 MMOL/L (ref 22–31)
CREAT SERPL-MCNC: 0.57 MG/DL (ref 0.6–1.3)
CREAT SERPL-MCNC: 0.57 MG/DL (ref 0.6–1.3)
CREAT SERPL-MCNC: 0.59 MG/DL (ref 0.6–1.3)
CREAT SERPL-MCNC: 0.59 MG/DL (ref 0.6–1.3)
CREAT SERPL-MCNC: 0.66 MG/DL (ref 0.6–1.3)
CREAT SERPL-MCNC: 0.72 MG/DL (ref 0.6–1.3)
ERYTHROCYTE [DISTWIDTH] IN BLOOD BY AUTOMATED COUNT: 12.4 % (ref 10–15)
GFR SERPL CREATININE-BSD FRML MDRD: >90 ML/MIN/1.73M2
GLUCOSE BLD-MCNC: 200 MG/DL (ref 70–125)
GLUCOSE BLD-MCNC: 215 MG/DL (ref 70–125)
GLUCOSE BLD-MCNC: 220 MG/DL (ref 70–125)
GLUCOSE BLD-MCNC: 242 MG/DL (ref 70–125)
GLUCOSE BLD-MCNC: 256 MG/DL (ref 70–125)
GLUCOSE BLD-MCNC: 311 MG/DL (ref 70–125)
GLUCOSE BLDC GLUCOMTR-MCNC: 202 MG/DL (ref 70–99)
GLUCOSE BLDC GLUCOMTR-MCNC: 212 MG/DL (ref 70–99)
GLUCOSE BLDC GLUCOMTR-MCNC: 224 MG/DL (ref 70–99)
GLUCOSE BLDC GLUCOMTR-MCNC: 228 MG/DL (ref 70–99)
GLUCOSE BLDC GLUCOMTR-MCNC: 234 MG/DL (ref 70–99)
GLUCOSE BLDC GLUCOMTR-MCNC: 273 MG/DL (ref 70–99)
GLUCOSE BLDC GLUCOMTR-MCNC: 312 MG/DL (ref 70–99)
HCT VFR BLD AUTO: 34.9 % (ref 35–53)
HGB BLD-MCNC: 12.2 G/DL (ref 11.7–17.7)
HOLD SPECIMEN: NORMAL
MAGNESIUM SERPL-MCNC: 1.8 MG/DL (ref 1.8–2.6)
MCH RBC QN AUTO: 31.4 PG (ref 26.5–33)
MCHC RBC AUTO-ENTMCNC: 35 G/DL (ref 31.5–36.5)
MCV RBC AUTO: 90 FL (ref 78–100)
PHOSPHATE SERPL-MCNC: 2.3 MG/DL (ref 2.5–4.5)
PHOSPHATE SERPL-MCNC: 2.5 MG/DL (ref 2.5–4.5)
PLATELET # BLD AUTO: 181 10E3/UL (ref 150–450)
POTASSIUM BLD-SCNC: 3.2 MMOL/L (ref 3.5–5)
POTASSIUM BLD-SCNC: 3.7 MMOL/L (ref 3.5–5)
POTASSIUM BLD-SCNC: 3.8 MMOL/L (ref 3.5–5)
POTASSIUM BLD-SCNC: 3.9 MMOL/L (ref 3.5–5)
RBC # BLD AUTO: 3.88 10E6/UL (ref 3.8–5.9)
SARS-COV-2 RNA RESP QL NAA+PROBE: POSITIVE
SODIUM SERPL-SCNC: 133 MMOL/L (ref 136–145)
SODIUM SERPL-SCNC: 134 MMOL/L (ref 136–145)
SODIUM SERPL-SCNC: 138 MMOL/L (ref 136–145)
WBC # BLD AUTO: 4.7 10E3/UL (ref 4–11)

## 2023-01-14 PROCEDURE — 250N000013 HC RX MED GY IP 250 OP 250 PS 637

## 2023-01-14 PROCEDURE — 99222 1ST HOSP IP/OBS MODERATE 55: CPT | Performed by: INTERNAL MEDICINE

## 2023-01-14 PROCEDURE — 80048 BASIC METABOLIC PNL TOTAL CA: CPT | Performed by: FAMILY MEDICINE

## 2023-01-14 PROCEDURE — 120N000001 HC R&B MED SURG/OB

## 2023-01-14 PROCEDURE — U0003 INFECTIOUS AGENT DETECTION BY NUCLEIC ACID (DNA OR RNA); SEVERE ACUTE RESPIRATORY SYNDROME CORONAVIRUS 2 (SARS-COV-2) (CORONAVIRUS DISEASE [COVID-19]), AMPLIFIED PROBE TECHNIQUE, MAKING USE OF HIGH THROUGHPUT TECHNOLOGIES AS DESCRIBED BY CMS-2020-01-R: HCPCS | Performed by: FAMILY MEDICINE

## 2023-01-14 PROCEDURE — 250N000011 HC RX IP 250 OP 636: Performed by: FAMILY MEDICINE

## 2023-01-14 PROCEDURE — 258N000003 HC RX IP 258 OP 636: Performed by: FAMILY MEDICINE

## 2023-01-14 PROCEDURE — 85027 COMPLETE CBC AUTOMATED: CPT

## 2023-01-14 PROCEDURE — 36415 COLL VENOUS BLD VENIPUNCTURE: CPT

## 2023-01-14 PROCEDURE — 99233 SBSQ HOSP IP/OBS HIGH 50: CPT | Mod: GC

## 2023-01-14 PROCEDURE — 84100 ASSAY OF PHOSPHORUS: CPT | Performed by: FAMILY MEDICINE

## 2023-01-14 PROCEDURE — 250N000009 HC RX 250

## 2023-01-14 PROCEDURE — 87040 BLOOD CULTURE FOR BACTERIA: CPT

## 2023-01-14 PROCEDURE — 83735 ASSAY OF MAGNESIUM: CPT | Performed by: FAMILY MEDICINE

## 2023-01-14 PROCEDURE — 84132 ASSAY OF SERUM POTASSIUM: CPT | Performed by: FAMILY MEDICINE

## 2023-01-14 PROCEDURE — 36415 COLL VENOUS BLD VENIPUNCTURE: CPT | Performed by: FAMILY MEDICINE

## 2023-01-14 PROCEDURE — 86140 C-REACTIVE PROTEIN: CPT

## 2023-01-14 PROCEDURE — 250N000013 HC RX MED GY IP 250 OP 250 PS 637: Performed by: FAMILY MEDICINE

## 2023-01-14 RX ORDER — DEXTROSE MONOHYDRATE 25 G/50ML
25-50 INJECTION, SOLUTION INTRAVENOUS
Status: DISCONTINUED | OUTPATIENT
Start: 2023-01-14 | End: 2023-01-16 | Stop reason: HOSPADM

## 2023-01-14 RX ORDER — POTASSIUM CHLORIDE 1500 MG/1
20 TABLET, EXTENDED RELEASE ORAL ONCE
Status: COMPLETED | OUTPATIENT
Start: 2023-01-14 | End: 2023-01-14

## 2023-01-14 RX ORDER — DEXTROSE MONOHYDRATE 25 G/50ML
25-50 INJECTION, SOLUTION INTRAVENOUS
Status: CANCELLED | OUTPATIENT
Start: 2023-01-14

## 2023-01-14 RX ORDER — NICOTINE POLACRILEX 4 MG
15-30 LOZENGE BUCCAL
Status: DISCONTINUED | OUTPATIENT
Start: 2023-01-14 | End: 2023-01-16 | Stop reason: HOSPADM

## 2023-01-14 RX ORDER — NICOTINE POLACRILEX 4 MG
15-30 LOZENGE BUCCAL
Status: CANCELLED | OUTPATIENT
Start: 2023-01-14

## 2023-01-14 RX ADMIN — POTASSIUM & SODIUM PHOSPHATES POWDER PACK 280-160-250 MG 1 PACKET: 280-160-250 PACK at 13:19

## 2023-01-14 RX ADMIN — VANCOMYCIN HYDROCHLORIDE 750 MG: 5 INJECTION, POWDER, LYOPHILIZED, FOR SOLUTION INTRAVENOUS at 23:55

## 2023-01-14 RX ADMIN — POTASSIUM & SODIUM PHOSPHATES POWDER PACK 280-160-250 MG 1 PACKET: 280-160-250 PACK at 20:50

## 2023-01-14 RX ADMIN — INSULIN GLARGINE 32 UNITS: 100 INJECTION, SOLUTION SUBCUTANEOUS at 18:17

## 2023-01-14 RX ADMIN — POTASSIUM CHLORIDE 20 MEQ: 1500 TABLET, EXTENDED RELEASE ORAL at 05:07

## 2023-01-14 RX ADMIN — POTASSIUM & SODIUM PHOSPHATES POWDER PACK 280-160-250 MG 1 PACKET: 280-160-250 PACK at 05:08

## 2023-01-14 RX ADMIN — POTASSIUM & SODIUM PHOSPHATES POWDER PACK 280-160-250 MG 1 PACKET: 280-160-250 PACK at 16:30

## 2023-01-14 RX ADMIN — IBUPROFEN 600 MG: 600 TABLET ORAL at 02:09

## 2023-01-14 RX ADMIN — VANCOMYCIN HYDROCHLORIDE 750 MG: 5 INJECTION, POWDER, LYOPHILIZED, FOR SOLUTION INTRAVENOUS at 10:42

## 2023-01-14 RX ADMIN — IBUPROFEN 600 MG: 600 TABLET ORAL at 20:45

## 2023-01-14 RX ADMIN — INSULIN ASPART 2 UNITS: 100 INJECTION, SOLUTION INTRAVENOUS; SUBCUTANEOUS at 04:24

## 2023-01-14 RX ADMIN — INSULIN ASPART 1 UNITS: 100 INJECTION, SOLUTION INTRAVENOUS; SUBCUTANEOUS at 07:26

## 2023-01-14 RX ADMIN — POTASSIUM & SODIUM PHOSPHATES POWDER PACK 280-160-250 MG 1 PACKET: 280-160-250 PACK at 09:15

## 2023-01-14 RX ADMIN — INSULIN ASPART 2 UNITS: 100 INJECTION, SOLUTION INTRAVENOUS; SUBCUTANEOUS at 00:23

## 2023-01-14 RX ADMIN — BACITRACIN: 500 OINTMENT TOPICAL at 20:50

## 2023-01-14 RX ADMIN — BACITRACIN: 500 OINTMENT TOPICAL at 09:15

## 2023-01-14 ASSESSMENT — ACTIVITIES OF DAILY LIVING (ADL)
DOING_ERRANDS_INDEPENDENTLY_DIFFICULTY: NO
ADLS_ACUITY_SCORE: 33
DIFFICULTY_COMMUNICATING: NO
ADLS_ACUITY_SCORE: 33
ADLS_ACUITY_SCORE: 33
ADLS_ACUITY_SCORE: 20
CHANGE_IN_FUNCTIONAL_STATUS_SINCE_ONSET_OF_CURRENT_ILLNESS/INJURY: NO
TOILETING_ISSUES: NO
ADLS_ACUITY_SCORE: 20
ADLS_ACUITY_SCORE: 33
FALL_HISTORY_WITHIN_LAST_SIX_MONTHS: NO
CONCENTRATING,_REMEMBERING_OR_MAKING_DECISIONS_DIFFICULTY: NO
EQUIPMENT_CURRENTLY_USED_AT_HOME: GLUCOMETER
ADLS_ACUITY_SCORE: 20
HEARING_DIFFICULTY_OR_DEAF: NO
ADLS_ACUITY_SCORE: 20
WALKING_OR_CLIMBING_STAIRS_DIFFICULTY: NO
ADLS_ACUITY_SCORE: 33
DRESSING/BATHING_DIFFICULTY: NO
DIFFICULTY_EATING/SWALLOWING: NO
ADLS_ACUITY_SCORE: 20
WEAR_GLASSES_OR_BLIND: NO

## 2023-01-14 NOTE — PLAN OF CARE
Problem: Wound Healing Progression  Goal: Optimal Wound Healing  Outcome: Not Progressing  Intervention: Promote Wound Healing    Problem: Hyperglycemia  Goal: Blood Glucose Level Within Targeted Range  Outcome: Progressing     Problem: Infection  Goal: Absence of Infection Signs and Symptoms  Outcome: Progressing    Problem: Pain Acute  Goal: Optimal Pain Control and Function  Outcome: Progressing  Intervention: Develop Pain Management Plan  Intervention: Prevent or Manage Pain    Problem: Electrolyte Imbalance  Goal: Electrolyte Imbalance: Plan of Care  Outcome: Progressing     Pt medicated once with 600 mg ibuprofen for head wound pain.  Open wound to head draining small amount serous.  Pt's potassium and phosphorous replaced this AM.  Pt afebrile.  Pt given Vancomycin via left arm PIV.  Pt states that there was burning with infusion.  Redness noted at IV site.  MD informed for possible PICC line placement prior to next dose of Vancomycin.  Pt has remained off insulin drip.  Pt given Lantus last evening and is now on sliding scale.  Glucose checks now every 4 hours.  Glucose levels 220 range.  IV fluids changed to NS.  Continue to monitor.

## 2023-01-14 NOTE — PROGRESS NOTES
Therapy: IV ABX   Insurance: [facility]    100% coverage for IV abx, will have a copay per dispense on the drug through pharmacy plan     In reference to admission on 1/12/23 to check IV abx coverage    Please contact Intake with any questions, [phone] or In Basket pool, [facility] ([phone]).

## 2023-01-14 NOTE — PROGRESS NOTES
Care Management Follow Up    Length of Stay (days): 2    Expected Discharge Date: 01/15/2023     Concerns to be Addressed:       Patient plan of care discussed at interdisciplinary rounds: Yes    Anticipated Discharge Disposition: Home     Anticipated Discharge Services:  pending  Anticipated Discharge DME:  pending    Referrals Placed by CM/SW:  [facility]    Additional Information:  9:30 AM  Chart reviewed.  Referral sent to [facility] ([facility]) to check benefits in anticipation of possible need for continued IV antibiotics.    10:51 AM   Per [facility], pt has 100% coverage.  Pt will have pharmacy copays for the medication - depending on which medication.    CM to continue to follow and coordinate as needed.    MIKEY London

## 2023-01-14 NOTE — PLAN OF CARE
Problem: Plan of Care - These are the overarching goals to be used throughout the patient stay.    Goal: Optimal Comfort and Wellbeing  Outcome: Progressing   Goal Outcome Evaluation:    Pt is A/O x4; pt mother is with pt. Ate very minimal. Insulin coverage per sliding scale/Lantus orders at HS. Declines pain. Phos replaced orally. Maintaining COVID precautions. calls appropriately. PIV X2 saline locked.

## 2023-01-14 NOTE — PROGRESS NOTES
Pt transferred to room 318; now ICU status and pt, mom both aware.  Transferred pt via w/c  And settled in room; call light at bedside.

## 2023-01-14 NOTE — CONSULTS
"CRITICAL CARE CONSULT:    Assessment/Plan:  22 year old with a history of glycogen storage disease IIIa, DM, depression/anxiety, eating disorder, admitted on 1/12 with DKA precipitated by disordered eating (binging/purging) and medication nonadherence, transferred to ICU evening of 1/13.    ENDO/RENAL:  Glycogen storage disease IIIa  DM  DKA, now with gap closed. High insulin resistance. On glargine 32 units daily. FSBG 200s, which is \"excellent\" per patient, who states their glucose is usually 600. They stopped glargine on 1/8. Have been binging/purging.    Gap closed, stable to transfer out of ICU    Insulin dosing per primary team    Diabetes education    Needs ongoing psychiatric and eating disorder support    RESP:  No acute issues.    monitor    CV:  No acute issues.    Monitor    NEURO:  Depression/anxiety, bulimia.    Treatment per primary team    GI:  Bulimia, malnutrition.    Treatment per primary team    ID:  Infected sebaceous cyst.    Treatment per primary team    HEMATOLOGIC:  No acute issues.    monitor    ICU PROPHYLAXIS:    PO diet    PT/OT    Lines/Drains/Tubes:  PIVs    CODE STATUS, DISPOSITION, FAMILY COMMUNICATION: Full code. Clinically stable to transfer out of ICU. Will sign off but call if needed.    Restraints  Not indicated    Barron Howard MD  Pulmonary and Critical Care Medicine  [facility]  Office [phone]  Pager [phone]  he/him    CC: DKA    HPI: 22 year old with a history of glycogen storage disease IIIa, DM, depression/anxiety, eating disorder, admitted on 1/12 with DKA precipitated by disordered eating (binging/purging) and medication nonadherence, transferred to ICU evening of 1/13. Patient reported binging/purging, stopped insulin 1/8. DKA, malnourished. Gap closed, stable, FSBG 200.    Past Medical History:  Past Medical History:   Diagnosis Date     Anxiety      Depressive disorder      Diabetes mellitus (H)      Glycogen storage disease IIIa - see " "Emergency Letter in EPIC dated 05/07/12 2/17/2012       Past Surgical History:  Past Surgical History:   Procedure Laterality Date     TONSILLECTOMY Bilateral 4/2015       Social History:  Social History     Socioeconomic History     Marital status: Single     Spouse name: Not on file     Number of children: Not on file     Years of education: Not on file     Highest education level: Not on file   Occupational History     Not on file   Tobacco Use     Smoking status: Never     Smokeless tobacco: Never     Tobacco comments:     no second hand smoke exposure at home   Vaping Use     Vaping Use: Some days     Substances: Nicotine, THC   Substance and Sexual Activity     Alcohol use: No     Drug use: Yes     Types: Marijuana     Comment: every other day user     Sexual activity: Not on file   Other Topics Concern     Not on file   Social History Narrative    Lives with parents and younger brother.  Currently in 12th grade.  Loves to sing     Social Determinants of Health     Financial Resource Strain: Not on file   Food Insecurity: Not on file   Transportation Needs: Not on file   Physical Activity: Not on file   Stress: Not on file   Social Connections: Not on file   Intimate Partner Violence: Not on file   Housing Stability: Not on file       Family History:  Family History   Problem Relation Age of Onset     Hearing Loss Father        Allergies:  Allergies   Allergen Reactions     Fluoxetine      Other reaction(s): Mental Status Change  Suicidal thoughts     Morphine Sulfate      No exposure but grandfather has the allergy to it     Tylenol [Acetaminophen]      Has glycogen storage disease and should not receive Tylenol, per GI.       MAR Reviewed      Physical Exam:  Vent settings for last 24 hours:  Resp: 18      /74 (BP Location: Left arm)   Pulse 80   Temp 97.7  F (36.5  C) (Oral)   Resp 18   Ht 1.626 m (5' 4\")   Wt 34.3 kg (75 lb 11.2 oz)   SpO2 100%   BMI 12.99 kg/m      Intake/Output last 3 " shifts:  I/O last 3 completed shifts:  In: 3863.34 [P.O.:1380; I.V.:2483.34]  Out: 3450 [Urine:3450]  Intake/Output this shift:  I/O this shift:  In: 158.33 [I.V.:158.33]  Out: 500 [Urine:500]    Physical Exam  Gen: alert, oriented, cachectic  HEENT: NT, no ANABELLE  CV: RRR, no m/g/r  Resp: CTAB  Abd: soft, nontender, BS+  Skin: no rashes or lesions  Ext: no edema  Neuro: PERRL, nonfocal exam    IMAGING:  CXR (1/12/23):  - images directly reviewed, formal interpretation follows:  IMPRESSION: Negative chest.    Total Critical Care Time : 45 Minutes    Clinically Significant Risk Factors        # Hypokalemia: Lowest K = 2.8 mmol/L in last 2 days, will replace as needed   # Hypocalcemia: Lowest Ca = 7.5 mg/dL in last 2 days, will monitor and replace as appropriate   # Hypomagnesemia: Lowest Mg = 1.4 mg/dL in last 2 days, will replace as needed  # Anion Gap Metabolic Acidosis: Highest Anion Gap = 27.5 mmol/L in last 2 days, will monitor and treat as appropriate

## 2023-01-14 NOTE — PROGRESS NOTES
Called by primary team for transfer to the ICU  Pt with brittle diabetes and on and off insulin gtt  Given q1 glucose checks and intensive monitoring  Agree with transfer to the ICU for closer monitoring.  Dr. Morales

## 2023-01-14 NOTE — PROGRESS NOTES
[facility]    Progress Note - Hospitalist Service       Date of Admission:  1/12/2023    Assessment & Plan   Kevin Stephens is a 22 year old adult admitted on 1/12/2023. Brianna Stephens has a past medical history of type 1 diabetes, infected sebaceous cyst status post removal, and glycogen storage disease type IIIa.  They are admitted on 1/12/2023 diabetic ketoacidosis.    Diabetic ketoacidosis, improved  Type 1 diabetes mellitus  Patient has not taken long-acting insulin since 1/8. Last short acting insulin was given on 1/12 around 130 for a blood glucose of 277.  Home regimen includes Lantus 32 units every morning and NovoLog 1 unit per 40/180.  In ED patient is noted to have kussmaul breathing. On recheck potassium was 2.8, so insulin drip held and potassium replaced.  Potassium was replaced and corrected, and insulin drip was restarted.  While insulin drip was off, blood glucose and ketones increased.  These began to correct after insulin drip was restarted.  - Downgraded from ICU status 1/14  - Anion gap closed  - Continuous pulse ox  - Continue telemetry today  - Glucose checks AC and HS with PTA sliding scale (40/180)  - Continue PTA 32 units of Lantus and sliding scale  - Potassium replacement, per protocol  - Phosphorus replacement, per protocol  - Magnesium replacement, per protocol  - Moderate carb consistent diet  - Consult inpatient diabetic educator, appreciate recs and cares   - Prescribe freestyle miguel and urine ketone test strips upon discharge  - Follow-up outpatient with endocrinologist.      If hypoglycemic, glucagon will NOT be effective  - D10 at 1.5 times maintenance  - Wean slowly as tolerated; no abrupt stops     Sebaceous cyst complicated by abscess  Staph aureus bacteremia  Patient seen at PCP clinic today for increased drainage from cyst removal on 1/10/2023. Patient was prescribed Bactrim. CT of head shows right parietal scalp swelling with a tiny focus of gas  suggesting a superficial tract. Patient started on vancomycin in the emergency department.  - Blood cultures x2 positive for staph aureus. Follow sensitivities    - Follow daily blood cultures   - Continue vancomycin. May transition to Bactrim or cephalosporin pending susceptibilities.     Glycogen-storage disease type IIIa (Cori disease)  Patient diagnosed at 18 months of age.  Disease noted to have liver, muscle, and cardiac involvement.  Patient is at high risk for hypoglycemic events.  Patient has not followed up with a specialty provider for disease since the age of 18 years old due to graduating from pediatrics.  Patient previously was taking cornstarch 4 times a day.  However, patient has not done this for 4 years now. Lactated ringers should be AVOIDED.  AST and ALT with slightly elevated at 55 and alkaline phosphatase elevated at 304.  Cholesterol levels within normal other than slightly elevated triglycerides at 240.  - Recommend outpatient follow-up with new GI specialist  - Genetics and Metabolism physician on call available 24 hours/day via the page  ([phone]).     Binging-purging behavior  Mother reports history of binge eating-purging behavior that has been present for at least the past 6 months, may have been present for up to 1 year.  Patient appears malnourished.  - Social work and nutrition consulted        Diet: Moderate Consistent Carb (60 g CHO per Meal) Diet    DVT Prophylaxis: Pneumatic compression devices  Byrne Catheter: Not present  Fluids: D5 half NS with potassium at 100/hr  Lines: None     Cardiac Monitoring: ACTIVE order. Indication: DKA  Code Status: Full Code      Clinically Significant Risk Factors        # Hypokalemia: Lowest K = 2.8 mmol/L in last 2 days, will replace as needed   # Hypocalcemia: Lowest Ca = 7.5 mg/dL in last 2 days, will monitor and replace as appropriate   # Hypomagnesemia: Lowest Mg = 1.4 mg/dL in last 2 days, will replace as needed  # Anion Gap  Metabolic Acidosis: Highest Anion Gap = 27.5 mmol/L in last 2 days, will monitor and treat as appropriate                     The patient's care was discussed with the Attending Physician, Dr. Naranjo.    Winifred Elizondo DO PGY1  Hospitalist Service  [facility]  Securely message with [employer] (more info)  Text page via [facility] Paging/Directory   ______________________________________________________________________    Interval History   Pt resting in bed this morning. Not able to get much sleep overnight. Pain on scalp, denies other pain. No nausea, vomiting, diarrhea, changes to urination, abdominal pain, fevers, chills, shortness of breath, chest pain, diaphoresis. Plans to try eating later this morning. Feels supported at home.    Physical Exam   Vital Signs: Temp: 97.7  F (36.5  C) Temp src: Oral BP: 103/74 Pulse: 80   Resp: 18 SpO2: 100 % O2 Device: None (Room air)    Weight: 75 lbs 11.2 oz    General appearance: Fatigued, in no distress, cooperative, answering questions appropriately, thin  Head: Erythematic and swelling of right parietal scalp from previous procedural incision, small amount of drainage which is nonpurulent, scarred area on left frontal scalp, multiple inflamed sebaceous cysts diffusely on scalp  Eyes: conjunctivae/corneas clear  Ears: hearing grossly intact  Nose: nares normal, no drainage  Throat: lips with piercings, mucosa, and tongue normal but dry  Lung: clear to auscultation bilaterally, no wheezing, coughing  Chest wall: no tenderness  Heart: regular rate and rhythm, S1, S2 normal, no murmur, click, rub, or gallop  Abdomen: soft, non-tender, bowel sounds present in all four quadrants, hepatomegaly  Extremities: warm, dry, atraumatic, no cyanosis, no edema  Pulses: 2+ and symmetric  Skin: Mild pallor, texture, and turgor. No rashes or lesions  Neurologic: Ambulatory, sensation grossly intact in bilateral lower extremities  Psychologic: Appropriate mood         Data     I have personally reviewed the following data over the past 24 hrs:    4.7  \   12.2   / 181     133 (L) 102 5 (L) /  256 (H)   3.8 16 (L) 0.59 (L) \       Procal: N/A CRP: 2.1 (H) Lactic Acid: N/A         Imaging results reviewed over the past 24 hrs:   No results found for this or any previous visit (from the past 24 hour(s)).

## 2023-01-15 LAB
ANION GAP SERPL CALCULATED.3IONS-SCNC: 10 MMOL/L (ref 5–18)
ANION GAP SERPL CALCULATED.3IONS-SCNC: 12 MMOL/L (ref 5–18)
ANION GAP SERPL CALCULATED.3IONS-SCNC: 9 MMOL/L (ref 5–18)
BUN SERPL-MCNC: 10 MG/DL (ref 8–22)
BUN SERPL-MCNC: 11 MG/DL (ref 8–22)
BUN SERPL-MCNC: 9 MG/DL (ref 8–22)
CALCIUM SERPL-MCNC: 8 MG/DL (ref 8.5–10.5)
CALCIUM SERPL-MCNC: 8.2 MG/DL (ref 8.5–10.5)
CALCIUM SERPL-MCNC: 8.3 MG/DL (ref 8.5–10.5)
CHLORIDE BLD-SCNC: 100 MMOL/L (ref 98–107)
CHLORIDE BLD-SCNC: 105 MMOL/L (ref 98–107)
CHLORIDE BLD-SCNC: 106 MMOL/L (ref 98–107)
CO2 SERPL-SCNC: 21 MMOL/L (ref 22–31)
CO2 SERPL-SCNC: 22 MMOL/L (ref 22–31)
CO2 SERPL-SCNC: 23 MMOL/L (ref 22–31)
CREAT SERPL-MCNC: 0.6 MG/DL (ref 0.6–1.3)
CREAT SERPL-MCNC: 0.61 MG/DL (ref 0.6–1.3)
CREAT SERPL-MCNC: 0.7 MG/DL (ref 0.6–1.3)
GFR SERPL CREATININE-BSD FRML MDRD: >90 ML/MIN/1.73M2
GLUCOSE BLD-MCNC: 254 MG/DL (ref 70–125)
GLUCOSE BLD-MCNC: 269 MG/DL (ref 70–125)
GLUCOSE BLD-MCNC: 280 MG/DL (ref 70–125)
GLUCOSE BLDC GLUCOMTR-MCNC: 210 MG/DL (ref 70–99)
GLUCOSE BLDC GLUCOMTR-MCNC: 218 MG/DL (ref 70–99)
GLUCOSE BLDC GLUCOMTR-MCNC: 257 MG/DL (ref 70–99)
GLUCOSE BLDC GLUCOMTR-MCNC: 303 MG/DL (ref 70–99)
HOLD SPECIMEN: NORMAL
HOLD SPECIMEN: NORMAL
MAGNESIUM SERPL-MCNC: 2.7 MG/DL (ref 1.8–2.6)
PHOSPHATE SERPL-MCNC: 3.3 MG/DL (ref 2.5–4.5)
POTASSIUM BLD-SCNC: 3.3 MMOL/L (ref 3.5–5)
POTASSIUM BLD-SCNC: 3.7 MMOL/L (ref 3.5–5)
POTASSIUM BLD-SCNC: 3.8 MMOL/L (ref 3.5–5)
POTASSIUM BLD-SCNC: 4.4 MMOL/L (ref 3.5–5)
SODIUM SERPL-SCNC: 133 MMOL/L (ref 136–145)
SODIUM SERPL-SCNC: 137 MMOL/L (ref 136–145)
SODIUM SERPL-SCNC: 138 MMOL/L (ref 136–145)

## 2023-01-15 PROCEDURE — 36415 COLL VENOUS BLD VENIPUNCTURE: CPT

## 2023-01-15 PROCEDURE — 80048 BASIC METABOLIC PNL TOTAL CA: CPT | Performed by: FAMILY MEDICINE

## 2023-01-15 PROCEDURE — 83735 ASSAY OF MAGNESIUM: CPT | Performed by: FAMILY MEDICINE

## 2023-01-15 PROCEDURE — 250N000013 HC RX MED GY IP 250 OP 250 PS 637: Performed by: FAMILY MEDICINE

## 2023-01-15 PROCEDURE — 250N000011 HC RX IP 250 OP 636: Performed by: FAMILY MEDICINE

## 2023-01-15 PROCEDURE — 87040 BLOOD CULTURE FOR BACTERIA: CPT

## 2023-01-15 PROCEDURE — 258N000003 HC RX IP 258 OP 636: Performed by: FAMILY MEDICINE

## 2023-01-15 PROCEDURE — 84100 ASSAY OF PHOSPHORUS: CPT | Performed by: FAMILY MEDICINE

## 2023-01-15 PROCEDURE — 120N000001 HC R&B MED SURG/OB

## 2023-01-15 PROCEDURE — 36415 COLL VENOUS BLD VENIPUNCTURE: CPT | Performed by: FAMILY MEDICINE

## 2023-01-15 PROCEDURE — 84132 ASSAY OF SERUM POTASSIUM: CPT | Performed by: FAMILY MEDICINE

## 2023-01-15 PROCEDURE — 99232 SBSQ HOSP IP/OBS MODERATE 35: CPT | Mod: GC

## 2023-01-15 PROCEDURE — 250N000013 HC RX MED GY IP 250 OP 250 PS 637

## 2023-01-15 RX ORDER — POTASSIUM CHLORIDE 1.5 G/1.58G
20 POWDER, FOR SOLUTION ORAL ONCE
Status: COMPLETED | OUTPATIENT
Start: 2023-01-15 | End: 2023-01-15

## 2023-01-15 RX ORDER — CEPHALEXIN 500 MG/1
500 CAPSULE ORAL EVERY 8 HOURS SCHEDULED
Status: DISCONTINUED | OUTPATIENT
Start: 2023-01-15 | End: 2023-01-16 | Stop reason: HOSPADM

## 2023-01-15 RX ADMIN — POTASSIUM CHLORIDE 20 MEQ: 1.5 POWDER, FOR SOLUTION ORAL at 07:45

## 2023-01-15 RX ADMIN — VANCOMYCIN HYDROCHLORIDE 750 MG: 5 INJECTION, POWDER, LYOPHILIZED, FOR SOLUTION INTRAVENOUS at 11:54

## 2023-01-15 RX ADMIN — INSULIN GLARGINE 32 UNITS: 100 INJECTION, SOLUTION SUBCUTANEOUS at 18:05

## 2023-01-15 RX ADMIN — IBUPROFEN 600 MG: 600 TABLET ORAL at 16:37

## 2023-01-15 RX ADMIN — IBUPROFEN 600 MG: 600 TABLET ORAL at 04:12

## 2023-01-15 RX ADMIN — CEPHALEXIN 500 MG: 500 CAPSULE ORAL at 14:33

## 2023-01-15 RX ADMIN — BACITRACIN: 500 OINTMENT TOPICAL at 21:58

## 2023-01-15 RX ADMIN — BACITRACIN: 500 OINTMENT TOPICAL at 11:50

## 2023-01-15 RX ADMIN — IBUPROFEN 600 MG: 600 TABLET ORAL at 09:55

## 2023-01-15 RX ADMIN — CEPHALEXIN 500 MG: 500 CAPSULE ORAL at 21:55

## 2023-01-15 ASSESSMENT — ACTIVITIES OF DAILY LIVING (ADL)
ADLS_ACUITY_SCORE: 20

## 2023-01-15 NOTE — PROGRESS NOTES
[facility]    Progress Note - Hospitalist Service       Date of Admission:  1/12/2023    Assessment & Plan   Kevin Stephens is a 22 year old adult admitted on 1/12/2023. Brianna Stephens has a past medical history of type 1 diabetes, infected sebaceous cyst status post removal, and glycogen storage disease type IIIa.  They are admitted on 1/12/2023 diabetic ketoacidosis.    Diabetic ketoacidosis, improved  Type 1 diabetes mellitus  Patient has not taken long-acting insulin since 1/8. Last short acting insulin was given on 1/12 around 130 for a blood glucose of 277.  Home regimen includes Lantus 32 units every morning and NovoLog 1 unit per 40/180.   - Downgraded from ICU status 1/14  - Anion gap closed  - Continuous pulse ox  - discontinue telemetry   - Glucose checks AC and HS with PTA sliding scale (40/180)  - Continue PTA 32 units of Lantus and sliding scale   - Consider PTA insulin regimen adjustments outpatient   - Potassium replacement, per protocol  - Phosphorus replacement, per protocol  - Magnesium replacement, per protocol  - Moderate carb consistent diet  - Consult inpatient diabetic educator, appreciate recs and cares   - Prescribe freestyle miguel and urine ketone test strips upon discharge  - Follow-up outpatient with endocrinologist.      If hypoglycemic, glucagon will NOT be effective  - D10 at 1.5 times maintenance  - Wean slowly as tolerated; no abrupt stops     Sebaceous cyst complicated by abscess  Staph aureus bacteremia  Patient seen at PCP clinic today for increased drainage from cyst removal on 1/10/2023. Patient was prescribed Bactrim. CT of head shows right parietal scalp swelling with a tiny focus of gas suggesting a superficial tract. Patient started on vancomycin in the emergency department.  - Blood cultures x2 positive for staph aureus.     - Follow daily blood cultures until negative x48 hours  - S/p vancomycin (1/12-1/15). transition to Keflex 500mg TID x 11  days based on susceptibilities      Glycogen-storage disease type IIIa (Cori disease)  Patient diagnosed at 18 months of age.  Disease noted to have liver, muscle, and cardiac involvement.  Patient is at high risk for hypoglycemic events.  Patient has not followed up with a specialty provider for disease since the age of 18 years old due to graduating from pediatrics.  Patient previously was taking cornstarch 4 times a day.  However, patient has not done this for 4 years now. Lactated ringers should be AVOIDED.  AST and ALT with slightly elevated at 55 and alkaline phosphatase elevated at 304.  Cholesterol levels within normal other than slightly elevated triglycerides at 240.  - Recommend outpatient follow-up with new GI specialist  - Genetics and Metabolism physician on call available 24 hours/day via the page  ([phone]).     Binging-purging behavior  Mother reports history of binge eating-purging behavior that has been present for at least the past 6 months, may have been present for up to 1 year.  Patient appears malnourished.  - Social work and nutrition consulted    Covid-19 positive  Collected 1/14/23 per hospital protocol. Asymptomatic.         Diet: Moderate Consistent Carb (60 g CHO per Meal) Diet    DVT Prophylaxis: Pneumatic compression devices  Byrne Catheter: Not present  Fluids: D5 half NS with potassium at 100/hr  Lines: None     Cardiac Monitoring: ACTIVE order. Indication: Electrolyte Imbalance (24 hours)- Magnesium <1.3 mg/ml; Potassium < =2.8 or > 5.5 mg/ml  Code Status: Full Code      Clinically Significant Risk Factors        # Hypokalemia: Lowest K = 3.2 mmol/L in last 2 days, will replace as needed     # Hypomagnesemia: Lowest Mg = 1.4 mg/dL in last 2 days, will replace as needed                      The patient's care was discussed with the Attending Physician, Dr. Naranjo.    Winifred Elizondo DO PGY1  Hospitalist Service  [facility]  Securely message  with [employer] (more info)  Text page via [facility] Paging/Directory   ______________________________________________________________________    Interval History   Pt resting in bed this morning. Not able to get much sleep overnight. Pain on scalp, denies other pain. No nausea, vomiting, diarrhea, changes to urination, abdominal pain, fevers, chills, shortness of breath, chest pain, diaphoresis.     Physical Exam   Vital Signs: Temp: 97.9  F (36.6  C) Temp src: Oral BP: 105/67 Pulse: 84   Resp: 18 SpO2: 98 % O2 Device: None (Room air)    Weight: 75 lbs 11.2 oz    General appearance: Fatigued, in no distress, cooperative, answering questions appropriately, thin  Head: Erythematic and swelling of right parietal scalp from previous procedural incision, small amount of drainage which is nonpurulent, scarred area on left frontal scalp, multiple inflamed sebaceous cysts diffusely on scalp  Eyes: conjunctivae/corneas clear  Ears: hearing grossly intact  Nose: nares normal, no drainage  Throat: lips with piercings, mucosa, and tongue normal but dry  Lung: clear to auscultation bilaterally, no wheezing, coughing  Chest wall: no tenderness  Heart: regular rate and rhythm, S1, S2 normal, no murmur, click, rub, or gallop  Abdomen: soft, non-tender, bowel sounds present in all four quadrants, hepatomegaly  Extremities: warm, dry, atraumatic, no cyanosis, no edema  Pulses: 2+ and symmetric  Skin: Mild pallor, texture, and turgor. No rashes or lesions  Neurologic: Ambulatory, sensation grossly intact in bilateral lower extremities  Psychologic: Appropriate mood        Data     I have personally reviewed the following data over the past 24 hrs:    N/A  \   N/A   / N/A     137 106 10 /  218 (H)   3.3 (L) 22 0.60 \       Procal: N/A CRP: N/A Lactic Acid: N/A         Imaging results reviewed over the past 24 hrs:   No results found for this or any previous visit (from the past 24 hour(s)).   Detail Level: Zone

## 2023-01-15 NOTE — PROGRESS NOTES
VSS on room air.  Pain noted to abscess site on scalp, ibuprophen given.  Alert and orient, able to make need known.  Blood sugar continues to be mildly elevated, insulin given per order.  Pt. Was on tele, NSR, which is discontinued at this time.

## 2023-01-15 NOTE — PLAN OF CARE
Ibuprofen given for pain 4/10 for cyst on R side of head. No other complaints of pain. IVs patent. VSS. Blood sugar of 318 at HS. 4 units given Novolog. Able to make needs known via call light. Independent in room.   Problem: Plan of Care - These are the overarching goals to be used throughout the patient stay.    Goal: Optimal Comfort and Wellbeing  Outcome: Progressing  Intervention: Monitor Pain and Promote Comfort  Recent Flowsheet Documentation  Taken 1/14/2023 2045 by Aneesh Jerome, RN  Pain Management Interventions: medication (see MAR)

## 2023-01-15 NOTE — PLAN OF CARE
Problem: Diabetic Ketoacidosis  Goal: Fluid and Electrolyte Balance with Absence of Ketosis  Outcome: Progressing     Problem: Wound Healing Progression  Goal: Optimal Wound Healing  Outcome: Progressing  Intervention: Promote Wound Healing  Recent Flowsheet Documentation  Taken 1/15/2023 0412 by Karlene Leblanc RN  Pain Management Interventions: medication (see MAR)  Activity Management: up ad mei  Taken 1/15/2023 0008 by Karlene Leblanc, RN  Activity Management: up ad mie     Goal Outcome Evaluation:    Patient is A&Ox4. VSS. C/O 7/10 pain on right side of head from a cyst. PRN ibuprofen was administered with relief. IV antibiotics administered. Regular diet with minimal appetite, patient did eat crackers intermittently throughout the night. Independent in room. Makes needs known.

## 2023-01-16 VITALS
OXYGEN SATURATION: 100 % | SYSTOLIC BLOOD PRESSURE: 103 MMHG | TEMPERATURE: 97.9 F | WEIGHT: 82.45 LBS | HEIGHT: 64 IN | BODY MASS INDEX: 14.08 KG/M2 | RESPIRATION RATE: 16 BRPM | DIASTOLIC BLOOD PRESSURE: 65 MMHG | HEART RATE: 86 BPM

## 2023-01-16 LAB
ANION GAP SERPL CALCULATED.3IONS-SCNC: 10 MMOL/L (ref 5–18)
BACTERIA BLD CULT: ABNORMAL
BUN SERPL-MCNC: 11 MG/DL (ref 8–22)
CALCIUM SERPL-MCNC: 8.5 MG/DL (ref 8.5–10.5)
CHLORIDE BLD-SCNC: 107 MMOL/L (ref 98–107)
CO2 SERPL-SCNC: 25 MMOL/L (ref 22–31)
CREAT SERPL-MCNC: 0.45 MG/DL (ref 0.6–1.3)
GFR SERPL CREATININE-BSD FRML MDRD: >90 ML/MIN/1.73M2
GLUCOSE BLD-MCNC: 96 MG/DL (ref 70–125)
GLUCOSE BLDC GLUCOMTR-MCNC: 107 MG/DL (ref 70–99)
GLUCOSE BLDC GLUCOMTR-MCNC: 130 MG/DL (ref 70–99)
GLUCOSE BLDC GLUCOMTR-MCNC: 171 MG/DL (ref 70–99)
MAGNESIUM SERPL-MCNC: 1.7 MG/DL (ref 1.8–2.6)
PHOSPHATE SERPL-MCNC: 3.8 MG/DL (ref 2.5–4.5)
POTASSIUM BLD-SCNC: 3.3 MMOL/L (ref 3.5–5)
SODIUM SERPL-SCNC: 142 MMOL/L (ref 136–145)

## 2023-01-16 PROCEDURE — 87040 BLOOD CULTURE FOR BACTERIA: CPT

## 2023-01-16 PROCEDURE — 250N000013 HC RX MED GY IP 250 OP 250 PS 637

## 2023-01-16 PROCEDURE — 99238 HOSP IP/OBS DSCHRG MGMT 30/<: CPT | Mod: GC

## 2023-01-16 PROCEDURE — 80048 BASIC METABOLIC PNL TOTAL CA: CPT

## 2023-01-16 PROCEDURE — 83735 ASSAY OF MAGNESIUM: CPT | Performed by: FAMILY MEDICINE

## 2023-01-16 PROCEDURE — 36415 COLL VENOUS BLD VENIPUNCTURE: CPT

## 2023-01-16 PROCEDURE — 250N000013 HC RX MED GY IP 250 OP 250 PS 637: Performed by: FAMILY MEDICINE

## 2023-01-16 PROCEDURE — 84100 ASSAY OF PHOSPHORUS: CPT | Performed by: FAMILY MEDICINE

## 2023-01-16 RX ORDER — POTASSIUM CHLORIDE 1.5 G/1.58G
20 POWDER, FOR SOLUTION ORAL ONCE
Status: COMPLETED | OUTPATIENT
Start: 2023-01-16 | End: 2023-01-16

## 2023-01-16 RX ORDER — CHLORPHENIR/PHENYLEPH/ASPIRIN 2-7.8-325
1 TABLET, EFFERVESCENT ORAL PRN
Qty: 100 STRIP | Refills: 0 | Status: SHIPPED | OUTPATIENT
Start: 2023-01-16 | End: 2023-01-17

## 2023-01-16 RX ORDER — GLUCOSAMINE HCL/CHONDROITIN SU 500-400 MG
CAPSULE ORAL
Qty: 120 EACH | Refills: 0 | Status: SHIPPED | OUTPATIENT
Start: 2023-01-16 | End: 2023-11-30

## 2023-01-16 RX ORDER — GINSENG 100 MG
CAPSULE ORAL 2 TIMES DAILY
Start: 2023-01-16 | End: 2023-04-13

## 2023-01-16 RX ORDER — CEPHALEXIN 500 MG/1
500 CAPSULE ORAL EVERY 8 HOURS
Qty: 28 CAPSULE | Refills: 0 | Status: ON HOLD | OUTPATIENT
Start: 2023-01-16 | End: 2023-04-18

## 2023-01-16 RX ADMIN — CEPHALEXIN 500 MG: 500 CAPSULE ORAL at 07:37

## 2023-01-16 RX ADMIN — IBUPROFEN 600 MG: 600 TABLET ORAL at 01:34

## 2023-01-16 RX ADMIN — IBUPROFEN 600 MG: 600 TABLET ORAL at 10:11

## 2023-01-16 RX ADMIN — BACITRACIN: 500 OINTMENT TOPICAL at 10:12

## 2023-01-16 RX ADMIN — CEPHALEXIN 500 MG: 500 CAPSULE ORAL at 13:56

## 2023-01-16 RX ADMIN — POTASSIUM CHLORIDE 20 MEQ: 1.5 POWDER, FOR SOLUTION ORAL at 10:11

## 2023-01-16 ASSESSMENT — ACTIVITIES OF DAILY LIVING (ADL)
ADLS_ACUITY_SCORE: 20

## 2023-01-16 NOTE — PLAN OF CARE
Problem: Diabetic Ketoacidosis  Goal: Fluid and Electrolyte Balance with Absence of Ketosis  Outcome: Progressing     Problem: Wound Healing Progression  Goal: Optimal Wound Healing  Outcome: Progressing     Ibuprofen given for pain from head cyst. Patient reported good relief. Tolerating oral antibiotic. Possible discharge to home tomorrow pending final blood culture results.

## 2023-01-16 NOTE — DISCHARGE SUMMARY
[facility]  Discharge Summary - Medicine & Pediatrics       Date of Admission:  1/12/2023  Date of Discharge:  1/16/2023  2:17 PM  Discharging Provider: Nasir Carlos MD  Discharge Service: [facility] Residency [facility]    Discharge Diagnoses   Principal Problem:    Diabetic ketoacidosis without coma associated with type 1 diabetes mellitus (H) (5/9/2022)  Active Problems:    Glycogen storage disease IIIa  (2/17/2012)    Infected sebaceous cyst (5/11/2022)    Severe protein-calorie malnutrition (H) (5/11/2022)    Binge-purge behavior (1/13/2023)        Follow-ups Needed After Discharge   Follow up with PCP within 7 days for hospital follow up.  Referral for general surgery for sebaceous cyst  Follow up with endocrinology for Type 1 DM  Establish with GI physician for glycogen storage disease    Unresulted Labs Ordered in the Past 30 Days of this Admission     Date and Time Order Name Status Description    1/16/2023 12:02 AM Blood Culture Hand, Right In process     1/16/2023 12:02 AM Blood Culture Hand, Left In process     1/15/2023 12:01 AM Blood Culture Peripheral Blood Preliminary     1/15/2023 12:01 AM Blood Culture Peripheral Blood Preliminary     1/14/2023 10:51 AM Blood Culture Arm, Left Preliminary     1/14/2023 10:51 AM Blood Culture Arm, Left Preliminary     1/13/2023  3:28 PM Blood Culture Arm, Right Preliminary     1/13/2023  3:28 PM Blood Culture Arm, Left Preliminary       These results will be followed up by PCP    Discharge Disposition   Discharged to home  Condition at discharge: Good    Hospital Course   Kevin Stephens was admitted on 1/12/2023 for diabetic ketoacidosis and found to have MSSA bacteremia secondary to infected sebaceous cyst.  The following problems were addressed during Brianna Stephens's hospitalization:    Diabetic ketoacidosis, improved  Type 1 diabetes mellitus  Admitted 1/12 for DKA. Patient did not taken long-acting insulin since 1/8 due to nausea, fatigue,  decreased appetite. After DKA resolved, sugars were low 200s on home regimen until day of discharge when sugars were 90s-low 100s.  Home regimen includes Lantus 32 units every morning and NovoLog 1 unit per 40/180. Prescribed Freestyle Yuan and ketone strips. Recommend follow up with endocrinology.     Sebaceous cyst complicated by abscess  MSSA bacteremia  Patient seen at PCP clinic day of admission for increased drainage from cyst removal on 1/10/2023. Patient was prescribed Bactrim at that time. CT of head shows right parietal scalp swelling with a tiny focus of gas suggesting a superficial tract. Patient started on vancomycin in the emergency department. 2/2 blood cultures collected 1/12 positive for MSSA, pansensitive. Repeat blood cultures neg x48 hours. Transitioned to Keflex to complete 14 day course (last day 1/25). Referral to general surgery for outpatient follow up of infected sebaceous cyst given.     Binging-purging behavior  Mother and patient report history of binge eating-purging behavior that has been present for at least the past 6 months, may have been present for up to 1 year.  Patient appears malnourished. Social work and nutrition consulted. PCP updated with new concerns by Dr Todd. Monitored for refeeding syndrome during admission.    Glycogen-storage disease type IIIa (Cori disease)  Patient diagnosed at 18 months of age.  Disease noted to have liver, muscle, and cardiac involvement.  Patient is at high risk for hypoglycemic events.  Patient has not followed up with a specialty provider for disease since the age of 18 years old due to graduating from pediatrics.  Patient previously was taking cornstarch 4 times a day.  However, patient has not done this for 4 years now. Lactated ringers AVOIDED.  AST and ALT with slightly elevated at 55 and alkaline phosphatase elevated at 304.  Cholesterol levels within normal other than slightly elevated triglycerides at 240. Recommended  outpatient follow-up with new GI specialist    Covid-19 positive  Collected 1/14/23 per hospital protocol. Asymptomatic.     Consultations This Hospital Stay   PHARMACY TO DOSE VANCO  DIABETES EDUCATION IP CONSULT  PHARMACY TO DOSE VANCO  NUTRITION SERVICES ADULT IP CONSULT  INTENSIVIST IP CONSULT    Code Status   Full Code       The patient was discussed with Dr. Terrance Ballard MD PGY2  [facility]idency [facility]  [address] [address]  Phone: [phone]  Fax: [phone]  ______________________________________________________________________    Physical Exam   Vital Signs: Temp: 98.3  F (36.8  C) Temp src: Oral BP: 107/71 Pulse: 79   Resp: 16 SpO2: 100 % O2 Device: None (Room air)    Weight: 82 lbs 7.23 oz     General appearance: no acute distress, cooperative, pleasant, answering questions appropriately, thin  Head: Erythematic and swelling of right parietal scalp from previous procedural incision, small amount of drainage which is nonpurulent, scarred area on left frontal scalp, multiple inflamed sebaceous cysts diffusely on scalp  Eyes: conjunctivae/corneas clear  Ears: hearing grossly intact  Nose: nares normal, no drainage  Throat: no oral lesions  Lung: clear to auscultation bilaterally, no wheezing, coughing  Chest wall: no tenderness  Heart: regular rate and rhythm, S1, S2 normal, no murmur, click, rub, or gallop  Abdomen: soft, non-tender, bowel sounds present in all four quadrants, hepatomegaly  Extremities: warm, dry, atraumatic, no cyanosis, no edema  Pulses: 2+ and symmetric  Skin: Mild pallor, texture, and turgor. No rashes or lesions  Neurologic: Ambulatory, sensation grossly intact in bilateral lower extremities  Psychologic: Appropriate mood            Primary Care Physician   DANIEL GALVEZ    Discharge Orders       Significant Results and Procedures   Most Recent 3 CBC's:Recent Labs   Lab Test 01/14/23  0819  01/13/23  0426 01/12/23  2319   WBC 4.7 8.5 12.6*   HGB 12.2 12.4 13.9   MCV 90 93 93    222 245     Most Recent 3 BMP's:Recent Labs   Lab Test 01/16/23  1205 01/16/23  0712 01/16/23  0430 01/15/23  1753 01/15/23  1225 01/15/23  1145 01/15/23  0828 01/15/23  0728 01/15/23  0422   NA  --   --  142  --   --   --  138  --  137   POTASSIUM  --   --  3.3*  --  3.8  --  4.4  --  3.3*   CHLORIDE  --   --  107  --   --   --  105  --  106   CO2  --   --  25  --   --   --  23  --  22   BUN  --   --  11  --   --   --  9  --  10   CR  --   --  0.45*  --   --   --  0.61  --  0.60   ANIONGAP  --   --  10  --   --   --  10  --  9   LUIS  --   --  8.5  --   --   --  8.0*  --  8.3*   * 107* 96   < >  --    < > 269*   < > 254*    < > = values in this interval not displayed.     Most Recent 2 LFT's:Recent Labs   Lab Test 01/12/23  1846 05/10/22  0201   AST 55* 109*   ALT 55* 88*   ALKPHOS 304* 112   BILITOTAL 0.4 0.4     7-Day Micro Results     Collected Updated Procedure Result Status      01/16/2023 0430 01/16/2023 0435 Blood Culture Hand, Left [[id number]]   Blood from Hand, Left    In process Component Value   No component results               01/16/2023 0430 01/16/2023 0436 Blood Culture Hand, Right [[id number]]   Blood from Hand, Right    In process Component Value   No component results               01/15/2023 0422 01/16/2023 1003 Blood Culture Peripheral Blood [[id number]]   Peripheral Blood    Preliminary result Component Value   Culture No growth after 1 day  [P]                01/15/2023 0422 01/16/2023 1003 Blood Culture Peripheral Blood [[id number]]   Peripheral Blood    Preliminary result Component Value   Culture No growth after 1 day  [P]                01/14/2023 1113 01/15/2023 1546 Blood Culture Arm, Left [[id number]]    Blood from Arm, Left    Preliminary result Component Value   Culture No growth after 1 day  [P]                01/14/2023 1113 01/15/2023 1546 Blood Culture Arm, Left  [[id number]]    Blood from Arm, Left    Preliminary result Component Value   Culture No growth after 1 day  [P]                01/14/2023 0928 01/14/2023 1002 Symptomatic COVID-19 Virus (Coronavirus) by PCR Nasopharyngeal [[id number]]    (Abnormal)   Swab from Nasopharyngeal    Final result Component Value   SARS CoV2 PCR Positive   POSITIVE: SARS-CoV-2 (COVID-19) RNA detected, presumed positive.            01/13/2023 2031 01/16/2023 0031 Blood Culture Arm, Left [[id number]]    Blood from Arm, Left    Preliminary result Component Value   Culture No growth after 2 days  [P]                01/13/2023 1608 01/15/2023 2131 Blood Culture Arm, Right [[id number]]    Blood from Arm, Right    Preliminary result Component Value   Culture No growth after 2 days  [P]                01/12/2023 1848 01/16/2023 0733 Blood Culture Line, venous [[id number]]    (Abnormal)   Blood from Line, venous    Final result Component Value   Culture Positive on the 1st day of incubation    Staphylococcus aureus    2 of 2 bottles    Staphylococcus aureus      1 of 2 bottles        Susceptibility      Staphylococcus aureus (1)      JILLIAN      Clindamycin <=0.25 ug/mL Susceptible      Erythromycin <=0.25 ug/mL Susceptible      Gentamicin <=0.5 ug/mL Susceptible      Oxacillin 0.5 ug/mL Susceptible  [1]       Tetracycline <=1 ug/mL Susceptible      Trimethoprim/Sulfamethoxazole <=0.5/9.5 ug/mL Susceptible      Vancomycin <=0.5 ug/mL Susceptible                   [1]  Oxacillin susceptible isolates are susceptible to cephalosporins (example: cefazolin and cephalexin) and beta lactam combination agents. Oxacillin resistant isolates are resistant to these agents.          Susceptibility      Staphylococcus aureus (2)      JILLIAN      Clindamycin <=0.25 ug/mL Susceptible      Erythromycin <=0.25 ug/mL Susceptible      Gentamicin <=0.5 ug/mL Susceptible      Oxacillin 0.5 ug/mL Susceptible  [1]       Tetracycline <=1 ug/mL Susceptible       Trimethoprim/Sulfamethoxazole <=0.5/9.5 ug/mL Susceptible      Vancomycin <=0.5 ug/mL Susceptible                   [1]  Oxacillin susceptible isolates are susceptible to cephalosporins (example: cefazolin and cephalexin) and beta lactam combination agents. Oxacillin resistant isolates are resistant to these agents.                 01/12/2023 1848 01/13/2023 1438 Verigene GP Panel [[id number]]    (Abnormal)   Blood from Line, venous    Final result Component Value   Staphylococcus aureus Detected   Positive for Staphylococcus aureus and negative for the mecA gene (not resistant to methicillin) by Funnely multiplex nucleic acid test. Final identification and antimicrobial susceptibility testing will be verified by standard methods.   Staphylococcus epidermidis Not Detected   Staphylococcus lugdunensis Not Detected   Enterococcus faecalis Not Detected   Enterococcus faecium Not Detected   Streptococcus species Not Detected   Streptococcus agalactiae Not Detected   Streptococcus anginosus group Not Detected   Streptococcus pneumoniae Not Detected   Streptococcus pyogenes Not Detected   Listeria species Not Detected                Most Recent Urinalysis:Recent Labs   Lab Test 01/12/23 2047   COLOR Colorless   APPEARANCE Clear   URINEGLC >1000*   URINEBILI Negative   URINEKETONE >150*   SG 1.022   UBLD 0.03 mg/dL*   URINEPH 5.0   PROTEIN 30*   NITRITE Negative   LEUKEST Negative   RBCU <1   WBCU 3   ,   Results for orders placed or performed during the hospital encounter of 01/12/23   XR Chest Port 1 View    Narrative    EXAM: XR CHEST PORT 1 VIEW  LOCATION: [facility]  DATE/TIME: 1/12/2023 7:20 PM    INDICATION: Diabetic ketoacidosis.  COMPARISON: None available.      Impression    IMPRESSION: Negative chest.   Head CT w/o contrast    Narrative    EXAM: CT HEAD W/O CONTRAST  LOCATION: [facility]  DATE/TIME: 1/12/2023 7:30 PM    INDICATION: Recurrent scalp  abscesses, hx of DM 1 and glycogen storage disease  COMPARISON: None.  TECHNIQUE: Routine CT Head without IV contrast. Multiplanar reformats. Dose reduction techniques were used.    FINDINGS:  INTRACRANIAL CONTENTS: No intracranial hemorrhage, extraaxial collection, or mass effect.  No CT evidence of acute infarct. Normal parenchymal attenuation. Mild generalized volume loss, advanced for age. No hydrocephalus.     VISUALIZED ORBITS/SINUSES/MASTOIDS: No intraorbital abnormality. No paranasal sinus mucosal disease. No middle ear or mastoid effusion.    BONES/SOFT TISSUES: Right parietal scalp swelling with a tiny focus of gas suggesting a tract. No skull fracture or cortical periostitis.      Impression    IMPRESSION:  1.  Right parietal scalp swelling with a tiny focus of gas suggesting a superficial tract. No underlying cortical periostitis.  2.  No acute intracranial abnormality.  3.  Mild global volume loss, advanced for age.       Discharge Medications   Current Discharge Medication List      CONTINUE these medications which have NOT CHANGED    Details   insulin aspart (NOVOLOG PEN) 100 UNIT/ML pen Correct 1 unit per 40 over 180 before breakfast, lunch, dinner and at bedtime.  Qty: 45 mL, Refills: 1    Comments: Using up to 50 units daily.  Associated Diagnoses: Hyperglycemia      insulin glargine (LANTUS PEN) 100 UNIT/ML pen Inject 32 Units Subcutaneous every morning      sulfamethoxazole-trimethoprim (BACTRIM DS) 800-160 MG tablet Take 1 tablet by mouth 2 times daily      acetone, Urine, test STRP Check urine ketones when two consecutive blood sugars are greater than 300 and at times of illness/vomiting.  Qty: 100 each, Refills: 11    Associated Diagnoses: Type 1 diabetes mellitus with hyperglycemia (H)      alcohol swab prep pads Use to swab area of injection/nigel as directed.  Qty: 100 each, Refills: 3    Associated Diagnoses: Diabetic ketoacidosis without coma associated with type 1 diabetes mellitus (H);  Uncontrolled diabetes mellitus secondary to pancreatic insufficiency      !! blood glucose (NO BRAND SPECIFIED) test strip Use to test blood sugar 4 times daily or as directed. To accompany: Blood Glucose Monitor Brands: per insurance.  Qty: 100 strip, Refills: 6    Associated Diagnoses: Diabetic ketoacidosis without coma associated with type 1 diabetes mellitus (H); Uncontrolled diabetes mellitus secondary to pancreatic insufficiency      blood glucose calibration (NO BRAND SPECIFIED) solution To accompany: Blood Glucose Monitor Brands: per insurance.  Qty: 100 each, Refills: 0    Associated Diagnoses: Diabetic ketoacidosis without coma associated with type 1 diabetes mellitus (H); Uncontrolled diabetes mellitus secondary to pancreatic insufficiency      blood glucose monitoring (NO BRAND SPECIFIED) meter device kit Use to test blood sugar 4 times daily or as directed. Preferred blood glucose meter OR supplies to accompany: Blood Glucose Monitor Brands: per insurance.  Qty: 1 kit, Refills: 0    Associated Diagnoses: Diabetic ketoacidosis without coma associated with type 1 diabetes mellitus (H); Uncontrolled diabetes mellitus secondary to pancreatic insufficiency      !! blood glucose monitoring (ONE TOUCH DELICA) lancets Use to test blood sugars 6 times daily.  Qty: 1 Box, Refills: 12    Associated Diagnoses: Type I (juvenile type) diabetes mellitus without mention of complication, not stated as uncontrolled      !! blood glucose monitoring (ONETOUCH VERIO IQ) test strip Use to test blood sugars 6 times daily or as directed.  Qty: 200 strip, Refills: 11    Associated Diagnoses: Hyperglycemia      Continuous Blood Gluc Sensor (DEXCOM G6 SENSOR) MISC 1 each See Admin Instructions Change every 10 days  Qty: 9 each, Refills: 3    Associated Diagnoses: Type 1 diabetes mellitus with hyperglycemia (H)      Continuous Blood Gluc Transmit (DEXCOM G6 TRANSMITTER) MISC 1 each every 3 months  Qty: 1 each, Refills: 3     Associated Diagnoses: Type 1 diabetes mellitus with hyperglycemia (H)      insulin pen needle (BD CALLI U/F) 32G X 4 MM miscellaneous Use pen needles 6 times daily.  Qty: 200 each, Refills: 6    Associated Diagnoses: Hyperglycemia      !! thin (NO BRAND SPECIFIED) lancets Use with lanceting device. To accompany: Blood Glucose Monitor Brands: per insurance.  Qty: 100 each, Refills: 6    Associated Diagnoses: Diabetic ketoacidosis without coma associated with type 1 diabetes mellitus (H); Uncontrolled diabetes mellitus secondary to pancreatic insufficiency       !! - Potential duplicate medications found. Please discuss with provider.        Allergies   Allergies   Allergen Reactions     Fluoxetine      Other reaction(s): Mental Status Change  Suicidal thoughts     Morphine Sulfate      No exposure but grandfather has the allergy to it     Tylenol [Acetaminophen]      Has glycogen storage disease and should not receive Tylenol, per GI.

## 2023-01-16 NOTE — PLAN OF CARE
Problem: Plan of Care - These are the overarching goals to be used throughout the patient stay.    Goal: Optimal Comfort and Wellbeing  Outcome: Progressing  Intervention: Monitor Pain and Promote Comfort  Recent Flowsheet Documentation  Taken 1/15/2023 2200 by Aneesh Jerome RN  Pain Management Interventions: declines    Alert and oriented x4. Able ot make needs known via call light. No c/o pain except after bacitracin applied to cyst on R side of head. Ibuprofen given @ 0130 for pain. Blood glucose of 210 and 130 during shift.Possible discharge to home 1/16 or 1/17 pending blood cultures for antibiotics. VSS.

## 2023-01-16 NOTE — PLAN OF CARE
Problem: Plan of Care - These are the overarching goals to be used throughout the patient stay.    Goal: Plan of Care Review  Description: The Plan of Care Review/Shift note should be completed every shift.  The Outcome Evaluation is a brief statement about your assessment that the patient is improving, declining, or no change.  This information will be displayed automatically on your shift note.  Outcome: Adequate for Care Transition   Goal Outcome Evaluation:       VSS on room air.  Pain well controlled with oral medication.  Adequate urine output.  Reviewed all discharge instructions, follow ups and medications with patient and pt.'s mom at bedside prior to discharge.  Patient expressed understanding of discharge instructions.  Adequate for discharge at this time.

## 2023-01-18 ENCOUNTER — PATIENT OUTREACH (OUTPATIENT)
Dept: CARE COORDINATION | Facility: CLINIC | Age: 23
End: 2023-01-18
Payer: COMMERCIAL

## 2023-01-18 ENCOUNTER — TELEPHONE (OUTPATIENT)
Dept: PEDIATRICS | Facility: CLINIC | Age: 23
End: 2023-01-18
Payer: COMMERCIAL

## 2023-01-18 LAB — BACTERIA BLD CULT: NO GROWTH

## 2023-01-18 NOTE — PROGRESS NOTES
Connected Care Resource Center Contact  [facility]/Voicemail     Clinical Data: Transitional Care Management Outreach     Outreach attempted x 2.  Left message on patient's voicemail, providing [facility]'s 24/7 scheduling and nurse triage phone number [phone] ([phone]) for questions/concerns and/or to schedule an appt with an [facility] provider, if they do not have a PCP.      Plan:  [facility] will do no further outreaches at this time.       KLAUDIA Gonzalez  Connected Care Resource [place], [facility]    *Connected Care Resource Team does NOT follow patient ongoing. Referrals are identified based on internal discharge reports and the outreach is to ensure patient has an understanding of their discharge instructions.

## 2023-01-18 NOTE — TELEPHONE ENCOUNTER
LVM for patient to call back to schedule [facility] hospital follow-up with Dr. Avani Oliver. Will send [employer] message as well.

## 2023-01-19 LAB
BACTERIA BLD CULT: NO GROWTH

## 2023-01-20 LAB
BACTERIA BLD CULT: NO GROWTH
BACTERIA BLD CULT: NO GROWTH

## 2023-01-21 LAB
BACTERIA BLD CULT: NO GROWTH
BACTERIA BLD CULT: NO GROWTH

## 2023-01-25 ENCOUNTER — OFFICE VISIT (OUTPATIENT)
Dept: PEDIATRICS | Facility: CLINIC | Age: 23
End: 2023-01-25
Attending: MEDICAL GENETICS
Payer: COMMERCIAL

## 2023-01-25 ENCOUNTER — HOSPITAL ENCOUNTER (EMERGENCY)
Facility: CLINIC | Age: 23
Discharge: HOME | End: 2023-01-25
Attending: EMERGENCY MEDICINE | Admitting: EMERGENCY MEDICINE
Payer: COMMERCIAL

## 2023-01-25 ENCOUNTER — TELEPHONE (OUTPATIENT)
Dept: ENDOCRINOLOGY | Facility: CLINIC | Age: 23
End: 2023-01-25

## 2023-01-25 VITALS
BODY MASS INDEX: 15.39 KG/M2 | HEART RATE: 62 BPM | OXYGEN SATURATION: 97 % | DIASTOLIC BLOOD PRESSURE: 64 MMHG | TEMPERATURE: 98.6 F | HEIGHT: 64 IN | RESPIRATION RATE: 20 BRPM | SYSTOLIC BLOOD PRESSURE: 102 MMHG | WEIGHT: 90.17 LBS

## 2023-01-25 VITALS
HEART RATE: 68 BPM | SYSTOLIC BLOOD PRESSURE: 87 MMHG | DIASTOLIC BLOOD PRESSURE: 51 MMHG | BODY MASS INDEX: 15.98 KG/M2 | WEIGHT: 90.17 LBS | HEIGHT: 63 IN

## 2023-01-25 DIAGNOSIS — R73.9 HYPERGLYCEMIA: ICD-10-CM

## 2023-01-25 DIAGNOSIS — E74.03 GLYCOGEN STORAGE DISEASE III (H): Primary | ICD-10-CM

## 2023-01-25 LAB
ABO/RH(D): NORMAL
AFP SERPL-MCNC: 4.5 NG/ML
ALBUMIN SERPL-MCNC: 2.4 G/DL (ref 3.4–5)
ALBUMIN SERPL-MCNC: 2.8 G/DL (ref 3.5–5)
ALP SERPL-CCNC: 196 U/L (ref 40–150)
ALP SERPL-CCNC: 218 U/L (ref 45–120)
ALT SERPL W P-5'-P-CCNC: 118 U/L (ref 0–45)
ALT SERPL W P-5'-P-CCNC: 131 U/L (ref 0–70)
ANION GAP SERPL CALCULATED.3IONS-SCNC: 7 MMOL/L (ref 5–18)
ANION GAP SERPL CALCULATED.3IONS-SCNC: 8 MMOL/L (ref 3–14)
ANTIBODY SCREEN: NEGATIVE
APTT PPP: 22 SECONDS (ref 22–38)
APTT PPP: >240 SECONDS (ref 22–38)
AST SERPL W P-5'-P-CCNC: 134 U/L (ref 0–45)
AST SERPL W P-5'-P-CCNC: 145 U/L (ref 0–40)
BASOPHILS # BLD AUTO: 0 10E3/UL (ref 0–0.2)
BASOPHILS NFR BLD AUTO: 1 %
BILIRUB DIRECT SERPL-MCNC: 0.2 MG/DL
BILIRUB SERPL-MCNC: 0.2 MG/DL (ref 0.2–1.3)
BILIRUB SERPL-MCNC: 0.3 MG/DL (ref 0–1)
BUN SERPL-MCNC: 20 MG/DL (ref 8–22)
BUN SERPL-MCNC: 21 MG/DL (ref 7–30)
CALCIUM SERPL-MCNC: 8.2 MG/DL (ref 8.5–10.1)
CALCIUM SERPL-MCNC: 8.5 MG/DL (ref 8.5–10.5)
CHLORIDE BLD-SCNC: 105 MMOL/L (ref 98–107)
CHLORIDE BLD-SCNC: 106 MMOL/L (ref 94–109)
CHOLEST SERPL-MCNC: 126 MG/DL
CK SERPL-CCNC: 110 U/L (ref 30–300)
CO2 SERPL-SCNC: 27 MMOL/L (ref 20–32)
CO2 SERPL-SCNC: 29 MMOL/L (ref 22–31)
CREAT SERPL-MCNC: 0.36 MG/DL (ref 0.52–1.25)
CREAT SERPL-MCNC: 0.7 MG/DL (ref 0.6–1.3)
DEPRECATED CALCIDIOL+CALCIFEROL SERPL-MC: 8 UG/L (ref 20–75)
EOSINOPHIL # BLD AUTO: 0 10E3/UL (ref 0–0.7)
EOSINOPHIL NFR BLD AUTO: 1 %
ERYTHROCYTE [DISTWIDTH] IN BLOOD BY AUTOMATED COUNT: 12.6 % (ref 10–15)
FASTING STATUS PATIENT QL REPORTED: NORMAL
GFR SERPL CREATININE-BSD FRML MDRD: >90 ML/MIN/1.73M2
GFR SERPL CREATININE-BSD FRML MDRD: >90 ML/MIN/1.73M2
GLUCOSE BLD-MCNC: 132 MG/DL (ref 70–99)
GLUCOSE BLD-MCNC: 460 MG/DL (ref 70–125)
HCG SERPL QL: NEGATIVE
HCT VFR BLD AUTO: 34.4 % (ref 35–53)
HDLC SERPL-MCNC: 60 MG/DL
HGB BLD-MCNC: 11.2 G/DL (ref 11.7–17.7)
IMM GRANULOCYTES # BLD: 0 10E3/UL
IMM GRANULOCYTES NFR BLD: 1 %
INR PPP: 1.1 (ref 0.85–1.15)
INR PPP: 3.88 (ref 0.85–1.15)
LACTATE SERPL-SCNC: 1.8 MMOL/L (ref 0.7–2)
LDLC SERPL CALC-MCNC: 51 MG/DL
LIPASE SERPL-CCNC: 17 U/L (ref 0–52)
LYMPHOCYTES # BLD AUTO: 1.3 10E3/UL (ref 0.8–5.3)
LYMPHOCYTES NFR BLD AUTO: 32 %
MCH RBC QN AUTO: 31.5 PG (ref 26.5–33)
MCHC RBC AUTO-ENTMCNC: 32.6 G/DL (ref 31.5–36.5)
MCV RBC AUTO: 97 FL (ref 78–100)
MONOCYTES # BLD AUTO: 0.2 10E3/UL (ref 0–1.3)
MONOCYTES NFR BLD AUTO: 5 %
NEUTROPHILS # BLD AUTO: 2.5 10E3/UL (ref 1.6–8.3)
NEUTROPHILS NFR BLD AUTO: 60 %
NONHDLC SERPL-MCNC: 66 MG/DL
NRBC # BLD AUTO: 0 10E3/UL
NRBC BLD AUTO-RTO: 0 /100
PLATELET # BLD AUTO: 237 10E3/UL (ref 150–450)
POTASSIUM BLD-SCNC: 3.7 MMOL/L (ref 3.4–5.3)
POTASSIUM BLD-SCNC: 4.2 MMOL/L (ref 3.5–5)
PROT SERPL-MCNC: 5.9 G/DL (ref 6.8–8.8)
PROT SERPL-MCNC: 6.2 G/DL (ref 6–8)
RBC # BLD AUTO: 3.56 10E6/UL (ref 3.8–5.9)
SODIUM SERPL-SCNC: 141 MMOL/L (ref 133–144)
SODIUM SERPL-SCNC: 141 MMOL/L (ref 136–145)
SPECIMEN EXPIRATION DATE: NORMAL
TRIGL SERPL-MCNC: 74 MG/DL
URATE SERPL-MCNC: 2.6 MG/DL (ref 2.6–7.2)
WBC # BLD AUTO: 4.1 10E3/UL (ref 4–11)

## 2023-01-25 PROCEDURE — 85730 THROMBOPLASTIN TIME PARTIAL: CPT | Performed by: EMERGENCY MEDICINE

## 2023-01-25 PROCEDURE — 84550 ASSAY OF BLOOD/URIC ACID: CPT | Performed by: MEDICAL GENETICS

## 2023-01-25 PROCEDURE — 82550 ASSAY OF CK (CPK): CPT | Performed by: MEDICAL GENETICS

## 2023-01-25 PROCEDURE — 36415 COLL VENOUS BLD VENIPUNCTURE: CPT | Performed by: EMERGENCY MEDICINE

## 2023-01-25 PROCEDURE — 83690 ASSAY OF LIPASE: CPT | Performed by: EMERGENCY MEDICINE

## 2023-01-25 PROCEDURE — 86850 RBC ANTIBODY SCREEN: CPT | Performed by: EMERGENCY MEDICINE

## 2023-01-25 PROCEDURE — 99283 EMERGENCY DEPT VISIT LOW MDM: CPT

## 2023-01-25 PROCEDURE — 85610 PROTHROMBIN TIME: CPT | Performed by: MEDICAL GENETICS

## 2023-01-25 PROCEDURE — 82306 VITAMIN D 25 HYDROXY: CPT | Performed by: MEDICAL GENETICS

## 2023-01-25 PROCEDURE — 83605 ASSAY OF LACTIC ACID: CPT | Performed by: MEDICAL GENETICS

## 2023-01-25 PROCEDURE — 82248 BILIRUBIN DIRECT: CPT | Performed by: MEDICAL GENETICS

## 2023-01-25 PROCEDURE — 86901 BLOOD TYPING SEROLOGIC RH(D): CPT | Performed by: EMERGENCY MEDICINE

## 2023-01-25 PROCEDURE — 85730 THROMBOPLASTIN TIME PARTIAL: CPT | Performed by: MEDICAL GENETICS

## 2023-01-25 PROCEDURE — G0463 HOSPITAL OUTPT CLINIC VISIT: HCPCS | Performed by: MEDICAL GENETICS

## 2023-01-25 PROCEDURE — 80061 LIPID PANEL: CPT | Performed by: MEDICAL GENETICS

## 2023-01-25 PROCEDURE — 84703 CHORIONIC GONADOTROPIN ASSAY: CPT | Performed by: EMERGENCY MEDICINE

## 2023-01-25 PROCEDURE — 82105 ALPHA-FETOPROTEIN SERUM: CPT | Performed by: MEDICAL GENETICS

## 2023-01-25 PROCEDURE — 36415 COLL VENOUS BLD VENIPUNCTURE: CPT | Performed by: MEDICAL GENETICS

## 2023-01-25 PROCEDURE — 85025 COMPLETE CBC W/AUTO DIFF WBC: CPT | Performed by: EMERGENCY MEDICINE

## 2023-01-25 PROCEDURE — 99215 OFFICE O/P EST HI 40 MIN: CPT | Performed by: MEDICAL GENETICS

## 2023-01-25 PROCEDURE — 85610 PROTHROMBIN TIME: CPT | Performed by: EMERGENCY MEDICINE

## 2023-01-25 PROCEDURE — 99417 PROLNG OP E/M EACH 15 MIN: CPT | Performed by: MEDICAL GENETICS

## 2023-01-25 PROCEDURE — 84450 TRANSFERASE (AST) (SGOT): CPT | Performed by: EMERGENCY MEDICINE

## 2023-01-25 PROCEDURE — G0463 HOSPITAL OUTPT CLINIC VISIT: HCPCS

## 2023-01-25 ASSESSMENT — ACTIVITIES OF DAILY LIVING (ADL): ADLS_ACUITY_SCORE: 37

## 2023-01-25 NOTE — LETTER
2023      RE: Kevin Stephens  [address] [place] [address]     Dear Colleague,    Thank you for the opportunity to participate in the care of your patient, Kevin Stephens, at the [facility] at [facility]. Please see a copy of my visit note below.    Adult Metabolism Clinic Return Visit  Name:  Brianna Stephens  :   2000  MRN:   [id number]  Date of Visit: 2023  PCP: Vanessa Wright    Managing Metabolic Center(s):  [facility].    Chief Complaint:  Brianna Stephens is a 22 year old adult whom I saw in follow up in Metabolism Clinic for Glycogen storage disease IIIa(H).  Brianna was accompanied to this visit by his mother. Brianna Stephens also saw our dietitian at this visit.     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  Most adults with this condition have gradually improving clinical status with gradual diminution in liver enzyme abnormalities and relative resolution and hepatomegaly though the liver may remain enlarged.  Treatment with cornstarch is nearly always used in infancy and childhood with frequent risks for hypoglycemia but with age, this becomes a much less prominent element in care.  Yimi has not been taking cornstarch for about the past 5 years without apparent changes in GSD status.  Their condition is complicated by the concurrent status of diabetes which has been more medically consequent overall since its diagnosis.  This was the reason for their most recent relatively prolonged hospitalization.    Adults with GSD3a may continue to have progression of muscle disease related to this condition.  Adults sometimes develop weakness.  Some advocate use of a high-protein diet to somewhat ameliorate this.  They also may develop cardiomyopathy, again related to the glycogen storage disease.  For this reason, we have recommended an abdominal  ultrasound to assess the state of hepatic fibrosis/injury, done liver enzyme testing and check coagulation factors, and recommended an echocardiogram.  I also recommended physical therapy as a potentially useful intervention, particularly as Li is noting some degree of muscle weakness particularly after this hospitalization.    We have previously ascertained only 1 of 2 alleles for this condition in this family.  For that reason, we will revisit genetic testing as the technology and strategy for further ascertainment has evolved since this was last entertained for this family.  This will take place at our next visit.     Plan:    1. Testing ordered at this visit:   Orders Placed This Encounter   Procedures     DIET CONSULT COMPREHENSIVE     US Abdomen Complete     AFP tumor marker     Uric acid     Lactic acid whole blood     Comprehensive metabolic panel     Lipid panel reflex to direct LDL Fasting     CK total     Vitamin D deficiency screening     Partial thromboplastin time     INR     Physical Therapy Referral     Echo Pediatric (TTE) Complete   .    Results are as noted, below. Additional recommendations based on these laboratory results: Liver enzymes were modestly elevated and CK was normal, but PTT and INR were markedly abnormal.  Please see associated phone notes regarding this  2. Medications: No specific medications at this time for GSD 3a  3. Metabolic dietician follow up with Suzette Lindsay RD, LD at this visit to review special dietary concerns.   4.   Genetic counseling to be done at next visit to review options regarding testing to ascertain second allele  5. Continue to observe emergency precautions as previously discussed.  Our on-call metabolic service is available 24 hours/day by calling the page  ([phone]) and asking for the Genetics and Metabolism doctor on call.  6. Return to the Adult Metabolism Clinic in 3 months for follow-up.     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, not elsewhere classified     Vitamin D deficiency     Family history of congenital hearing loss     Uncontrolled diabetes mellitus secondary to pancreatic insufficiency     Depression with suicidal ideation     Hyperglycemia     Diabetic ketoacidosis without coma associated with type 1 diabetes mellitus (H)     Infected sebaceous cyst     Transaminitis     Prolonged QT interval     Acute kidney injury (H)     Severe protein-calorie malnutrition (H)     Binge-purge behavior     Discussed at this visit:  Yimi has just recently been discharged after a prolonged hospitalization for a severe episode of diabetic ketoacidosis.  As a precipitating feature in this, they and their mother felt that the scalp draining lesions had worsened immediately prior to this most severe episode.  Since discharge, they have been working hard on monitoring glucose closely with checks 3 times daily.  They have also been seeking appropriate support for assistant with eating disorder resources as this undoubtedly adds to the complex nature of their diabetic and GSD care.    They indicate that since discharge, the glucose levels have been typically 100-200.  There was one number that was higher, but none lower.    Indicated that there most difficult continuing challenge is recurrent cysts that become inflamed, swollen, and then drain on the scalp.  These are recurrent.  They saw a dermatologist but received only medication for hair loss.  They were concerned because it was their feeling and that of their mother that this condition was in part precipitating her most recent hospitalization.    Interval History:  Kevin was last seen in our clinic on 8/12/2020 (virtual).   Brianna Stephens currently has a written emergency letter for this condition.  This  will require updating. Brianna Stephens had no general anesthesia and no surgical procedures.            Past Medical History:  Past Medical History:   Diagnosis Date     Anxiety      Depressive disorder      Diabetes mellitus (H)      Glycogen storage disease IIIa - see Emergency Letter in EPIC dated 05/07/12 2/17/2012     Personal History:  Family History Update:  There was no new family history information elicited at this visit  Family History   Problem Relation Age of Onset     Hearing Loss Father    .  Brianna indicated that they have only been recently comfortable beginning to discuss their binge/purge behaviors that have significantly impacted their health.  They feel they have a good working relationship with their primary physician.  They have also transitioned to adult endocrine care.  Current insurance status state/federal program (Medicaid/Medicare).      I have reviewed Kevin valdivia past medical history, family history, social history, medications and allergies as documented in the patient's electronic medical record.  There were no additional findings except as noted.     Review of Systems:  Constitional: See above regarding eating behaviors  Eyes: negative - normal vision  Ears/Nose/Throat: negative - normal hearing  Respiratory: negative  Cardiovascular: negative  Gastrointestinal: GSD 3a  Genitourinary: negative  Hematologic/Lymphatic: negative  Allergy/Immunologic: negative - no drug allergies  Musculoskeletal: History of elevated CK related to GSD  Endocrine: Diabetes with recent DKA  Integument: See above regarding scalp skin cysts  Neurologic: negative  Psychiatric: Anxiety, depression.    Medications:  Current Outpatient Medications   Medication Sig Dispense Refill     Acetone, Urine, Test (KETONE TEST) STRP 1 EACH BY IN VITRO ROUTE AS NEEDED (HYPERGLYCEMIA, CONCERN FOR DKA) 100 strip 0     acetone, Urine, test STRP Check urine ketones when two consecutive blood sugars are greater than 300 and at times of  illness/vomiting. 100 each 11     alcohol swab prep pads Use to swab area of injection/nigel as directed. 120 each 0     alcohol swab prep pads Use to swab area of injection/nigel as directed. 100 each 3     bacitracin 500 UNIT/GM OINT Apply topically 2 times daily       blood glucose (NO BRAND SPECIFIED) test strip Use to test blood sugar 4 times daily or as directed. To accompany: Blood Glucose Monitor Brands: per insurance. 100 strip 6     blood glucose calibration (NO BRAND SPECIFIED) solution To accompany: Blood Glucose Monitor Brands: per insurance. 100 each 0     blood glucose monitoring (NO BRAND SPECIFIED) meter device kit Use to test blood sugar 4 times daily or as directed. Preferred blood glucose meter OR supplies to accompany: Blood Glucose Monitor Brands: per insurance. 1 kit 0     blood glucose monitoring (ONE TOUCH DELICA) lancets Use to test blood sugars 6 times daily. 1 Box 12     blood glucose monitoring (ONETOUCH VERIO IQ) test strip Use to test blood sugars 6 times daily or as directed. 200 strip 11     cephALEXin (KEFLEX) 500 MG capsule Take 1 capsule (500 mg) by mouth every 8 hours Take one capsule tonight after getting home from the hospital (1/16/2023), then take one pill three times each day until the antibiotic is finished. 28 capsule 0     Continuous Blood Gluc Sensor (DEXCOM G6 SENSOR) MISC 1 each See Admin Instructions Change every 10 days 9 each 3     Continuous Blood Gluc Sensor (FREESTYLE PASQUALE 2 SENSOR) MISC 1 each every 14 days Use 1 sensor every 14 days. Use to read blood sugars per 's instructions. 2 each 5     Continuous Blood Gluc Transmit (DEXCOM G6 TRANSMITTER) MISC 1 each every 3 months 1 each 3     insulin aspart (NOVOLOG PEN) 100 UNIT/ML pen Correct 1 unit per 40 over 180 before breakfast, lunch, dinner and at bedtime. 45 mL 1     insulin glargine (LANTUS PEN) 100 UNIT/ML pen Inject 32 Units Subcutaneous every morning       insulin pen needle (BD CALLI U/F) 32G  "X 4 MM miscellaneous Use pen needles 6 times daily. 200 each 6     thin (NO BRAND SPECIFIED) lancets Use with lanceting device. To accompany: Blood Glucose Monitor Brands: per insurance. 100 each 6        Allergies:  Allergies   Allergen Reactions     Fluoxetine      Other reaction(s): Mental Status Change  Suicidal thoughts     Morphine Sulfate      No exposure but grandfather has the allergy to it     Tylenol [Acetaminophen]      Has glycogen storage disease and should not receive Tylenol, per GI.        Physical Examination:  Blood pressure (!) 87/51, pulse 68, height 5' 3.31\" (160.8 cm), weight 90 lb 2.7 oz (40.9 kg), head circumference 50 cm (19.69\").  Body surface area is 1.35 meters squared.  BMI: Body mass index is 15.82 kg/m .  General: This was a quite slender adult who responded appropriately to all requests during the examination.    Head and Neck:  Brianna Stephens had considerable loss of scalp hair over the surface of the right side of of the scalp with a fluctuant, tender quality.  This was not red, but was tender to the touch.  There were multiple healing exit points of this cyst.  Head was proportionate in appearance.    Eyes:  The pupils were equal, round, and reacted to light.   The conjunctivae were clear.   Ears:  Brianna Segovias ears were normal in architecture and placement.   Nose: The nose was clear.    Mouth and Throat: Brianna Kate dentition was normal.  The throat was without erythema.    Respiratory: The chest was clear to auscultation and had a symmetric appearance.    CV:  On examination of the heart, the rhythm was regular and there was no murmur.    GI: The abdomen was soft and had normal bowel sounds.  The liver is easily palpable 5-6 cm below the costal margin in the midclavicular line.  : I deferred a  examination.   Musculoskeletal: There was a full range of motion on the extremity exam, and diminishedmuscular volume and bulk.   Neurologic: The neurologic exam showed normal cranial " nerves on inspection, hard to elicit deep tendon reflexes, apparently normal sensation, and a normal gait. Brianna Stephens had normal tone.   Integument: The skin had no rashes or unusual pigmentation.  She had some bruising on the abdomen.    Results of laboratory studies collected at this visit and available when this note was completed:   Results for orders placed or performed in visit on 01/25/23   Uric acid     Status: Normal   Result Value Ref Range    Uric Acid 2.6 2.6 - 7.2 mg/dL    Narrative    The sex of this patient cannot be reliably determined based on discrepancies in demographics (legal sex, sex assigned at birth, gender identity).  Both male and female reference ranges are provided where applicable.  Careful evaluation of the patient's results as compared to the gender specific reference intervals is required in this setting.    Lactic acid whole blood     Status: Normal   Result Value Ref Range    Lactic Acid 1.8 0.7 - 2.0 mmol/L   Comprehensive metabolic panel     Status: Abnormal   Result Value Ref Range    Sodium 141 133 - 144 mmol/L    Potassium 3.7 3.4 - 5.3 mmol/L    Chloride 106 94 - 109 mmol/L    Carbon Dioxide (CO2) 27 20 - 32 mmol/L    Anion Gap 8 3 - 14 mmol/L    Urea Nitrogen 21 7 - 30 mg/dL    Creatinine 0.36 (L) 0.52 - 1.25 mg/dL    Calcium 8.2 (L) 8.5 - 10.1 mg/dL    Glucose 132 (H) 70 - 99 mg/dL    Alkaline Phosphatase 196 (H) 40 - 150 U/L     (H) 0 - 45 U/L     (H) 0 - 70 U/L    Protein Total 5.9 (L) 6.8 - 8.8 g/dL    Albumin 2.4 (L) 3.4 - 5.0 g/dL    Bilirubin Total 0.2 0.2 - 1.3 mg/dL    GFR Estimate >90 >60 mL/min/1.73m2    Narrative    The sex of this patient cannot be reliably determined based on discrepancies in demographics (legal sex, sex assigned at birth, gender identity).  Both male and female reference ranges are provided where applicable.  Careful evaluation of the patient s results as compared to the gender specific reference intervals is required in this  setting.    Lipid panel reflex to direct LDL Fasting     Status: None   Result Value Ref Range    Cholesterol 126 <200 mg/dL    Triglycerides 74 <150 mg/dL    Direct Measure HDL 60 >=40 mg/dL    LDL Cholesterol Calculated 51 <=100 mg/dL    Non HDL Cholesterol 66 <130 mg/dL    Patient Fasting > 8hrs? Unknown     Narrative    The sex of this patient cannot be reliably determined based on discrepancies in demographics (legal sex, sex assigned at birth, gender identity).  Both male and female reference ranges are provided where applicable.  Careful evaluation of the patient s results as compared to the gender specific reference intervals is required in this setting.   Cholesterol  Desirable:  <200 mg/dL    Triglycerides  Normal:  Less than 150 mg/dL  Borderline High:  150-199 mg/dL  High:  200-499 mg/dL  Very High:  Greater than or equal to 500 mg/dL    Direct Measure HDL  Female:  Greater than or equal to 50 mg/dL   Male:  Greater than or equal to 40 mg/dL    LDL Cholesterol  Desirable:  <100mg/dL  Above Desirable:  100-129 mg/dL   Borderline High:  130-159 mg/dL   High:  160-189 mg/dL   Very High:  >= 190 mg/dL    Non HDL Cholesterol  Desirable:  130 mg/dL  Above Desirable:  130-159 mg/dL  Borderline High:  160-189 mg/dL  High:  190-219 mg/dL  Very High:  Greater than or equal to 220 mg/dL   CK total     Status: Normal   Result Value Ref Range     30 - 300 U/L   Partial thromboplastin time     Status: Abnormal   Result Value Ref Range    aPTT >240 (HH) 22 - 38 Seconds   INR     Status: Abnormal   Result Value Ref Range    INR 3.88 (H) 0.85 - 1.15     HIRAL GRAVES M.D., FAAP, FAC  Professor  Division of Genetics and Metabolism  Department of Pediatrics  [facility]    Routed to patient in Comm Mgt  Also to Vanessa Wright  Care Team    Parent(s) of Kevin Stephens  [address] [place] [address]      90 minutes spent on the date of the encounter doing chart review, history and exam,  documentation and further activities per the note    Please do not hesitate to contact me if you have any questions/concerns.     Sincerely,     Avani Oliver MD

## 2023-01-25 NOTE — PATIENT INSTRUCTIONS
Adult Metabolism Clinic  [facility]     If questions/concerns, feel free to reach our nurse coordinator at the below number or send a [employer] message for any non-urgent questions/concerns.    If any immediate needs because of illness or symptoms, contact the Pediatric Genetic/Metabolic physician on-call via [phone].    Team contact numbers:   RN Care Coordinator: [phone]  Suzette Lindsay RD, LD (dietitian): [phone] or [email or web address]  Genetic/Metabolic Physician On-call: [phone]     Scheduling numbers:  General scheduling (Adult Call Center): [phone]                Our staff will make every effort to schedule your follow-up appointment in a timely fashion. If you don't hear from us in the next two weeks, please contact us for this scheduling.

## 2023-01-25 NOTE — TELEPHONE ENCOUNTER
I was contacted by our clinical laboratory to receive information regarding critical lab values.  Brianna was noted to have marked elevation in PTT and INR.    Other liver enzyme tests were abnormal, but only modestly so given the context of the primary metabolic condition.  In addition, CK was normal and there was not lactic acidosis.    It is difficult to know what the significance of this very abnormal finding is given the relative mismatch in laboratory values but given the critical nature, we reached out to Brianna to request immediate return to the emergency department for recheck of these tests.    I made an original call and left my pager number; our nurse coordinator Jolynn Carmona was able to reach Brianna and make the recommendation for immediate return to an emergency department.  She also contacted the department that Brianna was planning to go to and I updated our physician on call with this status.    HIRAL GRAVES M.D., FAAP, [facility]  Professor, Division of Genetics and Metabolism  Department of Pediatrics  [facility]

## 2023-01-25 NOTE — PROGRESS NOTES
Adult Metabolism Clinic Return Visit  Name:  Brianna Stephens  :   2000  MRN:   [id number]  Date of Visit: 2023  PCP: Vanessa Wright    Managing Metabolic Center(s):  [facility].    Chief Complaint:  Brianna Stephens is a 22 year old adult whom I saw in follow up in Metabolism Clinic for Glycogen storage disease IIIa(H).  Brianna was accompanied to this visit by his mother. Brianna Stephens also saw our dietitian at this visit.     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  Most adults with this condition have gradually improving clinical status with gradual diminution in liver enzyme abnormalities and relative resolution and hepatomegaly though the liver may remain enlarged.  Treatment with cornstarch is nearly always used in infancy and childhood with frequent risks for hypoglycemia but with age, this becomes a much less prominent element in care.  Yimi has not been taking cornstarch for about the past 5 years without apparent changes in GSD status.  Their condition is complicated by the concurrent status of diabetes which has been more medically consequent overall since its diagnosis.  This was the reason for their most recent relatively prolonged hospitalization.    Adults with GSD3a may continue to have progression of muscle disease related to this condition.  Adults sometimes develop weakness.  Some advocate use of a high-protein diet to somewhat ameliorate this.  They also may develop cardiomyopathy, again related to the glycogen storage disease.  For this reason, we have recommended an abdominal ultrasound to assess the state of hepatic fibrosis/injury, done liver enzyme testing and check coagulation factors, and recommended an echocardiogram.  I also recommended physical therapy as a potentially useful intervention, particularly as Brianna is noting some degree of muscle weakness particularly after this hospitalization.    We have previously ascertained  only 1 of 2 alleles for this condition in this family.  For that reason, we will revisit genetic testing as the technology and strategy for further ascertainment has evolved since this was last entertained for this family.  This will take place at our next visit.     Plan:    1. Testing ordered at this visit:   Orders Placed This Encounter   Procedures     DIET CONSULT COMPREHENSIVE     US Abdomen Complete     AFP tumor marker     Uric acid     Lactic acid whole blood     Comprehensive metabolic panel     Lipid panel reflex to direct LDL Fasting     CK total     Vitamin D deficiency screening     Partial thromboplastin time     INR     Physical Therapy Referral     Echo Pediatric (TTE) Complete   .    Results are as noted, below. Additional recommendations based on these laboratory results: Liver enzymes were modestly elevated and CK was normal, but PTT and INR were markedly abnormal.  Please see associated phone notes regarding this  2. Medications: No specific medications at this time for GSD 3a  3. Metabolic dietician follow up with Suzette Lindsay RD, LD at this visit to review special dietary concerns.   4.   Genetic counseling to be done at next visit to review options regarding testing to ascertain second allele  5. Continue to observe emergency precautions as previously discussed.  Our on-call metabolic service is available 24 hours/day by calling the page  ([phone]) and asking for the Genetics and Metabolism doctor on call.  6. Return to the Adult Metabolism Clinic in 3 months for follow-up.     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, not elsewhere classified     Vitamin D deficiency     Family history of congenital hearing loss     Uncontrolled diabetes mellitus secondary to pancreatic insufficiency      Depression with suicidal ideation     Hyperglycemia     Diabetic ketoacidosis without coma associated with type 1 diabetes mellitus (H)     Infected sebaceous cyst     Transaminitis     Prolonged QT interval     Acute kidney injury (H)     Severe protein-calorie malnutrition (H)     Binge-purge behavior     Discussed at this visit:  Yimi has just recently been discharged after a prolonged hospitalization for a severe episode of diabetic ketoacidosis.  As a precipitating feature in this, they and their mother felt that the scalp draining lesions had worsened immediately prior to this most severe episode.  Since discharge, they have been working hard on monitoring glucose closely with checks 3 times daily.  They have also been seeking appropriate support for assistant with eating disorder resources as this undoubtedly adds to the complex nature of their diabetic and GSD care.    They indicate that since discharge, the glucose levels have been typically 100-200.  There was one number that was higher, but none lower.    Indicated that there most difficult continuing challenge is recurrent cysts that become inflamed, swollen, and then drain on the scalp.  These are recurrent.  They saw a dermatologist but received only medication for hair loss.  They were concerned because it was their feeling and that of their mother that this condition was in part precipitating her most recent hospitalization.    Interval History:  Kevin was last seen in our clinic on 8/12/2020 (virtual).   Brianna Stephens currently has a written emergency letter for this condition.  This will require updating. Brianna Stephens had no general anesthesia and no surgical procedures.            Past Medical History:  Past Medical History:   Diagnosis Date     Anxiety      Depressive disorder      Diabetes mellitus (H)      Glycogen storage disease IIIa - see Emergency Letter in EPIC dated 05/07/12 2/17/2012     Personal History:  Family History Update:  There was no  new family history information elicited at this visit  Family History   Problem Relation Age of Onset     Hearing Loss Father    .  Li indicated that they have only been recently comfortable beginning to discuss their binge/purge behaviors that have significantly impacted their health.  They feel they have a good working relationship with their primary physician.  They have also transitioned to adult endocrine care.  Current insurance status state/federal program (Medicaid/Medicare).      I have reviewed Kevin s past medical history, family history, social history, medications and allergies as documented in the patient's electronic medical record.  There were no additional findings except as noted.     Review of Systems:  Constitional: See above regarding eating behaviors  Eyes: negative - normal vision  Ears/Nose/Throat: negative - normal hearing  Respiratory: negative  Cardiovascular: negative  Gastrointestinal: GSD 3a  Genitourinary: negative  Hematologic/Lymphatic: negative  Allergy/Immunologic: negative - no drug allergies  Musculoskeletal: History of elevated CK related to GSD  Endocrine: Diabetes with recent DKA  Integument: See above regarding scalp skin cysts  Neurologic: negative  Psychiatric: Anxiety, depression.    Medications:  Current Outpatient Medications   Medication Sig Dispense Refill     Acetone, Urine, Test (KETONE TEST) STRP 1 EACH BY IN VITRO ROUTE AS NEEDED (HYPERGLYCEMIA, CONCERN FOR DKA) 100 strip 0     acetone, Urine, test STRP Check urine ketones when two consecutive blood sugars are greater than 300 and at times of illness/vomiting. 100 each 11     alcohol swab prep pads Use to swab area of injection/nigel as directed. 120 each 0     alcohol swab prep pads Use to swab area of injection/nigel as directed. 100 each 3     bacitracin 500 UNIT/GM OINT Apply topically 2 times daily       blood glucose (NO BRAND SPECIFIED) test strip Use to test blood sugar 4 times daily or as directed. To  accompany: Blood Glucose Monitor Brands: per insurance. 100 strip 6     blood glucose calibration (NO BRAND SPECIFIED) solution To accompany: Blood Glucose Monitor Brands: per insurance. 100 each 0     blood glucose monitoring (NO BRAND SPECIFIED) meter device kit Use to test blood sugar 4 times daily or as directed. Preferred blood glucose meter OR supplies to accompany: Blood Glucose Monitor Brands: per insurance. 1 kit 0     blood glucose monitoring (ONE TOUCH DELICA) lancets Use to test blood sugars 6 times daily. 1 Box 12     blood glucose monitoring (ONETOUCH VERIO IQ) test strip Use to test blood sugars 6 times daily or as directed. 200 strip 11     cephALEXin (KEFLEX) 500 MG capsule Take 1 capsule (500 mg) by mouth every 8 hours Take one capsule tonight after getting home from the hospital (1/16/2023), then take one pill three times each day until the antibiotic is finished. 28 capsule 0     Continuous Blood Gluc Sensor (DEXCOM G6 SENSOR) MISC 1 each See Admin Instructions Change every 10 days 9 each 3     Continuous Blood Gluc Sensor (FREESTYLE PASQUALE 2 SENSOR) MISC 1 each every 14 days Use 1 sensor every 14 days. Use to read blood sugars per 's instructions. 2 each 5     Continuous Blood Gluc Transmit (DEXCOM G6 TRANSMITTER) MISC 1 each every 3 months 1 each 3     insulin aspart (NOVOLOG PEN) 100 UNIT/ML pen Correct 1 unit per 40 over 180 before breakfast, lunch, dinner and at bedtime. 45 mL 1     insulin glargine (LANTUS PEN) 100 UNIT/ML pen Inject 32 Units Subcutaneous every morning       insulin pen needle (BD CALLI U/F) 32G X 4 MM miscellaneous Use pen needles 6 times daily. 200 each 6     thin (NO BRAND SPECIFIED) lancets Use with lanceting device. To accompany: Blood Glucose Monitor Brands: per insurance. 100 each 6        Allergies:  Allergies   Allergen Reactions     Fluoxetine      Other reaction(s): Mental Status Change  Suicidal thoughts     Morphine Sulfate      No exposure but  "grandfather has the allergy to it     Tylenol [Acetaminophen]      Has glycogen storage disease and should not receive Tylenol, per GI.        Physical Examination:  Blood pressure (!) 87/51, pulse 68, height 5' 3.31\" (160.8 cm), weight 90 lb 2.7 oz (40.9 kg), head circumference 50 cm (19.69\").  Body surface area is 1.35 meters squared.  BMI: Body mass index is 15.82 kg/m .  General: This was a quite slender adult who responded appropriately to all requests during the examination.    Head and Neck:  Brianna Stephens had considerable loss of scalp hair over the surface of the right side of of the scalp with a fluctuant, tender quality.  This was not red, but was tender to the touch.  There were multiple healing exit points of this cyst.  Head was proportionate in appearance.    Eyes:  The pupils were equal, round, and reacted to light.   The conjunctivae were clear.   Ears:  Brianna Kate ears were normal in architecture and placement.   Nose: The nose was clear.    Mouth and Throat: Brianna Kate dentition was normal.  The throat was without erythema.    Respiratory: The chest was clear to auscultation and had a symmetric appearance.    CV:  On examination of the heart, the rhythm was regular and there was no murmur.    GI: The abdomen was soft and had normal bowel sounds.  The liver is easily palpable 5-6 cm below the costal margin in the midclavicular line.  : I deferred a  examination.   Musculoskeletal: There was a full range of motion on the extremity exam, and diminishedmuscular volume and bulk.   Neurologic: The neurologic exam showed normal cranial nerves on inspection, hard to elicit deep tendon reflexes, apparently normal sensation, and a normal gait. Brianna Stephens had normal tone.   Integument: The skin had no rashes or unusual pigmentation.  She had some bruising on the abdomen.    Results of laboratory studies collected at this visit and available when this note was completed:   Results for orders placed or " performed in visit on 01/25/23   Uric acid     Status: Normal   Result Value Ref Range    Uric Acid 2.6 2.6 - 7.2 mg/dL    Narrative    The sex of this patient cannot be reliably determined based on discrepancies in demographics (legal sex, sex assigned at birth, gender identity).  Both male and female reference ranges are provided where applicable.  Careful evaluation of the patient's results as compared to the gender specific reference intervals is required in this setting.    Lactic acid whole blood     Status: Normal   Result Value Ref Range    Lactic Acid 1.8 0.7 - 2.0 mmol/L   Comprehensive metabolic panel     Status: Abnormal   Result Value Ref Range    Sodium 141 133 - 144 mmol/L    Potassium 3.7 3.4 - 5.3 mmol/L    Chloride 106 94 - 109 mmol/L    Carbon Dioxide (CO2) 27 20 - 32 mmol/L    Anion Gap 8 3 - 14 mmol/L    Urea Nitrogen 21 7 - 30 mg/dL    Creatinine 0.36 (L) 0.52 - 1.25 mg/dL    Calcium 8.2 (L) 8.5 - 10.1 mg/dL    Glucose 132 (H) 70 - 99 mg/dL    Alkaline Phosphatase 196 (H) 40 - 150 U/L     (H) 0 - 45 U/L     (H) 0 - 70 U/L    Protein Total 5.9 (L) 6.8 - 8.8 g/dL    Albumin 2.4 (L) 3.4 - 5.0 g/dL    Bilirubin Total 0.2 0.2 - 1.3 mg/dL    GFR Estimate >90 >60 mL/min/1.73m2    Narrative    The sex of this patient cannot be reliably determined based on discrepancies in demographics (legal sex, sex assigned at birth, gender identity).  Both male and female reference ranges are provided where applicable.  Careful evaluation of the patient s results as compared to the gender specific reference intervals is required in this setting.    Lipid panel reflex to direct LDL Fasting     Status: None   Result Value Ref Range    Cholesterol 126 <200 mg/dL    Triglycerides 74 <150 mg/dL    Direct Measure HDL 60 >=40 mg/dL    LDL Cholesterol Calculated 51 <=100 mg/dL    Non HDL Cholesterol 66 <130 mg/dL    Patient Fasting > 8hrs? Unknown     Narrative    The sex of this patient cannot be reliably  determined based on discrepancies in demographics (legal sex, sex assigned at birth, gender identity).  Both male and female reference ranges are provided where applicable.  Careful evaluation of the patient s results as compared to the gender specific reference intervals is required in this setting.   Cholesterol  Desirable:  <200 mg/dL    Triglycerides  Normal:  Less than 150 mg/dL  Borderline High:  150-199 mg/dL  High:  200-499 mg/dL  Very High:  Greater than or equal to 500 mg/dL    Direct Measure HDL  Female:  Greater than or equal to 50 mg/dL   Male:  Greater than or equal to 40 mg/dL    LDL Cholesterol  Desirable:  <100mg/dL  Above Desirable:  100-129 mg/dL   Borderline High:  130-159 mg/dL   High:  160-189 mg/dL   Very High:  >= 190 mg/dL    Non HDL Cholesterol  Desirable:  130 mg/dL  Above Desirable:  130-159 mg/dL  Borderline High:  160-189 mg/dL  High:  190-219 mg/dL  Very High:  Greater than or equal to 220 mg/dL   CK total     Status: Normal   Result Value Ref Range     30 - 300 U/L   Partial thromboplastin time     Status: Abnormal   Result Value Ref Range    aPTT >240 (HH) 22 - 38 Seconds   INR     Status: Abnormal   Result Value Ref Range    INR 3.88 (H) 0.85 - 1.15     HIRAL GRAVES M.D., FAAP, [facility]  Professor  Division of Genetics and Metabolism  Department of Pediatrics  [facility]    Routed to patient in Comm Mgt  Also to Vanessa Wright  Care Team    90 minutes spent on the date of the encounter doing chart review, history and exam, documentation and further activities per the note

## 2023-01-25 NOTE — NURSING NOTE
"Chief Complaint   Patient presents with     RECHECK     Follow up        Vitals:    01/25/23 1133   BP: (!) 87/51   BP Location: Right arm   Patient Position: Sitting   Cuff Size: Child   Pulse: 68   Weight: 90 lb 2.7 oz (40.9 kg)   Height: 5' 3.31\" (160.8 cm)   HC: 50 cm (19.69\")       Zulma Garduno, EMT   January 25, 2023  "

## 2023-01-25 NOTE — Clinical Note
Kevin Stephens was seen and treated in our emergency department on 1/25/2023.  Brianna Stephens may return to work on 01/27/2023.       If you have any questions or concerns, please don't hesitate to call.      Rosa Montemayor MD

## 2023-01-26 NOTE — ED NOTES
Detail Level: Generalized
Expected Patient Referral to ED  3:45 PM    Referring Clinic/Provider:  Genetics Metabolism Clinic with [facility], pt with hx of glycogen storage disease.    Reason for referral/Clinical facts:  Seen in clinic today and abnormal INR 3.88, PTT >240, which is all new.  Uncertain if lab error or correct, but pt advised to come to [facility] ED right away.  Coming by private vehicle.     Recommendations provided:  Send to ED for further evaluation    Caller was informed that this institution does possess the capabilities and/or resources to provide for patient and should be transferred to our facility.    Discussed that if direct admit is sought and any hurdles are encountered, this ED would be happy to see the patient and evaluate.    Informed caller that recommendations provided are recommendations based only on the facts provided and that they responsible to accept or reject the advice, or to seek a formal in person consultation as needed and that this ED will see/treat patient should they arrive.      Juanpablo Hickman MD  [facility] EMERGENCY ROOM  [address] [address] [phone]       Juanpablo Hickman MD  01/25/23 154    
Seen in clinic for checkup after recent hospitalization for DKA and was instructed to come to ED for evaluation of abnormal labs. Denies any pain or injury or other concern. Presents with her mother. PIV placed and lab specimens sent to lab for analysis. VSS. Continue to assist as needed.   
Detail Level: Zone

## 2023-01-26 NOTE — ED TRIAGE NOTES
Pt was at GI appt and had labs drawn. Had elevated PT/INR and sent for further eval.     Triage Assessment     Row Name 01/25/23 [id number]       Triage Assessment (Adult)    Airway WDL WDL       Respiratory WDL    Respiratory WDL WDL       Skin Circulation/Temperature WDL    Skin Circulation/Temperature WDL WDL       Cardiac WDL    Cardiac WDL WDL       Peripheral/Neurovascular WDL    Peripheral Neurovascular WDL WDL       Cognitive/Neuro/Behavioral WDL    Cognitive/Neuro/Behavioral WDL WDL

## 2023-01-26 NOTE — ED PROVIDER NOTES
"EMERGENCY DEPARTMENT ENCOUNTER      NAME: Kevin Stephens  AGE: 22 year old adult  YOB: 2000  MRN: [id number]  EVALUATION DATE & TIME: 1/25/2023  7:13 PM    PCP: Vanessa Wright    ED PROVIDER: Rosa Montemayor M.D.      Chief Complaint   Patient presents with     Abnormal Labs         FINAL IMPRESSION:  1. Hyperglycemia          ED COURSE & MEDICAL DECISION MAKING:    ED Course as of 01/25/23 2146 Wed Jan 25, 2023 1933 Patient with reported elevated INR and PTT at clinic, per review of record from clinic it is uncertain why these were drawn, patient without h/o elevated coags before, no gums bleeding or petechiae, bilateral lateral wrist region bruises nonblanching after recent IV/lab draws, only recorded reason for INR was to \"monitor progression into adulthood\", will check platelets and INR/PTT here, hcg and BMP and reassess. No confusion or fever to suggest CARLA and no recent travel, drug use, bug  bites. Will recheck labs here to determine if lab error vs. True elevation and if elevated, dispo per ordering physician   2137 INR and PTT here normal reassuringly thus likely lab error earlier today. Glucse 460 with known insulin use, anion gap 7 and CO2 29, will discuss with patient and family   2141 Patient reassessed, feels well, notes glucose 460s is not completely abnormal for he/them and wants to use home sliding scale for insulin, declines insulin here and recheck FSG and vows they will return to the ER if glucose doesn't immediately go down and home and will closely watch it, motehr at bedside agrees to plan and will also closely watch glucose and ensure Li is okay. Patient discharged after being provided with extensive anticipatory guidance and given return precautions, importance of PMD follow-up emphasized.        Pertinent Labs & Imaging studies reviewed. (See chart for details)    N95 worn  A face shield was worn also  COVID PPE    Medical Decision Making    History:    Supplemental " history from: Family Member/Significant Other    External Record(s) reviewed: Inpatient Record: Recent hospitalization (1/12-16) and Outpatient Record: Clinic visit (1/25/23)    Work Up:    Chart documentation includes differential considered and any EKGs or imaging independently interpreted by provider, where specified.    In additional to work up documented, I considered the following work up: Documented in chart, if applicable.    External consultation:    Discussion of management with another provider: Documented in chart, if applicable    Complicating factors:    Care impacted by chronic illness: Other: GSD IIIa    Care affected by social determinants of health: N/A    Disposition considerations: Discharge. No recommendations on prescription strength medication(s). I considered admission, but discharged the patient after share decision making conversation.        At the conclusion of the encounter I discussed the results of all of the tests and the disposition. The questions were answered. The patient or family acknowledged understanding and was agreeable with the care plan.     MEDICATIONS GIVEN IN THE EMERGENCY:  Medications - No data to display    NEW PRESCRIPTIONS STARTED AT TODAY'S ER VISIT  New Prescriptions    No medications on file          =================================================================    HPI      Kevin Stephens is a 22 year old adult with PMHx of poorly controlled DM1, mental health diagnoses, and glycogen storage disease type 3A, on growth hormone therapy who is followed by [facility] who presents to the ED today via walk-in with mom for evaluation of abnormal labs.     Per chart review, patient was admitted to [facility] from 1/12-1/16 for DKA and MSSA bacteremia due to a sebaceous cyst complicated by abscess. Patient was noted to be asymptomatic COVID positive during this hospitalization. Patient had a office visit to re-establish care with their doctor  managing their glycogen storage disease type 3A earlier today (1/25/2023). Patient had a constellation of labs and TTE ordered. INR and PTT came back elevated. Patient was called with results and referred to the ED.     Patient reports no symptoms at this time, stating they came in per referral. They have not noticed any bruising, other than a small bruise surrounding a recent IV placement in their left forearm. Patient denies any gum bleeding or epistaxis. Also denies trouble breathing, fever, headache. Mom states the patient has not displayed any recent confusion.    Of note, patient was assigned female at birth. Patient uses he/they pronouns.    REVIEW OF SYSTEMS   All other systems reviewed and are negative except as noted above in HPI.    PAST MEDICAL HISTORY:  Past Medical History:   Diagnosis Date     Anxiety      Depressive disorder      Diabetes mellitus (H)      Glycogen storage disease IIIa - see Emergency Letter in EPIC dated 05/07/12 2/17/2012       PAST SURGICAL HISTORY:  Past Surgical History:   Procedure Laterality Date     TONSILLECTOMY Bilateral 4/2015       CURRENT MEDICATIONS:    Acetone, Urine, Test (KETONE TEST) STRP  acetone, Urine, test STRP  alcohol swab prep pads  alcohol swab prep pads  bacitracin 500 UNIT/GM OINT  blood glucose (NO BRAND SPECIFIED) test strip  blood glucose calibration (NO BRAND SPECIFIED) solution  blood glucose monitoring (NO BRAND SPECIFIED) meter device kit  blood glucose monitoring (ONE TOUCH DELICA) lancets  blood glucose monitoring (ONETOUCH VERIO IQ) test strip  cephALEXin (KEFLEX) 500 MG capsule  Continuous Blood Gluc Sensor (DEXCOM G6 SENSOR) MISC  Continuous Blood Gluc Sensor (FREESTYLE PASQUALE 2 SENSOR) MISC  Continuous Blood Gluc Transmit (DEXCOM G6 TRANSMITTER) MISC  insulin aspart (NOVOLOG PEN) 100 UNIT/ML pen  insulin glargine (LANTUS PEN) 100 UNIT/ML pen  insulin pen needle (BD CALLI U/F) 32G X 4 MM miscellaneous  thin (NO BRAND SPECIFIED)  "lancets        ALLERGIES:  Allergies   Allergen Reactions     Fluoxetine      Other reaction(s): Mental Status Change  Suicidal thoughts     Morphine Sulfate      No exposure but grandfather has the allergy to it     Tylenol [Acetaminophen]      Has glycogen storage disease and should not receive Tylenol, per GI.       FAMILY HISTORY:  Family History   Problem Relation Age of Onset     Hearing Loss Father        SOCIAL HISTORY:   Social History     Socioeconomic History     Marital status: Single     Spouse name: None     Number of children: None     Years of education: None     Highest education level: None   Tobacco Use     Smoking status: Never     Smokeless tobacco: Never     Tobacco comments:     no second hand smoke exposure at home   Vaping Use     Vaping Use: Some days     Substances: Nicotine, THC   Substance and Sexual Activity     Alcohol use: No     Drug use: Yes     Types: Marijuana     Comment: every other day user   Social History Narrative    Lives with parents and younger brother.  Currently in 12th grade.  Loves to sing       VITALS:  Patient Vitals for the past 24 hrs:   BP Temp Temp src Pulse Resp SpO2 Height Weight   01/25/23 2125 102/64 -- -- 62 -- 97 % -- --   01/25/23 2115 -- -- -- 65 -- 97 % -- --   01/25/23 2020 -- -- -- 73 -- 98 % -- --   01/25/23 2002 103/56 -- -- 68 -- 98 % -- --   01/25/23 1957 106/59 -- -- 79 -- 97 % -- --   01/25/23 1821 110/72 98.6  F (37  C) Oral 73 20 100 % 1.626 m (5' 4\") 40.9 kg (90 lb 2.7 oz)       PHYSICAL EXAM    GENERAL: Awake, alert.  In no acute distress.   HEENT: Normocephalic, atraumatic.  Pupils equal, round and reactive.  Conjunctiva normal.  EOMI.  NECK: No stridor or apparent deformity.  PULMONARY: Symmetrical breath sounds without distress.  Lungs clear to auscultation bilaterally without wheezes, rhonchi or rales.  CARDIO: Regular rate and rhythm.  No significant murmur, rub or gallop.  Radial pulses strong and symmetrical.  ABDOMINAL: Abdomen soft, " non-distended and non-tender to palpation.  No CVAT, no palpable hepatosplenomegaly.  EXTREMITIES: No lower extremity swelling or edema. Left lateral wrist IV region with small bruise and discoloration, non blanching.  NEURO: Alert and oriented to person, place and time.  Cranial nerves grossly intact.  No focal motor deficit.  PSYCH: Normal mood and affect  SKIN: No rashes      LAB:  All pertinent labs reviewed and interpreted.  Results for orders placed or performed during the hospital encounter of 01/25/23   Result Value Ref Range    INR 1.10 0.85 - 1.15   Partial thromboplastin time   Result Value Ref Range    aPTT 22 22 - 38 Seconds   Basic metabolic panel   Result Value Ref Range    Sodium 141 136 - 145 mmol/L    Potassium 4.2 3.5 - 5.0 mmol/L    Chloride 105 98 - 107 mmol/L    Carbon Dioxide (CO2) 29 22 - 31 mmol/L    Anion Gap 7 5 - 18 mmol/L    Urea Nitrogen 20 8 - 22 mg/dL    Creatinine 0.70 0.60 - 1.30 mg/dL    Calcium 8.5 8.5 - 10.5 mg/dL    Glucose 460 (HH) 70 - 125 mg/dL    GFR Estimate >90 >60 mL/min/1.73m2   Hepatic function panel   Result Value Ref Range    Bilirubin Total 0.3 0.0 - 1.0 mg/dL    Bilirubin Direct 0.2 <=0.5 mg/dL    Protein Total 6.2 6.0 - 8.0 g/dL    Albumin 2.8 (L) 3.5 - 5.0 g/dL    Alkaline Phosphatase 218 (H) 45 - 120 U/L     (H) 0 - 40 U/L     (H) 0 - 45 U/L   HCG QUALitative pregnancy (blood)   Result Value Ref Range    hCG Serum Qualitative Negative Negative   Result Value Ref Range    Lipase 17 0 - 52 U/L   CBC with platelets and differential   Result Value Ref Range    WBC Count 4.1 4.0 - 11.0 10e3/uL    RBC Count 3.56 (L) 3.80 - 5.90 10e6/uL    Hemoglobin 11.2 (L) 11.7 - 17.7 g/dL    Hematocrit 34.4 (L) 35.0 - 53.0 %    MCV 97 78 - 100 fL    MCH 31.5 26.5 - 33.0 pg    MCHC 32.6 31.5 - 36.5 g/dL    RDW 12.6 10.0 - 15.0 %    Platelet Count 237 150 - 450 10e3/uL    % Neutrophils 60 %    % Lymphocytes 32 %    % Monocytes 5 %    % Eosinophils 1 %    % Basophils 1 %     % Immature Granulocytes 1 %    NRBCs per 100 WBC 0 <1 /100    Absolute Neutrophils 2.5 1.6 - 8.3 10e3/uL    Absolute Lymphocytes 1.3 0.8 - 5.3 10e3/uL    Absolute Monocytes 0.2 0.0 - 1.3 10e3/uL    Absolute Eosinophils 0.0 0.0 - 0.7 10e3/uL    Absolute Basophils 0.0 0.0 - 0.2 10e3/uL    Absolute Immature Granulocytes 0.0 <=0.4 10e3/uL    Absolute NRBCs 0.0 10e3/uL   Adult Type and Screen   Result Value Ref Range    ABO/RH(D) A POS     Antibody Screen Negative Negative    SPECIMEN EXPIRATION DATE [id number]            I, Valente Dominique, am serving as a scribe to document services personally performed by Dr. Rosa Montemayor based on my observation and the provider's statements to me. IRosa MD attest that Valente Dominique is acting in a scribe capacity, has observed my performance of the services and has documented them in accordance with my direction.     Rosa Montemayor MD  01/25/23 6472

## 2023-02-02 PROBLEM — E23.0 PARTIAL HYPOPITUITARISM (H): Status: ACTIVE | Noted: 2022-02-17

## 2023-02-03 ENCOUNTER — HOSPITAL ENCOUNTER (OUTPATIENT)
Dept: CARDIOLOGY | Facility: CLINIC | Age: 23
Discharge: HOME | End: 2023-02-03
Attending: MEDICAL GENETICS
Payer: COMMERCIAL

## 2023-02-03 ENCOUNTER — HOSPITAL ENCOUNTER (OUTPATIENT)
Dept: ULTRASOUND IMAGING | Facility: CLINIC | Age: 23
Discharge: HOME | End: 2023-02-03
Attending: MEDICAL GENETICS
Payer: COMMERCIAL

## 2023-02-03 DIAGNOSIS — E74.03 GLYCOGEN STORAGE DISEASE III (H): ICD-10-CM

## 2023-02-03 PROCEDURE — 76700 US EXAM ABDOM COMPLETE: CPT | Mod: 26 | Performed by: RADIOLOGY

## 2023-02-03 PROCEDURE — 93306 TTE W/DOPPLER COMPLETE: CPT

## 2023-02-03 PROCEDURE — 76700 US EXAM ABDOM COMPLETE: CPT

## 2023-02-03 PROCEDURE — 93306 TTE W/DOPPLER COMPLETE: CPT | Mod: 26 | Performed by: STUDENT IN AN ORGANIZED HEALTH CARE EDUCATION/TRAINING PROGRAM

## 2023-02-06 ENCOUNTER — OFFICE VISIT (OUTPATIENT)
Dept: SURGERY | Facility: CLINIC | Age: 23
End: 2023-02-06
Payer: COMMERCIAL

## 2023-02-06 VITALS
HEIGHT: 64 IN | BODY MASS INDEX: 14.51 KG/M2 | WEIGHT: 85 LBS | SYSTOLIC BLOOD PRESSURE: 90 MMHG | DIASTOLIC BLOOD PRESSURE: 60 MMHG

## 2023-02-06 DIAGNOSIS — L72.3 INFECTED SEBACEOUS CYST: Primary | ICD-10-CM

## 2023-02-06 DIAGNOSIS — L08.9 INFECTED SEBACEOUS CYST: Primary | ICD-10-CM

## 2023-02-06 PROCEDURE — 99203 OFFICE O/P NEW LOW 30 MIN: CPT | Performed by: SPECIALIST

## 2023-02-06 NOTE — LETTER
2/6/2023         RE: Kevin Stephens  [address] [place] [address]        Dear Colleague,    Thank you for referring your patient, Kevin Stephens, to the [facility] [place]. Please see a copy of my visit note below.        [facility] Surgery Consult    HPI:    22 year old year old adult who I have been consulted by Vanessa Wright for evaluation of Derm Problem (Sebaceous cyst; Head)  She has an open wound on her head and scalp.  She had chronic issues and problems and loss of hair here.  She has had debridements and really has necrotic tissue in the area.  Question is whether she has an infected cyst or not believe she does.    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 There is no known cause, and no genetic testing has been completed.     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 History of GH treatment for a few years from age [age] 02/2022: IGF-1 72, Cortisol 15, ACTH 21, TSH 1.72     Prolonged QT interval 5/11/2022     Severe protein-calorie malnutrition (H) 5/11/2022     Transaminitis 5/11/2022     Uncontrolled diabetes  "mellitus secondary to pancreatic insufficiency 11/30/2014    Problem list name updated by automated process. Provider to review     Vitamin D deficiency 3/11/2012       Past Surgical History:   Procedure Laterality Date     TONSILLECTOMY Bilateral 4/2015       CURRENT MEDS:  Current Outpatient Medications   Medication     Acetone, Urine, Test (KETONE TEST) STRP     acetone, Urine, test STRP     alcohol swab prep pads     alcohol swab prep pads     bacitracin 500 UNIT/GM OINT     blood glucose (NO BRAND SPECIFIED) test strip     blood glucose calibration (NO BRAND SPECIFIED) solution     blood glucose monitoring (NO BRAND SPECIFIED) meter device kit     blood glucose monitoring (ONE TOUCH DELICA) lancets     blood glucose monitoring (ONETOUCH VERIO IQ) test strip     cephALEXin (KEFLEX) 500 MG capsule     Continuous Blood Gluc Sensor (DEXCOM G6 SENSOR) MISC     Continuous Blood Gluc Sensor (FREESTYLE PASQUALE 2 SENSOR) MISC     Continuous Blood Gluc Transmit (DEXCOM G6 TRANSMITTER) MISC     insulin aspart (NOVOLOG PEN) 100 UNIT/ML pen     insulin glargine (LANTUS PEN) 100 UNIT/ML pen     insulin pen needle (BD CALLI U/F) 32G X 4 MM miscellaneous     thin (NO BRAND SPECIFIED) lancets     collagenase (SANTYL) 250 UNIT/GM external ointment     polyethylene glycol (MIRALAX) 17 GM/Dose powder     No current facility-administered medications for this visit.       Family History   Problem Relation Age of Onset     Hearing Loss Father         reports that Brianna Stephens has never smoked. Brianna Stephens has never used smokeless tobacco. Brianna Stephens reports current drug use. Drug: Marijuana. Brianna Stephens reports that Brianna Stephens does not drink alcohol.    Review of Systems:  Negative except chronic cyst issues chronic infection issues and dermatologic issues of her scalp and head.Otherwise twelve system of review is negative.      OBJECTIVE:  Vitals:    02/06/23 0908   BP: 90/60   Weight: 38.6 kg (85 lb)   Height: 1.626 m (5' 4\") "     Body mass index is 14.59 kg/m .    EXAM:  GENERAL: This is a well-developed 22 year old adult who appears Li MAREK Stephens's stated age  HEAD: normocephalic large area loss of hair this is most on the right side she has can of an area of looks like it necrosis or dead skin which is intact no drainage no infection presently.  HEENT: Pupils equal and reactive bilaterally  MOUTH: mucus membranes intact  CARDIAC: RRR without murmur  CHEST/LUNG:  Clear to auscultation  ABDOMEN: Soft, nontender, nondistended, no masses  EXTREMITIES: Grossly normal, warm,   NEUROLOGIC: Focally intact, nonfocal  INTEGUMENT: No open lesions or ulcers  VASCULAR: Pulses intact, symmetrical upper and lower extremities.        LABS:  Lab Results   Component Value Date    WBC 5.9 02/24/2023    WBC 5.9 04/17/2018    HGB 15.8 02/24/2023    HGB 15.0 04/17/2018    HCT 46.9 02/24/2023    HCT 45.0 04/17/2018    MCV 85 02/24/2023    MCV 87 04/17/2018     02/24/2023     04/17/2018       Lab Results   Component Value Date    ALT 79 (H) 02/24/2023    AST 54 (H) 02/24/2023     (H) 10/13/2010    ALKPHOS 258 (H) 02/24/2023          Assessment/Plan:   Chronic dermatologic issues and changes of her scalp.  She has hair loss secondary to this  Chronic infections    I think she should see a dermatologist obviously for this issue if there is worry for excision she will need flaps and I would definitely get a plastic surgeon involved for this.  No drainage of anything at this time point is needed.  I do believe this gives me more of a chronic issue obviously and she is can do any kind of reconstruction plastic surgery should be involved.    No follow-ups on file.    Homer Mccain MD  General Surgery [phone]  Vascular Surgery [phone]          Again, thank you for allowing me to participate in the care of your patient.        Sincerely,        Homer Mccain MD

## 2023-02-13 ENCOUNTER — OFFICE VISIT (OUTPATIENT)
Dept: VASCULAR SURGERY | Facility: CLINIC | Age: 23
End: 2023-02-13
Attending: SPECIALIST
Payer: COMMERCIAL

## 2023-02-13 VITALS
BODY MASS INDEX: 14.59 KG/M2 | WEIGHT: 85 LBS | TEMPERATURE: 98.1 F | SYSTOLIC BLOOD PRESSURE: 94 MMHG | DIASTOLIC BLOOD PRESSURE: 62 MMHG | HEART RATE: 86 BPM | RESPIRATION RATE: 14 BRPM | OXYGEN SATURATION: 98 %

## 2023-02-13 DIAGNOSIS — Z72.0 NICOTINE ABUSE: ICD-10-CM

## 2023-02-13 DIAGNOSIS — L08.9 INFECTED SEBACEOUS CYST: ICD-10-CM

## 2023-02-13 DIAGNOSIS — L72.3 INFECTED SEBACEOUS CYST: ICD-10-CM

## 2023-02-13 DIAGNOSIS — E55.9 VITAMIN D DEFICIENCY: ICD-10-CM

## 2023-02-13 DIAGNOSIS — L98.499 TYPE 1 DIABETES MELLITUS WITH OTHER SKIN ULCER (H): ICD-10-CM

## 2023-02-13 DIAGNOSIS — L02.811 SCALP ABSCESS: Primary | ICD-10-CM

## 2023-02-13 DIAGNOSIS — E43 SEVERE PROTEIN-CALORIE MALNUTRITION (H): ICD-10-CM

## 2023-02-13 DIAGNOSIS — E10.622 TYPE 1 DIABETES MELLITUS WITH OTHER SKIN ULCER (H): ICD-10-CM

## 2023-02-13 PROCEDURE — 99203 OFFICE O/P NEW LOW 30 MIN: CPT | Performed by: NURSE PRACTITIONER

## 2023-02-13 PROCEDURE — G0463 HOSPITAL OUTPT CLINIC VISIT: HCPCS | Performed by: NURSE PRACTITIONER

## 2023-02-13 ASSESSMENT — PAIN SCALES - GENERAL: PAINLEVEL: NO PAIN (1)

## 2023-02-13 NOTE — PROGRESS NOTES
[facility] Consult Note    Date of Service: February 13, 2023     Requesting Provider: Dr. Homer Mccain    Chief Complaint: head abscess     History of Present Illness: Kevin Stephens is being seen at [facility] today regarding head abscess or sebaceous cyst infection. They arrive to the clinic today with mom. The patient reports that 1 month ago started as a small pimple; 3 days later it was the size of a tennis ball; sugars were increasing; went to the hospital was in DKA and had infection in the cyst this was lanced a few times in clinic and in the hospital. The cyst has since healed and then developed another area just near the cyst site.  Was currently/previously using triple antibiotic ointment; leaving milo. Reports pain of 2/10 when it gets dry and tight feeling; currently using ibuprofen for pain. Has used nothing as compression in the past, is currently using nothing for compression. Denies any fevers, chills, or generalized ill feeling. Denies history of cancer. Sleeps in a bed/recliner with legs elevated tries to avoid laying on the wound area.      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 There is no known cause, and no genetic testing has been completed.     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 History of GH treatment for a few years from age [age] 02/2022: IGF-1 72, Cortisol 15, ACTH 21, TSH 1.72     Prolonged QT interval 5/11/2022     Severe protein-calorie malnutrition (H) 5/11/2022     Transaminitis 5/11/2022     Uncontrolled diabetes mellitus secondary to pancreatic insufficiency 11/30/2014    Problem list name updated by automated process. Provider to review     Vitamin D deficiency 3/11/2012        Surgical History:   Past Surgical History:   Procedure Laterality Date     TONSILLECTOMY Bilateral 4/2015        Medications:   Current Outpatient Medications   Medication Sig     Acetone, Urine, Test (KETONE TEST) STRP 1 EACH BY IN VITRO ROUTE AS NEEDED (HYPERGLYCEMIA, CONCERN FOR DKA)     acetone, Urine, test STRP Check urine ketones when two consecutive blood sugars are greater than 300 and at times of illness/vomiting.     alcohol swab prep pads Use to swab area of injection/nigel as directed.     alcohol swab prep pads Use to swab area of injection/nigel as directed.     bacitracin 500 UNIT/GM OINT Apply topically 2 times daily     blood glucose (NO BRAND SPECIFIED) test strip Use to test blood sugar 4 times daily or as directed. To accompany: Blood Glucose Monitor Brands: per insurance.     blood glucose calibration (NO BRAND SPECIFIED) solution To accompany: Blood Glucose Monitor Brands: per insurance.     blood glucose monitoring  (NO BRAND SPECIFIED) meter device kit Use to test blood sugar 4 times daily or as directed. Preferred blood glucose meter OR supplies to accompany: Blood Glucose Monitor Brands: per insurance.     blood glucose monitoring (ONE TOUCH DELICA) lancets Use to test blood sugars 6 times daily.     blood glucose monitoring (ONETOUCH VERIO IQ) test strip Use to test blood sugars 6 times daily or as directed.     cephALEXin (KEFLEX) 500 MG capsule Take 1 capsule (500 mg) by mouth every 8 hours Take one capsule tonight after getting home from the hospital (1/16/2023), then take one pill three times each day until the antibiotic is finished.     collagenase (SANTYL) 250 UNIT/GM external ointment Apply topically daily Total square cm of the wound being treat is 3.96 please provide 30 days supply     Continuous Blood Gluc Sensor (DEXCOM G6 SENSOR) MISC 1 each See Admin Instructions Change every 10 days     Continuous Blood Gluc Sensor (FREESTYLE PASQUALE 2 SENSOR) MISC 1 each every 14 days Use 1 sensor every 14 days. Use to read blood sugars per 's instructions.     Continuous Blood Gluc Transmit (DEXCOM G6 TRANSMITTER) MISC 1 each every 3 months     insulin aspart (NOVOLOG PEN) 100 UNIT/ML pen Correct 1 unit per 40 over 180 before breakfast, lunch, dinner and at bedtime.     insulin glargine (LANTUS PEN) 100 UNIT/ML pen Inject 32 Units Subcutaneous every morning     insulin pen needle (BD CALLI U/F) 32G X 4 MM miscellaneous Use pen needles 6 times daily.     thin (NO BRAND SPECIFIED) lancets Use with lanceting device. To accompany: Blood Glucose Monitor Brands: per insurance.     No current facility-administered medications for this visit.       Allergies:   Allergies   Allergen Reactions     Fluoxetine      Other reaction(s): Mental Status Change  Suicidal thoughts     Morphine Sulfate      No exposure but grandfather has the allergy to it     Tylenol [Acetaminophen]      Has glycogen storage disease and should not  receive Tylenol, per GI.       Family history:   Family History   Problem Relation Age of Onset     Hearing Loss Father         Social History:   Social History     Socioeconomic History     Marital status: Single     Spouse name: Not on file     Number of children: Not on file     Years of education: Not on file     Highest education level: Not on file   Occupational History     Not on file   Tobacco Use     Smoking status: Never     Smokeless tobacco: Never     Tobacco comments:     no second hand smoke exposure at home   Vaping Use     Vaping Use: Some days     Substances: Nicotine, THC   Substance and Sexual Activity     Alcohol use: No     Drug use: Yes     Types: Marijuana     Comment: every other day user     Sexual activity: Not on file   Other Topics Concern     Not on file   Social History Narrative    Lives with parents and younger brother.  Currently in 12th grade.  Loves to sing     Social Determinants of Health     Financial Resource Strain: Not on file   Food Insecurity: Not on file   Transportation Needs: Not on file   Physical Activity: Not on file   Stress: Not on file   Social Connections: Not on file   Intimate Partner Violence: Not on file   Housing Stability: Not on file        Physical Exam  Vitals: BP 94/62   Pulse 86   Temp 98.1  F (36.7  C)   Resp 14   Wt 85 lb (38.6 kg)   SpO2 98%   BMI 14.59 kg/m    Weight is 85 lbs 0 oz          Body mass index is 14.59 kg/m .  General: This is a 22 year old adult who appears their reported age, extremely thin; not in acute distress  Head: normocephalic, Atraumatic; not wearing glasses; non-icteric sclera; no exudate; nohearing loss   Respiratory: Clear throughout all lung fields; unlabored breathing; no cough   Cardiac: Regular, Rate and Rhythm, no murmurs appreciated   Skin: Uniformly warm and dry  Psych: Alert and oriented x4; calm and cooperative throughout exam  Head: top of the scalp with loss of hair growth; scarring from previous cysts;  there is a 2.2x1.8x0.1cm full thickness ulcer with minimal drainage; no surrounding erythema; no fluctuance; no bone exposed at this time; minimally tender to palpation    Circumferential volume measures:        No flowsheet data found.    Ulceration(s)/Wound(s):     VASC Wound Right scalp (Active)   Pre Size Length 2.2 02/13/23 0700   Pre Size Width 1.8 02/13/23 0700   Pre Size Depth 0.1 02/13/23 0700   Pre Total Sq cm 3.96 02/13/23 0700   Description scab 02/13/23 0700       Laboratory studies:     I personally reviewed the following lab results today and those on care everywhere, if indicated     CRP   Date Value Ref Range Status   01/14/2023 2.1 (H) 0.0 - <0.8 mg/dL Final      No results found for: SED   Last Renal Panel:  Sodium   Date Value Ref Range Status   01/25/2023 141 136 - 145 mmol/L Final   09/17/2020 136 133 - 144 mmol/L Final     Potassium   Date Value Ref Range Status   01/25/2023 4.2 3.5 - 5.0 mmol/L Final   09/17/2020 4.2 3.4 - 5.3 mmol/L Final     Chloride   Date Value Ref Range Status   01/25/2023 105 98 - 107 mmol/L Final   09/17/2020 102 94 - 109 mmol/L Final     Carbon Dioxide   Date Value Ref Range Status   09/17/2020 24 20 - 32 mmol/L Final     Carbon Dioxide (CO2)   Date Value Ref Range Status   01/25/2023 29 22 - 31 mmol/L Final     Anion Gap   Date Value Ref Range Status   01/25/2023 7 5 - 18 mmol/L Final   09/17/2020 10 3 - 14 mmol/L Final     Glucose   Date Value Ref Range Status   01/25/2023 460 (HH) 70 - 125 mg/dL Final   09/17/2020 239 (H) 70 - 99 mg/dL Final     Urea Nitrogen   Date Value Ref Range Status   01/25/2023 20 8 - 22 mg/dL Final   09/17/2020 11 7 - 30 mg/dL Final     Creatinine   Date Value Ref Range Status   01/25/2023 0.70 0.60 - 1.30 mg/dL Final     Comment:     Age                     Creatinine (mg/dL)    0-22 Days               0.30-1.00    22 Days to 13 Months    0.10-0.60     13 Months to 6 Years    Female 0.10-0.50    Male 0.10-0.60    6 Years to 11 Years      Female 0.20-0.60    Male 0.20-0.70    11 Years to 16 Years    Female 0.40-0.70    Male 0.30-0.90    16 Years and Older      Female 0.60-1.10    Male 0.70-1.30        09/17/2020 0.52 0.52 - 1.04 mg/dL Final     GFR Estimate   Date Value Ref Range Status   01/25/2023 >90 >60 mL/min/1.73m2 Final     Comment:     GFR not calculated when sex unspecified or nonbinary.  eGFR calculated using 2021 CKD-EPI equation.   09/17/2020 >90 >60 mL/min/[1.73_m2] Final     Comment:     Non  GFR Calc  Starting 12/18/2018, serum creatinine based estimated GFR (eGFR) will be   calculated using the Chronic Kidney Disease Epidemiology Collaboration   (CKD-EPI) equation.       Calcium   Date Value Ref Range Status   01/25/2023 8.5 8.5 - 10.5 mg/dL Final   09/17/2020 8.8 8.5 - 10.1 mg/dL Final     Phosphorus   Date Value Ref Range Status   01/16/2023 3.8 2.5 - 4.5 mg/dL Final   09/06/2012 6.8 (H) 2.9 - 5.4 mg/dL Final     Albumin   Date Value Ref Range Status   01/25/2023 2.8 (L) 3.5 - 5.0 g/dL Final   09/17/2020 4.1 3.4 - 5.0 g/dL Final      Lab Results   Component Value Date    WBC 4.1 01/25/2023    WBC 5.9 04/17/2018     Lab Results   Component Value Date    RBC 3.56 01/25/2023    RBC 5.20 04/17/2018     Lab Results   Component Value Date    HGB 11.2 01/25/2023    HGB 15.0 04/17/2018     Lab Results   Component Value Date    HCT 34.4 01/25/2023    HCT 45.0 04/17/2018     No components found for: MCT  Lab Results   Component Value Date    MCV 97 01/25/2023    MCV 87 04/17/2018     Lab Results   Component Value Date    MCH 31.5 01/25/2023    MCH 28.8 04/17/2018     Lab Results   Component Value Date    MCHC 32.6 01/25/2023    MCHC 33.3 04/17/2018     Lab Results   Component Value Date    RDW 12.6 01/25/2023    RDW 12.2 04/17/2018     Lab Results   Component Value Date     01/25/2023     04/17/2018      Lab Results   Component Value Date    A1C  01/12/2023      Comment:      Unable to calculate   Normal <5.7%    Prediabetes 5.7-6.4%    Diabetes 6.5% or higher     Note: Adopted from ADA consensus guidelines.    A1C >14.0 05/09/2022    A1C 14.4 09/17/2020    A1C 14.3 01/16/2020    A1C 13.7 12/03/2019    A1C 12.0 08/27/2019    A1C 10.7 05/28/2019      TSH   Date Value Ref Range Status   06/17/2019 2.81 mcU/mL Final      Lab Results   Component Value Date    VITDT 8 (L) 01/25/2023    VITDT 13 (L) 09/27/2017    VITDT (L) 11/12/2014     <13  Season, race, dietary intake, and treatment affect the concentration of   25-hydroxy-Vitamin D. Values may decrease during winter months and increase   during summer months. Values less than 30 ug/L may indicate Vitamin D   deficiency.   Vitamin D determiniation is routinely performed by an immunoassay specific for   25 hydroxyvitamin D3.  If an individual is on vitamin D2 (ergocalciferol)   supplementation, please specify 25 OH vitamin D2 and D3 level determination by   LCMSMS test [id number].  For questions, please contact the laboratory at   [phone].      VITDT 19 (L) 09/11/2013    VITDT (L) 04/24/2013     <13  Season, race, dietary intake, and treatment affect the concentration of   25-hydroxy-Vitamin D. Values may decrease during winter months and increase   during summer months. Values less than 30 ug/L may indicate Vitamin D   deficiency.   Vitamin D determiniation is routinely performed by an immunoassay specific for   25 hydroxyvitamin D3.  If an individual is on vitamin D2 (ergocalciferol)   supplementation, please specify 25 OH vitamin D2 and D3 level determination by   LCMSMS test [email or web address].  For questions, please contact the laboratory at   [phone].          Impression:    Encounter Diagnoses   Name Primary?     Scalp abscess Yes     Infected sebaceous cyst      Nicotine abuse      Type 1 diabetes mellitus with other skin ulcer (H)      Severe protein-calorie malnutrition (H)      Vitamin D deficiency          2/13/2023 scalp            Assessment/Plan:  1. Debridement: not  done    2. Treatment: wound treatment will include irrigation and dressings to promote autolytic debridement which will include: will prescribe santyl ointment; bordered gauze; change daily; ok to shower and do wound care right after; we spoke about the rationale for not leaving milo; will stop the otc antibiotics due to increased risk resistance and development of resistance. If santyl not covered will consider SSD vs medihoney alginate    If for some reason the patient is not able to get your dressing(s) changed as outlined above (due to illness, lack of supplies, lack of help) please do the following: remove old, soiled dressings; wash the wounds with saline; pat dry; apply ABD pad or other absorbant pad and secure with rolled gauze; avoid tape directly on your skin; Patient instructed to call the clinic as soon as possible to let us know what the current issues are in receiving wound care.    3. Edema. na    4. Offloading: keep pressure off the wound area    5. Nutrition: we spoke about how her diabetes impacts the healing process; recommend keep sugars 150 or less; continue to work with endocrinology; vitamin d is very low; recommend daily supplement 2000 units; their albumin is low and they are underweight; recommend protein supplement daily; provided list of options    6. Nicotine abuse: they are vaping; educated pt on the impact of nicotine on the healing process; recommend that they decrease as much as they can to aid in the healing process; time spent in education and counseling was between 3-10 minutes.     Patient to return to clinic in 3-4 week(s) for re-evaluation. They were instructed to call the clinic sooner with any further questions or concerns. Answered all questions.          Eloisa Warner NP   [facility]  [phone]      This note was electronically signed by Eloisa Warner NP

## 2023-02-13 NOTE — PATIENT INSTRUCTIONS
Take protein supplement daily  Premier protein 30 grams per bottle  Core Power protein shake    Take 2000 units of vitamin d daily    Try to keep sugars 150 or less    Continue to work with endocrinology    Continue to work at decreasing the vaping amount; this will delay healing    Wound care supplies were ordered today through [employer] and if you are not receiving your supplies or have a question on your bill please contact Dian Machuca at [phone]. Please allow 2-5 business days for delivery of supplies. You may get a call from a [phone] if there are additional information Donna needs. It is important to  or return their call. PLEASE NOTE: If you need to return your supplies, you MUST call customer service within 15 days of delivery date.         Wound Care Instructions    DAILY and as needed, Cleanse your head wound(s) with Normal saline.    Pat Dry with non-sterile gauze    Primary Dressing: Apply thick layer of santyl ointment into/onto the wounds    Secondary dressing: Cover with bordered gauze    It is ok to get your wound wet in the bath or shower; do dressing change right after showering    Do not leave the wound open to the air; this will dry it out and delay healing      If for some reason you are not able to get your dressing(s) changed as outlined above (due to illness, lack of supplies, lack of help) please do the following: remove old, soiled dressings; wash the wounds with saline; pat dry; apply ABD pad or other absorbant pad and secure with rolled gauze; avoid tape directly on your skin; Call the clinic as soon as possible to let us know what the current issues are in receiving wound care [phone].      SEEK MEDICAL CARE IF:  You have an increase in swelling, pain, or redness around the wound.  You have an increase in the amount of pus coming from the wound.  There is a bad smell coming from the wound.  The wound appears to be worsening/enlarging  You have a fever greater than 101.5  F      It is ok to continue current wound care treatment/products for the next 2-3 days until new wound care supplies are ordered and arrive. If longer than this please contact our office at [phone].    If you have a 2 layer or 4 layer compression wrap on these are safe to have on for ONLY 7 days. If for some reason you are not able to get the wrap(s) changed (due to illness; lack of supplies, lack of help, lack of transportation) please do the following: unwrap the old 2 or 4 layer compression wrap; avoid using scissors as you could cut your skin and cause wounds; use tubular compression when available. Call to reschedule your home care or clinic visit appointment as soon as possible.    Please NOTE: if you are 15 minutes late to your clinic appointment you will have to be rescheduled. Please call our clinic as soon as possible if you know you will not be able to get to your appointment at [phone].    If you fail to show up to 3 scheduled clinic appointments you will be dismissed from our clinic.              We want to hear from you!  In the next few weeks, you should receive a call or email to complete a survey about your visit at [facility]. Please help us improve your appointment experience by letting us know how we did today. We strive to make your experience good and value any ways in which we could do better.      We value your input and suggestions.    Thank you for choosing the [facility]!        High Protein Foods  Chicken  -Chicken breast, 3.5oz.-30 grams protein  -Chicken thigh-10 grams(average size)  -Drumstick-11 grams  -Wing- 6 grams  -Chicken meat, cooked, 4 oz.  Beef  -Hamburger doroteo, 4 oz-28 grams protein  -Steak, 6 oz-42 grams  -Most cuts of beef- 7 grams of protein per ounce  Fish  -Most fish fillets or steaks are about 22 grams of protein for 3 1/2 oz(100 grams) of cooked fish, or 6 grams per ounce  -Tuna, 6 oz can-40 grams of  protein  Pork  -Pork chop, average-22 grams protein  -Pork loin or tenderloin, 4 oz.-29 grams  -Ham, 3oz serving- 19 grams  -Ground pork 3oz cooked-22 grams  -Aguiar, 1 slice-3 grams  -Humphreys-style aguiar(black aguiar), slice-5-6 grams  Eggs and Dairy  -Egg, large-7 grams  -Milk, 1 cup-8 grams  -Cottage cheese, 1/2 cup-15 grams  -Greek yogurt, 1 cup-usually 8-12 grams, check label  -Soft cheeses (Mozzarella, Brie, Camembert)- 6 grams  -Medium cheeses(cheddar, swiss)- 7 or 8 grams per oz  -Hard cheeses(parmesan)- 10 grams per oz  Beans  -Tofu, 1/2 cup 20 grams  -Tofu, 1 oz., 2.3 grams  -Soy milk, 1 cup-6-10 grams  -Most beans(black, alston, lentils, etc.) about 7-10 grams protein per half cup of cooked beans  -soy beans, 1/2 cup cooked-14 grams  -Split peas, 1/2 cup cooked- 8 grams  Nuts and Seeds  -Peanut butter, 2 Tablespoons- 8 grams protein  -Almonds, 1/4 cup- 8 grams  -Peanuts, 1/4 cup-9 grams  -Cashews, 1/4 cup- 5 grams  -Pecans, 1/4 cup- 2.5 grams  -Sunflower seeds, 1/4 cup- 6 grams  -Pumpkin seeds, 1/4 cup-8 grams  -Flax seeds- 1/4 cup- 8 grams  Protein Supplements  -Ensure  -Boost  -Glucerna, if diabetic  When you have an open ulcer, your bodies protein needs are much higher, so it is recommended eat good sources of protein

## 2023-02-17 ENCOUNTER — TELEPHONE (OUTPATIENT)
Dept: VASCULAR SURGERY | Facility: CLINIC | Age: 23
End: 2023-02-17
Payer: COMMERCIAL

## 2023-02-17 NOTE — TELEPHONE ENCOUNTER
LM for patient regarding PAN form completion; need their signature on the form; I can either email to them or they can come in and sign it and then I can forward onto the company.

## 2023-02-20 NOTE — PROGRESS NOTES
"..CLINICAL NUTRITION SERVICES - ASSESSMENT NOTE    REASON FOR ASSESSMENT  \"Li\" MAREK Stephens is a 22 year old adult seen by the dietitian for consult for Glycogen Storage Disease Type 3 (also with diabetes type 1).    ANTHROPOMETRICS  Height: 160.8 cm or 63.3 in  Weight: 40.8 kg or 90 lbs  BMI: 15.8, underweight    NUTRITION HISTORY  Patient returns to metabolic clinic for the first time since 2017.    Patient was recently hospitalized with diabetic ketoacidosis (poor compliance with insulin).  During this admission patient was found to have been binging and purging for some time.  During the admission social work had provided resources for this and patient had preferred not to speak to any RD.    Regarding GSD treatment, patient has not taken cornstarch for many years.       LABS  Labs reviewed  -patient has a Dexcom, but has been using glucometer at home for now (checking 4x daily right now since discharge).      MEDICATIONS  Medications reviewed    ASSESSED NUTRITION NEEDS:  Estimated Energy Needs: Hubbard-St. Jeor (1150) x 1.2-1.3 = 34-37 kcal/kg  Estimated Protein Needs: DRI = 0.8 g/kg, typically ~2 g/kg recommended for GSD3  Micronutrient Needs: 15 mcg Vitamin D    NUTRITION DIAGNOSIS:  Impaired nutrient utilization related to dx of GSD3 as evidenced by risk of elevated CK/elevated metabolites with uncontrolled glucoses.    INTERVENTIONS  Nutrition Prescription  Meet 100% estimated nutrition needs through regular diet.    Nutrition Education:   No major changes were discussed at visit.  Patient does not report having low blood sugars less than 70 (although does state they experience low symptoms when not numerically low, such as 150-200 as they are so used to being very hyperglycemic).  At this time, it appears GSD labs are stable and cornstarch was not recommended.      -Encouraged patient to keep prioritizing doing glucose checks and insulin administration and complying with their diabetes treatment  -Encouraged " patient to seek further/specialized help for binging/purging.  Patient feels this is under control.  RD still encouraged setting up counseling to address long term help and other factors contributing to this.  -Mom confirms she still has the resources for treatment centers at home and they will discuss this together    Goals  1. Weight maintenance  2. Meet >85% estimated nutrition needs through regular diet    FOLLOW UP/MONITORING  Food and Beverage intake  Anthropometric measurements    Time spent with patient: no charge    Suzette Lindsay, NAZARIO, LD

## 2023-02-24 ENCOUNTER — HOSPITAL ENCOUNTER (EMERGENCY)
Facility: CLINIC | Age: 23
Discharge: HOME | End: 2023-02-24
Admitting: NURSE PRACTITIONER
Payer: COMMERCIAL

## 2023-02-24 ENCOUNTER — APPOINTMENT (OUTPATIENT)
Dept: CT IMAGING | Facility: CLINIC | Age: 23
End: 2023-02-24
Attending: NURSE PRACTITIONER
Payer: COMMERCIAL

## 2023-02-24 VITALS
BODY MASS INDEX: 14.59 KG/M2 | SYSTOLIC BLOOD PRESSURE: 130 MMHG | DIASTOLIC BLOOD PRESSURE: 83 MMHG | HEART RATE: 78 BPM | WEIGHT: 85 LBS | OXYGEN SATURATION: 100 % | TEMPERATURE: 97.4 F | RESPIRATION RATE: 18 BRPM

## 2023-02-24 DIAGNOSIS — R10.12 ABDOMINAL PAIN, LEFT UPPER QUADRANT: ICD-10-CM

## 2023-02-24 DIAGNOSIS — K59.00 CONSTIPATION: ICD-10-CM

## 2023-02-24 LAB
ALBUMIN SERPL-MCNC: 4.1 G/DL (ref 3.5–5)
ALBUMIN UR-MCNC: NEGATIVE MG/DL
ALP SERPL-CCNC: 258 U/L (ref 45–120)
ALT SERPL W P-5'-P-CCNC: 79 U/L (ref 0–45)
ANION GAP SERPL CALCULATED.3IONS-SCNC: 13 MMOL/L (ref 5–18)
APPEARANCE UR: CLEAR
APTT PPP: 23 SECONDS (ref 22–38)
AST SERPL W P-5'-P-CCNC: 54 U/L (ref 0–40)
BACTERIA #/AREA URNS HPF: ABNORMAL /HPF
BASOPHILS # BLD AUTO: 0 10E3/UL (ref 0–0.2)
BASOPHILS NFR BLD AUTO: 0 %
BILIRUB DIRECT SERPL-MCNC: 0.2 MG/DL
BILIRUB SERPL-MCNC: 0.6 MG/DL (ref 0–1)
BILIRUB UR QL STRIP: NEGATIVE
BUN SERPL-MCNC: 19 MG/DL (ref 8–22)
CALCIUM SERPL-MCNC: 9.6 MG/DL (ref 8.5–10.5)
CHLORIDE BLD-SCNC: 98 MMOL/L (ref 98–107)
CK SERPL-CCNC: 85 U/L (ref 30–190)
CO2 SERPL-SCNC: 27 MMOL/L (ref 22–31)
COLOR UR AUTO: ABNORMAL
CREAT SERPL-MCNC: 0.71 MG/DL (ref 0.6–1.3)
EOSINOPHIL # BLD AUTO: 0.1 10E3/UL (ref 0–0.7)
EOSINOPHIL NFR BLD AUTO: 1 %
ERYTHROCYTE [DISTWIDTH] IN BLOOD BY AUTOMATED COUNT: 12.1 % (ref 10–15)
GFR SERPL CREATININE-BSD FRML MDRD: >90 ML/MIN/1.73M2
GLUCOSE BLD-MCNC: 300 MG/DL (ref 70–125)
GLUCOSE UR STRIP-MCNC: >1000 MG/DL
HCG SERPL QL: NEGATIVE
HCT VFR BLD AUTO: 46.9 % (ref 35–53)
HGB BLD-MCNC: 15.8 G/DL (ref 11.7–17.7)
HGB UR QL STRIP: NEGATIVE
IMM GRANULOCYTES # BLD: 0 10E3/UL
IMM GRANULOCYTES NFR BLD: 1 %
INR PPP: 1.03 (ref 0.85–1.15)
KETONES BLD-SCNC: 0.26 MMOL/L
KETONES UR STRIP-MCNC: NEGATIVE MG/DL
LEUKOCYTE ESTERASE UR QL STRIP: ABNORMAL
LIPASE SERPL-CCNC: 12 U/L (ref 0–52)
LYMPHOCYTES # BLD AUTO: 1.9 10E3/UL (ref 0.8–5.3)
LYMPHOCYTES NFR BLD AUTO: 33 %
MCH RBC QN AUTO: 28.7 PG (ref 26.5–33)
MCHC RBC AUTO-ENTMCNC: 33.7 G/DL (ref 31.5–36.5)
MCV RBC AUTO: 85 FL (ref 78–100)
MONOCYTES # BLD AUTO: 0.2 10E3/UL (ref 0–1.3)
MONOCYTES NFR BLD AUTO: 3 %
MUCOUS THREADS #/AREA URNS LPF: PRESENT /LPF
NEUTROPHILS # BLD AUTO: 3.7 10E3/UL (ref 1.6–8.3)
NEUTROPHILS NFR BLD AUTO: 62 %
NITRATE UR QL: NEGATIVE
NRBC # BLD AUTO: 0 10E3/UL
NRBC BLD AUTO-RTO: 0 /100
PH UR STRIP: 5.5 [PH] (ref 5–7)
PLATELET # BLD AUTO: 302 10E3/UL (ref 150–450)
POTASSIUM BLD-SCNC: 3.9 MMOL/L (ref 3.5–5)
PROT SERPL-MCNC: 8.4 G/DL (ref 6–8)
RBC # BLD AUTO: 5.51 10E6/UL (ref 3.8–5.9)
RBC URINE: 1 /HPF
SODIUM SERPL-SCNC: 138 MMOL/L (ref 136–145)
SP GR UR STRIP: 1.03 (ref 1–1.03)
SQUAMOUS EPITHELIAL: 1 /HPF
TRIGL SERPL-MCNC: 109 MG/DL
UROBILINOGEN UR STRIP-MCNC: <2 MG/DL
WBC # BLD AUTO: 5.9 10E3/UL (ref 4–11)
WBC URINE: 3 /HPF

## 2023-02-24 PROCEDURE — 85610 PROTHROMBIN TIME: CPT | Performed by: NURSE PRACTITIONER

## 2023-02-24 PROCEDURE — 74177 CT ABD & PELVIS W/CONTRAST: CPT

## 2023-02-24 PROCEDURE — 84478 ASSAY OF TRIGLYCERIDES: CPT | Performed by: NURSE PRACTITIONER

## 2023-02-24 PROCEDURE — 96361 HYDRATE IV INFUSION ADD-ON: CPT

## 2023-02-24 PROCEDURE — 84703 CHORIONIC GONADOTROPIN ASSAY: CPT | Performed by: NURSE PRACTITIONER

## 2023-02-24 PROCEDURE — 250N000011 HC RX IP 250 OP 636: Performed by: NURSE PRACTITIONER

## 2023-02-24 PROCEDURE — 82550 ASSAY OF CK (CPK): CPT | Performed by: NURSE PRACTITIONER

## 2023-02-24 PROCEDURE — 82010 KETONE BODYS QUAN: CPT | Performed by: NURSE PRACTITIONER

## 2023-02-24 PROCEDURE — 36415 COLL VENOUS BLD VENIPUNCTURE: CPT | Performed by: NURSE PRACTITIONER

## 2023-02-24 PROCEDURE — 80053 COMPREHEN METABOLIC PANEL: CPT | Performed by: NURSE PRACTITIONER

## 2023-02-24 PROCEDURE — 83690 ASSAY OF LIPASE: CPT | Performed by: NURSE PRACTITIONER

## 2023-02-24 PROCEDURE — 85730 THROMBOPLASTIN TIME PARTIAL: CPT | Performed by: NURSE PRACTITIONER

## 2023-02-24 PROCEDURE — 85025 COMPLETE CBC W/AUTO DIFF WBC: CPT | Performed by: NURSE PRACTITIONER

## 2023-02-24 PROCEDURE — 82248 BILIRUBIN DIRECT: CPT | Performed by: NURSE PRACTITIONER

## 2023-02-24 PROCEDURE — 96374 THER/PROPH/DIAG INJ IV PUSH: CPT | Mod: 59

## 2023-02-24 PROCEDURE — 81001 URINALYSIS AUTO W/SCOPE: CPT | Performed by: NURSE PRACTITIONER

## 2023-02-24 PROCEDURE — 99285 EMERGENCY DEPT VISIT HI MDM: CPT | Mod: 25

## 2023-02-24 PROCEDURE — 258N000003 HC RX IP 258 OP 636: Performed by: NURSE PRACTITIONER

## 2023-02-24 PROCEDURE — 87086 URINE CULTURE/COLONY COUNT: CPT | Performed by: NURSE PRACTITIONER

## 2023-02-24 RX ORDER — IOPAMIDOL 755 MG/ML
100 INJECTION, SOLUTION INTRAVASCULAR ONCE
Status: COMPLETED | OUTPATIENT
Start: 2023-02-24 | End: 2023-02-24

## 2023-02-24 RX ORDER — HYDROMORPHONE HYDROCHLORIDE 1 MG/ML
0.5 INJECTION, SOLUTION INTRAMUSCULAR; INTRAVENOUS; SUBCUTANEOUS ONCE
Status: COMPLETED | OUTPATIENT
Start: 2023-02-24 | End: 2023-02-24

## 2023-02-24 RX ORDER — ONDANSETRON 2 MG/ML
4 INJECTION INTRAMUSCULAR; INTRAVENOUS ONCE
Status: COMPLETED | OUTPATIENT
Start: 2023-02-24 | End: 2023-02-24

## 2023-02-24 RX ORDER — POLYETHYLENE GLYCOL 3350 17 G/17G
1 POWDER, FOR SOLUTION ORAL DAILY
Qty: 527 G | Refills: 0 | Status: SHIPPED | OUTPATIENT
Start: 2023-02-24 | End: 2023-03-26

## 2023-02-24 RX ADMIN — IOPAMIDOL 100 ML: 755 INJECTION, SOLUTION INTRAVENOUS at 14:08

## 2023-02-24 RX ADMIN — ONDANSETRON 4 MG: 2 INJECTION INTRAMUSCULAR; INTRAVENOUS at 13:17

## 2023-02-24 RX ADMIN — HYDROMORPHONE HYDROCHLORIDE 0.5 MG: 1 INJECTION, SOLUTION INTRAMUSCULAR; INTRAVENOUS; SUBCUTANEOUS at 13:17

## 2023-02-24 RX ADMIN — SODIUM CHLORIDE 1000 ML: 9 INJECTION, SOLUTION INTRAVENOUS at 13:18

## 2023-02-24 ASSESSMENT — ACTIVITIES OF DAILY LIVING (ADL)
ADLS_ACUITY_SCORE: 37
ADLS_ACUITY_SCORE: 35

## 2023-02-24 ASSESSMENT — ENCOUNTER SYMPTOMS
CHILLS: 0
DIARRHEA: 0
DYSURIA: 0
NAUSEA: 1
CONSTIPATION: 1
VOMITING: 0
FEVER: 0
ABDOMINAL PAIN: 1

## 2023-02-24 NOTE — ED PROVIDER NOTES
EMERGENCY DEPARTMENT ENCOUNTER      NAME: Kevin Stephens  AGE: 22 year old adult  YOB: 2000  MRN: [id number]  EVALUATION DATE & TIME: 2023 12:22 PM    PCP: Vanessa Wright    ED PROVIDER: JAYDE Rutledge, CNP      Chief Complaint   Patient presents with     Abdominal Pain         FINAL IMPRESSION:  1. Abdominal pain, left upper quadrant    2. Constipation          ED COURSE & MEDICAL DECISION MAKIN:35 PM I met with the patient, obtained history, performed an initial exam, and discussed options and plan for treatment here in the ED.  3:10 PM updated patient.    Pertinent Labs & Imaging studies reviewed. (See chart for details)  22 year old adult presents to the Emergency Department for evaluation of LUQ abdominal pain.  Complex medical history including glycogen-storage disease and prior history of pancreatitis.  Differentials would include but not limited to pancreatitis, gastritis, constipation, pyelonephritis, colitis, bowel obstruction.  Given IV fluids, antiemetics, and analgesia.  Symptoms did improve.  She has not had any vomiting at home.  She has not had any hypoglycemia.  Her liver function test appears stable.  Lipase normal.  No radiographic evidence for pancreatitis.  Bladder wall thickening noted on CT but no UTI symptoms.  Will culture urine.  Moderate burden of stool noted on CT.  She has noted symptoms to suggest constipation.  This may be a source of her pain.  We will start her on MiraLAX daily.  Recommend ongoing adequate hydration.  Recommend clinic follow-up within 1 week.  If her symptoms worsen, instructed to return to the ER for further care      Medical Decision Making    History:    Supplemental history from: Documented in chart, if applicable    External Record(s) reviewed: Documented in chart, if applicable.    Work Up:    Chart documentation includes differential considered and any EKGs or imaging independently interpreted by provider, where  specified.    In additional to work up documented, I considered the following work up: Documented in chart, if applicable.    External consultation:    Discussion of management with another provider: Documented in chart, if applicable    Complicating factors:    Care impacted by chronic illness: Other: Glycogen-storage disease    Care affected by social determinants of health: N/A    Disposition considerations: Discharge. No recommendations on prescription strength medication(s). I considered admission, but discharged patient after significant clinical improvement.        At the conclusion of the encounter I discussed the results of all of the tests and the disposition. The questions were answered. The patient or family acknowledged understanding and was agreeable with the care plan.       0 minutes of critical care time     MEDICATIONS GIVEN IN THE EMERGENCY:  Medications   0.9% sodium chloride BOLUS (1,000 mLs Intravenous $New Bag 2/24/23 1318)   ondansetron (ZOFRAN) injection 4 mg (4 mg Intravenous $Given 2/24/23 1317)   HYDROmorphone (PF) (DILAUDID) injection 0.5 mg (0.5 mg Intravenous $Given 2/24/23 1317)   iopamidol (ISOVUE-370) solution 100 mL (100 mLs Intravenous $Given 2/24/23 1408)       NEW PRESCRIPTIONS STARTED AT TODAY'S ER VISIT  New Prescriptions    POLYETHYLENE GLYCOL (MIRALAX) 17 GM/DOSE POWDER    Take 17 g (1 capful.) by mouth daily for 30 days            =================================================================    Patient information was obtained from: Patient    Use of Intrepreter: N/A    Limitations to History: None    HPI    Kevin Stephens is a 22 year old adult with PMHx of poorly controlled DM1, pancreatitis, mental health diagnoses, and glycogen storage disease type 3A, on growth hormone therapy who is followed by [facility] who presents to the ED today via walk-in with mom for evaluation of LUQ pain. Began 2 weeks ago. Pain has been constant. Slight improvement with  lying still and using heat. Unsure if this feels like pancreatitis. Has been nauseated and notes constipation. No fever, chills, vomiting, dysuria, or other new / associated symptoms. No significant change in appetite. Blood sugars have been in the low 200's.     Review of Systems   Constitutional: Negative for chills and fever.   Gastrointestinal: Positive for abdominal pain, constipation and nausea. Negative for diarrhea and vomiting.   Genitourinary: Negative for dysuria.   All other systems reviewed and are negative.      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 There is no known cause, and no genetic testing has been completed.     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 History of GH treatment for a few years from age [age] 02/2022: IGF-1 72, Cortisol 15, ACTH 21, TSH 1.72     Prolonged QT interval 5/11/2022     Severe protein-calorie malnutrition (H) 5/11/2022     Transaminitis 5/11/2022     Uncontrolled diabetes mellitus secondary to pancreatic insufficiency 11/30/2014    Problem list name updated by automated process. Provider to review     Vitamin D deficiency 3/11/2012       PAST SURGICAL HISTORY:  Past Surgical History:    Procedure Laterality Date     TONSILLECTOMY Bilateral 4/2015           CURRENT MEDICATIONS:    No current facility-administered medications for this encounter.     Current Outpatient Medications   Medication     polyethylene glycol (MIRALAX) 17 GM/Dose powder     Acetone, Urine, Test (KETONE TEST) STRP     acetone, Urine, test STRP     alcohol swab prep pads     alcohol swab prep pads     bacitracin 500 UNIT/GM OINT     blood glucose (NO BRAND SPECIFIED) test strip     blood glucose calibration (NO BRAND SPECIFIED) solution     blood glucose monitoring (NO BRAND SPECIFIED) meter device kit     blood glucose monitoring (ONE TOUCH DELICA) lancets     blood glucose monitoring (ONETOUCH VERIO IQ) test strip     cephALEXin (KEFLEX) 500 MG capsule     collagenase (SANTYL) 250 UNIT/GM external ointment     Continuous Blood Gluc Sensor (DEXCOM G6 SENSOR) MISC     Continuous Blood Gluc Sensor (FREESTYLE PASQUALE 2 SENSOR) MISC     Continuous Blood Gluc Transmit (DEXCOM G6 TRANSMITTER) MISC     insulin aspart (NOVOLOG PEN) 100 UNIT/ML pen     insulin glargine (LANTUS PEN) 100 UNIT/ML pen     insulin pen needle (BD CALLI U/F) 32G X 4 MM miscellaneous     thin (NO BRAND SPECIFIED) lancets         ALLERGIES:  Allergies   Allergen Reactions     Fluoxetine      Other reaction(s): Mental Status Change  Suicidal thoughts     Morphine Sulfate      No exposure but grandfather has the allergy to it     Tylenol [Acetaminophen]      Has glycogen storage disease and should not receive Tylenol, per GI.         VITALS:  Patient Vitals for the past 24 hrs:   BP Temp Temp src Pulse Resp SpO2 Weight   02/24/23 1220 127/82 97.4  F (36.3  C) Skin 97 16 99 % 38.6 kg (85 lb)       PHYSICAL EXAM    Constitutional: Alert, cachectic body habitus.  No distress  EYES: Conjunctivae clear  HENT:  Atraumatic, normocephalic  Respiratory:  No respiratory distress, normal breath sounds  Cardiovascular:  Normal rate, normal rhythm, no murmurs  GI:  Soft,  nondistended, LUQ tenderness, no palpable masses, no rebound, no guarding    Integument: Wound noted on the scalp.  Neurologic:  Alert & oriented x 3              LAB:  All pertinent labs reviewed and interpreted.  Labs Ordered and Resulted from Time of ED Arrival to Time of ED Departure   BASIC METABOLIC PANEL - Abnormal       Result Value    Sodium 138      Potassium 3.9      Chloride 98      Carbon Dioxide (CO2) 27      Anion Gap 13      Urea Nitrogen 19      Creatinine 0.71      Calcium 9.6      Glucose 300 (*)     GFR Estimate >90     HEPATIC FUNCTION PANEL - Abnormal    Bilirubin Total 0.6      Bilirubin Direct 0.2      Protein Total 8.4 (*)     Albumin 4.1      Alkaline Phosphatase 258 (*)     AST 54 (*)     ALT 79 (*)    ROUTINE UA WITH MICROSCOPIC REFLEX TO CULTURE - Abnormal    Color Urine Light Yellow      Appearance Urine Clear      Glucose Urine >1000 (*)     Bilirubin Urine Negative      Ketones Urine Negative      Specific Gravity Urine 1.028      Blood Urine Negative      pH Urine 5.5      Protein Albumin Urine Negative      Urobilinogen Urine <2.0      Nitrite Urine Negative      Leukocyte Esterase Urine 25 Iva/uL (*)     Bacteria Urine Few (*)     Mucus Urine Present (*)     RBC Urine 1      WBC Urine 3      Squamous Epithelials Urine 1     LIPASE - Normal    Lipase 12     HCG QUALITATIVE PREGNANCY - Normal    hCG Serum Qualitative Negative     INR - Normal    INR 1.03     PARTIAL THROMBOPLASTIN TIME - Normal    aPTT 23     CK TOTAL - Normal    CK 85     KETONE BETA-HYDROXYBUTYRATE QUANTITATIVE, RAPID - Normal    Ketone (Beta-Hydroxybutyrate) Quantitative 0.26     TRIGLYCERIDES - Normal    Triglycerides 109     CBC WITH PLATELETS AND DIFFERENTIAL    WBC Count 5.9      RBC Count 5.51      Hemoglobin 15.8      Hematocrit 46.9      MCV 85      MCH 28.7      MCHC 33.7      RDW 12.1      Platelet Count 302      % Neutrophils 62      % Lymphocytes 33      % Monocytes 3      % Eosinophils 1      %  Basophils 0      % Immature Granulocytes 1      NRBCs per 100 WBC 0      Absolute Neutrophils 3.7      Absolute Lymphocytes 1.9      Absolute Monocytes 0.2      Absolute Eosinophils 0.1      Absolute Basophils 0.0      Absolute Immature Granulocytes 0.0      Absolute NRBCs 0.0     URINE CULTURE         RADIOLOGY:  Reviewed all pertinent imaging. Please see official radiology report.  CT Abdomen Pelvis w Contrast   Final Result   IMPRESSION:       1.  Mild urinary bladder wall thickening; correlate for acute cystitis.      2.  No CT findings of pyelonephritis. No hydronephrosis.      3.  Moderate colonic stool suggests constipation. No bowel obstruction or obvious inflammation. Normal appendix. No diverticulitis.      4.  No significant free fluid. No free air. No abscess.                         PROCEDURES:   None    JAYDE Rutledge, CNP  Emergency Services  [facility] EMERGENCY ROOM  [address] [id number] [phone]  Dept: [phone]         Juanpablo Ramos APRN CNP  02/24/23 [id number]

## 2023-02-24 NOTE — DISCHARGE INSTRUCTIONS
Discharge Instructions  Constipation    Your workup today does not show pancreatitis or other serious problem. The CT scan does show a moderate amount of stool and your pain may be related to constipation.  Prescription for MiraLAX was sent to your pharmacy.  Take this as prescribed.  Also stay well-hydrated as this will help prevent constipation.    Your doctor has diagnosed you with constipation. Constipation can cause severe cramping pain and your physician thinks this is the cause of your abdominal pain today.  People usually recognize that they are constipated because they have difficulty having bowel movements, are not having bowel movements frequently enough, or are not having large enough bowel movements. Sometimes, especially in children or older people, you do not recognize that you are constipated until it becomes severe. The most common cause of constipation is a combination of lack of exercise and not eating enough fruits, vegetables and whole grains. Constipation can also be a side effect of medications, such as narcotics, or may be caused by a disease of the digestive system.    Return to the Emergency Department if:  Your abdominal pain worsens or does not improve after a bowel movement.  You become very weak.  You get an oral temperature above 102oF or as directed by your doctor.  You have blood in your stools (bright red or black, tarry stools).  You keep throwing up or can t drink liquids.  Your see blood when you throw up.  Your stomach gets bloated or bigger.  You have new symptoms or anything that worries you.    What can I do to help myself?  If your doctor gave you a cathartic medication, like magnesium citrate or GoLytely  (polyethylene glycol), you can expect to have cramps and gas pains after taking it. You can expect to have a number of bowel movements and even diarrhea in the course of clearing your bowels.  You will know your bowels have been cleaned out after you pass clear liquid. The  cramps and gas should let up after you have emptied your bowels. You may want to wait until morning to take this type of medication so you aren t up in the night.   Sometimes instead of cathartics, we recommend laxatives like milk of magnesia to move your bowels more slowly, or an enema to help the bowels to move. Read and follow the package directions, or follow your physician s instructions.  Once you have become very constipated, it takes time for your bowels to return to normal and you need to be very careful to prevent becoming constipated again. Take a laxative if you don t move your bowels at least every two days.     Eat foods that have a lot of fiber. Good choices are fruits, vegetables, prune juice, apple juice and high fiber cereal. Limit dairy products such as milk and cheese, since these can make constipation worse.   Drink plenty of water and other fluids.   When you feel the need to go to the bathroom, go to the bathroom. Don t hold it.  Miralax , Metamucil , Colace , Senna or fiber supplements can be used daily.  Miralax  daily is often the best choice for children.    FOLLOW UP WITH YOUR REGULAR DOCTOR IF YOUR CONSTIPATION IS NOT IMPROVING.  Sometimes, chronic constipation requires further testing to determine the cause. If you are over 50 years old, you may need a colonoscopy if you have not had one before.   If you were given a prescription for medicine here today, be sure to read all of the information (including the package insert) that comes with your prescription.  This will include important information about the medicine, its side effects, and any warnings that you need to know about.  The pharmacist who fills the prescription can provide more information and answer questions you may have about the medicine.  If you have questions or concerns that the pharmacist cannot address, please call or return to the Emergency Department.       Remember that you can always come back to the Emergency  Department if you are not able to see your regular doctor in the amount of time listed above, if you get any new symptoms, or if there is anything that worries you.

## 2023-02-24 NOTE — ED TRIAGE NOTES
Pt arrives to ED with c/o ongoing LUQ for the past couple of weeks. Pt endorses to nausea but no vomiting or diarrhea. Pt has not taken any medications for this today.      Triage Assessment     Row Name 02/24/23 1220       Triage Assessment (Adult)    Airway WDL WDL       Respiratory WDL    Respiratory WDL WDL       Skin Circulation/Temperature WDL    Skin Circulation/Temperature WDL WDL       Cardiac WDL    Cardiac WDL WDL       Peripheral/Neurovascular WDL    Peripheral Neurovascular WDL WDL       Cognitive/Neuro/Behavioral WDL    Cognitive/Neuro/Behavioral WDL WDL

## 2023-02-26 LAB — BACTERIA UR CULT: NORMAL

## 2023-02-28 NOTE — PROGRESS NOTES
[facility] Surgery Consult    HPI:    22 year old year old adult who I have been consulted by Vanessa Wright for evaluation of Derm Problem (Sebaceous cyst; Head)  She has an open wound on her head and scalp.  She had chronic issues and problems and loss of hair here.  She has had debridements and really has necrotic tissue in the area.  Question is whether she has an infected cyst or not believe she does.    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 There is no known cause, and no genetic testing has been completed.     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 History of GH treatment for a few years from age [age] 02/2022: IGF-1 72, Cortisol 15, ACTH 21, TSH 1.72     Prolonged QT interval 5/11/2022     Severe protein-calorie malnutrition (H) 5/11/2022     Transaminitis 5/11/2022     Uncontrolled diabetes mellitus secondary to pancreatic insufficiency 11/30/2014    Problem list name updated by automated process. Provider to review     Vitamin D deficiency 3/11/2012       Past Surgical History:   Procedure Laterality Date     TONSILLECTOMY Bilateral 4/2015       CURRENT  "MEDS:  Current Outpatient Medications   Medication     Acetone, Urine, Test (KETONE TEST) STRP     acetone, Urine, test STRP     alcohol swab prep pads     alcohol swab prep pads     bacitracin 500 UNIT/GM OINT     blood glucose (NO BRAND SPECIFIED) test strip     blood glucose calibration (NO BRAND SPECIFIED) solution     blood glucose monitoring (NO BRAND SPECIFIED) meter device kit     blood glucose monitoring (ONE TOUCH DELICA) lancets     blood glucose monitoring (ONETOUCH VERIO IQ) test strip     cephALEXin (KEFLEX) 500 MG capsule     Continuous Blood Gluc Sensor (DEXCOM G6 SENSOR) MISC     Continuous Blood Gluc Sensor (FREESTYLE PASQUALE 2 SENSOR) MISC     Continuous Blood Gluc Transmit (DEXCOM G6 TRANSMITTER) MISC     insulin aspart (NOVOLOG PEN) 100 UNIT/ML pen     insulin glargine (LANTUS PEN) 100 UNIT/ML pen     insulin pen needle (BD CALLI U/F) 32G X 4 MM miscellaneous     thin (NO BRAND SPECIFIED) lancets     collagenase (SANTYL) 250 UNIT/GM external ointment     polyethylene glycol (MIRALAX) 17 GM/Dose powder     No current facility-administered medications for this visit.       Family History   Problem Relation Age of Onset     Hearing Loss Father         reports that Brianna Stephens has never smoked. Brianna Stephens has never used smokeless tobacco. Brianna Stephens reports current drug use. Drug: Marijuana. Brianna Stephens reports that Brianna Stephens does not drink alcohol.    Review of Systems:  Negative except chronic cyst issues chronic infection issues and dermatologic issues of her scalp and head.Otherwise twelve system of review is negative.      OBJECTIVE:  Vitals:    02/06/23 0908   BP: 90/60   Weight: 38.6 kg (85 lb)   Height: 1.626 m (5' 4\")     Body mass index is 14.59 kg/m .    EXAM:  GENERAL: This is a well-developed 22 year old adult who appears Brianna Stephens's stated age  HEAD: normocephalic large area loss of hair this is most on the right side she has can of an area of looks like it necrosis or dead skin " which is intact no drainage no infection presently.  HEENT: Pupils equal and reactive bilaterally  MOUTH: mucus membranes intact  CARDIAC: RRR without murmur  CHEST/LUNG:  Clear to auscultation  ABDOMEN: Soft, nontender, nondistended, no masses  EXTREMITIES: Grossly normal, warm,   NEUROLOGIC: Focally intact, nonfocal  INTEGUMENT: No open lesions or ulcers  VASCULAR: Pulses intact, symmetrical upper and lower extremities.        LABS:  Lab Results   Component Value Date    WBC 5.9 02/24/2023    WBC 5.9 04/17/2018    HGB 15.8 02/24/2023    HGB 15.0 04/17/2018    HCT 46.9 02/24/2023    HCT 45.0 04/17/2018    MCV 85 02/24/2023    MCV 87 04/17/2018     02/24/2023     04/17/2018       Lab Results   Component Value Date    ALT 79 (H) 02/24/2023    AST 54 (H) 02/24/2023     (H) 10/13/2010    ALKPHOS 258 (H) 02/24/2023          Assessment/Plan:   Chronic dermatologic issues and changes of her scalp.  She has hair loss secondary to this  Chronic infections    I think she should see a dermatologist obviously for this issue if there is worry for excision she will need flaps and I would definitely get a plastic surgeon involved for this.  No drainage of anything at this time point is needed.  I do believe this gives me more of a chronic issue obviously and she is can do any kind of reconstruction plastic surgery should be involved.    No follow-ups on file.    Homer Mccain MD  General Surgery [phone]  Vascular Surgery [phone]

## 2023-03-01 ENCOUNTER — TELEPHONE (OUTPATIENT)
Dept: VASCULAR SURGERY | Facility: CLINIC | Age: 23
End: 2023-03-01
Payer: COMMERCIAL

## 2023-03-01 NOTE — TELEPHONE ENCOUNTER
Called and left message for Li that they need to come in and sign a form regarding Santyl. Form is listed under [employer] patient assistance application and scanned into chart. They should come in to clinic to sign the paperwork or call clinic so we can discuss alternatives if this is not possible.

## 2023-03-01 NOTE — TELEPHONE ENCOUNTER
----- Message from Eloisa Warner NP sent at 3/1/2023  9:02 AM CST -----  Regarding: santyl form  I called and left this patient a message with regard to her santyl. It looks like we can get this approved however she needs to sign a form. I have completed my portion of the form and we need the patient's signature. I have it scanned in her chart listed as [employer] patient assistance application. We need to get her signature!!!!    Help!    LK

## 2023-03-09 ENCOUNTER — APPOINTMENT (OUTPATIENT)
Dept: CT IMAGING | Facility: CLINIC | Age: 23
End: 2023-03-09
Attending: EMERGENCY MEDICINE
Payer: COMMERCIAL

## 2023-03-09 ENCOUNTER — HOSPITAL ENCOUNTER (EMERGENCY)
Facility: CLINIC | Age: 23
Discharge: HOME | End: 2023-03-09
Attending: EMERGENCY MEDICINE | Admitting: EMERGENCY MEDICINE
Payer: COMMERCIAL

## 2023-03-09 ENCOUNTER — APPOINTMENT (OUTPATIENT)
Dept: ULTRASOUND IMAGING | Facility: CLINIC | Age: 23
End: 2023-03-09
Attending: EMERGENCY MEDICINE
Payer: COMMERCIAL

## 2023-03-09 VITALS
WEIGHT: 85 LBS | HEART RATE: 110 BPM | OXYGEN SATURATION: 98 % | DIASTOLIC BLOOD PRESSURE: 91 MMHG | HEIGHT: 64 IN | SYSTOLIC BLOOD PRESSURE: 119 MMHG | BODY MASS INDEX: 14.51 KG/M2 | RESPIRATION RATE: 20 BRPM | TEMPERATURE: 98.5 F

## 2023-03-09 DIAGNOSIS — K52.9 COLITIS: ICD-10-CM

## 2023-03-09 LAB
ALBUMIN SERPL-MCNC: 4.1 G/DL (ref 3.5–5)
ALBUMIN UR-MCNC: 20 MG/DL
ALP SERPL-CCNC: 155 U/L (ref 45–120)
ALT SERPL W P-5'-P-CCNC: 49 U/L (ref 0–45)
ANION GAP SERPL CALCULATED.3IONS-SCNC: 11 MMOL/L (ref 5–18)
APPEARANCE UR: CLEAR
AST SERPL W P-5'-P-CCNC: 43 U/L (ref 0–40)
BASOPHILS # BLD AUTO: 0 10E3/UL (ref 0–0.2)
BASOPHILS NFR BLD AUTO: 0 %
BILIRUB DIRECT SERPL-MCNC: 0.2 MG/DL
BILIRUB SERPL-MCNC: 0.7 MG/DL (ref 0–1)
BILIRUB UR QL STRIP: NEGATIVE
BUN SERPL-MCNC: 24 MG/DL (ref 8–22)
CALCIUM SERPL-MCNC: 9.5 MG/DL (ref 8.5–10.5)
CHLORIDE BLD-SCNC: 100 MMOL/L (ref 98–107)
CO2 SERPL-SCNC: 26 MMOL/L (ref 22–31)
COLOR UR AUTO: YELLOW
CREAT SERPL-MCNC: 0.64 MG/DL (ref 0.6–1.3)
EOSINOPHIL # BLD AUTO: 0.1 10E3/UL (ref 0–0.7)
EOSINOPHIL NFR BLD AUTO: 1 %
ERYTHROCYTE [DISTWIDTH] IN BLOOD BY AUTOMATED COUNT: 12.6 % (ref 10–15)
GFR SERPL CREATININE-BSD FRML MDRD: >90 ML/MIN/1.73M2
GLUCOSE BLD-MCNC: 237 MG/DL (ref 70–125)
GLUCOSE BLDC GLUCOMTR-MCNC: 243 MG/DL (ref 70–99)
GLUCOSE UR STRIP-MCNC: 50 MG/DL
HCG SERPL QL: NEGATIVE
HCT VFR BLD AUTO: 45.3 % (ref 35–53)
HGB BLD-MCNC: 15.9 G/DL (ref 11.7–17.7)
HGB UR QL STRIP: NEGATIVE
IMM GRANULOCYTES # BLD: 0 10E3/UL
IMM GRANULOCYTES NFR BLD: 0 %
KETONES UR STRIP-MCNC: 10 MG/DL
LEUKOCYTE ESTERASE UR QL STRIP: ABNORMAL
LIPASE SERPL-CCNC: <9 U/L (ref 0–52)
LYMPHOCYTES # BLD AUTO: 2.5 10E3/UL (ref 0.8–5.3)
LYMPHOCYTES NFR BLD AUTO: 37 %
MCH RBC QN AUTO: 28.8 PG (ref 26.5–33)
MCHC RBC AUTO-ENTMCNC: 35.1 G/DL (ref 31.5–36.5)
MCV RBC AUTO: 82 FL (ref 78–100)
MONOCYTES # BLD AUTO: 0.2 10E3/UL (ref 0–1.3)
MONOCYTES NFR BLD AUTO: 4 %
MUCOUS THREADS #/AREA URNS LPF: PRESENT /LPF
NEUTROPHILS # BLD AUTO: 4 10E3/UL (ref 1.6–8.3)
NEUTROPHILS NFR BLD AUTO: 58 %
NITRATE UR QL: NEGATIVE
NRBC # BLD AUTO: 0 10E3/UL
NRBC BLD AUTO-RTO: 0 /100
PH UR STRIP: 5.5 [PH] (ref 5–7)
PLATELET # BLD AUTO: 276 10E3/UL (ref 150–450)
POTASSIUM BLD-SCNC: 3.9 MMOL/L (ref 3.5–5)
PROT SERPL-MCNC: 7.9 G/DL (ref 6–8)
RBC # BLD AUTO: 5.52 10E6/UL (ref 3.8–5.9)
RBC URINE: <1 /HPF
SODIUM SERPL-SCNC: 137 MMOL/L (ref 136–145)
SP GR UR STRIP: 1.02 (ref 1–1.03)
UROBILINOGEN UR STRIP-MCNC: <2 MG/DL
WBC # BLD AUTO: 6.9 10E3/UL (ref 4–11)
WBC URINE: <1 /HPF

## 2023-03-09 PROCEDURE — 250N000013 HC RX MED GY IP 250 OP 250 PS 637: Performed by: EMERGENCY MEDICINE

## 2023-03-09 PROCEDURE — 76705 ECHO EXAM OF ABDOMEN: CPT

## 2023-03-09 PROCEDURE — 84703 CHORIONIC GONADOTROPIN ASSAY: CPT | Performed by: EMERGENCY MEDICINE

## 2023-03-09 PROCEDURE — 82248 BILIRUBIN DIRECT: CPT | Performed by: EMERGENCY MEDICINE

## 2023-03-09 PROCEDURE — 250N000011 HC RX IP 250 OP 636: Performed by: EMERGENCY MEDICINE

## 2023-03-09 PROCEDURE — 36415 COLL VENOUS BLD VENIPUNCTURE: CPT | Performed by: EMERGENCY MEDICINE

## 2023-03-09 PROCEDURE — 85025 COMPLETE CBC W/AUTO DIFF WBC: CPT | Performed by: EMERGENCY MEDICINE

## 2023-03-09 PROCEDURE — 74177 CT ABD & PELVIS W/CONTRAST: CPT

## 2023-03-09 PROCEDURE — 99285 EMERGENCY DEPT VISIT HI MDM: CPT | Mod: 25

## 2023-03-09 PROCEDURE — 82962 GLUCOSE BLOOD TEST: CPT

## 2023-03-09 PROCEDURE — 81003 URINALYSIS AUTO W/O SCOPE: CPT | Performed by: EMERGENCY MEDICINE

## 2023-03-09 PROCEDURE — 83690 ASSAY OF LIPASE: CPT | Performed by: EMERGENCY MEDICINE

## 2023-03-09 PROCEDURE — 80053 COMPREHEN METABOLIC PANEL: CPT | Performed by: EMERGENCY MEDICINE

## 2023-03-09 RX ORDER — OXYCODONE HYDROCHLORIDE 5 MG/1
5 TABLET ORAL EVERY 6 HOURS PRN
Qty: 12 TABLET | Refills: 0 | Status: SHIPPED | OUTPATIENT
Start: 2023-03-09 | End: 2023-03-12

## 2023-03-09 RX ORDER — IOPAMIDOL 755 MG/ML
100 INJECTION, SOLUTION INTRAVASCULAR ONCE
Status: COMPLETED | OUTPATIENT
Start: 2023-03-09 | End: 2023-03-09

## 2023-03-09 RX ORDER — OXYCODONE HYDROCHLORIDE 5 MG/1
5 TABLET ORAL ONCE
Status: COMPLETED | OUTPATIENT
Start: 2023-03-09 | End: 2023-03-09

## 2023-03-09 RX ADMIN — IOPAMIDOL 100 ML: 755 INJECTION, SOLUTION INTRAVENOUS at 20:50

## 2023-03-09 RX ADMIN — OXYCODONE HYDROCHLORIDE 5 MG: 5 TABLET ORAL at 22:46

## 2023-03-09 ASSESSMENT — ENCOUNTER SYMPTOMS
DIAPHORESIS: 0
CONSTIPATION: 1
FEVER: 0
NAUSEA: 1
CHILLS: 0
VOMITING: 0
DIARRHEA: 1
ABDOMINAL PAIN: 1

## 2023-03-09 ASSESSMENT — ACTIVITIES OF DAILY LIVING (ADL): ADLS_ACUITY_SCORE: 37

## 2023-03-10 NOTE — ED PROVIDER NOTES
EMERGENCY DEPARTMENT ENCOUNTER      NAME: Kevin Stephens  AGE: 22 year old adult  YOB: 2000  MRN: [id number]  EVALUATION DATE & TIME: No admission date for patient encounter.    PCP: Vanessa Wright    ED PROVIDER: Soheila Phillips MD      Chief Complaint   Patient presents with     Abdominal Pain         FINAL IMPRESSION:  1. Colitis          ED COURSE & MEDICAL DECISION MAKING:    Pertinent Labs & Imaging studies reviewed. (See chart for details)  22 year old adult presents to the Emergency Department for evaluation of 3 to 4 weeks of left-sided abdominal pain.  The patient has a significant history of type 1 diabetes, glycogen-storage disease, partial hypopituitarism.  On exam, she is tender in the left side of the abdomen.  CT scan of the abdomen pelvis demonstrates colitis.  Otherwise, LFTs are unchanged from previous.  Right upper quadrant ultrasound is unremarkable.  I discussed these findings with GI.  They would recommend the patient follow-up in clinic for colonoscopy.  Would recommend stool cultures.  Would not recommend initiation of any medications currently.  Patient was given oral oxycodone for the pain, referral to GI was made.  Patient and the patient's mom are comfortable with this plan.    6:22 PM Due to a shortage of available emergency department rooms, with the patient's permission I met with the patient for the initial interview and physical examination in a triage room. Discussed plan for treatment and workup in the ED.    9:27 PM I rechecked and updated the patient.   10:30 PM We discussed the plan for discharge and the patient is agreeable. Reviewed supportive cares, symptomatic treatment, outpatient follow up, and reasons to return to the Emergency Department. Patient to be discharged by ED RN.     At the conclusion of the encounter I discussed the results of all of the tests and the disposition. The questions were answered. The patient or family acknowledged understanding  and was agreeable with the care plan.       Medical Decision Making    History:    Supplemental history from: Documented in chart, if applicable    External Record(s) reviewed: Inpatient Record: ED Note - 2/25/23    Work Up:    Chart documentation includes differential considered and any EKGs or imaging independently interpreted by provider, where specified.    In additional to work up documented, I considered the following work up: Documented in chart, if applicable.    External consultation:    Discussion of management with another provider: Documented in chart, if applicable    Complicating factors:    Care impacted by chronic illness: Diabetes, Mental Health and Other: Glycogen Storage Disease IIIa    Care affected by social determinants of health: N/A    Disposition considerations: Discharge. I prescribed additional prescription strength medication(s) as charted. See documentation for any additional details.      MEDICATIONS GIVEN IN THE EMERGENCY:  Medications   oxyCODONE (ROXICODONE) tablet 5 mg (has no administration in time range)   iopamidol (ISOVUE-370) solution 100 mL (100 mLs Intravenous $Given 3/9/23 2050)       NEW PRESCRIPTIONS STARTED AT TODAY'S ER VISIT  New Prescriptions    OXYCODONE (ROXICODONE) 5 MG TABLET    Take 1 tablet (5 mg) by mouth every 6 hours as needed for pain          =================================================================    HPI    Patient information was obtained from: Patient     Use of : N/A         Kevin Stephens is a 22 year old adult with a pertinent history of glycogen storage disease IIIa, DM I, CARMELINA, binge-purge behavior, and depression who presents to this ED by walk in for evaluation of abdominal pain.    Per chart review, the patient was seen in the ED on 2/24/23 (~1.5 weeks ago) for abdominal pain. Given IV fluids, antiemetics, and analgesia. Symptoms improved. Her liver function test appeared stable. Lipase normal. No radiographic evidence for  pancreatitis. Bladder wall thickening noted on CT but no UTI symptoms. Moderate burden of stool noted on CT. Patient noted symptoms to suggest constipation, which may be a source of pain. Patient started on MiraLAX daily. Recommended ongoing adequate hydration and clinic follow-up within 1 week.     Patient reports 3-4 weeks of left sided abdominal pain. The patient states being seen in the ED for their symptoms about 1.5 weeks ago. The patient has tried stool softeners and enemas for their constipation and has noticed some improvement; over the past 3 days, the patient states that they have been having diarrhea and feel like their stools are passing. However, the patient still reports of constant abdominal pain. The patient describes the abdominal pain as sharp and cramping in nature, with radiation to the back. Patient adds that the abdominal pain has been worsening over the past couple of days. Patient also endorses nausea. Patient reports having a liver biopsy in the past, but otherwise no abdominal surgeries. No fevers, chills, or sweats. Patient otherwise denies any other symptoms or complaints at this time.     Of note, the patient mentions that their blood sugar normally sits in the low 200s.     REVIEW OF SYSTEMS   Review of Systems   Constitutional: Negative for chills, diaphoresis and fever.   Gastrointestinal: Positive for abdominal pain, constipation (improved), diarrhea and nausea. Negative for vomiting.   All other systems reviewed and are negative.       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 There is no known cause, and no genetic testing has been completed.     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 History of GH treatment for a few years from age [age] 02/2022: IGF-1 72, Cortisol 15, ACTH 21, TSH 1.72     Prolonged QT interval 5/11/2022     Severe protein-calorie malnutrition (H) 5/11/2022     Transaminitis 5/11/2022     Uncontrolled diabetes mellitus secondary to pancreatic insufficiency 11/30/2014    Problem list name updated by automated process. Provider to review     Vitamin D deficiency 3/11/2012       PAST SURGICAL HISTORY:  Past Surgical History:   Procedure Laterality Date     TONSILLECTOMY Bilateral 4/2015           CURRENT MEDICATIONS:    oxyCODONE (ROXICODONE) 5 MG tablet  Acetone, Urine, Test (KETONE TEST) STRP  acetone, Urine, test STRP  alcohol swab prep pads  alcohol swab prep pads  bacitracin 500 UNIT/GM OINT  blood glucose (NO BRAND SPECIFIED) test strip  blood glucose calibration (NO BRAND SPECIFIED) solution  blood glucose monitoring (NO BRAND SPECIFIED) meter device kit  blood glucose monitoring (ONE TOUCH DELICA) lancets  blood glucose monitoring (ONETOUCH VERIO IQ) test strip  cephALEXin (KEFLEX) 500 MG capsule  collagenase (SANTYL) 250 UNIT/GM external ointment  Continuous Blood Gluc Sensor (DEXCOM G6 SENSOR) MISC  Continuous Blood Gluc Sensor (FREESTYLE PASQUALE 2 SENSOR) MISC  Continuous Blood Gluc Transmit (DEXCOM G6 TRANSMITTER) MISC  insulin aspart (NOVOLOG PEN) 100 UNIT/ML pen  insulin glargine (LANTUS PEN) 100 UNIT/ML pen  insulin pen needle (BD CALLI U/F) 32G X 4 MM miscellaneous  polyethylene  "glycol (MIRALAX) 17 GM/Dose powder  thin (NO BRAND SPECIFIED) lancets        ALLERGIES:  Allergies   Allergen Reactions     Fluoxetine      Other reaction(s): Mental Status Change  Suicidal thoughts     Morphine Sulfate      No exposure but grandfather has the allergy to it     Tylenol [Acetaminophen]      Has glycogen storage disease and should not receive Tylenol, per GI.       FAMILY HISTORY:  Family History   Problem Relation Age of Onset     Hearing Loss Father        SOCIAL HISTORY:   Social History     Socioeconomic History     Marital status: Single   Tobacco Use     Smoking status: Never     Smokeless tobacco: Never     Tobacco comments:     no second hand smoke exposure at home   Vaping Use     Vaping Use: Some days     Substances: Nicotine, THC   Substance and Sexual Activity     Alcohol use: No     Drug use: Yes     Types: Marijuana     Comment: every other day user   Social History Narrative    Lives with parents and younger brother.  Currently in 12th grade.  Loves to sing       VITALS:  BP (!) 119/91   Pulse 110   Temp 98.5  F (36.9  C) (Oral)   Resp 20   Ht 1.626 m (5' 4\")   Wt 38.6 kg (85 lb)   SpO2 98%   BMI 14.59 kg/m      PHYSICAL EXAM    Constitutional: Well developed, Well nourished, NAD  HENT: Normocephalic, Atraumatic, Bilateral external ears normal, Oropharynx normal, mucous membranes moist, Nose normal.   Neck- Normal range of motion, No tenderness, Supple, No stridor.  Eyes: PERRL, EOMI, Conjunctiva normal, No discharge.   Respiratory: Normal breath sounds, No respiratory distress  Cardiovascular: Normal heart rate, Regular rhythm  GI: Bowel sounds normal, Soft, Tenderness to left side of abdomen  Musculoskeletal: No edema. Good range of motion in all major joints. No tenderness to palpation or major deformities noted.   Integument: Warm, Dry, No erythema, No rash  Neurologic: Alert & oriented x 3, Normal motor function, Normal sensory function, No focal deficits noted. Normal gait. "   Psychiatric: Affect normal, Judgment normal, Mood normal.      LAB:  All pertinent labs reviewed and interpreted.  Results for orders placed or performed during the hospital encounter of 03/09/23   Abdomen US, limited (RUQ only)    Impression    IMPRESSION:  1.  Borderline hepatomegaly with slightly nodular contour, similar to prior CT, could be seen in setting of chronic liver disease.  2.  No cholelithiasis or biliary obstruction.       CT Abdomen Pelvis w Contrast    Impression    IMPRESSION:   1.  Segmental colitis involving left colon and possibly right colon.  2.  No abscess, obstruction, free fluid, or free air.  3.  Bladder wall thickening, possibly due to nondistention or chronic dysfunction, correlate with urinalysis.   Basic metabolic panel   Result Value Ref Range    Sodium 137 136 - 145 mmol/L    Potassium 3.9 3.5 - 5.0 mmol/L    Chloride 100 98 - 107 mmol/L    Carbon Dioxide (CO2) 26 22 - 31 mmol/L    Anion Gap 11 5 - 18 mmol/L    Urea Nitrogen 24 (H) 8 - 22 mg/dL    Creatinine 0.64 0.60 - 1.30 mg/dL    Calcium 9.5 8.5 - 10.5 mg/dL    Glucose 237 (H) 70 - 125 mg/dL    GFR Estimate >90 >60 mL/min/1.73m2   Result Value Ref Range    Lipase <9 0 - 52 U/L   Hepatic function panel   Result Value Ref Range    Bilirubin Total 0.7 0.0 - 1.0 mg/dL    Bilirubin Direct 0.2 <=0.5 mg/dL    Protein Total 7.9 6.0 - 8.0 g/dL    Albumin 4.1 3.5 - 5.0 g/dL    Alkaline Phosphatase 155 (H) 45 - 120 U/L    AST 43 (H) 0 - 40 U/L    ALT 49 (H) 0 - 45 U/L   UA with Microscopic reflex to Culture    Specimen: Urine, Clean Catch   Result Value Ref Range    Color Urine Yellow Colorless, Straw, Light Yellow, Yellow    Appearance Urine Clear Clear    Glucose Urine 50 (A) Negative mg/dL    Bilirubin Urine Negative Negative    Ketones Urine 10 (A) Negative mg/dL    Specific Gravity Urine 1.020 1.001 - 1.030    Blood Urine Negative Negative    pH Urine 5.5 5.0 - 7.0    Protein Albumin Urine 20 (A) Negative mg/dL    Urobilinogen Urine  <2.0 <2.0 mg/dL    Nitrite Urine Negative Negative    Leukocyte Esterase Urine 75 Iva/uL (A) Negative    Mucus Urine Present (A) None Seen /LPF    RBC Urine <1 <=2 /HPF    WBC Urine <1 <=5 /HPF   HCG qualitative Blood   Result Value Ref Range    hCG Serum Qualitative Negative Negative   Glucose by meter   Result Value Ref Range    GLUCOSE BY METER POCT 243 (H) 70 - 99 mg/dL   CBC with platelets and differential   Result Value Ref Range    WBC Count 6.9 4.0 - 11.0 10e3/uL    RBC Count 5.52 3.80 - 5.90 10e6/uL    Hemoglobin 15.9 11.7 - 17.7 g/dL    Hematocrit 45.3 35.0 - 53.0 %    MCV 82 78 - 100 fL    MCH 28.8 26.5 - 33.0 pg    MCHC 35.1 31.5 - 36.5 g/dL    RDW 12.6 10.0 - 15.0 %    Platelet Count 276 150 - 450 10e3/uL    % Neutrophils 58 %    % Lymphocytes 37 %    % Monocytes 4 %    % Eosinophils 1 %    % Basophils 0 %    % Immature Granulocytes 0 %    NRBCs per 100 WBC 0 <1 /100    Absolute Neutrophils 4.0 1.6 - 8.3 10e3/uL    Absolute Lymphocytes 2.5 0.8 - 5.3 10e3/uL    Absolute Monocytes 0.2 0.0 - 1.3 10e3/uL    Absolute Eosinophils 0.1 0.0 - 0.7 10e3/uL    Absolute Basophils 0.0 0.0 - 0.2 10e3/uL    Absolute Immature Granulocytes 0.0 <=0.4 10e3/uL    Absolute NRBCs 0.0 10e3/uL       RADIOLOGY:  Reviewed all pertinent imaging. Please see official radiology report.  CT Abdomen Pelvis w Contrast   Final Result   IMPRESSION:    1.  Segmental colitis involving left colon and possibly right colon.   2.  No abscess, obstruction, free fluid, or free air.   3.  Bladder wall thickening, possibly due to nondistention or chronic dysfunction, correlate with urinalysis.      Abdomen US, limited (RUQ only)   Final Result   IMPRESSION:   1.  Borderline hepatomegaly with slightly nodular contour, similar to prior CT, could be seen in setting of chronic liver disease.   2.  No cholelithiasis or biliary obstruction.                Aron PEOPLES Cha, am serving as a scribe to document services personally performed by Soheila  Jacqueline, based on my observation and the provider's statements to me. I, Soheila Phillips MD, attest that Aron Walker is acting in a scribe capacity, has observed my performance of the services and has documented them in accordance with my direction.    Soheila Phillips MD  Emergency Medicine  [facility] EMERGENCY ROOM  [facility] [facility] [id number] [phone]     Soheila Phillips MD  03/09/23 [id number]

## 2023-03-10 NOTE — ED TRIAGE NOTES
Pt reports ongoing abdominal pain to L side. Was seen weeks ago and discharged with dx of constipation. Reports constipation has improved, but pain is still severe.     Triage Assessment     Row Name 03/09/23 6750       Triage Assessment (Adult)    Airway WDL WDL       Respiratory WDL    Respiratory WDL WDL       Skin Circulation/Temperature WDL    Skin Circulation/Temperature WDL WDL       Cardiac WDL    Cardiac WDL X;rhythm    Pulse Rate & Regularity tachycardic       Peripheral/Neurovascular WDL    Peripheral Neurovascular WDL WDL       Cognitive/Neuro/Behavioral WDL    Cognitive/Neuro/Behavioral WDL WDL

## 2023-03-16 ENCOUNTER — APPOINTMENT (OUTPATIENT)
Dept: CT IMAGING | Facility: CLINIC | Age: 23
End: 2023-03-16
Attending: STUDENT IN AN ORGANIZED HEALTH CARE EDUCATION/TRAINING PROGRAM
Payer: COMMERCIAL

## 2023-03-16 ENCOUNTER — HOSPITAL ENCOUNTER (EMERGENCY)
Facility: CLINIC | Age: 23
Discharge: HOME | End: 2023-03-17
Attending: STUDENT IN AN ORGANIZED HEALTH CARE EDUCATION/TRAINING PROGRAM | Admitting: STUDENT IN AN ORGANIZED HEALTH CARE EDUCATION/TRAINING PROGRAM
Payer: COMMERCIAL

## 2023-03-16 DIAGNOSIS — K52.9 COLITIS: ICD-10-CM

## 2023-03-16 DIAGNOSIS — R10.84 ABDOMINAL PAIN, GENERALIZED: ICD-10-CM

## 2023-03-16 LAB
ALBUMIN SERPL BCG-MCNC: 4.4 G/DL (ref 3.5–5.2)
ALBUMIN UR-MCNC: NEGATIVE MG/DL
ALP SERPL-CCNC: 179 U/L (ref 35–129)
ALT SERPL W P-5'-P-CCNC: 71 U/L (ref 10–50)
ANION GAP SERPL CALCULATED.3IONS-SCNC: 12 MMOL/L (ref 7–15)
APPEARANCE UR: CLEAR
APTT PPP: 24 SECONDS (ref 22–38)
AST SERPL W P-5'-P-CCNC: 84 U/L (ref 10–50)
B-OH-BUTYR SERPL-SCNC: 0.2 MMOL/L
BASOPHILS # BLD AUTO: 0 10E3/UL (ref 0–0.2)
BASOPHILS NFR BLD AUTO: 1 %
BILIRUB SERPL-MCNC: 0.5 MG/DL
BILIRUB UR QL STRIP: NEGATIVE
BUN SERPL-MCNC: 16.3 MG/DL (ref 6–20)
CALCIUM SERPL-MCNC: 9.6 MG/DL (ref 8.6–10)
CHLORIDE SERPL-SCNC: 97 MMOL/L (ref 98–107)
CHOLEST SERPL-MCNC: 170 MG/DL
CK SERPL-CCNC: 180 U/L (ref 26–308)
COLOR UR AUTO: ABNORMAL
CREAT SERPL-MCNC: 0.38 MG/DL (ref 0.51–1.17)
DEPRECATED HCO3 PLAS-SCNC: 28 MMOL/L (ref 22–29)
EOSINOPHIL # BLD AUTO: 0.1 10E3/UL (ref 0–0.7)
EOSINOPHIL NFR BLD AUTO: 1 %
ERYTHROCYTE [DISTWIDTH] IN BLOOD BY AUTOMATED COUNT: 12.6 % (ref 10–15)
GFR SERPL CREATININE-BSD FRML MDRD: >90 ML/MIN/1.73M2
GLUCOSE BLDC GLUCOMTR-MCNC: 231 MG/DL (ref 70–99)
GLUCOSE BLDC GLUCOMTR-MCNC: 240 MG/DL (ref 70–99)
GLUCOSE SERPL-MCNC: 238 MG/DL (ref 70–99)
GLUCOSE UR STRIP-MCNC: >=1000 MG/DL
HCG SERPL QL: NEGATIVE
HCO3 BLDV-SCNC: 29 MMOL/L (ref 21–28)
HCO3 BLDV-SCNC: 38 MMOL/L (ref 21–28)
HCT VFR BLD AUTO: 47.4 % (ref 35–53)
HDLC SERPL-MCNC: 49 MG/DL
HGB BLD-MCNC: 15.6 G/DL (ref 11.7–17.7)
HGB UR QL STRIP: NEGATIVE
IMM GRANULOCYTES # BLD: 0 10E3/UL
IMM GRANULOCYTES NFR BLD: 0 %
INR PPP: 0.95 (ref 0.85–1.15)
KETONES UR STRIP-MCNC: ABNORMAL MG/DL
LACTATE BLD-SCNC: 0.4 MMOL/L
LACTATE BLD-SCNC: 0.7 MMOL/L
LACTATE SERPL-SCNC: 0.8 MMOL/L (ref 0.7–2)
LDLC SERPL CALC-MCNC: 101 MG/DL
LEUKOCYTE ESTERASE UR QL STRIP: ABNORMAL
LIPASE SERPL-CCNC: 14 U/L (ref 13–60)
LYMPHOCYTES # BLD AUTO: 2.5 10E3/UL (ref 0.8–5.3)
LYMPHOCYTES NFR BLD AUTO: 38 %
MAGNESIUM SERPL-MCNC: 2.7 MG/DL (ref 1.7–2.3)
MCH RBC QN AUTO: 28 PG (ref 26.5–33)
MCHC RBC AUTO-ENTMCNC: 32.9 G/DL (ref 31.5–36.5)
MCV RBC AUTO: 85 FL (ref 78–100)
MONOCYTES # BLD AUTO: 0.4 10E3/UL (ref 0–1.3)
MONOCYTES NFR BLD AUTO: 6 %
NEUTROPHILS # BLD AUTO: 3.4 10E3/UL (ref 1.6–8.3)
NEUTROPHILS NFR BLD AUTO: 54 %
NITRATE UR QL: NEGATIVE
NONHDLC SERPL-MCNC: 121 MG/DL
NRBC # BLD AUTO: 0 10E3/UL
NRBC BLD AUTO-RTO: 0 /100
PCO2 BLDV: 48 MM HG (ref 40–50)
PCO2 BLDV: 62 MM HG (ref 40–50)
PH BLDV: 7.39 [PH] (ref 7.32–7.43)
PH BLDV: 7.4 [PH] (ref 7.32–7.43)
PH UR STRIP: 7.5 [PH] (ref 5–7)
PLATELET # BLD AUTO: 256 10E3/UL (ref 150–450)
PO2 BLDV: 18 MM HG (ref 25–47)
PO2 BLDV: 46 MM HG (ref 25–47)
POTASSIUM SERPL-SCNC: 4.4 MMOL/L (ref 3.4–5.3)
PROT SERPL-MCNC: 7.8 G/DL (ref 6.4–8.3)
RBC # BLD AUTO: 5.57 10E6/UL (ref 3.8–5.9)
RBC URINE: <1 /HPF
SAO2 % BLDV: 26 % (ref 94–100)
SAO2 % BLDV: 81 % (ref 94–100)
SODIUM SERPL-SCNC: 137 MMOL/L (ref 136–145)
SP GR UR STRIP: 1.03 (ref 1–1.03)
SQUAMOUS EPITHELIAL: 2 /HPF
TRIGL SERPL-MCNC: 100 MG/DL
UROBILINOGEN UR STRIP-MCNC: NORMAL MG/DL
WBC # BLD AUTO: 6.4 10E3/UL (ref 4–11)
WBC URINE: 1 /HPF

## 2023-03-16 PROCEDURE — 99284 EMERGENCY DEPT VISIT MOD MDM: CPT | Performed by: STUDENT IN AN ORGANIZED HEALTH CARE EDUCATION/TRAINING PROGRAM

## 2023-03-16 PROCEDURE — 74177 CT ABD & PELVIS W/CONTRAST: CPT | Mod: 26 | Performed by: STUDENT IN AN ORGANIZED HEALTH CARE EDUCATION/TRAINING PROGRAM

## 2023-03-16 PROCEDURE — 82962 GLUCOSE BLOOD TEST: CPT | Mod: 91

## 2023-03-16 PROCEDURE — 99285 EMERGENCY DEPT VISIT HI MDM: CPT | Mod: 25 | Performed by: STUDENT IN AN ORGANIZED HEALTH CARE EDUCATION/TRAINING PROGRAM

## 2023-03-16 PROCEDURE — 82550 ASSAY OF CK (CPK): CPT | Performed by: STUDENT IN AN ORGANIZED HEALTH CARE EDUCATION/TRAINING PROGRAM

## 2023-03-16 PROCEDURE — 83735 ASSAY OF MAGNESIUM: CPT | Performed by: STUDENT IN AN ORGANIZED HEALTH CARE EDUCATION/TRAINING PROGRAM

## 2023-03-16 PROCEDURE — 83605 ASSAY OF LACTIC ACID: CPT | Performed by: STUDENT IN AN ORGANIZED HEALTH CARE EDUCATION/TRAINING PROGRAM

## 2023-03-16 PROCEDURE — 83690 ASSAY OF LIPASE: CPT | Performed by: STUDENT IN AN ORGANIZED HEALTH CARE EDUCATION/TRAINING PROGRAM

## 2023-03-16 PROCEDURE — 250N000013 HC RX MED GY IP 250 OP 250 PS 637: Performed by: STUDENT IN AN ORGANIZED HEALTH CARE EDUCATION/TRAINING PROGRAM

## 2023-03-16 PROCEDURE — 250N000009 HC RX 250: Performed by: STUDENT IN AN ORGANIZED HEALTH CARE EDUCATION/TRAINING PROGRAM

## 2023-03-16 PROCEDURE — 80053 COMPREHEN METABOLIC PANEL: CPT | Performed by: STUDENT IN AN ORGANIZED HEALTH CARE EDUCATION/TRAINING PROGRAM

## 2023-03-16 PROCEDURE — 250N000011 HC RX IP 250 OP 636: Performed by: STUDENT IN AN ORGANIZED HEALTH CARE EDUCATION/TRAINING PROGRAM

## 2023-03-16 PROCEDURE — 85730 THROMBOPLASTIN TIME PARTIAL: CPT | Performed by: STUDENT IN AN ORGANIZED HEALTH CARE EDUCATION/TRAINING PROGRAM

## 2023-03-16 PROCEDURE — 80061 LIPID PANEL: CPT | Performed by: STUDENT IN AN ORGANIZED HEALTH CARE EDUCATION/TRAINING PROGRAM

## 2023-03-16 PROCEDURE — 84703 CHORIONIC GONADOTROPIN ASSAY: CPT | Performed by: STUDENT IN AN ORGANIZED HEALTH CARE EDUCATION/TRAINING PROGRAM

## 2023-03-16 PROCEDURE — 74177 CT ABD & PELVIS W/CONTRAST: CPT

## 2023-03-16 PROCEDURE — 82803 BLOOD GASES ANY COMBINATION: CPT

## 2023-03-16 PROCEDURE — 85610 PROTHROMBIN TIME: CPT | Performed by: STUDENT IN AN ORGANIZED HEALTH CARE EDUCATION/TRAINING PROGRAM

## 2023-03-16 PROCEDURE — 83605 ASSAY OF LACTIC ACID: CPT

## 2023-03-16 PROCEDURE — 36415 COLL VENOUS BLD VENIPUNCTURE: CPT | Performed by: STUDENT IN AN ORGANIZED HEALTH CARE EDUCATION/TRAINING PROGRAM

## 2023-03-16 PROCEDURE — 85025 COMPLETE CBC W/AUTO DIFF WBC: CPT | Performed by: STUDENT IN AN ORGANIZED HEALTH CARE EDUCATION/TRAINING PROGRAM

## 2023-03-16 PROCEDURE — 82010 KETONE BODYS QUAN: CPT | Performed by: STUDENT IN AN ORGANIZED HEALTH CARE EDUCATION/TRAINING PROGRAM

## 2023-03-16 PROCEDURE — 81001 URINALYSIS AUTO W/SCOPE: CPT | Performed by: STUDENT IN AN ORGANIZED HEALTH CARE EDUCATION/TRAINING PROGRAM

## 2023-03-16 RX ORDER — OXYCODONE HYDROCHLORIDE 5 MG/1
5 TABLET ORAL ONCE
Status: COMPLETED | OUTPATIENT
Start: 2023-03-16 | End: 2023-03-16

## 2023-03-16 RX ORDER — ONDANSETRON 4 MG/1
4 TABLET, ORALLY DISINTEGRATING ORAL
Status: COMPLETED | OUTPATIENT
Start: 2023-03-16 | End: 2023-03-16

## 2023-03-16 RX ORDER — IOPAMIDOL 755 MG/ML
54 INJECTION, SOLUTION INTRAVASCULAR ONCE
Status: COMPLETED | OUTPATIENT
Start: 2023-03-16 | End: 2023-03-16

## 2023-03-16 RX ORDER — IBUPROFEN 600 MG/1
600 TABLET, FILM COATED ORAL ONCE
Status: COMPLETED | OUTPATIENT
Start: 2023-03-16 | End: 2023-03-16

## 2023-03-16 RX ADMIN — IBUPROFEN 600 MG: 600 TABLET, FILM COATED ORAL at 19:51

## 2023-03-16 RX ADMIN — OXYCODONE HYDROCHLORIDE 5 MG: 5 TABLET ORAL at 19:51

## 2023-03-16 RX ADMIN — SODIUM CHLORIDE, PRESERVATIVE FREE 64 ML: 5 INJECTION INTRAVENOUS at 22:06

## 2023-03-16 RX ADMIN — IOPAMIDOL 54 ML: 755 INJECTION, SOLUTION INTRAVENOUS at 22:06

## 2023-03-16 RX ADMIN — ONDANSETRON 4 MG: 4 TABLET, ORALLY DISINTEGRATING ORAL at 19:51

## 2023-03-16 RX ADMIN — LIDOCAINE HYDROCHLORIDE 30 ML: 20 SOLUTION ORAL; TOPICAL at 19:52

## 2023-03-16 ASSESSMENT — ACTIVITIES OF DAILY LIVING (ADL)
ADLS_ACUITY_SCORE: 37
ADLS_ACUITY_SCORE: 37

## 2023-03-16 NOTE — ED TRIAGE NOTES
Pt presents from an inpatient eating disorder clinic with a 1.5 month history of abdominal pain.  Pt has been worked up at other ED's in the past and diagnosed with colitis and referred to GI.  Pt has hx of Type I diabetes.      Triage Assessment     Row Name 03/16/23 0706       Triage Assessment (Adult)    Airway WDL WDL       Respiratory WDL    Respiratory WDL WDL       Skin Circulation/Temperature WDL    Skin Circulation/Temperature WDL WDL       Cardiac WDL    Cardiac WDL WDL       Peripheral/Neurovascular WDL    Peripheral Neurovascular WDL WDL       Cognitive/Neuro/Behavioral WDL    Cognitive/Neuro/Behavioral WDL WDL

## 2023-03-17 ENCOUNTER — VIRTUAL VISIT (OUTPATIENT)
Dept: ENDOCRINOLOGY | Facility: CLINIC | Age: 23
End: 2023-03-17
Payer: COMMERCIAL

## 2023-03-17 VITALS
RESPIRATION RATE: 18 BRPM | HEART RATE: 106 BPM | OXYGEN SATURATION: 100 % | SYSTOLIC BLOOD PRESSURE: 137 MMHG | WEIGHT: 88 LBS | BODY MASS INDEX: 15.03 KG/M2 | TEMPERATURE: 98.4 F | DIASTOLIC BLOOD PRESSURE: 93 MMHG | HEIGHT: 64 IN

## 2023-03-17 DIAGNOSIS — E10.10 DIABETIC KETOACIDOSIS WITHOUT COMA ASSOCIATED WITH TYPE 1 DIABETES MELLITUS (H): ICD-10-CM

## 2023-03-17 DIAGNOSIS — Z91.199 NO-SHOW FOR APPOINTMENT: Primary | ICD-10-CM

## 2023-03-17 PROBLEM — E10.65 UNCONTROLLED TYPE 1 DIABETES MELLITUS WITH HYPERGLYCEMIA (H): Status: ACTIVE | Noted: 2023-03-07

## 2023-03-17 PROBLEM — F41.1 GAD (GENERALIZED ANXIETY DISORDER): Status: ACTIVE | Noted: 2023-03-15

## 2023-03-17 PROBLEM — F33.1 MODERATE EPISODE OF RECURRENT MAJOR DEPRESSIVE DISORDER (H): Status: ACTIVE | Noted: 2023-03-15

## 2023-03-17 PROBLEM — F64.9 GENDER DYSPHORIA: Status: ACTIVE | Noted: 2023-03-15

## 2023-03-17 PROCEDURE — 250N000013 HC RX MED GY IP 250 OP 250 PS 637: Performed by: STUDENT IN AN ORGANIZED HEALTH CARE EDUCATION/TRAINING PROGRAM

## 2023-03-17 RX ORDER — METRONIDAZOLE 500 MG/1
500 TABLET ORAL 2 TIMES DAILY
Qty: 14 TABLET | Refills: 1 | Status: SHIPPED | OUTPATIENT
Start: 2023-03-17 | End: 2023-03-24

## 2023-03-17 RX ORDER — IBUPROFEN 600 MG/1
600 TABLET, FILM COATED ORAL EVERY 6 HOURS PRN
Qty: 28 TABLET | Refills: 0 | Status: SHIPPED | OUTPATIENT
Start: 2023-03-17 | End: 2023-03-24

## 2023-03-17 RX ORDER — METRONIDAZOLE 500 MG/1
500 TABLET ORAL ONCE
Status: COMPLETED | OUTPATIENT
Start: 2023-03-17 | End: 2023-03-17

## 2023-03-17 RX ORDER — OXYCODONE HYDROCHLORIDE 5 MG/1
5 TABLET ORAL EVERY 6 HOURS PRN
Qty: 9 TABLET | Refills: 0 | Status: SHIPPED | OUTPATIENT
Start: 2023-03-17 | End: 2023-03-20

## 2023-03-17 RX ORDER — OXYCODONE HYDROCHLORIDE 5 MG/1
5 TABLET ORAL ONCE
Status: COMPLETED | OUTPATIENT
Start: 2023-03-17 | End: 2023-03-17

## 2023-03-17 RX ADMIN — OXYCODONE HYDROCHLORIDE 5 MG: 5 TABLET ORAL at 00:41

## 2023-03-17 RX ADMIN — METRONIDAZOLE 500 MG: 500 TABLET ORAL at 00:41

## 2023-03-17 NOTE — ED NOTES
Pt received discharge paperwork and script to take home, pt signed the discharge paperwork with no further questions for the RN of MD, IV removed, vitals taken, and medications given prior to discharge, pt ambulated out of the ED with family member, pt stated they were able to get a ride to take them home

## 2023-03-17 NOTE — DISCHARGE INSTRUCTIONS
Please make an appointment to follow up with GI Clinic (phone: [phone]) in 7-14 days unless symptoms completely resolve.

## 2023-03-17 NOTE — ED PROVIDER NOTES
"    [place] [facility]ENC[facility] DE[facility]MENT ([facility])    3/16/23      History     Chief Complaint   Patient presents with     Abdominal Pain     The history is provided by the patient, medical records and a parent.     Kevin Stephens is a 22 year old adult with history of type 1 diabetes mellitus, glycogen storage disease, who presents to the Emergency Department with their mother with abdominal pain. Patient reports ongoing abdominal pain for the past month. They state pain is constant, describes it as a \"big C\" beginning in the middle of their abdomen, radiating to the left side and into the back. They state most pain is in the LLQ. They state pain is sharp and pressure-like. No burning in the upper abdomen. Patient reports recent inpatient stay at [facility] due to history of bulimia. They advised patient come in due to continued pain. Patient denies nausea, vomiting, fevers, chills, chest pain, or shortness of breath. They denies trauma to the abdomen. Patient endorses decreased urination, no other urinary symptoms. Patient has been seen in the ED previously for the pain, most recent visit was diagnosed with colitis. They have not been seen by GI. Patient reports they were advised to get a colonoscopy but the appointments for this everywhere are months out.  Patient has type 1 diabetes, was hospitalized for DKA in January. They states they started a new insulin routine yesterday, are on 20 units long acting in the morning, sliding scale with 1:25 carb ratio. They deny recent missed doses of long acting or sliding scale insulin. Patient has history of glycogen storage disease, following by metabolic disorders, Dr. Oliver. They have not been seen by Dr. Oliver for this but note she is aware. Patient reports their blood glucose typically runs in the 200s-300s. They last checked their blood sugar about 2 hours ago and it was 330.    Per chart review, patient has recent ED visit at [facility] on 2/24, due " to abdominal pain, notably in the LUQ. They were given IV fluids, antiemetics, and Dilaudid with improvement in symptoms. CT was done showing moderate stool burden. They were started on daily Miralax. Patient then represented to the ED on 3/9 with continued pain. CT was again done showing colitis. RUQ ultrasound was unremarkable. Findings discussed with GI and they recommended follow-up in clinic for colonoscopy and stool cultures. Referral to GI was made. Patient was given oral oxycodone in the ED and given a prescription for this.    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 There is no known cause, and no genetic testing has been completed.     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 History of GH treatment for a few years from age [age] 02/2022: IGF-1 72, Cortisol 15, ACTH 21, TSH 1.72     Prolonged QT interval 5/11/2022     Severe protein-calorie malnutrition (H) 5/11/2022     Transaminitis 5/11/2022     Uncontrolled diabetes mellitus secondary to pancreatic insufficiency 11/30/2014    Problem list name updated by automated process. Provider to review     Vitamin D deficiency 3/11/2012     Past Surgical History:   Procedure Laterality  Date     TONSILLECTOMY Bilateral 4/2015     ibuprofen (ADVIL/MOTRIN) 600 MG tablet  metroNIDAZOLE (FLAGYL) 500 MG tablet  oxyCODONE (ROXICODONE) 5 MG tablet  Acetone, Urine, Test (KETONE TEST) STRP  acetone, Urine, test STRP  alcohol swab prep pads  alcohol swab prep pads  bacitracin 500 UNIT/GM OINT  blood glucose (NO BRAND SPECIFIED) test strip  blood glucose calibration (NO BRAND SPECIFIED) solution  blood glucose monitoring (NO BRAND SPECIFIED) meter device kit  blood glucose monitoring (ONE TOUCH DELICA) lancets  blood glucose monitoring (ONETOUCH VERIO IQ) test strip  cephALEXin (KEFLEX) 500 MG capsule  collagenase (SANTYL) 250 UNIT/GM external ointment  Continuous Blood Gluc Sensor (DEXCOM G6 SENSOR) MISC  Continuous Blood Gluc Sensor (FREESTYLE PASQUALE 2 SENSOR) MISC  Continuous Blood Gluc Transmit (DEXCOM G6 TRANSMITTER) MISC  insulin aspart (NOVOLOG PEN) 100 UNIT/ML pen  insulin glargine (LANTUS PEN) 100 UNIT/ML pen  insulin pen needle (BD CALLI U/F) 32G X 4 MM miscellaneous  polyethylene glycol (MIRALAX) 17 GM/Dose powder  thin (NO BRAND SPECIFIED) lancets      Allergies   Allergen Reactions     Fluoxetine      Other reaction(s): Mental Status Change  Suicidal thoughts     Morphine Sulfate      No exposure but grandfather has the allergy to it     Tylenol [Acetaminophen]      Has glycogen storage disease and should not receive Tylenol, per GI.     Family History  Family History   Problem Relation Age of Onset     Hearing Loss Father      Social History   Social History     Tobacco Use     Smoking status: Never     Smokeless tobacco: Never     Tobacco comments:     no second hand smoke exposure at home   Vaping Use     Vaping Use: Some days     Substances: Nicotine, THC   Substance Use Topics     Alcohol use: No     Drug use: Yes     Types: Marijuana     Comment: every other day user         A medically appropriate review of systems was performed with pertinent positives and negatives noted in the HPI, and  "all other systems negative.    Physical Exam   BP: (!) 149/88  Pulse: 100  Temp: 98.7  F (37.1  C)  Resp: 18  Height: 162.6 cm (5' 4\")  Weight: 39.9 kg (88 lb)  SpO2: 97 %  Physical Exam  Vital Signs Reviewed  Gen: Well nourished, well developed, appears uncomfortable, no acute distress  HEENT: NC/AT, PERRL, EOMI, MMM  Neck: Supple, FROM  CV: Regular Rate, no murmur/rub/gallop  Lungs/Chest: Normal Effort, CTAB  Abd: Soft, nondistended, diffusely tender worse in the left lower quadrant.  Voluntary guarding.  No rigidity or rebound.  MSK/Back: FROM, no visible deformity  Neuro: A&Ox3, GCS 15, CN II-XII unremarkable. No obvious focal deficits appreciated, moving all extremities spontaneously.  Skin: Warm, Dry, Intact, no visible lesions    ED Course, Procedures, & Data     ED Course as of 03/17/23 0111   Thu Mar 16, 2023   1942 Patient seen and assessed in vertical triage. Snapshot/Care plan reviewed. Labs and PO meds ordered. Discussion regarding expectations of diagnosis and symptom management.   2129 Labs reviewed, mildly elevated transaminases.  Will page genetics and metabolism physician per care plan note.   2134 I discussed the transaminase rise and presentation with the genetics physician   2250 Patient reevaluated in the waiting room.  Lab results and discussion with genetics team discussed.  Feeling better after receiving pain medication.  Imaging pending.   2313 CT results reviewed.  Overall decrease in inflammation seen previously.      7:23 PM  The patient was seen and examined by Binh Palacio MD in [facility].  0100: Patient reassessed in vertical triage.  Feeling better.  Appears stable for discharge.    Procedures               Results for orders placed or performed during the hospital encounter of 03/16/23   CT Abdomen Pelvis w Contrast     Status: None    Narrative    EXAM: CT ABDOMEN PELVIS W CONTRAST  LOCATION: [facility]  DATE/TIME: 3/16/2023 10:23 " PM    INDICATION: Persistent severel epigastric, LUQ LLQ, suprapubic abdominal pain. ? diagnosis of colitis, becoming more severe. Hx glycogen storage disorder, T1DM  COMPARISON: 03/09/2023  TECHNIQUE: CT scan of the abdomen and pelvis was performed following injection of IV contrast. Multiplanar reformats were obtained. Dose reduction techniques were used.  CONTRAST: Contrast was administered.    FINDINGS:   LOWER CHEST: Normal.    HEPATOBILIARY: Chronic hepatomegaly. Normal gallbladder.    PANCREAS: Normal.    SPLEEN: Normal.    ADRENAL GLANDS: Normal.    KIDNEYS/BLADDER: Normal kidneys. Similar bladder thickening in the setting of underdistention.    BOWEL: There is no bowel obstruction. There appears to be overall improvement in the previously described thickening of the colon. Normal appendix.    LYMPH NODES: Normal.    VASCULATURE: Unremarkable.    PELVIC ORGANS: Normal.    MUSCULOSKELETAL: Normal.      Impression    IMPRESSION:   1.  Overall decrease in the amount of thickening in the colon as seen on prior examination. No acute findings.   Comprehensive metabolic panel     Status: Abnormal   Result Value Ref Range    Sodium 137 136 - 145 mmol/L    Potassium 4.4 3.4 - 5.3 mmol/L    Chloride 97 (L) 98 - 107 mmol/L    Carbon Dioxide (CO2) 28 22 - 29 mmol/L    Anion Gap 12 7 - 15 mmol/L    Urea Nitrogen 16.3 6.0 - 20.0 mg/dL    Creatinine 0.38 (L) 0.51 - 1.17 mg/dL    Calcium 9.6 8.6 - 10.0 mg/dL    Glucose 238 (H) 70 - 99 mg/dL    Alkaline Phosphatase 179 (H) 35 - 129 U/L    AST 84 (H) 10 - 50 U/L    ALT 71 (H) 10 - 50 U/L    Protein Total 7.8 6.4 - 8.3 g/dL    Albumin 4.4 3.5 - 5.2 g/dL    Bilirubin Total 0.5 <=1.2 mg/dL    GFR Estimate >90 >60 mL/min/1.73m2    Narrative    The sex of this patient cannot be reliably determined based on discrepancies in demographics (legal sex, sex assigned at birth, gender identity).  Both male and female reference ranges are provided where applicable.  Careful evaluation of  the patient s results as compared to the gender specific reference intervals is required in this setting.    Lipase     Status: Normal   Result Value Ref Range    Lipase 14 13 - 60 U/L   Lactic acid whole blood     Status: Normal   Result Value Ref Range    Lactic Acid 0.8 0.7 - 2.0 mmol/L   Magnesium     Status: Abnormal   Result Value Ref Range    Magnesium 2.7 (H) 1.7 - 2.3 mg/dL   HCG qualitative Blood     Status: Normal   Result Value Ref Range    hCG Serum Qualitative Negative Negative   CK total     Status: Normal   Result Value Ref Range     26 - 308 U/L    Narrative    The sex of this patient cannot be reliably determined based on discrepancies in demographics (legal sex, sex assigned at birth, gender identity).  Both male and female reference ranges are provided where applicable.  Careful evaluation of the patient s results as compared to the gender specific reference intervals is required in this setting.    INR     Status: Normal   Result Value Ref Range    INR 0.95 0.85 - 1.15   Partial thromboplastin time     Status: Normal   Result Value Ref Range    aPTT 24 22 - 38 Seconds   Lipid Profile     Status: Abnormal   Result Value Ref Range    Cholesterol 170 <200 mg/dL    Triglycerides 100 <150 mg/dL    Direct Measure HDL 49 >=40 mg/dL    LDL Cholesterol Calculated 101 (H) <=100 mg/dL    Non HDL Cholesterol 121 <130 mg/dL    Narrative    Cholesterol  Desirable:  <200 mg/dL    Triglycerides  Normal:  Less than 150 mg/dL  Borderline High:  150-199 mg/dL  High:  200-499 mg/dL  Very High:  Greater than or equal to 500 mg/dL    Direct Measure HDL  Female:  Greater than or equal to 50 mg/dL   Male:  Greater than or equal to 40 mg/dL    LDL Cholesterol  Desirable:  <100mg/dL  Above Desirable:  100-129 mg/dL   Borderline High:  130-159 mg/dL   High:  160-189 mg/dL   Very High:  >= 190 mg/dL    Non HDL Cholesterol  Desirable:  130 mg/dL  Above Desirable:  130-159 mg/dL  Borderline High:  160-189 mg/dL  High:   190-219 mg/dL  Very High:  Greater than or equal to 220 mg/dL   Ketone Beta-Hydroxybutyrate Quantitative     Status: Normal   Result Value Ref Range    Ketone (Beta-Hydroxybutyrate) Quantitative 0.20 <=0.30 mmol/L   UA with Microscopic reflex to Culture     Status: Abnormal    Specimen: Urine, Midstream   Result Value Ref Range    Color Urine Straw Colorless, Straw, Light Yellow, Yellow    Appearance Urine Clear Clear    Glucose Urine >=1000 (A) Negative mg/dL    Bilirubin Urine Negative Negative    Ketones Urine Trace (A) Negative mg/dL    Specific Gravity Urine 1.028 1.003 - 1.035    Blood Urine Negative Negative    pH Urine 7.5 (H) 5.0 - 7.0    Protein Albumin Urine Negative Negative mg/dL    Urobilinogen Urine Normal Normal, 2.0 mg/dL    Nitrite Urine Negative Negative    Leukocyte Esterase Urine Trace (A) Negative    RBC Urine <1 <=2 /HPF    WBC Urine 1 <=5 /HPF    Squamous Epithelials Urine 2 (H) <=1 /HPF    Narrative    Urine Culture not indicated   CBC with platelets and differential     Status: None   Result Value Ref Range    WBC Count 6.4 4.0 - 11.0 10e3/uL    RBC Count 5.57 3.80 - 5.90 10e6/uL    Hemoglobin 15.6 11.7 - 17.7 g/dL    Hematocrit 47.4 35.0 - 53.0 %    MCV 85 78 - 100 fL    MCH 28.0 26.5 - 33.0 pg    MCHC 32.9 31.5 - 36.5 g/dL    RDW 12.6 10.0 - 15.0 %    Platelet Count 256 150 - 450 10e3/uL    % Neutrophils 54 %    % Lymphocytes 38 %    % Monocytes 6 %    % Eosinophils 1 %    % Basophils 1 %    % Immature Granulocytes 0 %    NRBCs per 100 WBC 0 <1 /100    Absolute Neutrophils 3.4 1.6 - 8.3 10e3/uL    Absolute Lymphocytes 2.5 0.8 - 5.3 10e3/uL    Absolute Monocytes 0.4 0.0 - 1.3 10e3/uL    Absolute Eosinophils 0.1 0.0 - 0.7 10e3/uL    Absolute Basophils 0.0 0.0 - 0.2 10e3/uL    Absolute Immature Granulocytes 0.0 <=0.4 10e3/uL    Absolute NRBCs 0.0 10e3/uL    Narrative    The sex of this patient cannot be reliably determined based on discrepancies in demographics (legal sex, sex assigned at  birth, gender identity).  Both male and female reference ranges are provided where applicable.  Careful evaluation of the patient s results as compared to the gender specific reference intervals is required in this setting.    iStat Gases (lactate) venous, POCT     Status: Abnormal   Result Value Ref Range    Lactic Acid POCT 0.7 <=2.0 mmol/L    Bicarbonate Venous POCT 38 (H) 21 - 28 mmol/L    O2 Sat, Venous POCT 26 (L) 94 - 100 %    pCO2V Venous POCT 62 (H) 40 - 50 mm Hg    pH Venous POCT 7.40 7.32 - 7.43    pO2 Venous POCT 18 (L) 25 - 47 mm Hg   Glucose by meter     Status: Abnormal   Result Value Ref Range    GLUCOSE BY METER POCT 231 (H) 70 - 99 mg/dL   iStat Gases (lactate) venous, POCT     Status: Abnormal   Result Value Ref Range    Lactic Acid POCT 0.4 <=2.0 mmol/L    Bicarbonate Venous POCT 29 (H) 21 - 28 mmol/L    O2 Sat, Venous POCT 81 (L) 94 - 100 %    pCO2V Venous POCT 48 40 - 50 mm Hg    pH Venous POCT 7.39 7.32 - 7.43    pO2 Venous POCT 46 25 - 47 mm Hg   Glucose by meter     Status: Abnormal   Result Value Ref Range    GLUCOSE BY METER POCT 240 (H) 70 - 99 mg/dL   CBC with platelets differential     Status: None    Narrative    The following orders were created for panel order CBC with platelets differential.  Procedure                               Abnormality         Status                     ---------                               -----------         ------                     CBC with platelets and d...[[id number]]                      Final result                 Please view results for these tests on the individual orders.     Medications   oxyCODONE (ROXICODONE) tablet 5 mg (5 mg Oral $Given 3/16/23 1951)   ibuprofen (ADVIL/MOTRIN) tablet 600 mg (600 mg Oral $Given 3/16/23 1951)   lidocaine (viscous) (XYLOCAINE) 2 % 15 mL, alum & mag hydroxide-simethicone (MAALOX) 15 mL GI Cocktail (30 mLs Oral $Given 3/16/23 1952)   ondansetron (ZOFRAN ODT) ODT tab 4 mg (4 mg Oral $Given 3/16/23 1951)   CT  saline (64 mLs Intravenous $Given 3/16/23 2206)   iopamidol (ISOVUE-370) solution 54 mL (54 mLs Intravenous $Given 3/16/23 2206)   oxyCODONE (ROXICODONE) tablet 5 mg (5 mg Oral $Given 3/17/23 0041)   metroNIDAZOLE (FLAGYL) tablet 500 mg (500 mg Oral $Given 3/17/23 0041)     Labs Ordered and Resulted from Time of ED Arrival to Time of ED Departure   COMPREHENSIVE METABOLIC PANEL - Abnormal       Result Value    Sodium 137      Potassium 4.4      Chloride 97 (*)     Carbon Dioxide (CO2) 28      Anion Gap 12      Urea Nitrogen 16.3      Creatinine 0.38 (*)     Calcium 9.6      Glucose 238 (*)     Alkaline Phosphatase 179 (*)     AST 84 (*)     ALT 71 (*)     Protein Total 7.8      Albumin 4.4      Bilirubin Total 0.5      GFR Estimate >90     MAGNESIUM - Abnormal    Magnesium 2.7 (*)    LIPID PROFILE - Abnormal    Cholesterol 170      Triglycerides 100      Direct Measure HDL 49      LDL Cholesterol Calculated 101 (*)     Non HDL Cholesterol 121     ROUTINE UA WITH MICROSCOPIC REFLEX TO CULTURE - Abnormal    Color Urine Straw      Appearance Urine Clear      Glucose Urine >=1000 (*)     Bilirubin Urine Negative      Ketones Urine Trace (*)     Specific Gravity Urine 1.028      Blood Urine Negative      pH Urine 7.5 (*)     Protein Albumin Urine Negative      Urobilinogen Urine Normal      Nitrite Urine Negative      Leukocyte Esterase Urine Trace (*)     RBC Urine <1      WBC Urine 1      Squamous Epithelials Urine 2 (*)    ISTAT GASES LACTATE VENOUS POCT - Abnormal    Lactic Acid POCT 0.7      Bicarbonate Venous POCT 38 (*)     O2 Sat, Venous POCT 26 (*)     pCO2V Venous POCT 62 (*)     pH Venous POCT 7.40      pO2 Venous POCT 18 (*)    GLUCOSE BY METER - Abnormal    GLUCOSE BY METER POCT 231 (*)    ISTAT GASES LACTATE VENOUS POCT - Abnormal    Lactic Acid POCT 0.4      Bicarbonate Venous POCT 29 (*)     O2 Sat, Venous POCT 81 (*)     pCO2V Venous POCT 48      pH Venous POCT 7.39      pO2 Venous POCT 46     GLUCOSE BY  METER - Abnormal    GLUCOSE BY METER POCT 240 (*)    LIPASE - Normal    Lipase 14     LACTIC ACID WHOLE BLOOD - Normal    Lactic Acid 0.8     HCG QUALITATIVE PREGNANCY - Normal    hCG Serum Qualitative Negative     CK TOTAL - Normal         INR - Normal    INR 0.95     PARTIAL THROMBOPLASTIN TIME - Normal    aPTT 24     KETONE BETA-HYDROXYBUTYRATE QUANTITATIVE, RAPID - Normal    Ketone (Beta-Hydroxybutyrate) Quantitative 0.20     CBC WITH PLATELETS AND DIFFERENTIAL    WBC Count 6.4      RBC Count 5.57      Hemoglobin 15.6      Hematocrit 47.4      MCV 85      MCH 28.0      MCHC 32.9      RDW 12.6      Platelet Count 256      % Neutrophils 54      % Lymphocytes 38      % Monocytes 6      % Eosinophils 1      % Basophils 1      % Immature Granulocytes 0      NRBCs per 100 WBC 0      Absolute Neutrophils 3.4      Absolute Lymphocytes 2.5      Absolute Monocytes 0.4      Absolute Eosinophils 0.1      Absolute Basophils 0.0      Absolute Immature Granulocytes 0.0      Absolute NRBCs 0.0     GLUCOSE MONITOR NURSING POCT     CT Abdomen Pelvis w Contrast   Final Result   IMPRESSION:    1.  Overall decrease in the amount of thickening in the colon as seen on prior examination. No acute findings.             Critical care was not performed.     Medical Decision Making  The patient's presentation was of moderate complexity (an undiagnosed new problem with uncertain diagnosis).    The patient's evaluation involved:  review of external note(s) from 1 sources (see separate area of note for details)  ordering and/or review of 3+ test(s) in this encounter (see separate area of note for details)  discussion of management or test interpretation with another health professional (see separate area of note for details)    The patient's management necessitated moderate risk (prescription drug management including medications given in the ED).      Assessment & Plan    Kevin Stephens is a 22 year old adult with history of type 1  "diabetes mellitus, glycogen storage disease, who presents to the Emergency Department with their mother with abdominal pain. Patient reports ongoing abdominal pain for the past month. They state pain is constant, describes it as a \"big C\" beginning in the middle of their abdomen, radiating to the left side and into the back. They state most pain is in the LLQ. They state pain is sharp and pressure-like. No burning in the upper abdomen. Patient reports recent inpatient stay at [facility] due to history of bulimia. They advised patient come in due to continued pain.  Triage vitals reviewed, slightly elevated blood pressure in the setting of acute pain otherwise vital signs are largely unremarkable.  High normal heart rate but not severely tachycardic.  On exam heart rate had improved.  After examination patient was no longer bouncing restlessly/uncomfortably and was still on the cart.    Patient is a complicated history including glycogen-storage disease type 1 diabetes.  Snapshot/care plan reviewed.  A broad set of labs looking for metabolic abnormalities was ordered, will need to rule out DKA.  Prepped patient and  their mother that we may not find a specific diagnosis today.  They did seem frustrated that they have not been able to get outpatient follow-up as of yet.  It does look like they are in communication with her care team.  Her examination is nonspecific for cause.  Her prior imaging was reviewed by myself.  Radiologist did read as colitis.  It is possible the patient has some worsening bowel inflammation or problems.  As her pain is persistent and worsening I do think it would be needed to get a new CT scan tonight despite her receiving 2 in the last few months.  Will get p.o. medications for pain control as part of vertical triage process.  Will likely reach out to her metabolic team if there are any significant abnormalities on her blood work.  Does not appear to been on antibiotics.  May " need a course of Cipro Flagyl for colitis if redemonstrated she has not improved.  Certainly will need to follow-up with gastroenterology.  Low suspicion for pelvic etiology given the distribution of pain.     Results of patient's work-up were reviewed.  She has no leukocytosis no anemia, ketones or not significantly elevated and she is not acidotic, do not suspect DKA.  Lactate within normal limits.  No CARMELINA, no UTI.  She has a slight elevation in her transaminases.  I did speak with the genetics metabolism physician on-call who stated that as long as the patient is maintaining glucose levels there is no need for acute alarm on their side and this can likely be followed by gastroenterology.    The repeat CT scan was reviewed, the radiologist final impression suggested that the inflammation described as colitis is beginning to improve.  Upon reassessment the patient said she was feeling better.  A little bit of pain was returning so an additional dose of pain medication prior to discharge was provided.  Had discussion with the patient and mother about risks and benefits of using an antibiotic such as Flagyl which has some known anti-inflammatory properties and they elected to trial a 1 week course of this to see if she continues to improve.  Prescriptions for additional pain medications were provided and referral sent to [facility] for procedure and consult appointments were sent.    Patient and her mother acknowledge that a definitive diagnosis causing her pain has not been established but it may simply be this colitis beginning to improve.  There may also be an element of functional abdominal pain but further evaluation by gastroenterology is required prior to this diagnosis especially with the findings on CT imaging recently.    Follow-up with gastroenterology in the next 1 to 2 weeks, return precautions provided.  Patient and her mother have no questions comments or concerns at time of discharge.  Patient will  be returning to her eating disorder program that she is enrolled in.    I have reviewed the nursing notes. I have reviewed the findings, diagnosis, plan and need for follow up with the patient.    Discharge Medication List as of 3/17/2023 12:36 AM      START taking these medications    Details   ibuprofen (ADVIL/MOTRIN) 600 MG tablet Take 1 tablet (600 mg) by mouth every 6 hours as needed for moderate pain (4-6), Disp-28 tablet, R-0, Local Print      metroNIDAZOLE (FLAGYL) 500 MG tablet Take 1 tablet (500 mg) by mouth 2 times daily for 7 days, Disp-14 tablet, R-1, Local PrintEat yogurt or cottage cheese daily to prevent diarrhea that can be caused by taking this medication.      oxyCODONE (ROXICODONE) 5 MG tablet Take 1 tablet (5 mg) by mouth every 6 hours as needed for severe pain (7-10), Disp-9 tablet, R-0, Local Print             Final diagnoses:   Abdominal pain, generalized   Colitis     I, Keshia Bliss, am serving as a trained medical scribe to document services personally performed by Binh Arriaga MD, based on the provider's statements to me.     I, Binh Arriaga MD, was physically present and have reviewed and verified the accuracy of this note documented by Keshia Bliss.    Binh Arriaga Jr., MD   [facility] EMERGENCY DEPARTMENT  3/16/2023     Binh Arriaga MD  03/17/23 0113

## 2023-03-17 NOTE — LETTER
3/17/2023         RE: Kevin Stephens  [address] [place] [address]        Dear Colleague,    Thank you for referring your patient, Kevin Stephens, to the [facility]. Please see a copy of my visit note below.          This patient was a no show for this scheduled appointment.      Again, thank you for allowing me to participate in the care of your patient.        Sincerely,        Jazzmine Babin NP

## 2023-04-12 ENCOUNTER — HOSPITAL ENCOUNTER (OUTPATIENT)
Facility: CLINIC | Age: 23
End: 2023-04-12
Attending: INTERNAL MEDICINE | Admitting: INTERNAL MEDICINE
Payer: COMMERCIAL

## 2023-04-13 ENCOUNTER — HOSPITAL ENCOUNTER (INPATIENT)
Facility: CLINIC | Age: 23
LOS: 5 days | Discharge: HOME | End: 2023-04-18
Attending: EMERGENCY MEDICINE | Admitting: INTERNAL MEDICINE
Payer: COMMERCIAL

## 2023-04-13 ENCOUNTER — APPOINTMENT (OUTPATIENT)
Dept: RADIOLOGY | Facility: CLINIC | Age: 23
End: 2023-04-13
Attending: EMERGENCY MEDICINE
Payer: COMMERCIAL

## 2023-04-13 DIAGNOSIS — G47.00 INSOMNIA, UNSPECIFIED TYPE: ICD-10-CM

## 2023-04-13 DIAGNOSIS — E10.65 UNCONTROLLED TYPE 1 DIABETES MELLITUS WITH HYPERGLYCEMIA (H): ICD-10-CM

## 2023-04-13 DIAGNOSIS — R63.4 LOSS OF WEIGHT: ICD-10-CM

## 2023-04-13 DIAGNOSIS — R00.0 SINUS TACHYCARDIA: ICD-10-CM

## 2023-04-13 DIAGNOSIS — K31.84 GASTROPARESIS: Primary | ICD-10-CM

## 2023-04-13 DIAGNOSIS — R00.0 TACHYCARDIA: ICD-10-CM

## 2023-04-13 DIAGNOSIS — K21.00 GASTROESOPHAGEAL REFLUX DISEASE WITH ESOPHAGITIS WITHOUT HEMORRHAGE: ICD-10-CM

## 2023-04-13 DIAGNOSIS — R10.13 ABDOMINAL PAIN, EPIGASTRIC: ICD-10-CM

## 2023-04-13 LAB
ALBUMIN SERPL-MCNC: 3.6 G/DL (ref 3.5–5)
ALBUMIN UR-MCNC: NEGATIVE MG/DL
ALP SERPL-CCNC: 120 U/L (ref 45–120)
ALT SERPL W P-5'-P-CCNC: 52 U/L (ref 0–45)
ANION GAP SERPL CALCULATED.3IONS-SCNC: 13 MMOL/L (ref 5–18)
APPEARANCE UR: CLEAR
AST SERPL W P-5'-P-CCNC: 36 U/L (ref 0–40)
BASOPHILS # BLD AUTO: 0 10E3/UL (ref 0–0.2)
BASOPHILS NFR BLD AUTO: 0 %
BILIRUB SERPL-MCNC: 0.7 MG/DL (ref 0–1)
BILIRUB UR QL STRIP: NEGATIVE
BUN SERPL-MCNC: 18 MG/DL (ref 8–22)
CALCIUM SERPL-MCNC: 9.2 MG/DL (ref 8.5–10.5)
CHLORIDE BLD-SCNC: 99 MMOL/L (ref 98–107)
CO2 SERPL-SCNC: 23 MMOL/L (ref 22–31)
COLOR UR AUTO: ABNORMAL
CREAT SERPL-MCNC: 0.77 MG/DL (ref 0.6–1.3)
EOSINOPHIL # BLD AUTO: 0.1 10E3/UL (ref 0–0.7)
EOSINOPHIL NFR BLD AUTO: 1 %
ERYTHROCYTE [DISTWIDTH] IN BLOOD BY AUTOMATED COUNT: 13.2 % (ref 10–15)
GFR SERPL CREATININE-BSD FRML MDRD: >90 ML/MIN/1.73M2
GLUCOSE BLD-MCNC: 369 MG/DL (ref 70–125)
GLUCOSE BLDC GLUCOMTR-MCNC: 223 MG/DL (ref 70–99)
GLUCOSE UR STRIP-MCNC: >1000 MG/DL
HCG UR QL: NEGATIVE
HCT VFR BLD AUTO: 43.3 % (ref 35–53)
HGB BLD-MCNC: 15.1 G/DL (ref 11.7–17.7)
HGB UR QL STRIP: NEGATIVE
IMM GRANULOCYTES # BLD: 0 10E3/UL
IMM GRANULOCYTES NFR BLD: 0 %
KETONES UR STRIP-MCNC: 20 MG/DL
LEUKOCYTE ESTERASE UR QL STRIP: NEGATIVE
LIPASE SERPL-CCNC: <9 U/L (ref 0–52)
LYMPHOCYTES # BLD AUTO: 2.3 10E3/UL (ref 0.8–5.3)
LYMPHOCYTES NFR BLD AUTO: 35 %
MAGNESIUM SERPL-MCNC: 1.6 MG/DL (ref 1.8–2.6)
MAGNESIUM SERPL-MCNC: 1.9 MG/DL (ref 1.8–2.6)
MCH RBC QN AUTO: 28.1 PG (ref 26.5–33)
MCHC RBC AUTO-ENTMCNC: 34.9 G/DL (ref 31.5–36.5)
MCV RBC AUTO: 81 FL (ref 78–100)
MONOCYTES # BLD AUTO: 0.2 10E3/UL (ref 0–1.3)
MONOCYTES NFR BLD AUTO: 3 %
MUCOUS THREADS #/AREA URNS LPF: PRESENT /LPF
NEUTROPHILS # BLD AUTO: 4 10E3/UL (ref 1.6–8.3)
NEUTROPHILS NFR BLD AUTO: 61 %
NITRATE UR QL: NEGATIVE
NRBC # BLD AUTO: 0 10E3/UL
NRBC BLD AUTO-RTO: 0 /100
PH UR STRIP: 6 [PH] (ref 5–7)
PHOSPHATE SERPL-MCNC: 3.7 MG/DL (ref 2.5–4.5)
PLATELET # BLD AUTO: 295 10E3/UL (ref 150–450)
POTASSIUM BLD-SCNC: 3.9 MMOL/L (ref 3.5–5)
PROT SERPL-MCNC: 7.2 G/DL (ref 6–8)
RBC # BLD AUTO: 5.38 10E6/UL (ref 3.8–5.9)
RBC URINE: 4 /HPF
SODIUM SERPL-SCNC: 135 MMOL/L (ref 136–145)
SP GR UR STRIP: 1.01 (ref 1–1.03)
SQUAMOUS EPITHELIAL: 1 /HPF
TSH SERPL DL<=0.005 MIU/L-ACNC: 0.91 UIU/ML (ref 0.3–5)
UROBILINOGEN UR STRIP-MCNC: <2 MG/DL
WBC # BLD AUTO: 6.6 10E3/UL (ref 4–11)
WBC URINE: 3 /HPF

## 2023-04-13 PROCEDURE — C9113 INJ PANTOPRAZOLE SODIUM, VIA: HCPCS | Performed by: INTERNAL MEDICINE

## 2023-04-13 PROCEDURE — 83690 ASSAY OF LIPASE: CPT | Performed by: EMERGENCY MEDICINE

## 2023-04-13 PROCEDURE — 250N000013 HC RX MED GY IP 250 OP 250 PS 637: Performed by: INTERNAL MEDICINE

## 2023-04-13 PROCEDURE — 36415 COLL VENOUS BLD VENIPUNCTURE: CPT | Performed by: EMERGENCY MEDICINE

## 2023-04-13 PROCEDURE — 250N000009 HC RX 250: Performed by: INTERNAL MEDICINE

## 2023-04-13 PROCEDURE — 258N000003 HC RX IP 258 OP 636: Performed by: INTERNAL MEDICINE

## 2023-04-13 PROCEDURE — 250N000012 HC RX MED GY IP 250 OP 636 PS 637: Performed by: INTERNAL MEDICINE

## 2023-04-13 PROCEDURE — 84443 ASSAY THYROID STIM HORMONE: CPT | Performed by: INTERNAL MEDICINE

## 2023-04-13 PROCEDURE — 99223 1ST HOSP IP/OBS HIGH 75: CPT | Performed by: INTERNAL MEDICINE

## 2023-04-13 PROCEDURE — 36415 COLL VENOUS BLD VENIPUNCTURE: CPT | Performed by: INTERNAL MEDICINE

## 2023-04-13 PROCEDURE — 250N000009 HC RX 250: Performed by: EMERGENCY MEDICINE

## 2023-04-13 PROCEDURE — 120N000001 HC R&B MED SURG/OB

## 2023-04-13 PROCEDURE — 84100 ASSAY OF PHOSPHORUS: CPT | Performed by: INTERNAL MEDICINE

## 2023-04-13 PROCEDURE — 250N000011 HC RX IP 250 OP 636: Performed by: EMERGENCY MEDICINE

## 2023-04-13 PROCEDURE — 96374 THER/PROPH/DIAG INJ IV PUSH: CPT

## 2023-04-13 PROCEDURE — 82962 GLUCOSE BLOOD TEST: CPT

## 2023-04-13 PROCEDURE — 250N000011 HC RX IP 250 OP 636: Performed by: INTERNAL MEDICINE

## 2023-04-13 PROCEDURE — 81025 URINE PREGNANCY TEST: CPT | Performed by: EMERGENCY MEDICINE

## 2023-04-13 PROCEDURE — 82310 ASSAY OF CALCIUM: CPT | Performed by: EMERGENCY MEDICINE

## 2023-04-13 PROCEDURE — 81003 URINALYSIS AUTO W/O SCOPE: CPT | Performed by: EMERGENCY MEDICINE

## 2023-04-13 PROCEDURE — 85025 COMPLETE CBC W/AUTO DIFF WBC: CPT | Performed by: EMERGENCY MEDICINE

## 2023-04-13 PROCEDURE — 99285 EMERGENCY DEPT VISIT HI MDM: CPT | Mod: 25

## 2023-04-13 PROCEDURE — 96361 HYDRATE IV INFUSION ADD-ON: CPT

## 2023-04-13 PROCEDURE — 83735 ASSAY OF MAGNESIUM: CPT | Performed by: INTERNAL MEDICINE

## 2023-04-13 PROCEDURE — 80053 COMPREHEN METABOLIC PANEL: CPT | Performed by: EMERGENCY MEDICINE

## 2023-04-13 PROCEDURE — 999N000065 XR CHEST PORT 1 VIEW

## 2023-04-13 PROCEDURE — 258N000003 HC RX IP 258 OP 636: Performed by: EMERGENCY MEDICINE

## 2023-04-13 RX ORDER — KETOROLAC TROMETHAMINE 15 MG/ML
15 INJECTION, SOLUTION INTRAMUSCULAR; INTRAVENOUS ONCE
Status: COMPLETED | OUTPATIENT
Start: 2023-04-13 | End: 2023-04-13

## 2023-04-13 RX ORDER — LIDOCAINE 40 MG/G
CREAM TOPICAL
Status: DISCONTINUED | OUTPATIENT
Start: 2023-04-13 | End: 2023-04-18 | Stop reason: HOSPADM

## 2023-04-13 RX ORDER — OXYMETAZOLINE HYDROCHLORIDE 0.05 G/100ML
2 SPRAY NASAL
Status: DISCONTINUED | OUTPATIENT
Start: 2023-04-13 | End: 2023-04-18 | Stop reason: HOSPADM

## 2023-04-13 RX ORDER — IBUPROFEN 200 MG
400 TABLET ORAL EVERY 4 HOURS PRN
Status: ON HOLD | COMMUNITY
End: 2023-04-18

## 2023-04-13 RX ORDER — NICOTINE POLACRILEX 4 MG
15-30 LOZENGE BUCCAL
Status: DISCONTINUED | OUTPATIENT
Start: 2023-04-13 | End: 2023-04-15

## 2023-04-13 RX ORDER — METOCLOPRAMIDE 5 MG/1
5 TABLET ORAL
Status: DISCONTINUED | OUTPATIENT
Start: 2023-04-13 | End: 2023-04-18 | Stop reason: HOSPADM

## 2023-04-13 RX ORDER — METOCLOPRAMIDE 5 MG/1
5 TABLET ORAL
Status: ON HOLD | COMMUNITY
End: 2023-04-18

## 2023-04-13 RX ORDER — ONDANSETRON 2 MG/ML
4 INJECTION INTRAMUSCULAR; INTRAVENOUS EVERY 6 HOURS PRN
Status: DISCONTINUED | OUTPATIENT
Start: 2023-04-13 | End: 2023-04-18 | Stop reason: HOSPADM

## 2023-04-13 RX ORDER — SODIUM CHLORIDE 9 MG/ML
INJECTION, SOLUTION INTRAVENOUS CONTINUOUS
Status: DISCONTINUED | OUTPATIENT
Start: 2023-04-13 | End: 2023-04-14

## 2023-04-13 RX ORDER — ONDANSETRON 4 MG/1
4 TABLET, ORALLY DISINTEGRATING ORAL EVERY 6 HOURS PRN
Status: DISCONTINUED | OUTPATIENT
Start: 2023-04-13 | End: 2023-04-18 | Stop reason: HOSPADM

## 2023-04-13 RX ORDER — DEXTROSE MONOHYDRATE 25 G/50ML
25-50 INJECTION, SOLUTION INTRAVENOUS
Status: DISCONTINUED | OUTPATIENT
Start: 2023-04-13 | End: 2023-04-15

## 2023-04-13 RX ORDER — LIDOCAINE HYDROCHLORIDE 40 MG/ML
5 SOLUTION TOPICAL
Status: COMPLETED | OUTPATIENT
Start: 2023-04-13 | End: 2023-04-13

## 2023-04-13 RX ADMIN — SODIUM CHLORIDE: 9 INJECTION, SOLUTION INTRAVENOUS at 19:05

## 2023-04-13 RX ADMIN — SODIUM CHLORIDE 1000 ML: 9 INJECTION, SOLUTION INTRAVENOUS at 15:27

## 2023-04-13 RX ADMIN — PHENOL 1 ML: 1.5 LIQUID ORAL at 19:10

## 2023-04-13 RX ADMIN — KETOROLAC TROMETHAMINE 15 MG: 15 INJECTION, SOLUTION INTRAMUSCULAR; INTRAVENOUS at 15:27

## 2023-04-13 RX ADMIN — PANTOPRAZOLE SODIUM 40 MG: 40 INJECTION, POWDER, FOR SOLUTION INTRAVENOUS at 19:05

## 2023-04-13 RX ADMIN — PHENOL 1 ML: 1.5 LIQUID ORAL at 20:17

## 2023-04-13 RX ADMIN — PHENOL 1 ML: 1.5 LIQUID ORAL at 22:04

## 2023-04-13 RX ADMIN — LIDOCAINE HYDROCHLORIDE 5 ML: 40 SOLUTION TOPICAL at 16:58

## 2023-04-13 RX ADMIN — COLLAGENASE SANTYL: 250 OINTMENT TOPICAL at 19:21

## 2023-04-13 RX ADMIN — INSULIN GLARGINE 16 UNITS: 100 INJECTION, SOLUTION SUBCUTANEOUS at 21:59

## 2023-04-13 RX ADMIN — OXYCODONE HYDROCHLORIDE 2.5 MG: 5 TABLET ORAL at 21:59

## 2023-04-13 ASSESSMENT — ENCOUNTER SYMPTOMS
CHILLS: 0
HEMATURIA: 0
UNEXPECTED WEIGHT CHANGE: 1
DYSURIA: 0
FEVER: 0
BLOOD IN STOOL: 0
VOMITING: 0
ABDOMINAL PAIN: 1
DIARRHEA: 0

## 2023-04-13 ASSESSMENT — ACTIVITIES OF DAILY LIVING (ADL)
ADLS_ACUITY_SCORE: 37
ADLS_ACUITY_SCORE: 35
ADLS_ACUITY_SCORE: 37
DEPENDENT_IADLS:: INDEPENDENT;TRANSPORTATION
ADLS_ACUITY_SCORE: 37
ADLS_ACUITY_SCORE: 37

## 2023-04-13 NOTE — PROGRESS NOTES
Transfer Type: [facility]  Transfer Triage Note    Date of call: 04/13/23  Time of call: 6:50 PM    Current Patient Location:  [facility]  Current Level of Care: ED    Vitals: Temp: 98.3  F (36.8  C)   BP: 130/85 Pulse: 110   Resp: 20 SpO2: 99 %   Weight: 34 kg (75 lb)  O2 Device: None (Room air) at    Diagnosis: Diabetic with known h/o glycogen storage dz admitted with abdominal pain worse after eating and 10 lb weight loss in 1 month in the setting of eating disorder.  Is COVID-19 a concern? No  Reason for requested transfer: Further diagnostic work up, management, and consultation for specialized care   Isolation Needs: None    Outside Records: Available  Additional records may be faxed to [phone].    Transfer accepted: Yes  Stability of Patient: Patient is vitally stable, with no critical labs, and will likely remain stable throughout the transfer process  Level of Care Needed: Med Surg  Telemetry Needed:  Med (Remote) Telemetry  Expected Time of Arrival for Transfer: greater than 24 hours  Arrival Location:  [facility]    Recommendations for Management and Stabilization: Not needed    Additional Comments: provider uncomfortable managing patient's nutrition needs in the setting of DM with hyperglycemia and eating d/o and glycogen storage dz with recent weight loss and anticipated need for tube feeds and refeeding syndrome. Patient was due to GI appt in 4 days for EGD and colonoscopy but was seen today by psychiatrist at [facility] and referred to the ED.     Gricel Toure MD

## 2023-04-13 NOTE — ED PROVIDER NOTES
EMERGENCY DEPARTMENT ENCOUNTER      NAME: Kevin Stephens  AGE: 22 year old adult  YOB: 2000  MRN: [id number]  EVALUATION DATE & TIME: No admission date for patient encounter.    PCP: Vanessa Wright    ED PROVIDER: Deedee Horner MD    Chief Complaint   Patient presents with     Abdominal Pain         FINAL IMPRESSION:  No diagnosis found.      ED COURSE & MEDICAL DECISION MAKING:    Pertinent Labs & Imaging studies reviewed. (See chart for details)  22 year old adult presents to the Emergency Department for evaluation of weight loss and abdominal pain.  Given patient's pain and tenderness we discussed option of CT scan soon after arrival.  However patient states her pain is the same and they have done multiple CT scans and work-up without finding any cause of her pain.  The next step is endoscopy.  Patient has no dysuria, hematuria, or evidence on a urinalysis to suggest urinary tract infection.  Patient's pregnancy test is negative.  Lipase is normal.  CMP shows just a mild hyponatremia.  CBC was completely normal.  The request was patient to be admitted with NG tube to improve her nutrition status given she has lost approximately 10 pounds even in the bout the last month per her mom.  Patient is in agreement with the plan.     Patient was sent in by her primary care provider.  Prior to transfer, the provider who took the call recommended evaluating other hospitals as there are no beds available at this hospital.  And when they came to the emergency department we discussed options of they would be open to being admitted to other hospitals outside of our system given all beds were full, but the stated they wanted to stay within our system.         2:42 PM I met with the patient for the initial interview and physical examination. Discussed plan for treatment and workup in the ED. PPE: Provider wore gloves, and paper mask.      Medical Decision Making    History:    Supplemental history from:  Documented in chart, if applicable and Family Member/Significant Other    External Record(s) reviewed: Documented in chart, if applicable.    Work Up:    Chart documentation includes differential considered and any EKGs or imaging independently interpreted by provider, where specified.    In additional to work up documented, I considered the following work up: Documented in chart, if applicable.    External consultation:    Discussion of management with another provider: Documented in chart, if applicable    Complicating factors:    Care impacted by chronic illness: Diabetes, Mental Health and Other: gylcogen storage disease illa    Care affected by social determinants of health: N/A    Disposition considerations: Admit.         At the conclusion of the encounter I discussed the results of all of the tests and the disposition. The questions were answered. The patient or family acknowledged understanding and was agreeable with the care plan.         MEDICATIONS GIVEN IN THE EMERGENCY:  Medications   oxymetazoline (AFRIN) 0.05 % spray 2 spray (has no administration in time range)   lidocaine (XYLOCAINE) 4 % solution 5 mL (has no administration in time range)   0.9% sodium chloride BOLUS (has no administration in time range)   ketorolac (TORADOL) injection 15 mg (has no administration in time range)       NEW PRESCRIPTIONS STARTED AT TODAY'S ER VISIT  New Prescriptions    No medications on file          =================================================================    HPI    Patient information was obtained from: the patient and their mother    Use of : N/A         Kevin JARA Kat is a 22 year old adult with a pertinent history of gylcogen storage disease illa, anxiety, type 1 diabetes mellitus, protein-calorie malnutrition, and a binge-purge eating disorder who presents to this ED via walk in for evaluation of abdominal pain.     The patient reports that they have been losing a significant amount of weight  for the last four months (since January 2023) as they have significant abdominal pain when eating. This abdominal pain is diffuse, and they have had multiple abdominal imaging studies performed, the most recent image being on 3/16. The patient's primary care provider recommended that they present to the ED today to have an NG tube placed. The patient denies vomiting, fever, chills, diarrhea, rash, black or bloody stool, dysuria, hematuria, and any other symptoms or complaints at this time.     Per patient's mother: The patient has lost about ten pounds in the last month. The patient was scheduled to have a colonoscopy on 4/10 but was unable to as it was scheduled at an outpatient facility.     Per Chart Review:   3/16/23 The patient's abdominal CT scan showed decrease in thickening of the colon and no acute findings.   4/13/23 The patient presented to [facility]. The patient has been having ongoing abdominal pain, limiting what they are able to eat, and has lost 10 pounds in the last month.     REVIEW OF SYSTEMS   Review of Systems   Constitutional: Positive for unexpected weight change (10 pounds in the last month). Negative for chills and fever.   Gastrointestinal: Positive for abdominal pain. Negative for blood in stool, diarrhea and vomiting.   Genitourinary: Negative for dysuria and hematuria.   Skin: Negative for rash.   All other systems reviewed and are negative.       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 There is no known cause, and no genetic testing has been completed.     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 History of GH treatment for a few years from age [age] 02/2022: IGF-1 72, Cortisol 15, ACTH 21, TSH 1.72     Prolonged QT interval 5/11/2022     Severe protein-calorie malnutrition (H) 5/11/2022     Transaminitis 5/11/2022     Uncontrolled diabetes mellitus secondary to pancreatic insufficiency 11/30/2014    Problem list name updated by automated process. Provider to review     Vitamin D deficiency 3/11/2012       PAST SURGICAL HISTORY:  Past Surgical History:   Procedure Laterality Date     TONSILLECTOMY Bilateral 4/2015           CURRENT MEDICATIONS:    Acetone, Urine, Test (KETONE TEST) STRP  acetone, Urine, test STRP  alcohol swab prep pads  alcohol swab prep pads  bacitracin 500 UNIT/GM OINT  blood glucose (NO BRAND SPECIFIED) test strip  blood glucose calibration (NO BRAND SPECIFIED) solution  blood glucose monitoring (NO BRAND SPECIFIED) meter device kit  blood glucose monitoring (ONE TOUCH DELICA) lancets  blood glucose monitoring (ONETOUCH VERIO IQ) test strip  cephALEXin (KEFLEX) 500 MG capsule  collagenase (SANTYL) 250 UNIT/GM external ointment  Continuous Blood Gluc Sensor (DEXCOM G6 SENSOR) MISC  Continuous Blood Gluc Sensor (FREESTYLE PASQUALE 2 SENSOR) MISC  Continuous Blood Gluc Transmit (DEXCOM G6 TRANSMITTER) MISC  insulin aspart (NOVOLOG PEN) 100 UNIT/ML pen  insulin glargine (LANTUS PEN) 100 UNIT/ML pen  insulin pen needle (BD CALLI U/F) 32G X 4 MM miscellaneous  thin (NO BRAND SPECIFIED) lancets        ALLERGIES:  Allergies   Allergen Reactions     Fluoxetine      Other reaction(s): Mental  Status Change  Suicidal thoughts     Morphine Sulfate      No exposure but grandfather has the allergy to it     Tylenol [Acetaminophen]      Has glycogen storage disease and should not receive Tylenol, per GI.       FAMILY HISTORY:  Family History   Problem Relation Age of Onset     Hearing Loss Father        SOCIAL HISTORY:   Social History     Socioeconomic History     Marital status: Single   Tobacco Use     Smoking status: Never     Smokeless tobacco: Never     Tobacco comments:     no second hand smoke exposure at home   Vaping Use     Vaping status: Some Days     Substances: Nicotine, THC   Substance and Sexual Activity     Alcohol use: No     Drug use: Yes     Types: Marijuana     Comment: every other day user   Social History Narrative    Lives with parents and younger brother.  Currently in 12th grade.  Loves to sing       VITALS:  BP (!) 87/67   Pulse 113   Temp 98.3  F (36.8  C)   Resp 20   Wt 34 kg (75 lb)   LMP  (LMP Unknown)   SpO2 97%   BMI 12.87 kg/m      PHYSICAL EXAM    General: Alert, lying on the bed in NAD  Neuro: Awake alert; moving extremities x 4 face symterical  Eyes: sclera nonicteric conjunctiva clear  Mouth: mmm  Heart: RRR  Lungs:  CTA bilaterally  Abdomen:  soft  tenderness to palpation diffusely, no rebound; no guarding +BS  Rectal:  no stool in the rectal vault, hemocult negative.  Back: No CVA tenderness  Extremities: no pedal edema         LAB:  All pertinent labs reviewed and interpreted.       RADIOLOGY:  Reviewed all pertinent imaging. Please see official radiology report.  X-ray Chest 1 vw port    (Results Pending)       EKG:    None.     PROCEDURES:   None.             I, Ana Maria Caba am serving as a scribe to document services personally performed by Deedee Horner MD, based on my observation and the provider's statements to me. I, Deedee Horner MD attest that Ana Maria Caba is acting in a scribe capacity, has observed my performance of the services and has documented  them in accordance with my direction.     Deedee Horner MD  [facility] EMERGENCY ROOM  [facility] [id number] [phone]     Deedee Horner MD  04/13/23 3018

## 2023-04-13 NOTE — H&P
Admission History & Physical  Kevin Stephens, 2000, [id number]    [facility]  Vanessa Wright, [phone]    Assessment and Plan:  22 year old adult past medical history significant for eating disorder/binge purge behavior most recently was at [place] [facility] clinic 4 weeks ago, glycogen-storage disease 3A, diabetes mellitus type 1, DKA, anxiety depression, presented from eating disorder clinic with complaints of abdominal pain, inability to eat, recent weight loss about 10 pounds in the last 1 month.  Being admitted for further GI work-up, further care.    Upper abdominal pain  Weight loss  History of eating disorder  Severe protein calorie malnutrition BMI 12  Rule out peptic ulcer disease  Start Protonix IV twice daily  Diet as tolerated  GI consultation  NG tube placed in the ED  Dietitian consult  His lab work-up was essentially negative  Check magnesium, phosphorus  Start protocol if needed  Request psychiatry consultation  Monitor for electrolyte disturbances    History of uncontrolled diabetes mellitus type 1  History of glycogen storage disease,   Hepatomegaly   most recent A1c 11  Continue home Lantus  Insulin sliding scale as needed  Diabetes educator to see  Care plan reviewed    History of anxiety, depression  Psychiatry consultation  Continue other home medications    DVT prophylaxis: Early ambulation, SCDs  Code: Full      Expected length of stay : anticipate hospitalization more than 2 days.     Chief Complaint: abdominal pain, weight loss, eating disorder    HPI:     Kevin Stephens is a 22 year old adult past medical history significant for eating disorder/binge purge behavior most recently was at [place] [facility] clinic 4 weeks ago, glycogen-storage disease 3A, diabetes mellitus type 1, DKA, anxiety depression, presented from eating disorder clinic with complaints of abdominal pain, inability to eat, recent weight loss about 10 pounds in the last 1  month.  About 4 weeks ago he was admitted to the eating disorder program but there he has noticed more pain after eating and also upper abdominal pain when he is hungry.  Pain is located in the upper abdomen and left upper quadrant. denied any vomiting.  He feels hungry and would like to eat.  Denied any diarrhea.  No other urinary or bowel complaints.  History is obtained from the patient and his mom in the room.  Most recently was seen in the ED on 3/9/2023 showing colitis but did not finish antibiotics.  He is being followed by [facility] and was scheduled to have colonoscopy and endoscopy but was canceled on this Monday 4 days ago, and recommended to have it done as an inpatient..    In ED vitally tachycardic with a heart rate of 110/min, lab work-up was unrevealing.  Blood sugar elevated at 360.  He has nasogastric tube placed in the ED.  We have no beds available anywhere in the system including the [facility] and there is being admitted at [facility].    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 There is no known cause, and no genetic testing has been completed.     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 History of GH treatment for a few  years from age [age] 02/2022: IGF-1 72, Cortisol 15, ACTH 21, TSH 1.72     Prolonged QT interval 5/11/2022     Severe protein-calorie malnutrition (H) 5/11/2022     Transaminitis 5/11/2022     Uncontrolled diabetes mellitus secondary to pancreatic insufficiency 11/30/2014    Problem list name updated by automated process. Provider to review     Vitamin D deficiency 3/11/2012      Patient Active Problem List    Diagnosis Date Noted     Loss of weight 04/13/2023     Priority: Medium     SAMEERA (generalized anxiety disorder) 03/15/2023     Priority: Medium     Gender dysphoria 03/15/2023     Priority: Medium     Moderate episode of recurrent major depressive disorder (H) 03/15/2023     Priority: Medium     Uncontrolled type 1 diabetes mellitus with hyperglycemia (H) 03/07/2023     Priority: Medium     Binge-purge behavior 01/13/2023     Priority: Medium     Infected sebaceous cyst 05/11/2022     Priority: Medium     Transaminitis 05/11/2022     Priority: Medium     Prolonged QT interval 05/11/2022     Priority: Medium     Acute kidney injury (H) 05/11/2022     Priority: Medium     Severe protein-calorie malnutrition (H) 05/11/2022     Priority: Medium     Diabetic ketoacidosis without coma associated with type 1 diabetes mellitus (H) 05/09/2022     Priority: Medium     Partial hypopituitarism (H) 02/17/2022     Priority: Medium     Formatting of this note might be different from the original.  History of GH treatment for a few years from age [age]  02/2022: IGF-1 72, Cortisol 15, ACTH 21, TSH 1.72       Hyperglycemia 04/18/2018     Priority: Medium     Depression with suicidal ideation 05/18/2017     Priority: Medium     Uncontrolled diabetes mellitus secondary to pancreatic insufficiency 11/30/2014     Priority: Medium     Problem list name updated by automated process. Provider to review       Family history of congenital hearing loss 09/26/2012     Priority: Medium     Kevin's father has bilateral, congenital hearing  loss. There is no known cause, and no genetic testing has been completed.       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     Past Surgical History:   Procedure Laterality Date     TONSILLECTOMY Bilateral 4/2015       Allergies  Allergies   Allergen Reactions     Fluoxetine      Other reaction(s): Mental Status Change  Suicidal thoughts, previously tolerated sertraline     Morphine Sulfate      No exposure but grandfather has the allergy to it     Tylenol [Acetaminophen]      Has glycogen storage disease and should not receive Tylenol, per GI.       Prior to Admission Medications   (Not in a hospital admission)      Social History  Reviewed, and Brianna Stephens  reports that Brianna Stephens has never smoked. Brianna Stephens has never used smokeless tobacco. Brianna Stephens reports current drug use. Drug: Marijuana. Brianna Stephens reports that Brianna Stephens does not drink alcohol.  Social History     Tobacco Use     Smoking status: Never     Smokeless tobacco: Never     Tobacco comments:     no second hand smoke exposure at home   Vaping Use     Vaping status: Some Days     Substances: Nicotine, THC   Substance Use Topics     Alcohol use: No       Family History  Reviewed, and I have reviewed this patient's family history and updated it with pertinent information if needed.  Family History   Problem Relation Age of Onset     Hearing Loss Father           Review Of Systems  Complete As per admission HPI, all other systems reviewed and negative.     Physical Exam:  Vital signs:  Temp: 98.3  F (36.8  C)   BP: 130/85 Pulse: 110    "Resp: 20 SpO2: 99 % O2 Device: None (Room air)     Weight: 34 kg (75 lb)  Estimated body mass index is 12.87 kg/m  as calculated from the following:    Height as of 3/16/23: 1.626 m (5' 4\").    Weight as of this encounter: 34 kg (75 lb).    General Appearance:  Awake Alert, orientedx3, cachectic malnourished not in any apparent distress   Head:  Normocephalic, without obvious abnormality   Eyes:  PERRL, conjunctiva/corneas clear   Throat: Oral mucosa moist NG tube in place.  He has piercings in his lips.   Neck: Supple,  no JVD   Lungs:   Clear to auscultation bilaterally, respirations unlabored   Chest Wall:  No tenderness   Heart:  Regular rate and rhythm, S1, S2 normal,no murmur   Abdomen:   Soft, non-tender, bowel sounds present,  no guarding, rigidity    Extremities:  no edema, no joint swelling   Skin: Skin color, texture, turgor normal, no rashes or lesions   Neurologic: Alert and oriented X 3, no focal deficits     Results:  Labs Ordered and Resulted from Time of ED Arrival to Time of ED Departure   COMPREHENSIVE METABOLIC PANEL - Abnormal       Result Value    Sodium 135 (*)     Potassium 3.9      Chloride 99      Carbon Dioxide (CO2) 23      Anion Gap 13      Urea Nitrogen 18      Creatinine 0.77      Calcium 9.2      Glucose 369 (*)     Alkaline Phosphatase 120      AST 36      ALT 52 (*)     Protein Total 7.2      Albumin 3.6      Bilirubin Total 0.7      GFR Estimate >90     ROUTINE UA WITH MICROSCOPIC REFLEX TO CULTURE - Abnormal    Color Urine Light Yellow      Appearance Urine Clear      Glucose Urine >1000 (*)     Bilirubin Urine Negative      Ketones Urine 20 (*)     Specific Gravity Urine 1.010      Blood Urine Negative      pH Urine 6.0      Protein Albumin Urine Negative      Urobilinogen Urine <2.0      Nitrite Urine Negative      Leukocyte Esterase Urine Negative      Mucus Urine Present (*)     RBC Urine 4 (*)     WBC Urine 3      Squamous Epithelials Urine 1     LIPASE - Normal    Lipase <9  "    HCG QUALITATIVE URINE - Normal    hCG Urine Qualitative Negative     CBC WITH PLATELETS AND DIFFERENTIAL    WBC Count 6.6      RBC Count 5.38      Hemoglobin 15.1      Hematocrit 43.3      MCV 81      MCH 28.1      MCHC 34.9      RDW 13.2      Platelet Count 295      % Neutrophils 61      % Lymphocytes 35      % Monocytes 3      % Eosinophils 1      % Basophils 0      % Immature Granulocytes 0      NRBCs per 100 WBC 0      Absolute Neutrophils 4.0      Absolute Lymphocytes 2.3      Absolute Monocytes 0.2      Absolute Eosinophils 0.1      Absolute Basophils 0.0      Absolute Immature Granulocytes 0.0      Absolute NRBCs 0.0     MAGNESIUM   PHOSPHORUS   GLUCOSE MONITOR NURSING POCT   GLUCOSE MONITOR NURSING POCT        Imaging:   CT Abdomen Pelvis w Contrast    Result Date: 3/16/2023  EXAM: CT ABDOMEN PELVIS W CONTRAST LOCATION: [facility] DATE/TIME: 3/16/2023 10:23 PM INDICATION: Persistent severel epigastric, LUQ LLQ, suprapubic abdominal pain. ? diagnosis of colitis, becoming more severe. Hx glycogen storage disorder, T1DM COMPARISON: 03/09/2023 TECHNIQUE: CT scan of the abdomen and pelvis was performed following injection of IV contrast. Multiplanar reformats were obtained. Dose reduction techniques were used. CONTRAST: Contrast was administered. FINDINGS: LOWER CHEST: Normal. HEPATOBILIARY: Chronic hepatomegaly. Normal gallbladder. PANCREAS: Normal. SPLEEN: Normal. ADRENAL GLANDS: Normal. KIDNEYS/BLADDER: Normal kidneys. Similar bladder thickening in the setting of underdistention. BOWEL: There is no bowel obstruction. There appears to be overall improvement in the previously described thickening of the colon. Normal appendix. LYMPH NODES: Normal. VASCULATURE: Unremarkable. PELVIC ORGANS: Normal. MUSCULOSKELETAL: Normal.     IMPRESSION: 1.  Overall decrease in the amount of thickening in the colon as seen on prior examination. No acute findings.        > 75  MINUTES SPENT BY ME on the date of service doing chart review, history, exam, documentation & further activities per the note.      Zehra Waddell M.D  [facility]  Internal Medicine    4/13/2023  5:51 PM

## 2023-04-13 NOTE — ED TRIAGE NOTES
Pt presents to the ED with c/o abdominal pain that has been going on for 3 months. Pt has been seen for this in the past. Pt was admitted to an inpatient facility for an eating disorder, has been receiving outpatient treatment lately. Pt has been loosing weight. Pain located on left side, radiates into back. Denies N/V/D. Pt hypotensive in triage, denies being light headed or dizzy.      Triage Assessment       Row Name 04/13/23 6255       Triage Assessment (Adult)    Airway WDL WDL       Respiratory WDL    Respiratory WDL WDL       Skin Circulation/Temperature WDL    Skin Circulation/Temperature WDL WDL       Cardiac WDL    Cardiac WDL WDL       Peripheral/Neurovascular WDL    Peripheral Neurovascular WDL WDL       Cognitive/Neuro/Behavioral WDL    Cognitive/Neuro/Behavioral WDL WDL

## 2023-04-13 NOTE — PHARMACY-ADMISSION MEDICATION HISTORY
Pharmacist Admission Medication History    Admission medication history is complete. The information provided in this note is only as accurate as the sources available at the time of the update.    Medication reconciliation/reorder completed by provider prior to medication history? No    Information Source(s): Patient, Family member and CareEverywhere/SureScripts via in-person    Pertinent Information: variable compliance lately    Changes made to PTA medication list:    Added: reglan    Deleted: None    Changed: None    Medication Affordability:       Allergies reviewed with patient and updates made in EHR: unable to assess    Medication History Completed By: Titi Garcia RPH 4/13/2023 3:30 PM    PTA Med List   Medication Sig Last Dose     ibuprofen (ADVIL/MOTRIN) 200 MG tablet Take 400 mg by mouth every 4 hours as needed for pain Past Week     insulin aspart (NOVOLOG PEN) 100 UNIT/ML pen Sliding scale, has not been compliant recently.  Used 10 units 4/13 am 4/13/2023     insulin glargine (LANTUS PEN) 100 UNIT/ML pen Inject 16 Units Subcutaneous At Bedtime 4/12/2023     metoclopramide (REGLAN) 5 MG tablet Take 5 mg by mouth 4 times daily (before meals and nightly) 4/12/2023

## 2023-04-14 ENCOUNTER — HOSPITAL ENCOUNTER (INPATIENT)
Facility: CLINIC | Age: 23
Setting detail: SURGERY ADMIT
End: 2023-04-14
Attending: INTERNAL MEDICINE | Admitting: INTERNAL MEDICINE
Payer: COMMERCIAL

## 2023-04-14 ENCOUNTER — ANESTHESIA (OUTPATIENT)
Dept: SURGERY | Facility: CLINIC | Age: 23
End: 2023-04-14
Payer: COMMERCIAL

## 2023-04-14 ENCOUNTER — ANESTHESIA EVENT (OUTPATIENT)
Dept: SURGERY | Facility: CLINIC | Age: 23
End: 2023-04-14
Payer: COMMERCIAL

## 2023-04-14 LAB
ALBUMIN SERPL-MCNC: 3.4 G/DL (ref 3.5–5)
ALP SERPL-CCNC: 112 U/L (ref 45–120)
ALT SERPL W P-5'-P-CCNC: 48 U/L (ref 0–45)
ANION GAP SERPL CALCULATED.3IONS-SCNC: 7 MMOL/L (ref 5–18)
AST SERPL W P-5'-P-CCNC: 45 U/L (ref 0–40)
BASOPHILS # BLD AUTO: 0 10E3/UL (ref 0–0.2)
BASOPHILS NFR BLD AUTO: 0 %
BILIRUB SERPL-MCNC: 0.7 MG/DL (ref 0–1)
BUN SERPL-MCNC: 12 MG/DL (ref 8–22)
CALCIUM SERPL-MCNC: 8.4 MG/DL (ref 8.5–10.5)
CHLORIDE BLD-SCNC: 102 MMOL/L (ref 98–107)
CO2 SERPL-SCNC: 25 MMOL/L (ref 22–31)
CREAT SERPL-MCNC: 0.53 MG/DL (ref 0.6–1.3)
EOSINOPHIL # BLD AUTO: 0.1 10E3/UL (ref 0–0.7)
EOSINOPHIL NFR BLD AUTO: 1 %
ERYTHROCYTE [DISTWIDTH] IN BLOOD BY AUTOMATED COUNT: 13 % (ref 10–15)
GFR SERPL CREATININE-BSD FRML MDRD: >90 ML/MIN/1.73M2
GLUCOSE BLD-MCNC: 259 MG/DL (ref 70–125)
GLUCOSE BLDC GLUCOMTR-MCNC: 155 MG/DL (ref 70–99)
GLUCOSE BLDC GLUCOMTR-MCNC: 166 MG/DL (ref 70–99)
GLUCOSE BLDC GLUCOMTR-MCNC: 188 MG/DL (ref 70–99)
GLUCOSE BLDC GLUCOMTR-MCNC: 222 MG/DL (ref 70–99)
GLUCOSE BLDC GLUCOMTR-MCNC: 223 MG/DL (ref 70–99)
GLUCOSE BLDC GLUCOMTR-MCNC: 236 MG/DL (ref 70–99)
GLUCOSE BLDC GLUCOMTR-MCNC: 263 MG/DL (ref 70–99)
GLUCOSE BLDC GLUCOMTR-MCNC: 278 MG/DL (ref 70–99)
GLUCOSE BLDC GLUCOMTR-MCNC: 60 MG/DL (ref 70–99)
HCT VFR BLD AUTO: 42.2 % (ref 35–53)
HGB BLD-MCNC: 14.8 G/DL (ref 11.7–17.7)
IMM GRANULOCYTES # BLD: 0 10E3/UL
IMM GRANULOCYTES NFR BLD: 0 %
LYMPHOCYTES # BLD AUTO: 2 10E3/UL (ref 0.8–5.3)
LYMPHOCYTES NFR BLD AUTO: 35 %
MAGNESIUM SERPL-MCNC: 1.7 MG/DL (ref 1.8–2.6)
MAGNESIUM SERPL-MCNC: 1.8 MG/DL (ref 1.8–2.6)
MCH RBC QN AUTO: 28.2 PG (ref 26.5–33)
MCHC RBC AUTO-ENTMCNC: 35.1 G/DL (ref 31.5–36.5)
MCV RBC AUTO: 80 FL (ref 78–100)
MONOCYTES # BLD AUTO: 0.2 10E3/UL (ref 0–1.3)
MONOCYTES NFR BLD AUTO: 3 %
NEUTROPHILS # BLD AUTO: 3.5 10E3/UL (ref 1.6–8.3)
NEUTROPHILS NFR BLD AUTO: 61 %
NRBC # BLD AUTO: 0 10E3/UL
NRBC BLD AUTO-RTO: 0 /100
PHOSPHATE SERPL-MCNC: 3.6 MG/DL (ref 2.5–4.5)
PLATELET # BLD AUTO: 264 10E3/UL (ref 150–450)
POTASSIUM BLD-SCNC: 4.1 MMOL/L (ref 3.5–5)
POTASSIUM BLD-SCNC: 4.1 MMOL/L (ref 3.5–5)
PROT SERPL-MCNC: 6.5 G/DL (ref 6–8)
RBC # BLD AUTO: 5.25 10E6/UL (ref 3.8–5.9)
SODIUM SERPL-SCNC: 134 MMOL/L (ref 136–145)
UPPER GI ENDOSCOPY: NORMAL
WBC # BLD AUTO: 5.7 10E3/UL (ref 4–11)

## 2023-04-14 PROCEDURE — 250N000011 HC RX IP 250 OP 636: Performed by: INTERNAL MEDICINE

## 2023-04-14 PROCEDURE — 258N000003 HC RX IP 258 OP 636: Performed by: HOSPITALIST

## 2023-04-14 PROCEDURE — C9113 INJ PANTOPRAZOLE SODIUM, VIA: HCPCS | Performed by: INTERNAL MEDICINE

## 2023-04-14 PROCEDURE — 250N000009 HC RX 250: Performed by: ANESTHESIOLOGY

## 2023-04-14 PROCEDURE — 258N000003 HC RX IP 258 OP 636: Performed by: INTERNAL MEDICINE

## 2023-04-14 PROCEDURE — 0DB98ZX EXCISION OF DUODENUM, VIA NATURAL OR ARTIFICIAL OPENING ENDOSCOPIC, DIAGNOSTIC: ICD-10-PCS | Performed by: INTERNAL MEDICINE

## 2023-04-14 PROCEDURE — 88305 TISSUE EXAM BY PATHOLOGIST: CPT | Mod: 26 | Performed by: PATHOLOGY

## 2023-04-14 PROCEDURE — 999N000141 HC STATISTIC PRE-PROCEDURE NURSING ASSESSMENT: Performed by: INTERNAL MEDICINE

## 2023-04-14 PROCEDURE — 370N000017 HC ANESTHESIA TECHNICAL FEE, PER MIN: Performed by: INTERNAL MEDICINE

## 2023-04-14 PROCEDURE — 88342 IMHCHEM/IMCYTCHM 1ST ANTB: CPT | Mod: 26 | Performed by: PATHOLOGY

## 2023-04-14 PROCEDURE — 360N000075 HC SURGERY LEVEL 2, PER MIN: Performed by: INTERNAL MEDICINE

## 2023-04-14 PROCEDURE — 0DB68ZX EXCISION OF STOMACH, VIA NATURAL OR ARTIFICIAL OPENING ENDOSCOPIC, DIAGNOSTIC: ICD-10-PCS | Performed by: INTERNAL MEDICINE

## 2023-04-14 PROCEDURE — 84132 ASSAY OF SERUM POTASSIUM: CPT | Performed by: INTERNAL MEDICINE

## 2023-04-14 PROCEDURE — 99233 SBSQ HOSP IP/OBS HIGH 50: CPT | Performed by: INTERNAL MEDICINE

## 2023-04-14 PROCEDURE — 88342 IMHCHEM/IMCYTCHM 1ST ANTB: CPT | Mod: TC | Performed by: INTERNAL MEDICINE

## 2023-04-14 PROCEDURE — 250N000011 HC RX IP 250 OP 636: Performed by: ANESTHESIOLOGY

## 2023-04-14 PROCEDURE — 272N000001 HC OR GENERAL SUPPLY STERILE: Performed by: INTERNAL MEDICINE

## 2023-04-14 PROCEDURE — 80053 COMPREHEN METABOLIC PANEL: CPT | Performed by: INTERNAL MEDICINE

## 2023-04-14 PROCEDURE — 83735 ASSAY OF MAGNESIUM: CPT | Performed by: INTERNAL MEDICINE

## 2023-04-14 PROCEDURE — 82962 GLUCOSE BLOOD TEST: CPT

## 2023-04-14 PROCEDURE — 258N000001 HC RX 258: Performed by: INTERNAL MEDICINE

## 2023-04-14 PROCEDURE — 36415 COLL VENOUS BLD VENIPUNCTURE: CPT | Performed by: INTERNAL MEDICINE

## 2023-04-14 PROCEDURE — 84100 ASSAY OF PHOSPHORUS: CPT | Performed by: INTERNAL MEDICINE

## 2023-04-14 PROCEDURE — 250N000013 HC RX MED GY IP 250 OP 250 PS 637: Performed by: INTERNAL MEDICINE

## 2023-04-14 PROCEDURE — 85025 COMPLETE CBC W/AUTO DIFF WBC: CPT | Performed by: INTERNAL MEDICINE

## 2023-04-14 PROCEDURE — 120N000001 HC R&B MED SURG/OB

## 2023-04-14 PROCEDURE — 250N000012 HC RX MED GY IP 250 OP 636 PS 637: Performed by: INTERNAL MEDICINE

## 2023-04-14 RX ORDER — DEXTROSE MONOHYDRATE 25 G/50ML
25-50 INJECTION, SOLUTION INTRAVENOUS
Status: DISCONTINUED | OUTPATIENT
Start: 2023-04-14 | End: 2023-04-18 | Stop reason: HOSPADM

## 2023-04-14 RX ORDER — LIDOCAINE HYDROCHLORIDE 10 MG/ML
INJECTION, SOLUTION INFILTRATION; PERINEURAL PRN
Status: DISCONTINUED | OUTPATIENT
Start: 2023-04-14 | End: 2023-04-14

## 2023-04-14 RX ORDER — NALOXONE HYDROCHLORIDE 0.4 MG/ML
0.4 INJECTION, SOLUTION INTRAMUSCULAR; INTRAVENOUS; SUBCUTANEOUS
Status: DISCONTINUED | OUTPATIENT
Start: 2023-04-14 | End: 2023-04-18 | Stop reason: HOSPADM

## 2023-04-14 RX ORDER — SODIUM CHLORIDE, SODIUM LACTATE, POTASSIUM CHLORIDE, CALCIUM CHLORIDE 600; 310; 30; 20 MG/100ML; MG/100ML; MG/100ML; MG/100ML
INJECTION, SOLUTION INTRAVENOUS CONTINUOUS PRN
Status: DISCONTINUED | OUTPATIENT
Start: 2023-04-14 | End: 2023-04-14

## 2023-04-14 RX ORDER — SODIUM CHLORIDE, SODIUM LACTATE, POTASSIUM CHLORIDE, CALCIUM CHLORIDE 600; 310; 30; 20 MG/100ML; MG/100ML; MG/100ML; MG/100ML
INJECTION, SOLUTION INTRAVENOUS CONTINUOUS
Status: DISCONTINUED | OUTPATIENT
Start: 2023-04-14 | End: 2023-04-14 | Stop reason: HOSPADM

## 2023-04-14 RX ORDER — NICOTINE POLACRILEX 4 MG
15-30 LOZENGE BUCCAL
Status: DISCONTINUED | OUTPATIENT
Start: 2023-04-14 | End: 2023-04-18 | Stop reason: HOSPADM

## 2023-04-14 RX ORDER — PROPOFOL 10 MG/ML
INJECTION, EMULSION INTRAVENOUS PRN
Status: DISCONTINUED | OUTPATIENT
Start: 2023-04-14 | End: 2023-04-14

## 2023-04-14 RX ORDER — NALOXONE HYDROCHLORIDE 0.4 MG/ML
0.2 INJECTION, SOLUTION INTRAMUSCULAR; INTRAVENOUS; SUBCUTANEOUS
Status: DISCONTINUED | OUTPATIENT
Start: 2023-04-14 | End: 2023-04-18 | Stop reason: HOSPADM

## 2023-04-14 RX ORDER — LIDOCAINE 40 MG/G
CREAM TOPICAL
Status: DISCONTINUED | OUTPATIENT
Start: 2023-04-14 | End: 2023-04-14 | Stop reason: HOSPADM

## 2023-04-14 RX ORDER — PROPOFOL 10 MG/ML
INJECTION, EMULSION INTRAVENOUS CONTINUOUS PRN
Status: DISCONTINUED | OUTPATIENT
Start: 2023-04-14 | End: 2023-04-14

## 2023-04-14 RX ORDER — SODIUM CHLORIDE 9 MG/ML
INJECTION, SOLUTION INTRAVENOUS CONTINUOUS
Status: DISCONTINUED | OUTPATIENT
Start: 2023-04-14 | End: 2023-04-14

## 2023-04-14 RX ADMIN — INSULIN ASPART 2 UNITS: 100 INJECTION, SOLUTION INTRAVENOUS; SUBCUTANEOUS at 23:36

## 2023-04-14 RX ADMIN — METOCLOPRAMIDE 5 MG: 5 TABLET ORAL at 09:54

## 2023-04-14 RX ADMIN — DEXTROSE AND SODIUM CHLORIDE: 5; 900 INJECTION, SOLUTION INTRAVENOUS at 03:52

## 2023-04-14 RX ADMIN — METOCLOPRAMIDE 5 MG: 5 TABLET ORAL at 11:51

## 2023-04-14 RX ADMIN — PANTOPRAZOLE SODIUM 40 MG: 40 INJECTION, POWDER, FOR SOLUTION INTRAVENOUS at 19:14

## 2023-04-14 RX ADMIN — PROPOFOL 300 MCG/KG/MIN: 10 INJECTION, EMULSION INTRAVENOUS at 14:35

## 2023-04-14 RX ADMIN — METOCLOPRAMIDE 5 MG: 5 TABLET ORAL at 21:51

## 2023-04-14 RX ADMIN — INSULIN ASPART 1 UNITS: 100 INJECTION, SOLUTION INTRAVENOUS; SUBCUTANEOUS at 18:54

## 2023-04-14 RX ADMIN — MAGNESIUM SULFATE HEPTAHYDRATE 1 G: 500 INJECTION, SOLUTION INTRAMUSCULAR; INTRAVENOUS at 23:43

## 2023-04-14 RX ADMIN — PROPOFOL 100 MG: 10 INJECTION, EMULSION INTRAVENOUS at 14:35

## 2023-04-14 RX ADMIN — OXYCODONE HYDROCHLORIDE 2.5 MG: 5 TABLET ORAL at 23:33

## 2023-04-14 RX ADMIN — PANTOPRAZOLE SODIUM 40 MG: 40 INJECTION, POWDER, FOR SOLUTION INTRAVENOUS at 09:53

## 2023-04-14 RX ADMIN — DEXTROSE MONOHYDRATE 25 ML: 25 INJECTION, SOLUTION INTRAVENOUS at 03:32

## 2023-04-14 RX ADMIN — INSULIN GLARGINE 16 UNITS: 100 INJECTION, SOLUTION SUBCUTANEOUS at 21:48

## 2023-04-14 RX ADMIN — OXYCODONE HYDROCHLORIDE 2.5 MG: 5 TABLET ORAL at 09:53

## 2023-04-14 RX ADMIN — OXYCODONE HYDROCHLORIDE 2.5 MG: 5 TABLET ORAL at 17:00

## 2023-04-14 RX ADMIN — SODIUM CHLORIDE: 9 INJECTION, SOLUTION INTRAVENOUS at 06:24

## 2023-04-14 RX ADMIN — PHENOL 1 ML: 1.5 LIQUID ORAL at 10:00

## 2023-04-14 RX ADMIN — LIDOCAINE HYDROCHLORIDE 20 MG: 10 INJECTION, SOLUTION INFILTRATION; PERINEURAL at 14:35

## 2023-04-14 RX ADMIN — METOCLOPRAMIDE 5 MG: 5 TABLET ORAL at 18:40

## 2023-04-14 ASSESSMENT — ACTIVITIES OF DAILY LIVING (ADL)
ADLS_ACUITY_SCORE: 37

## 2023-04-14 NOTE — ED NOTES
Bed: [facility]ED-01  Expected date: 4/14/23  Expected time: 2:15 PM  Means of arrival:   Comments:  Held for pt after procedure

## 2023-04-14 NOTE — UTILIZATION REVIEW
Admission Status; Secondary Review Determination     Under the authority of the Utilization Management Committee, the utilization review process indicated a secondary review on the above patient. The review outcome is based on review of the medical records, discussions with staff, and applying clinical experience noted on the date of the review.     (x) Inpatient Status Appropriate - This patient's medical care is consistent with medical management for inpatient care and reasonable inpatient medical practice.     RATIONALE FOR DETERMINATION     Kevin Stephens is a 24 yo old with a PMH significant for  uncontrolled type 1 DM, glycogen storage disease and eating disorder who presents to the eating disorder clinic with abd pain, anorexia and significant ongoing weight loss.  Started on IV protonix bid and GI consult requested.  She has significant protein malnutrition and has also had difficulty with labile blood sugars and hypoglycemia since admission.  She is requiring transfer to a higher level of care today due to the complex nature of her malnutrition, insulin-dependent DM and risk for refeeding syndrome.  Currently is NPO for possible endoscopy today.    At the time of admission with the information available to the attending physician more than 2 nights Hospital complex care was anticipated, based on patient risk of adverse outcome if treated as outpatient and complex care required. Inpatient admission is appropriate based on the Medicare guidelines.     The information on this document is developed by the utilization review team in order for the business office to ensure compliance. This only denotes the appropriateness of proper admission status and does not reflect the quality of care rendered.   The definitions of Inpatient Status and Observation Status used in making the determination above are those provided in the CMS Coverage Manual, Chapter 1 and Chapter 6, section 70.4.         Sincerely,     Darling  Travis, DO  Utilization Review  Physician Advisor  [facility].

## 2023-04-14 NOTE — H&P
The History and Physical has been reviewed, the patient has been examined and no changes have occurred in the patient's condition since the H & P was completed.       Rafael Villagomez MD  [facility]  [phone]

## 2023-04-14 NOTE — ANESTHESIA POSTPROCEDURE EVALUATION
Patient: Kevin Stephens    Procedure: Procedure(s):  ESOPHAGOGASTRODUODENOSCOPY with biopsies       Anesthesia Type:  MAC    Note:  Disposition: Inpatient   Postop Pain Control: Uneventful            Sign Out: Well controlled pain   PONV: No   Neuro/Psych: Uneventful            Sign Out: Acceptable/Baseline neuro status   Airway/Respiratory: Uneventful            Sign Out: Acceptable/Baseline resp. status   CV/Hemodynamics: Uneventful            Sign Out: Acceptable CV status; No obvious hypovolemia; No obvious fluid overload   Other NRE:    DID A NON-ROUTINE EVENT OCCUR? No    Event details/Postop Comments:  Patient seen in procedure room at conclusion of procedure, following commands and opening eyes, per review of documentation handed off awake and in stable condition with no apparent anesthesia complications           Last vitals:  Vitals:    04/14/23 0300 04/14/23 0758 04/14/23 1356   BP: 132/80 130/76 129/84   Pulse: 85 87 82   Resp: 27 26 26   Temp: 36.4  C (97.5  F) 36.2  C (97.1  F) 36.9  C (98.4  F)   SpO2: 97% 98% 99%       Electronically Signed By: Juanpablo Pineda MD  April 14, 2023  3:22 PM

## 2023-04-14 NOTE — PROGRESS NOTES
"[facility] Medicine PROGRESS NOTE      Identification/Summary:   22 year old adult past medical history significant for eating disorder/binge purge behavior most recently was at [place] [facility] clinic 4 weeks ago, glycogen-storage disease 3A, diabetes mellitus type 1, DKA, anxiety depression, presented from eating disorder clinic with complaints of abdominal pain, inability to eat, recent weight loss about 10 pounds in the last 1 month.  Being admitted for further GI work-up, further cares awaiting GI, dietitian input.     Upper abdominal pain  Weight loss  History of eating disorder  Severe protein calorie malnutrition BMI 12  Rule out peptic ulcer disease  Start Protonix IV twice daily  Diet as tolerated  GI consultation pending  NG tube placed in the ED  Dietitian consult  His lab work-up was essentially negative  Request psychiatry consultation  Monitor for electrolyte disturbances    Hypomagnesemia  Replaced.    History of uncontrolled diabetes mellitus type 1  History of glycogen storage disease,   Hepatomegaly   most recent A1c 11  Continue home Lantus  Insulin sliding scale as needed  Diabetes educator to see  Care plan reviewed     History of anxiety, depression  Psychiatry consultation  Continue other home medications     DVT prophylaxis: Early ambulation, SCDs    Diet: Combination Diet Regular Diet Adult  DVT Prophylaxis:    Code Status: Full Code    Anticipated possible discharge in 1-2 days once further work-up milestones are met.    Interval History/Subjective:  Try to eat this morning with pain. No nausea.  No shortness of breath, chest pain.  No other urinary complaints.     Physical Exam/Objective:  Vitals I/O   Vital signs:  Temp: 97.1  F (36.2  C) Temp src: Oral BP: 130/76 Pulse: 87   Resp: 26 SpO2: 98 % O2 Device: None (Room air)     Weight: 31.8 kg (70 lb)  Estimated body mass index is 12.02 kg/m  as calculated from the following:    Height as of 3/16/23: 1.626 m (5' 4\").    " Weight as of this encounter: 31.8 kg (70 lb). I/O last 3 completed shifts:  In: 86.92 [I.V.:86.92]  Out: -      Body mass index is 12.02 kg/m .    General Appearance:  Alert, cooperative, no distress   Head:  Normocephalic, atraumatic   Eyes:  PERRL    Throat:  mucosa; moist   Neck: No JVD, thyromegaly   Lungs:   Clear to auscultation bilaterally, respirations unlabored   Chest Wall:  No tenderness or deformity   Heart:  Regular rate and rhythm, S1, S2 normal,no murmur   Abdomen:   Soft, non tender, non distended, bowel sounds present, no guarding or rigidity   Extremities: No edema, no joint swelling   Skin: Skin color, texture, turgor normal, no rashes or lesions   Neurologic: Alert and oriented X 3, Moves all 4 extremities       Medications:   Personally Reviewed.    collagenase   Topical Daily     insulin glargine  16 Units Subcutaneous At Bedtime     metoclopramide  5 mg Oral 4x Daily AC & HS     pantoprazole  40 mg Intravenous BID     sodium chloride (PF)  3 mL Intracatheter Q8H       Data reviewed today: I personally reviewed all new medications, labs, imaging/diagnostics reports over the past 24 hours. Pertinent findings include    Labs:  Most Recent 3 CBC's:Recent Labs   Lab Test 04/14/23  0630 04/13/23 1516 03/16/23 1953   WBC 5.7 6.6 6.4   HGB 14.8 15.1 15.6   MCV 80 81 85    295 256     Most Recent 3 BMP's:Recent Labs   Lab Test 04/14/23  0935 04/14/23  0630 04/14/23  0615 04/13/23 1959 04/13/23 1516 03/16/23 2001 03/16/23 1953   NA  --  134*  --   --  135*  --  137   POTASSIUM  --  4.1  --   --  3.9  --  4.4   CHLORIDE  --  102  --   --  99  --  97*   CO2  --  25  --   --  23  --  28   BUN  --  12  --   --  18  --  16.3   CR  --  0.53*  --   --  0.77  --  0.38*   ANIONGAP  --  7  --   --  13  --  12   LUIS  --  8.4*  --   --  9.2  --  9.6   * 259* 278*   < > 369*   < > 238*    < > = values in this interval not displayed.     Most Recent 2 LFT's:Recent Labs   Lab Test 04/14/23  0609  04/13/23  1516   AST 45* 36   ALT 48* 52*   ALKPHOS 112 120   BILITOTAL 0.7 0.7     Most Recent 3 INR's:Recent Labs   Lab Test 03/16/23 1953 02/24/23  1316 01/25/23 2005   INR 0.95 1.03 1.10       Imaging:   Recent Results (from the past 24 hour(s))   X-ray Chest 1 vw port    Narrative    EXAM: XR CHEST PORT 1 VIEW  LOCATION: [facility]  DATE/TIME: 4/13/2023 5:24 PM CDT    INDICATION: Post NG tube placement.  COMPARISON: None.      Impression    IMPRESSION:    Lungs are clear. No pleural effusion. Normal heart size.    Enteric tube tip over the left upper quadrant stomach, near the proximal - mid stomach.          > 50 MINUTES SPENT BY ME on the date of service doing chart review, history, exam, documentation & further activities per the note.    Zehra Waddell MD  Hospitalist  [facility]

## 2023-04-14 NOTE — PLAN OF CARE
"A&Ox4, calls appropriately   Room air, clear lung sounds   K/Mg protocol, rechecks for the morning.   Sinus rhythm.  NG clamped, @ 49 cm.   Throat spray and oxycodone given for throat pain r/t NG tube.   Plan for GI input, transfer accepted to [facility].   Problem: Hyperglycemia  Goal: Blood Glucose Level Within Targeted Range  Outcome: Progressing     Problem: Pain Acute  Goal: Optimal Pain Control and Function  Outcome: Progressing   Goal Outcome Evaluation:             Notified Dr Maire @ 5288 \"Pt type 1 diabetic. BG around 3 am check; 60. Gave 8 oz orange juice, will give D50 25 ml. Recheck @ 8137. Would you like to add dextrose to IVF too?\"     Response: Changed IVF to D5 + NS.     BG check post D50 25 ml, 236.      RN wanted to check BG @ 0600, .   Notified Dr Marie.   Provider ordered to revert back to old IVF.              "

## 2023-04-14 NOTE — CONSULTS
"Care Management Initial Consult    General Information  Assessment completed with: Patient, Parents, mom Adenike at bedside  Type of CM/SW Visit: Initial Assessment    Primary Care Provider verified and updated as needed: Yes   Readmission within the last 30 days: no previous admission in last 30 days         Advance Care Planning: Advance Care Planning Reviewed:  (no HCD on file, requested and given blank Honoring Choices HCD)        Communication Assessment  Patient's communication style: spoken language (English or Bilingual)       Cognitive  Cognitive/Neuro/Behavioral: not assessed by CM as patient deferred to his Mom to provide history. He was awake, calm, nodding only while CM was in room.                      Living Environment:   People in home: parent(s), sibling(s)  Mom Adenike, dad \"Jacob\" and brother Remington  Current living Arrangements: house      Able to return to prior arrangements: yes     Family/Social Support:  Care provided by: self (except does not drive)  Provides care for: no one  Marital Status: Single  Parent(s), Sibling(s)          Description of Support System: Supportive, Involved       Current Resources:   Patient receiving home care services: No  Community Resources:  ([facility] (Psychiatrist/Therapist/Dietician)- has been both IP and OP)  Equipment currently used at home:    Supplies currently used at home:  (diabetic supplies/insulin)    Employment/Financial:  Employment Status: employed part-time     Employment/ Comments: no  or [employer]  Financial Concerns: No concerns identified   Referral to Financial Worker: No     Lifestyle & Psychosocial Needs:  Social Determinants of Health     Tobacco Use: Low Risk  (4/13/2023)    Patient History      Smoking Tobacco Use: Never      Smokeless Tobacco Use: Never      Passive Exposure: Not on file   Alcohol Use: Not on file   Financial Resource Strain: Not on file   Food Insecurity: Not on file   Transportation Needs: " "Not on file   Physical Activity: Not on file   Stress: Not on file   Social Connections: Not on file   Intimate Partner Violence: Not on file   Depression: Not at risk (9/17/2020)    PHQ-2      PHQ-2 Score: 2   Housing Stability: Not on file     Functional Status:  Prior to admission patient needed assistance:   Dependent ADLs:: Independent  Dependent IADLs:: Independent, Transportation (parents do the driving)     Mental Health Status:  Mental Health Status:  (Psych consult pending. Has providers at [facility]. Has been IP and OP at [facility].)       Chemical Dependency Status:  Chemical Dependency Status: No Current Concerns           Values/Beliefs:  Spiritual, Cultural Beliefs, Druze Practices, Values that affect care: no               Additional Information:  Chart reviewed. Anticipate likely transfer to high level of care ([facility]). Psych and GI consults pending.     CM met with patient and mom Adenike (at bedside). Patient deferred to his mom to answer all questions and provide history. Patient nodded head \"yes\" to consent to talk with his mom in the room, as well as for her to answer the questions.     Lives with his parents and younger brother. He is independent, works part time at [employer] but does not (has never) drive (parents do that).     He is type I diabetic- glucometer for blood sugar monitoring and takes insulin. No other medical equipment or assistive devices. PCP confirmed.     He is followed by [facility]- psychiatrist, therapist and dietician. He has been IP and OP.     Mother reports plan is to get patient optimized medically and then go IP at [facility].     Patient/mother deny needs from CM at this time. They are aware a CM/SW is available on the floor and at [facility] to assist with any discharge planning or needs. Mother will transport home at hospital discharge.      Delphine Vergara RN        "

## 2023-04-14 NOTE — CONSULTS
GI CONSULT NOTE      Name: Kevin Stephens  Medical Record #: [id number]  YOB: 2000  Date of Admission: 4/13/2023  Date/Time: 4/14/2023/1:42 PM     CHIEF COMPLAINT: Abdominal pain    HISTORY OF PRESENT ILLNESS: We were asked to see Kevin Stephens by Dr Waddell for evaluation of abdominal pain and weight loss. 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  The patient reports having difficulties with upper abdominal pain dating back to an episode of DKA in January.  This upper abdominal discomfort worsens with eating but is also present when hungry. There is associated nausea, but no vomiting.  Reports having roughly a 20 pound weight loss since January but documentation from the eating disorder clinic estimate 10 pound weight loss over the past month.  The patient was referred to gastroenterology for further evaluation and saw a provider through [facility].  Upper endoscopy and colonoscopy was recommended but could not be done in a timely manner.  The patient then had an upper endoscopy and colonoscopy scheduled through [facility] for April 10, 2023, but due to low BMI, the procedures were cancelled (now scheduled for July in hospital setting).     The patient continues to have upper abdominal pain. Reglan has been given along with Oxycodone this AM. The patient describes having a bowel movement every other day and denies feeling constipated. No black or bloody stools.     The patient has had several imaging studies over recent months including a CT abdomen and pelvis February 24 that showed a moderate amount of stool in the colon.  CT on March 9, 2023 showed possible segmental wall thickening and mucosal inflammation in the descending  and rectosigmoid colon suspicious for segmental colitis.  CT abdomen and pelvis March 16 showed an overall decrease in the amount of thickening in the colon versus previous.  Abdominal ultrasound March 9, 2023 showed borderline hepatomegaly with slightly nodular contour.  There was no biliary dilation or cholelithiasis.  The patient has multiple care providers including an endocrinologist through [facility] and is a patient [facility] re: glycogen storage disease. Her blood sugars have been poorly controlled with most recent hgb A1c of 11 (this is improved since 11/2022 when Hgb A1c 15.5).  No prior colonoscopy or gastric emptying study.     Denies using tobacco and alcohol. Uses THC daily.     REVIEW OF SYSTEMS (ROS): Complete review of systems negative other than listed in HPI.    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 There is no known cause, and no genetic testing has been completed.     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 History of GH treatment for a few years from age [age] 02/2022: IGF-1 72, Cortisol 15, ACTH 21, TSH 1.72     Prolonged QT interval 5/11/2022     Severe protein-calorie malnutrition (H) 5/11/2022      Transaminitis 5/11/2022     Uncontrolled diabetes mellitus secondary to pancreatic insufficiency 11/30/2014    Problem list name updated by automated process. Provider to review     Vitamin D deficiency 3/11/2012        FAMILY HISTORY:  Family History   Problem Relation Age of Onset     Hearing Loss Father          MEDICATIONS PRIOR TO ADMISSION: (Not in a hospital admission)         ALLERGIES: Fluoxetine, Morphine sulfate, and Tylenol [acetaminophen]    PHYSICAL EXAM:    /76   Pulse 87   Temp 97.1  F (36.2  C) (Oral)   Resp 26   Wt 31.8 kg (70 lb)   LMP  (LMP Unknown)   SpO2 98%   BMI 12.02 kg/m      GENERAL: Pleasant, cooperative, and responds appropriately  NECK: Supple without adenopathy  EYES: No scleral icterus  LUNGS: Clear to auscultation bilaterally  HEART: S1S2, no lower extremity edema  ABDOMEN: Non-distended. Positive bowel sounds. Soft, non-tender, no guarding  MUSKULOSKELETAL:  Warm and well perfused, moves all extremities well  SKIN: No jaundice  NEUROLOGIC: Alert and oriented  PSYCHIATRIC: Normal affect    LAB DATA:  CMP Results:   Recent Labs   Lab Test 04/14/23  0935 04/14/23 0630 04/14/23 0615 04/13/23 1959 04/13/23  1516 03/16/23 2001 03/16/23 1953   NA  --  134*  --   --  135*  --  137   POTASSIUM  --  4.1  --   --  3.9  --  4.4   CHLORIDE  --  102  --   --  99  --  97*   CO2  --  25  --   --  23  --  28   ANIONGAP  --  7  --   --  13  --  12   * 259* 278*   < > 369*   < > 238*   BUN  --  12  --   --  18  --  16.3   CR  --  0.53*  --   --  0.77  --  0.38*   BILITOTAL  --  0.7  --   --  0.7  --  0.5   ALKPHOS  --  112  --   --  120  --  179*   ALT  --  48*  --   --  52*  --  71*   AST  --  45*  --   --  36  --  84*    < > = values in this interval not displayed.      CBC  Recent Labs   Lab 04/14/23 0630 04/13/23  1516   WBC 5.7 6.6   RBC 5.25 5.38   HGB 14.8 15.1   HCT 42.2 43.3   MCV 80 81   MCH 28.2 28.1   MCHC 35.1 34.9   RDW 13.0 13.2    295     INRNo lab  results found in last 7 days.   Lipase   Date Value Ref Range Status   04/13/2023 <9 0 - 52 U/L Final   03/16/2023 14 13 - 60 U/L Final   03/09/2023 <9 0 - 52 U/L Final   02/24/2023 12 0 - 52 U/L Final   07/22/2021 <9 0 - 52 U/L Final   04/17/2018 76 0 - 194 U/L Final   11/21/2014 65 0 - 194 U/L Final     Comment:     Effective 7/30/2014, the reference range for this assay has changed to reflect   new instrumentation/methodology.     05/14/2014 25 20 - 250 U/L Final       IMAGING:  EXAM: CT ABDOMEN PELVIS W CONTRAST  LOCATION: [facility]  DATE/TIME: 3/16/2023 10:23 PM     INDICATION: Persistent severel epigastric, LUQ LLQ, suprapubic abdominal pain. ? diagnosis of colitis, becoming more severe. Hx glycogen storage disorder, T1DM  COMPARISON: 03/09/2023  TECHNIQUE: CT scan of the abdomen and pelvis was performed following injection of IV contrast. Multiplanar reformats were obtained. Dose reduction techniques were used.  CONTRAST: Contrast was administered.     FINDINGS:   LOWER CHEST: Normal.     HEPATOBILIARY: Chronic hepatomegaly. Normal gallbladder.     PANCREAS: Normal.     SPLEEN: Normal.     ADRENAL GLANDS: Normal.     KIDNEYS/BLADDER: Normal kidneys. Similar bladder thickening in the setting of underdistention.     BOWEL: There is no bowel obstruction. There appears to be overall improvement in the previously described thickening of the colon. Normal appendix.     LYMPH NODES: Normal.     VASCULATURE: Unremarkable.     PELVIC ORGANS: Normal.     MUSCULOSKELETAL: Normal.                                                                      IMPRESSION:   1.  Overall decrease in the amount of thickening in the colon as seen on prior examination. No acute findings.    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 The patient describes having upper abdominal pain that dates back several months and reports having associated 20# weight loss over the past 6months. Although CT showed subtle findings of colitis, I think this is less likely a contributing factor. Hepatomegaly was considered as a possible cause for pain, but this is not likely a culprit as hepatomegaly dates back many years.  Upper endoscopy and colonoscopy should be pursued to further evaluate for esophagitis/gastritis/PUD/H. pylori, duodenitis, gastric outlet obstruction (less likely), celiac, inflammatory bowel disease, and less likely malignancy. I strongly suspect the patient's symptoms are consistent with gastroparesis given history of poorly controlled diabetes. Constipation could be contributing as well. Gastric emptying study should be pursued, but this must be done after narcotics and Reglan have been discontinued/held X2days.      PLAN:  NPO.  Continue ppi.  EGD later today. Eventual colonoscopy (will need to be done in hospital setting given low bmi).  Gastric emptying study-- strongly suspect gastroparesis in the setting of poorly controlled blood sugars. Reglan and narcotics will need to be held X2days prior to GES.  Prefer avoiding narcotics in this situation, if possible.  Daily Miralax to avoid constipation.   Blood glucose control. Encourage ongoing care through the [facility], Endocrin. And [facility].  Follow LFTs given glycogen storage disease.    Further recommendations will be made pending findings on EGD. We could arrange for f/up at [facility] as an outpatient or the patient could follow up at [facility] (was previously seen at ).    Total time spent on this encounter =45 minutes.  Discussed with Dr. Villagomez who will also visit with the  patient.                                                Luisa Stubbs PA-C  Thank you for the opportunity to participate in the care of this patient.   Please feel free to call me with any questions or concerns.  Phone number [phone].

## 2023-04-14 NOTE — ANESTHESIA PREPROCEDURE EVALUATION
Anesthesia Pre-Procedure Evaluation    Patient: Kevin Stephens   MRN: [id number] : 2000        Procedure : Procedure(s):  ESOPHAGOGASTRODUODENOSCOPY          Past Medical History:   Diagnosis Date     Acute kidney injury (H) 2022     Amylo-1,6-glucosidase deficiency (H) 2012     Anxiety      Binge-purge behavior 2023     Delay in sexual development and puberty, not elsewhere classified 2012     Depression with suicidal ideation 2017     Depressive disorder      Diabetes mellitus (H)      Diabetic ketoacidosis without coma associated with type 1 diabetes mellitus (H) 2022     Family history of congenital hearing loss 2012    Kevin's father has bilateral, congenital hearing loss. There is no known cause, and no genetic testing has been completed.     GH Deficiency 2012     Glycogen storage disease IIIa - see Emergency Letter in EPIC dated 12     Hyperglycemia 2018     Infected sebaceous cyst 2022     Partial hypopituitarism (H) 2022    Formatting of this note might be different from the original. History of GH treatment for a few years from age [age] 2022: IGF-1 72, Cortisol 15, ACTH 21, TSH 1.72     Prolonged QT interval 2022     Severe protein-calorie malnutrition (H) 2022     Transaminitis 2022     Uncontrolled diabetes mellitus secondary to pancreatic insufficiency 2014    Problem list name updated by automated process. Provider to review     Vitamin D deficiency 3/11/2012      Past Surgical History:   Procedure Laterality Date     TONSILLECTOMY Bilateral 2015      Allergies   Allergen Reactions     Fluoxetine      Other reaction(s): Mental Status Change  Suicidal thoughts, previously tolerated sertraline     Morphine Sulfate      No exposure but grandfather has the allergy to it     Tylenol [Acetaminophen]      Has glycogen storage disease and should not receive Tylenol, per GI.      Social History     Tobacco  Use     Smoking status: Never     Smokeless tobacco: Never     Tobacco comments:     no second hand smoke exposure at home   Vaping Use     Vaping status: Some Days     Substances: Nicotine, THC   Substance Use Topics     Alcohol use: No      Wt Readings from Last 1 Encounters:   04/14/23 31.8 kg (70 lb)        Anesthesia Evaluation            ROS/MED HX  ENT/Pulmonary:       Neurologic:       Cardiovascular:       METS/Exercise Tolerance:     Hematologic:       Musculoskeletal:       GI/Hepatic: Comment: Weight loss, abdominal  pain    (+) liver disease,     Renal/Genitourinary:     (+) renal disease,     Endo:     (+) type I DM,     Psychiatric/Substance Use:     (+) Recreational drug usage: Cannabis.    Infectious Disease:       Malignancy:       Other:            Physical Exam    Airway  airway exam normal    Comment: NG in place     TM distance: > 3 FB   Neck ROM: full     Respiratory Devices and Support         Dental       (+) Minor Abnormalities - some fillings, tiny chips      Cardiovascular   cardiovascular exam normal          Pulmonary   pulmonary exam normal                OUTSIDE LABS:  CBC:   Lab Results   Component Value Date    WBC 5.7 04/14/2023    WBC 6.6 04/13/2023    HGB 14.8 04/14/2023    HGB 15.1 04/13/2023    HCT 42.2 04/14/2023    HCT 43.3 04/13/2023     04/14/2023     04/13/2023     BMP:   Lab Results   Component Value Date     (L) 04/14/2023     (L) 04/13/2023    POTASSIUM 4.1 04/14/2023    POTASSIUM 3.9 04/13/2023    CHLORIDE 102 04/14/2023    CHLORIDE 99 04/13/2023    CO2 25 04/14/2023    CO2 23 04/13/2023    BUN 12 04/14/2023    BUN 18 04/13/2023    CR 0.53 (L) 04/14/2023    CR 0.77 04/13/2023     (H) 04/14/2023     (H) 04/14/2023     COAGS:   Lab Results   Component Value Date    PTT 24 03/16/2023    INR 0.95 03/16/2023    FIBR 417 11/28/2007     POC:   Lab Results   Component Value Date     (H) 04/19/2018    HCG Negative 04/13/2023     HCGS Negative 03/16/2023     HEPATIC:   Lab Results   Component Value Date    ALBUMIN 3.4 (L) 04/14/2023    PROTTOTAL 6.5 04/14/2023    ALT 48 (H) 04/14/2023    AST 45 (H) 04/14/2023     (H) 10/13/2010    ALKPHOS 112 04/14/2023    BILITOTAL 0.7 04/14/2023     OTHER:   Lab Results   Component Value Date    PH 7.39 03/16/2023    LACT 0.4 03/16/2023    A1C  01/12/2023      Comment:      Unable to calculate   Normal <5.7%   Prediabetes 5.7-6.4%    Diabetes 6.5% or higher     Note: Adopted from ADA consensus guidelines.    LUIS 8.4 (L) 04/14/2023    PHOS 3.7 04/13/2023    MAG 1.8 04/14/2023    LIPASE <9 04/13/2023    AMYLASE 86 11/21/2014    TSH 0.91 04/13/2023    T4 1.03 02/17/2012    CRP 2.1 (H) 01/14/2023       Anesthesia Plan    ASA Status:  3, emergent    NPO Status:  NPO Appropriate 4/14/2023 2:38 PM   Anesthesia Type: MAC.              Consents    Anesthesia Plan(s) and associated risks, benefits, and realistic alternatives discussed. Questions answered and patient/representative(s) expressed understanding.    - Discussed:     - Discussed with:  Patient         Postoperative Care       PONV prophylaxis: Background Propofol Infusion     Comments:                Juanpablo Pineda MD

## 2023-04-14 NOTE — PROGRESS NOTES
PATIENT HAS A BIRTHDAY TODAY!    Patient LEATHA report 1355 voided, assessed prior to transfer. EGD today.    NPO after 0900 this morning.     Pt has the desire to eat today.     Waiting to determine if she is starting TPN or tube feeds, otherwise pull NG?     Needs nutrition consult- TF or TPN and add ensure or magic cup?     Plan tomorrow colonoscopy then gastroparesis study TBD.

## 2023-04-14 NOTE — CONSULTS
"Care Management Initial Consult     General Information  Assessment completed with: Patient, Parents, mom Adenike at bedside  Type of CM/SW Visit: Initial Assessment     Primary Care Provider verified and updated as needed: Yes   Readmission within the last 30 days: no previous admission in last 30 days         Advance Care Planning: Advance Care Planning Reviewed:  (no HCD on file, requested and given blank Honoring Choices HCD)        Communication Assessment  Patient's communication style: spoken language (English or Bilingual)       Cognitive  Cognitive/Neuro/Behavioral: not assessed by CM as patient deferred to his Mom to provide history. He was awake, calm, nodding only while CM was in room.                       Living Environment:   People in home: parent(s), sibling(s)  Mom Adenike, dad \"Jacob\" and brother Remington  Current living Arrangements: house      Able to return to prior arrangements: yes     Family/Social Support:  Care provided by: self (except does not drive)  Provides care for: no one  Marital Status: Single  Parent(s), Sibling(s)          Description of Support System: Supportive, Involved       Current Resources:   Patient receiving home care services: No  Community Resources:  ([place] [facility] disorder program (Psychiatrist/Therapist/Dietician)- has been both IP and OP)  Equipment currently used at home:    Supplies currently used at home:  (diabetic supplies/insulin)     Employment/Financial:  Employment Status: employed part-time     Employment/ Comments: no  or [employer]  Financial Concerns: No concerns identified   Referral to Financial Worker: No     Lifestyle & Psychosocial Needs:  Social Determinants of Health           Tobacco Use: Low Risk  (4/13/2023)     Patient History       Smoking Tobacco Use: Never       Smokeless Tobacco Use: Never       Passive Exposure: Not on file   Alcohol Use: Not on file   Financial Resource Strain: Not on file   Food Insecurity: Not on file " "  Transportation Needs: Not on file   Physical Activity: Not on file   Stress: Not on file   Social Connections: Not on file   Intimate Partner Violence: Not on file   Depression: Not at risk (9/17/2020)     PHQ-2       PHQ-2 Score: 2   Housing Stability: Not on file      Functional Status:  Prior to admission patient needed assistance:   Dependent ADLs:: Independent  Dependent IADLs:: Independent, Transportation (parents do the driving)     Mental Health Status:  Mental Health Status:  (Psych consult pending. Has providers at [place] [facility]. Has been IP and OP at [facility].)        Chemical Dependency Status:  Chemical Dependency Status: No Current Concerns           Values/Beliefs:  Spiritual, Cultural Beliefs, Jew Practices, Values that affect care: no                Additional Information:  Chart reviewed. Anticipate likely transfer to high level of care ([facility]). Psych and GI consults pending.      CM met with patient and mom Adenike (at bedside). Patient deferred to his mom to answer all questions and provide history. Patient nodded head \"yes\" to consent to talk with his mom in the room, as well as for her to answer the questions.      Lives with his parents and younger brother. He is independent, works part time at [employer] but does not (has never) drive (parents do that).      He is type I diabetic- glucometer for blood sugar monitoring and takes insulin. No other medical equipment or assistive devices. PCP confirmed.      He is followed by [facility]- psychiatrist, therapist and dietician. He has been IP and OP.      Mother reports plan is to get patient optimized medically and then go IP at [facility].      Patient/mother deny needs from CM at this time. They are aware a CM/SW is available on the floor and at [facility] to assist with any discharge planning or needs. Mother will transport home at hospital discharge.       Delphine Vergara RN       "

## 2023-04-14 NOTE — ANESTHESIA CARE TRANSFER NOTE
Patient: Kevin Stephens    Procedure: Procedure(s):  ESOPHAGOGASTRODUODENOSCOPY with biopsies       Diagnosis: Loss of weight [R63.4]  Diagnosis Additional Information: No value filed.    Anesthesia Type:   MAC     Note:    Oropharynx: oropharynx clear of all foreign objects  Level of Consciousness: awake  Oxygen Supplementation: room air    Independent Airway: airway patency satisfactory and stable  Dentition: dentition unchanged  Vital Signs Stable: post-procedure vital signs reviewed and stable  Report to RN Given: handoff report given  Patient transferred to: Emergency Department    Handoff Report: Identifed the Patient, Identified the Reponsible Provider, Reviewed the pertinent medical history, Discussed the surgical course, Reviewed Intra-OP anesthesia mangement and issues during anesthesia, Set expectations for post-procedure period and Allowed opportunity for questions and acknowledgement of understanding      Vitals:  Vitals Value Taken Time   BP     Temp     Pulse     Resp     SpO2         Electronically Signed By: JAYDE Whatley CRNA  April 14, 2023  2:51 PM

## 2023-04-14 NOTE — CONSULTS
"CLINICAL NUTRITION SERVICES - ASSESSMENT NOTE     Nutrition Prescription    RECOMMENDATIONS FOR MDs/PROVIDERS TO ORDER:  Please put in official \"Registered Dietitian to Assess and Order TF per Medical Nutrition Therapy Protocol\" RD consult- RD cannot order TF without this consult.   Patient is at high risk for refeeding syndrome, replace electrolytes if low.     Malnutrition Status:    Severe malnutrition in the context of chronic illness    Recommendations already ordered by Registered Dietitian (RD):  None    Future/Additional Recommendations:  TF recs:    Osmolite 1.5 Jaret @ goal of  50ml/hr  (1200ml/day) provides: 1800 kcals, 75 g PRO, 914 ml free H20, 244 g CHO, and 0 g fiber daily.  Initiate at 10mL/hr and advance by 10mL/hr every 12 hrs as tolerated to goal rate  Do not advance TF if K, Phos, or Mg are low. Patient is at high risk for refeeding syndrome.   FWF: 150mL every 4 hrs to meet hydration needs    Monitor TF plan, labs, TF tolerance, and weight trends     REASON FOR ASSESSMENT  Aldorothy Stephens is a/an 23 year old adult assessed by the dietitian for Provider Order - tube feeds, h/o type 3a glycogen storage dz, eating disorder, DM1, inadvertent weight loss    NUTRITION HISTORY  Patient has T1DM, history of binge/purge eating disorder, and is non binary (female at birth). BMI of 12 currently.     Patient was ordered ensure enlive TID but it is currently held d/t NPO. Plan per Dr. Villagomez to start TF. NG tube placed 4/13/23.    Patient was covered up to the chin during RD visit. They report a decreased oral intake in the past month. They also report that they do not follow any special diets at home and they are accepting of supplements and possible TF.     From H&P: 22 year old adult past medical history significant for eating disorder/binge purge behavior most recently was at [place] [facility] 4 weeks ago, glycogen-storage disease 3A, diabetes mellitus type 1, DKA, anxiety depression, " "presented from eating disorder clinic with complaints of abdominal pain, inability to eat, recent weight loss about 10 pounds in the last 1 month.  Being admitted for further GI work-up, further care.    RD does not have official TF consult at this time so will out in TF recs but will not order at this time.     CURRENT NUTRITION ORDERS  Diet: NPO  Intake/Tolerance: NPO    LABS  Labs reviewed - Na 134(L), K 4.1, Mg 1.8, no phos lab today.     MEDICATIONS  Medications reviewed - lantus, reglan, protonix, NaCl infusion 25mL/hr continuous-  600mL/daily.     ANTHROPOMETRICS  Height: 5' 4\"  Most Recent Weight: 31.8 kg (70 lb)    IBW: 55 kg  BMI: Underweight BMI <18.5  Weight History: 20% weight loss in 1 month  Wt Readings from Last 10 Encounters:   04/14/23 31.8 kg (70 lb)   03/16/23 39.9 kg (88 lb)   03/09/23 38.6 kg (85 lb)   02/24/23 38.6 kg (85 lb)   02/13/23 38.6 kg (85 lb)   02/06/23 38.6 kg (85 lb)   01/25/23 40.9 kg (90 lb 2.7 oz)   01/25/23 40.9 kg (90 lb 2.7 oz)   01/16/23 37.4 kg (82 lb 7.2 oz)   05/11/22 37.9 kg (83 lb 8 oz)     Dosing Weight: 55 kg    ASSESSED NUTRITION NEEDS  Estimated Energy Needs: 8353-0307 kcals/day (30 - 35 kcals/kg )  Justification: Repletion and Underweight  Estimated Protein Needs: 66-83 grams protein/day (1.2 - 1.5 grams of pro/kg)  Justification: Repletion  Estimated Fluid Needs: (1 mL/kcal)   Justification: Maintenance    MALNUTRITION  % Intake: </=50% for >/= 1 month (severe)  % Weight Loss: > 5% in 1 month (severe)  Subcutaneous Fat Loss: None observed  Muscle Loss: None observed  Fluid Accumulation/Edema: None noted  Malnutrition Diagnosis: Severe malnutrition in the context of chronic illness    NUTRITION DIAGNOSIS  Malnutrition related to disordered eating patterns as evidenced by 20% weight loss in 1 month and decreased oral intake.     INTERVENTIONS  Implementation  Continue ensure enlive as ordered  RD cannot order TF without correct consult, see TF recs " below:    Osmolite 1.5 Jaret @ goal of  50ml/hr  (1200ml/day) provides: 1800 kcals, 75 g PRO, 914 ml free H20, 244 g CHO, and 0 g fiber daily.  Initiate at 10mL/hr and advance by 10mL/hr every 12 hrs as tolerated to goal rate  Do not advance TF if K, Phos, or Mg are low. Patient is at high risk for refeeding syndrome.   FWF: 150mL every 4 hrs to meet hydration needs    Goals  Diet adv v nutrition support within 2-3 days.  Electrolytes WNL  Gain weight - achieve normal BMI  Meet estimated nutrition needs     Monitoring/Evaluation  Monitor TF plan, labs, TF tolerance, and weight trends  Kelli Hu RDN, LD

## 2023-04-15 LAB
ALBUMIN SERPL-MCNC: 3.5 G/DL (ref 3.5–5)
ALP SERPL-CCNC: 109 U/L (ref 45–120)
ALT SERPL W P-5'-P-CCNC: 47 U/L (ref 0–45)
ANION GAP SERPL CALCULATED.3IONS-SCNC: 11 MMOL/L (ref 5–18)
AST SERPL W P-5'-P-CCNC: 41 U/L (ref 0–40)
BILIRUB DIRECT SERPL-MCNC: 0.2 MG/DL
BILIRUB SERPL-MCNC: 0.8 MG/DL (ref 0–1)
BUN SERPL-MCNC: 7 MG/DL (ref 8–22)
CALCIUM SERPL-MCNC: 8.9 MG/DL (ref 8.5–10.5)
CHLORIDE BLD-SCNC: 100 MMOL/L (ref 98–107)
CO2 SERPL-SCNC: 26 MMOL/L (ref 22–31)
CREAT SERPL-MCNC: 0.57 MG/DL (ref 0.6–1.3)
GFR SERPL CREATININE-BSD FRML MDRD: >90 ML/MIN/1.73M2
GLUCOSE BLD-MCNC: 169 MG/DL (ref 70–125)
GLUCOSE BLDC GLUCOMTR-MCNC: 151 MG/DL (ref 70–99)
GLUCOSE BLDC GLUCOMTR-MCNC: 218 MG/DL (ref 70–99)
GLUCOSE BLDC GLUCOMTR-MCNC: 269 MG/DL (ref 70–99)
GLUCOSE BLDC GLUCOMTR-MCNC: 360 MG/DL (ref 70–99)
GLUCOSE BLDC GLUCOMTR-MCNC: 381 MG/DL (ref 70–99)
GLUCOSE BLDC GLUCOMTR-MCNC: 420 MG/DL (ref 70–99)
GLUCOSE BLDC GLUCOMTR-MCNC: 443 MG/DL (ref 70–99)
MAGNESIUM SERPL-MCNC: 1.8 MG/DL (ref 1.8–2.6)
MAGNESIUM SERPL-MCNC: 1.8 MG/DL (ref 1.8–2.6)
MAGNESIUM SERPL-MCNC: 2.2 MG/DL (ref 1.8–2.6)
PHOSPHATE SERPL-MCNC: 3.9 MG/DL (ref 2.5–4.5)
PHOSPHATE SERPL-MCNC: 4.3 MG/DL (ref 2.5–4.5)
PHOSPHATE SERPL-MCNC: 4.5 MG/DL (ref 2.5–4.5)
POTASSIUM BLD-SCNC: 3.8 MMOL/L (ref 3.5–5)
PROT SERPL-MCNC: 7 G/DL (ref 6–8)
SODIUM SERPL-SCNC: 137 MMOL/L (ref 136–145)

## 2023-04-15 PROCEDURE — 36415 COLL VENOUS BLD VENIPUNCTURE: CPT | Performed by: INTERNAL MEDICINE

## 2023-04-15 PROCEDURE — 82962 GLUCOSE BLOOD TEST: CPT

## 2023-04-15 PROCEDURE — 80053 COMPREHEN METABOLIC PANEL: CPT | Performed by: PHYSICIAN ASSISTANT

## 2023-04-15 PROCEDURE — 250N000013 HC RX MED GY IP 250 OP 250 PS 637: Performed by: INTERNAL MEDICINE

## 2023-04-15 PROCEDURE — 99233 SBSQ HOSP IP/OBS HIGH 50: CPT | Performed by: INTERNAL MEDICINE

## 2023-04-15 PROCEDURE — 84100 ASSAY OF PHOSPHORUS: CPT | Performed by: INTERNAL MEDICINE

## 2023-04-15 PROCEDURE — 83735 ASSAY OF MAGNESIUM: CPT | Performed by: INTERNAL MEDICINE

## 2023-04-15 PROCEDURE — 82248 BILIRUBIN DIRECT: CPT | Performed by: PHYSICIAN ASSISTANT

## 2023-04-15 PROCEDURE — 120N000001 HC R&B MED SURG/OB

## 2023-04-15 PROCEDURE — 250N000011 HC RX IP 250 OP 636: Performed by: INTERNAL MEDICINE

## 2023-04-15 PROCEDURE — C9113 INJ PANTOPRAZOLE SODIUM, VIA: HCPCS | Performed by: INTERNAL MEDICINE

## 2023-04-15 RX ADMIN — INSULIN GLARGINE 16 UNITS: 100 INJECTION, SOLUTION SUBCUTANEOUS at 21:23

## 2023-04-15 RX ADMIN — METOCLOPRAMIDE 5 MG: 5 TABLET ORAL at 06:36

## 2023-04-15 RX ADMIN — INSULIN ASPART 6 UNITS: 100 INJECTION, SOLUTION INTRAVENOUS; SUBCUTANEOUS at 14:15

## 2023-04-15 RX ADMIN — INSULIN ASPART 6 UNITS: 100 INJECTION, SOLUTION INTRAVENOUS; SUBCUTANEOUS at 21:23

## 2023-04-15 RX ADMIN — INSULIN ASPART 5 UNITS: 100 INJECTION, SOLUTION INTRAVENOUS; SUBCUTANEOUS at 17:20

## 2023-04-15 RX ADMIN — ONDANSETRON 4 MG: 2 INJECTION INTRAMUSCULAR; INTRAVENOUS at 18:48

## 2023-04-15 RX ADMIN — METOCLOPRAMIDE 5 MG: 5 TABLET ORAL at 17:50

## 2023-04-15 RX ADMIN — INSULIN ASPART 3 UNITS: 100 INJECTION, SOLUTION INTRAVENOUS; SUBCUTANEOUS at 12:30

## 2023-04-15 RX ADMIN — PANTOPRAZOLE SODIUM 40 MG: 40 INJECTION, POWDER, FOR SOLUTION INTRAVENOUS at 20:20

## 2023-04-15 RX ADMIN — OXYCODONE HYDROCHLORIDE 2.5 MG: 5 TABLET ORAL at 18:48

## 2023-04-15 RX ADMIN — PANTOPRAZOLE SODIUM 40 MG: 40 INJECTION, POWDER, FOR SOLUTION INTRAVENOUS at 08:56

## 2023-04-15 RX ADMIN — OXYCODONE HYDROCHLORIDE 2.5 MG: 5 TABLET ORAL at 05:33

## 2023-04-15 RX ADMIN — METOCLOPRAMIDE 5 MG: 5 TABLET ORAL at 21:24

## 2023-04-15 RX ADMIN — INSULIN ASPART 2 UNITS: 100 INJECTION, SOLUTION INTRAVENOUS; SUBCUTANEOUS at 01:54

## 2023-04-15 RX ADMIN — METOCLOPRAMIDE 5 MG: 5 TABLET ORAL at 14:12

## 2023-04-15 ASSESSMENT — ACTIVITIES OF DAILY LIVING (ADL)
ADLS_ACUITY_SCORE: 37

## 2023-04-15 NOTE — PROGRESS NOTES
[facility] Medicine PROGRESS NOTE      Identification/Summary:   22 year old adult past medical history significant for eating disorder/binge purge behavior most recently was at [place] [facility] 4 weeks ago, glycogen-storage disease 3A, diabetes mellitus type 1, DKA, anxiety depression, presented from eating disorder clinic with complaints of abdominal pain, inability to eat, recent weight loss about 10 pounds in the last 1 month. admitted for further GI work-up.  She underwent EGD on 4/14 no acute changes, biopsy results pending, suspect gastroparesis versus cannabis hyperemesis syndrome.  She had NG tube placed, started on tube feeds 4/14.  Currently at 20 mill per hour, goal rate of 50 mL/h.  Dietitian following.      Upper abdominal pain s/p EGD 4/14  Weight loss  History of eating disorder  Severe protein calorie malnutrition BMI 12 on NG tube feeds  EGD unrevealing 4/14, biopsy results pending  Suspected gastroparesis, cannabis hyperemesis syndrome  Eventual colonoscopy timing pending EGD results but has to be done inpatient because of low BMI  Continue PPI, Reglan  Diet as tolerated  GI input appreciated  Request psychiatry consultation  Monitor for electrolyte disturbances  Started on tube feeds on 4/14  Dietitian following.  High risk for refeeding syndrome  Monitoring electrolytes every 8 hours    Hypomagnesemia  Replaced.    History of uncontrolled diabetes mellitus type 1  History of glycogen storage disease,   Hepatomegaly   most recent A1c 11  Continue home Lantus  Insulin sliding scale as needed  Every 4 hour blood sugar checks, sliding scale while on tube feeds  Diabetes educator to see  Care plan for glycogen-storage disease in epic reviewed  She will need D10 if she becomes hypoglycemic.     History of anxiety, depression  Psychiatry consultation pending  Continue other home medications  Care management to follow regarding discharge  She was previously enrolled in [place]  "eating disorder program    DVT prophylaxis: Early ambulation, SCDs    Diet: Snacks/Supplements Adult: Ensure Enlive; With Meals  Code Status: Full Code    Anticipated possible discharge in few days once tube feeds at goal,  milestones are met.    Interval History/Subjective:  She is eating with pain following eating. No nausea.  No shortness of breath, chest pain.  No other urinary complaints.  She is constipated, able to pass gas, last bowel movement was 2 days ago    Physical Exam/Objective:  Vitals I/O   Vital signs:  Temp: 98.3  F (36.8  C) Temp src: Oral BP: 127/89 Pulse: 94   Resp: 14 SpO2: 100 % O2 Device: None (Room air)     Weight: 31.8 kg (70 lb)  Estimated body mass index is 12.02 kg/m  as calculated from the following:    Height as of 3/16/23: 1.626 m (5' 4\").    Weight as of this encounter: 31.8 kg (70 lb). I/O last 3 completed shifts:  In: 1503 [P.O.:360; I.V.:600; NG/GT:543]  Out: -      Body mass index is 12.02 kg/m .    General Appearance:  Alert, cooperative, no distress   Head:  Normocephalic, atraumatic   Eyes:  PERRL    Throat:  mucosa; moist   Neck: No JVD, thyromegaly   Lungs:   Clear to auscultation bilaterally, respirations unlabored   Chest Wall:  No tenderness or deformity   Heart:  Regular rate and rhythm, S1, S2 normal,no murmur   Abdomen:   Soft, tender in LUQ, non distended, bowel sounds present, no guarding or rigidity   Extremities: No edema, no joint swelling   Skin: Skin color, texture, turgor normal, no rashes or lesions   Neurologic: Alert and oriented X 3, Moves all 4 extremities       Medications:   Personally Reviewed.    collagenase   Topical Daily     insulin aspart  1-6 Units Subcutaneous Q4H     insulin glargine  16 Units Subcutaneous At Bedtime     metoclopramide  5 mg Oral 4x Daily AC & HS     pantoprazole  40 mg Intravenous BID     sodium chloride (PF)  3 mL Intracatheter Q8H       Data reviewed today: I personally reviewed all new medications, labs, imaging/diagnostics " reports over the past 24 hours. Pertinent findings include    Labs:  Most Recent 3 CBC's:  Recent Labs   Lab Test 04/14/23  0630 04/13/23 1516 03/16/23 1953   WBC 5.7 6.6 6.4   HGB 14.8 15.1 15.6   MCV 80 81 85    295 256     Most Recent 3 BMP's:  Recent Labs   Lab Test 04/15/23  1227 04/15/23  0659 04/15/23  0535 04/14/23  2335 04/14/23  2221 04/14/23  0935 04/14/23  0630 04/13/23 1959 04/13/23  1516   NA  --  137  --   --   --   --  134*  --  135*   POTASSIUM  --  3.8  --   --  4.1  --  4.1  --  3.9   CHLORIDE  --  100  --   --   --   --  102  --  99   CO2  --  26  --   --   --   --  25  --  23   BUN  --  7*  --   --   --   --  12  --  18   CR  --  0.57*  --   --   --   --  0.53*  --  0.77   ANIONGAP  --  11  --   --   --   --  7  --  13   LUIS  --  8.9  --   --   --   --  8.4*  --  9.2   * 169* 151*   < >  --    < > 259*   < > 369*    < > = values in this interval not displayed.     Most Recent 2 LFT's:  Recent Labs   Lab Test 04/15/23  0659 04/14/23 0630   AST 41* 45*   ALT 47* 48*   ALKPHOS 109 112   BILITOTAL 0.8 0.7     Most Recent 3 INR's:  Recent Labs   Lab Test 03/16/23 1953 02/24/23  1316 01/25/23 2005   INR 0.95 1.03 1.10       Imaging:   No results found for this or any previous visit (from the past 24 hour(s)).     > 50 MINUTES SPENT BY ME on the date of service doing chart review, history, exam, documentation & further activities per the note.    Zehra Waddell MD  Hospitalist  [facility]

## 2023-04-15 NOTE — PROGRESS NOTES
"Alert and oriented. VSS. Pleasant. PRN Oxycodone given for abdominal pain, effective per patient. Regular diet tray for dinner per provider.Patient tolerated.  Tube feeding started at 2015 10ml/hr. Increase Q 12 hr by 10ml with goal rate of 50. Patient reported abdominal fullness after tube feeding was started. Assisted patient to bathroom. Patient was able to void and pass gas. Ambulated down to cafeteria and patient reported the abdominal fullness\" felt better.\" Mom in room. Q 8 hour k,mg,phosp checks. K and mag protocol. Q 4 hr accuchecks. K,mag,phosh checked at 2200, mag came back at 1.7. Protocol ran, recheck am. Provider on call sent an FYI message. On call provider called back and writer advised to continue to follow protocol.  "

## 2023-04-15 NOTE — PROGRESS NOTES
Patient is alert and oriented x4 denies pain or discomfort. Tolerating tube feedings currently at 20 ml/hr with goal to advance by 10 ml/hr every 12 hours. No nausea or vomiting this morning. Patient transferred to room 3108. Report given to the receiving nurse.

## 2023-04-15 NOTE — PROGRESS NOTES
"A&Ox4. Telemetry Sinus Rhythm/Sinus Tach. C/o discomfort in left side. Denies nausea. No emesis. Patient states, \"I get stomach aches when I eat\". BS in ER before transfer to floor = 420. Covered with extra insulin.  at suppertime. Covered with sliding scale insulin.  NG running at 20ml/hr to be advanced at 8pm. Independent in room. Family member at bedside. Patient states, \"I have a lot of support\". NG flushed with 110ml as patient could not tolerate the rest d/t fullness and discomfort after eating. Oxycodone and IV zofran given for discomfort. Will monitor.   "

## 2023-04-15 NOTE — CONSULTS
CLINICAL NUTRITION SERVICES - BRIEF NOTE      Nutrition Prescription     RECOMMENDATIONS FOR MDs/PROVIDERS TO ORDER:  Patient is at high risk for refeeding syndrome, replace electrolytes if low.      Recommendations already ordered by Registered Dietitian (RD):  Osmolite 1.5 Jaret @ goal of  50ml/hr  (1200ml/day) provides: 1800 kcals, 75 g PRO, 914 ml free H20, 244 g CHO, and 0 g fiber daily.  Initiate at 10mL/hr and advance by 10mL/hr every 12 hrs as tolerated to goal rate  Do not advance TF if K, Phos, or Mg are low. Patient is at high risk for refeeding syndrome.   FWF: 150mL every 4 hrs to meet hydration needs    Decrease ensure enlive to once daily      Future/Additional Recommendations:  Monitor TF plan, labs, TF tolerance, and weight trends     Received nutrition consult for: Registered Dietitian to Assess and Order TF per Medical Nutrition Therapy Protocol    New Findings:  Labs: Na 137, K 3.8, Mg 1.8, Phos 4.3  Last BM 4/14/23  Patient is on Mg and K replacement protocols    Patient is ordering 3 meals per day + ensure enlive TID - no intake recorded in chart    Interventions  Osmolite 1.5 Jaret @ goal of  50ml/hr  (1200ml/day) provides: 1800 kcals, 75 g PRO, 914 ml free H20, 244 g CHO, and 0 g fiber daily.  Initiate at 10mL/hr and advance by 10mL/hr every 12 hrs as tolerated to goal rate  Do not advance TF if K, Phos, or Mg are low. Patient is at high risk for refeeding syndrome.   FWF: 150mL every 4 hrs to meet hydration needs    Decrease ensure enlive to once daily     RD to follow per protocol.    Kelli Hu RDN, LD

## 2023-04-15 NOTE — PROGRESS NOTES
A/Ox4. VSS. Utilizing PRN oxycodone PO for L. Abdomen pain. Tube feedings currently running at 10mL/hrwith 150 flush q 4 hours. Q4 hour blood sugar checks. Denies nausea. Ambulates to bathroom. 1mg Mag. IV replacement overnight. 0600 AM redraws. On telemetry running sinus rhythm/sinus tach.

## 2023-04-15 NOTE — PROGRESS NOTES
[facility] Dig[facility] Progress Note       SUBJECTIVE:  Patient reports some abdominal pain after eating. No nausea/vomiting. No BM x 2 days.        OBJECTIVE:  /89 (BP Location: Left arm, Patient Position: Sitting)   Pulse 94   Temp 98.3  F (36.8  C) (Oral)   Resp 14   Wt 31.8 kg (70 lb)   LMP  (LMP Unknown)   SpO2 100%   BMI 12.02 kg/m    Temp (24hrs), Av.2  F (36.8  C), Min:97.6  F (36.4  C), Max:98.5  F (36.9  C)    Patient Vitals for the past 72 hrs:   Weight   23 0925 31.8 kg (70 lb)   23 1437 34 kg (75 lb)       Intake/Output Summary (Last 24 hours) at 4/15/2023 1119  Last data filed at 4/15/2023 0535  Gross per 24 hour   Intake 1383 ml   Output --   Net 1383 ml        PHYSICAL EXAM  GEN: NAD, thin patient appears stated age sitting on edge of bed  HRT: no LE edema  RESP: unlabored  ABD: nondistended, NG tube present  SKIN: No rash or jaundice      Additional Data:  I have reviewed the patient's new clinical lab results:     Recent Labs   Lab Test 2330 23  1516 23  1316 23   WBC 5.7 6.6 6.4   < > 5.9 4.1   HGB 14.8 15.1 15.6   < > 15.8 11.2*   MCV 80 81 85   < > 85 97    295 256   < > 302 237   INR  --   --  0.95  --  1.03 1.10    < > = values in this interval not displayed.     Recent Labs   Lab Test 04/15/23  0659 23  2221 2330 23  151   POTASSIUM 3.8 4.1 4.1 3.9   CHLORIDE 100  --  102 99   CO2 26  --  25 23   BUN 7*  --  12 18   ANIONGAP 11  --  7 13     Recent Labs   Lab Test 04/15/23  0659 23  0630 23  1537 23  1516 23  2009   ALBUMIN 3.5 3.4*  --  3.6  --  4.4 4.1   BILITOTAL 0.8 0.7  --  0.7  --  0.5 0.7   ALT 47* 48*  --  52*  --  71* 49*   AST 41* 45*  --  36  --  84* 43*   PROTEIN  --   --  Negative  --  Negative  --  20*   LIPASE  --   --   --  <9  --  14 <9         Procedure results:  EGD 23 ([facility]):  Findings:         NG tube was seen in the esophagus, this was otherwise normal.        The entire examined stomach was normal, save for the NG tube. Biopsies        were taken with a cold forceps for histology.        The examined duodenum was normal. Biopsies were taken with a cold        forceps for histology.        THe NG tube was held in position with a forceps and the scope backed        off, leaving this in good position.                                                                                     Impression:            - Normal esophagus.                          - Normal stomach. Biopsied.                          - Normal examined duodenum. Biopsied.                          No anatomic findings to account for patients abdominal                          pain. Reviewed CT scans, and the question of colitis                          is pretty soft, particularly in a patient with minimal                          intra-abdominal fat. Differential for her symptoms                          include gastroparesis, hpylori (unlikely), celiac                          (also unlikely), manifestation of eating disorder,                          secondary to cannabis (although this usually presents                          with emesis as well), vascular insufficiency (no                          evidence on CT scans).   Recommendation:        - [facility] to [facility] for [facility].                          - Dietician consult to start TF.                          - Watch electrolytes including phosphorus closely for                          refeeding syndrome.                          - Await path results. (pending)                         - Continue reglan, patient may benefit from gastric                          emptying study in the future.                          - Cannabis cessation.      IMPRESSION:  Abdominal pain  Weight loss  24 y/o nonbinary patient with PMH glycogen storage disease III, type 1  diabetes, eating disorder, anxiety, admitted 4/14 for abdominal pain and weight loss. Patient has had upper abdominal pain since DKA in Jan 2023 with nausea but no vomiting and reported weight loss of 20 pounds since Jan. EGD/colon planned at [facility] 4/10 but procedures cancelled due to low BMI. EGD 4/14 endoscopically unremarkable, pathology pending. NG tube placed 4/13 with start of TF. Patient also eating and continues to have postprandial pain but no n/v. Suspect gastroparesis as cause for symptoms so will need GES as outpatient once back to baseline. Cannabinoid hyperemesis syndrome also possible.    PLAN:  - Await EGD pathology  - Eventual colonoscopy (will need hospital setting with [facility] due to low BMI)  - TF per Dietitian  - daily Miralax to prevent constipation  - Continue PPI, Reglan  - Supportive cares per medicine, avoid narcotic if possible  - Eventual gastric emptying study   - Avoid marijuana       (Dr. Amin)  Gogo Adan PA-C  [facility]  4/15/2023 11:19 AM  [phone] (office)    30 minutes of total time was spent providing patient care, including patient evaluation, reviewing documentation/test results, , and documentation.  ________________________________________________________________________

## 2023-04-16 LAB
GLUCOSE BLDC GLUCOMTR-MCNC: 249 MG/DL (ref 70–99)
GLUCOSE BLDC GLUCOMTR-MCNC: 282 MG/DL (ref 70–99)
GLUCOSE BLDC GLUCOMTR-MCNC: 286 MG/DL (ref 70–99)
GLUCOSE BLDC GLUCOMTR-MCNC: 338 MG/DL (ref 70–99)
GLUCOSE BLDC GLUCOMTR-MCNC: 362 MG/DL (ref 70–99)
GLUCOSE BLDC GLUCOMTR-MCNC: 437 MG/DL (ref 70–99)
MAGNESIUM SERPL-MCNC: 1.6 MG/DL (ref 1.8–2.6)
MAGNESIUM SERPL-MCNC: 1.8 MG/DL (ref 1.8–2.6)
PHOSPHATE SERPL-MCNC: 3.7 MG/DL (ref 2.5–4.5)
PHOSPHATE SERPL-MCNC: 3.9 MG/DL (ref 2.5–4.5)
PHOSPHATE SERPL-MCNC: 4 MG/DL (ref 2.5–4.5)
POTASSIUM BLD-SCNC: 4.1 MMOL/L (ref 3.5–5)

## 2023-04-16 PROCEDURE — 36415 COLL VENOUS BLD VENIPUNCTURE: CPT | Performed by: INTERNAL MEDICINE

## 2023-04-16 PROCEDURE — 99233 SBSQ HOSP IP/OBS HIGH 50: CPT | Performed by: FAMILY MEDICINE

## 2023-04-16 PROCEDURE — 250N000013 HC RX MED GY IP 250 OP 250 PS 637: Performed by: FAMILY MEDICINE

## 2023-04-16 PROCEDURE — 120N000001 HC R&B MED SURG/OB

## 2023-04-16 PROCEDURE — C9113 INJ PANTOPRAZOLE SODIUM, VIA: HCPCS | Performed by: INTERNAL MEDICINE

## 2023-04-16 PROCEDURE — 83735 ASSAY OF MAGNESIUM: CPT | Performed by: INTERNAL MEDICINE

## 2023-04-16 PROCEDURE — 93005 ELECTROCARDIOGRAM TRACING: CPT | Performed by: FAMILY MEDICINE

## 2023-04-16 PROCEDURE — 93010 ELECTROCARDIOGRAM REPORT: CPT | Performed by: INTERNAL MEDICINE

## 2023-04-16 PROCEDURE — 84100 ASSAY OF PHOSPHORUS: CPT | Performed by: INTERNAL MEDICINE

## 2023-04-16 PROCEDURE — 93005 ELECTROCARDIOGRAM TRACING: CPT

## 2023-04-16 PROCEDURE — 250N000013 HC RX MED GY IP 250 OP 250 PS 637: Performed by: INTERNAL MEDICINE

## 2023-04-16 PROCEDURE — 84132 ASSAY OF SERUM POTASSIUM: CPT | Performed by: INTERNAL MEDICINE

## 2023-04-16 PROCEDURE — 250N000011 HC RX IP 250 OP 636: Performed by: INTERNAL MEDICINE

## 2023-04-16 PROCEDURE — 999N000157 HC STATISTIC RCP TIME EA 10 MIN

## 2023-04-16 RX ORDER — MIRTAZAPINE 15 MG/1
15 TABLET, FILM COATED ORAL AT BEDTIME
Status: DISCONTINUED | OUTPATIENT
Start: 2023-04-16 | End: 2023-04-18 | Stop reason: HOSPADM

## 2023-04-16 RX ORDER — POLYETHYLENE GLYCOL 3350 17 G/17G
17 POWDER, FOR SOLUTION ORAL DAILY
Status: DISCONTINUED | OUTPATIENT
Start: 2023-04-16 | End: 2023-04-18 | Stop reason: HOSPADM

## 2023-04-16 RX ADMIN — METOCLOPRAMIDE 5 MG: 5 TABLET ORAL at 17:53

## 2023-04-16 RX ADMIN — INSULIN ASPART 4 UNITS: 100 INJECTION, SOLUTION INTRAVENOUS; SUBCUTANEOUS at 13:17

## 2023-04-16 RX ADMIN — OXYCODONE HYDROCHLORIDE 2.5 MG: 5 TABLET ORAL at 13:16

## 2023-04-16 RX ADMIN — INSULIN ASPART 3 UNITS: 100 INJECTION, SOLUTION INTRAVENOUS; SUBCUTANEOUS at 06:46

## 2023-04-16 RX ADMIN — INSULIN ASPART 3 UNITS: 100 INJECTION, SOLUTION INTRAVENOUS; SUBCUTANEOUS at 09:22

## 2023-04-16 RX ADMIN — METOCLOPRAMIDE 5 MG: 5 TABLET ORAL at 09:09

## 2023-04-16 RX ADMIN — METOCLOPRAMIDE 5 MG: 5 TABLET ORAL at 21:57

## 2023-04-16 RX ADMIN — MIRTAZAPINE 15 MG: 15 TABLET, FILM COATED ORAL at 21:57

## 2023-04-16 RX ADMIN — INSULIN ASPART 6 UNITS: 100 INJECTION, SOLUTION INTRAVENOUS; SUBCUTANEOUS at 17:52

## 2023-04-16 RX ADMIN — OXYCODONE HYDROCHLORIDE 2.5 MG: 5 TABLET ORAL at 00:55

## 2023-04-16 RX ADMIN — PANTOPRAZOLE SODIUM 40 MG: 40 INJECTION, POWDER, FOR SOLUTION INTRAVENOUS at 08:07

## 2023-04-16 RX ADMIN — INSULIN ASPART 5 UNITS: 100 INJECTION, SOLUTION INTRAVENOUS; SUBCUTANEOUS at 21:57

## 2023-04-16 RX ADMIN — INSULIN ASPART 3 UNITS: 100 INJECTION, SOLUTION INTRAVENOUS; SUBCUTANEOUS at 02:55

## 2023-04-16 RX ADMIN — OXYCODONE HYDROCHLORIDE 2.5 MG: 5 TABLET ORAL at 08:07

## 2023-04-16 RX ADMIN — POLYETHYLENE GLYCOL 3350 17 G: 17 POWDER, FOR SOLUTION ORAL at 13:20

## 2023-04-16 RX ADMIN — METOCLOPRAMIDE 5 MG: 5 TABLET ORAL at 13:16

## 2023-04-16 RX ADMIN — PANTOPRAZOLE SODIUM 40 MG: 40 INJECTION, POWDER, FOR SOLUTION INTRAVENOUS at 21:56

## 2023-04-16 ASSESSMENT — ACTIVITIES OF DAILY LIVING (ADL)
ADLS_ACUITY_SCORE: 20

## 2023-04-16 NOTE — PLAN OF CARE
Problem: Pain Acute  Goal: Optimal Pain Control and Function  Outcome: Progressing  Intervention: Develop Pain Management Plan    Intervention: Prevent or Manage Pain    Problem: Enteral Nutrition  Goal: Feeding Tolerance  Outcome: Progressing   Goal Outcome Evaluation:       Pt A/Ox4, able to make needs known. Reports of moderate abdominal pain, PRN oxycodone, heat application utilized to manage. Denies nausea. NG tube in place.Tolerating tube feedings, increased to 30ml/hr at 2000. 150 free water flushes Q4. Q4 blood sugar checks. Independent in room. Pt had shower later in the evening. VSS, on room air. On tele, running Sinus tachy.

## 2023-04-16 NOTE — PROVIDER NOTIFICATION
Rm 3110 Pt A.F. Pt on cont. feeding, hx of DM type 1. Blood sugar 443 at 2100, given 6 units novolog and 16 units lantus, rechecked at 2250, is 381. Will check at 0200 and give insulin per scale. Any new orders? Thanks    No new orders per MD.

## 2023-04-16 NOTE — PROGRESS NOTES
[facility] MEDICINE PROGRESS NOTE      Identification/Summary: Kevin Stephens is a 23 year old adult with a past medical history of eating disorder, binge purge behavior , seeing at [place] [facility], glycogen-storage disease 3A, DM1, DKA, anxiety, depression, came from eating disorder clinic with complaints of abdominal pain, inability to eat due to abdominal pain for 3 months, recent weight loss about 10 pound in last 1 month.  Admitted for further GI work-up.  EGD on 4/14 revealed no acute changes, biopsy result is pending, suspect gastroparesis versus cannabis hyperemesis syndrome.  Started on NG tube feeding since 4/14, currently 20 ml/h per hour, goal rate 50 ml/h.  Eventual colonoscopy in the hospital.  MiraLAX daily to prevent constipation.  Eventual gastric emptying study.  Started on Reglan.  Avoid marijuana.    Assessment and Plan:  Upper abdominal pain   EGD 4/14--unrevealing, biopsy result pending  Suspected gastroparesis VS cannabis hyperemesis syndrome  Started on PPI   Added on Reglan 4 times a day and bed time  Colonoscopy in inpatient  Eventual gastric emptying study  Avoid marijuana  Daily MiraLAX to prevent constipation  Appreciate GI consult    Eating disorder  Severe protein calorie malnutrition, BMI 12 kg/m   10 pound weight loss in 1 month  States appetite is stable, wants to eat but unable to eat due to abdominal pain for last 3-month  Started on NG tube feeding  Diet as tolerated  High risk of refeeding syndrome  Close monitoring of electrolytes     Hypoalbuminemia: Lowest albumin = 3.4 g/dL at 4/14/2023     Hypomagnesemia, replaced    History of uncontrolled DM1  Hemoglobin A1c 11  Started on tube feeding since 4/14  Monitor glucose every 4 hours and insulin sliding scale  Lantus dose increased to 22 unit at night  Diabetic education      History of glycogen storage disease  Diagnosed since childhood    Hepatomegaly, chronic  LFTs are  stable    History of anxiety and depression  Significant eating disorder, seeing at eating disorder clinic  Order Mirtazapine 15 mg daily   Psychiatry consultation    Marijuana use, absolute cessation is advised    Code Status: Full Code  DVT prophylaxis, Low risk. early ambulation and SCDs  Barrier to discharge  Awaiting for more clinical improvement.  Anticipated discharge in few days    Diet: Snacks/Supplements Adult: Ensure Enlive; With Meals  Adult Formula Drip Feeding: Continuous Osmolite 1.5; Nasogastric tube; Goal Rate: 50; mL/hr; Initiate at 10mL/hr and advance by 10mL/hr every 12 hrs as tolerated to goal rate; Do not advance tube feeding rate unless K+ is = or > 3.0, Mg++ is = or ...  Consistent Carbohydrate Diet Moderate Consistent Carb (60 g CHO per Meal) Diet      Janna Valdez MD  [facility]  Phone: #[phone]  Securely message with the Vocera Web Console (learn more here)  Text page via [employer] Paging/Directory     Interval History/Subjective:  Resting comfortably.  Tolerating tube feeding.  Epigastric pain is stable.  No nausea or vomiting.  Afebrile.  No other concern.    Physical Exam/Objective:  Temp:  [98.1  F (36.7  C)-98.5  F (36.9  C)] 98.4  F (36.9  C)  Pulse:  [101-124] 105  Resp:  [16] 16  BP: (131-141)/(82-84) 133/83  SpO2:  [97 %-98 %] 97 %  Body mass index is 12.86 kg/m .    GENERAL:  Alert, appears comfortable, in no acute distress, appears stated age, underweight   HEAD:  Normocephalic, without obvious abnormality, atraumatic   EYES:  PERRL, conjunctiva  clear,   EOM's intact   NOSE: NG in place   THROAT: Lips, mucosa,   gums normal, mouth moist   NECK: Supple, symmetrical, trachea midline   BACK:   Symmetric, no curvature, ROM normal   LUNGS:   Clear to auscultation bilaterally, no rales, rhonchi, or wheezing, symmetric chest rise on inhalation, respirations unlabored   CHEST WALL:  No tenderness or deformity   HEART:   Regular rate and rhythm, S1 and S2 normal, no murmur    ABDOMEN:   Soft, non-tender, bowel sounds active , no masses, no organomegaly, no rebound or guarding   EXTREMITIES: No   edema    SKIN: No rashes   NEURO: Alert, oriented x3, moves all four extremities freely   PSYCH: Cooperative, behavior is appropriate      Data reviewed today: I personally reviewed all new medications, labs, imaging/diagnostics reports over the past 24 hours. Pertinent findings include:    Imaging:   Abdomen CT  Overall decrease in the amount of thickening in the colon as seen on prior examination. No acute findings. Chronic hepatomegaly. Normal gallbladder.    CXR  Lungs are clear. No pleural effusion. Normal heart size.    Labs:  Most Recent 3 CBC's:Recent Labs   Lab Test 04/14/23  0630 04/13/23  1516 03/16/23  1953   WBC 5.7 6.6 6.4   HGB 14.8 15.1 15.6   MCV 80 81 85    295 256     Most Recent 3 BMP's:Recent Labs   Lab Test 04/16/23  0913 04/16/23  0823 04/16/23  0627 04/16/23  0232 04/15/23  1227 04/15/23  0659 04/14/23  2335 04/14/23  2221 04/14/23  0935 04/14/23  0630 04/13/23  1959 04/13/23  1516   NA  --   --   --   --   --  137  --   --   --  134*  --  135*   POTASSIUM  --  4.1  --   --   --  3.8  --  4.1  --  4.1  --  3.9   CHLORIDE  --   --   --   --   --  100  --   --   --  102  --  99   CO2  --   --   --   --   --  26  --   --   --  25  --  23   BUN  --   --   --   --   --  7*  --   --   --  12  --  18   CR  --   --   --   --   --  0.57*  --   --   --  0.53*  --  0.77   ANIONGAP  --   --   --   --   --  11  --   --   --  7  --  13   LUIS  --   --   --   --   --  8.9  --   --   --  8.4*  --  9.2   *  --  286* 282*   < > 169*   < >  --    < > 259*   < > 369*    < > = values in this interval not displayed.     Most Recent 2 LFT's:Recent Labs   Lab Test 04/15/23  0659 04/14/23  0630   AST 41* 45*   ALT 47* 48*   ALKPHOS 109 112   BILITOTAL 0.8 0.7       Medications:   Personally Reviewed.  Medications        collagenase   Topical Daily     insulin aspart  1-6 Units Subcutaneous Q4H     insulin glargine  22 Units Subcutaneous At Bedtime     metoclopramide  5 mg Oral 4x Daily AC & HS     mirtazapine  15 mg Oral At Bedtime     pantoprazole  40 mg Intravenous BID     polyethylene glycol  17 g Oral Daily     sodium chloride (PF)  3 mL Intracatheter Q8H     Total time spent 50 min on the date of service doing chart review, history, exam, documentation & further activities per the note.

## 2023-04-16 NOTE — PROGRESS NOTES
[facility] Progress Note       SUBJECTIVE:  Patient reports improvement in postprandial abdominal pain. Still no BM x 3 days now. No nausea.        OBJECTIVE:  /84 (BP Location: Left arm)   Pulse 105   Temp 98  F (36.7  C) (Oral)   Resp 16   Wt 34 kg (74 lb 14.4 oz)   LMP  (LMP Unknown)   SpO2 98%   BMI 12.86 kg/m    Temp (24hrs), Av.2  F (36.8  C), Min:97.6  F (36.4  C), Max:98.5  F (36.9  C)    Patient Vitals for the past 72 hrs:   Weight   23 0412 34 kg (74 lb 14.4 oz)   04/15/23 1716 34.5 kg (76 lb)   23 0925 31.8 kg (70 lb)   23 1437 34 kg (75 lb)       Intake/Output Summary (Last 24 hours) at 4/15/2023 1119  Last data filed at 4/15/2023 0535  Gross per 24 hour   Intake 1383 ml   Output --   Net 1383 ml        PHYSICAL EXAM  GEN: NAD, thin patient appears stated age sitting up in bed  HRT: no LE edema  RESP: unlabored  ABD: nondistended, NG tube present  SKIN: No rash or jaundice      Additional Data:  I have reviewed the patient's new clinical lab results:     Recent Labs   Lab Test 23  0630 23  1516 23  1953 23  1316 23   WBC 5.7 6.6 6.4   < > 5.9 4.1   HGB 14.8 15.1 15.6   < > 15.8 11.2*   MCV 80 81 85   < > 85 97    295 256   < > 302 237   INR  --   --  0.95  --  1.03 1.10    < > = values in this interval not displayed.     Recent Labs   Lab Test 23  0823 04/15/23  0659 23  0630 23  1516   POTASSIUM 4.1 3.8 4.1 4.1 3.9   CHLORIDE  --  100  --  102 99   CO2  --  26  --  25 23   BUN  --  7*  --  12 18   ANIONGAP  --  11  --  7 13     Recent Labs   Lab Test 04/15/23  0659 23  0630 23  1537 23  1516 23  1953 03/09/23  2009   ALBUMIN 3.5 3.4*  --  3.6  --  4.4 4.1   BILITOTAL 0.8 0.7  --  0.7  --  0.5 0.7   ALT 47* 48*  --  52*  --  71* 49*   AST 41* 45*  --  36  --  84* 43*   PROTEIN  --   --  Negative  --  Negative  --  20*   LIPASE  --    --   --  <9  --  14 <9         Procedure results:  EGD 4/14/23 ([facility]):  Findings:        NG tube was seen in the esophagus, this was otherwise normal.        The entire examined stomach was normal, save for the NG tube. Biopsies        were taken with a cold forceps for histology.        The examined duodenum was normal. Biopsies were taken with a cold        forceps for histology.        THe NG tube was held in position with a forceps and the scope backed        off, leaving this in good position.                                                                                     Impression:            - Normal esophagus.                          - Normal stomach. Biopsied.                          - Normal examined duodenum. Biopsied.                          No anatomic findings to account for patients abdominal                          pain. Reviewed CT scans, and the question of colitis                          is pretty soft, particularly in a patient with minimal                          intra-abdominal fat. Differential for her symptoms                          include gastroparesis, hpylori (unlikely), celiac                          (also unlikely), manifestation of eating disorder,                          secondary to cannabis (although this usually presents                          with emesis as well), vascular insufficiency (no                          evidence on CT scans).   Recommendation:        - [facility] to [facility].                          - Dietician consult to start TF.                          - Watch electrolytes including phosphorus closely for                          refeeding syndrome.                          - Await path results. (pending)                         - Continue reglan, patient may benefit from gastric                          emptying study in the future.                          - Cannabis cessation.      IMPRESSION:  Abdominal  pain  Weight loss  22 y/o nonbinary patient with PMH glycogen storage disease III, type 1 diabetes, eating disorder, anxiety, admitted 4/14 for abdominal pain and weight loss. Patient has had upper abdominal pain since DKA in Jan 2023 with nausea but no vomiting and reported weight loss of 20 pounds since Jan. EGD/colon planned at [facility] 4/10 but procedures cancelled due to low BMI. EGD 4/14 endoscopically unremarkable, pathology pending. Suspect gastroparesis as cause for symptoms so will need GES as outpatient once back to baseline. Cannabinoid hyperemesis syndrome also possible. NG tube placed 4/13 with start of TF. Patient also eating and continues to have postprandial pain but no n/v, pain improving today. Plan is for discharge tomorrow once TF is at goal rate.     PLAN:  - Await EGD pathology  - TF per Dietitian  - Daily Miralax to prevent constipation  - Continue PPI, Reglan  - Supportive cares per medicine, avoid narcotics if possible  - Outpatient GI follow up 4/27 as scheduled with eventual gastric emptying study  - Outpatient colonoscopy as scheduled at [facility] 6/14/23  (needs hospital setting with [facility] due to low BMI)  - Avoid marijuana     We will no longer actively follow this patient in-house. Please call if questions arise or patient's status changes.       (Dr. Amin)  Gogo Adan PA-C  [facility]  4/16/2023 10:41 AM  [phone] (office)    35 minutes of total time was spent providing patient care, including patient evaluation, reviewing documentation/test results, , and documentation.  ________________________________________________________________________

## 2023-04-16 NOTE — PROGRESS NOTES
VSS. Patient states she is feeling better. Oxycodone given for abdominal discomfort. Denies nausea. Acupuncture and Integrative therapies ordered for tomorrow. Tolerating diabetic diet. Tube feeding continues at 40ml/hr. Family present at bedside.

## 2023-04-17 LAB
ATRIAL RATE - MUSE: 111 BPM
DIASTOLIC BLOOD PRESSURE - MUSE: NORMAL MMHG
GLUCOSE BLDC GLUCOMTR-MCNC: 159 MG/DL (ref 70–99)
GLUCOSE BLDC GLUCOMTR-MCNC: 310 MG/DL (ref 70–99)
GLUCOSE BLDC GLUCOMTR-MCNC: 313 MG/DL (ref 70–99)
GLUCOSE BLDC GLUCOMTR-MCNC: 341 MG/DL (ref 70–99)
GLUCOSE BLDC GLUCOMTR-MCNC: 368 MG/DL (ref 70–99)
GLUCOSE BLDC GLUCOMTR-MCNC: 393 MG/DL (ref 70–99)
INTERPRETATION ECG - MUSE: NORMAL
MAGNESIUM SERPL-MCNC: 1.8 MG/DL (ref 1.8–2.6)
MAGNESIUM SERPL-MCNC: 1.9 MG/DL (ref 1.8–2.6)
P AXIS - MUSE: 69 DEGREES
PHOSPHATE SERPL-MCNC: 3.7 MG/DL (ref 2.5–4.5)
PHOSPHATE SERPL-MCNC: 3.9 MG/DL (ref 2.5–4.5)
PHOSPHATE SERPL-MCNC: 5.1 MG/DL (ref 2.5–4.5)
POTASSIUM BLD-SCNC: 4.2 MMOL/L (ref 3.5–5)
POTASSIUM BLD-SCNC: 4.4 MMOL/L (ref 3.5–5)
PR INTERVAL - MUSE: 116 MS
QRS DURATION - MUSE: 70 MS
QT - MUSE: 320 MS
QTC - MUSE: 435 MS
R AXIS - MUSE: 69 DEGREES
SYSTOLIC BLOOD PRESSURE - MUSE: NORMAL MMHG
T AXIS - MUSE: 52 DEGREES
VENTRICULAR RATE- MUSE: 111 BPM

## 2023-04-17 PROCEDURE — 250N000013 HC RX MED GY IP 250 OP 250 PS 637: Performed by: INTERNAL MEDICINE

## 2023-04-17 PROCEDURE — 36415 COLL VENOUS BLD VENIPUNCTURE: CPT | Performed by: FAMILY MEDICINE

## 2023-04-17 PROCEDURE — 84132 ASSAY OF SERUM POTASSIUM: CPT | Performed by: FAMILY MEDICINE

## 2023-04-17 PROCEDURE — 120N000001 HC R&B MED SURG/OB

## 2023-04-17 PROCEDURE — 93005 ELECTROCARDIOGRAM TRACING: CPT | Performed by: FAMILY MEDICINE

## 2023-04-17 PROCEDURE — 36415 COLL VENOUS BLD VENIPUNCTURE: CPT | Performed by: INTERNAL MEDICINE

## 2023-04-17 PROCEDURE — 93005 ELECTROCARDIOGRAM TRACING: CPT

## 2023-04-17 PROCEDURE — 999N000157 HC STATISTIC RCP TIME EA 10 MIN

## 2023-04-17 PROCEDURE — 93010 ELECTROCARDIOGRAM REPORT: CPT | Performed by: INTERNAL MEDICINE

## 2023-04-17 PROCEDURE — 84100 ASSAY OF PHOSPHORUS: CPT | Performed by: INTERNAL MEDICINE

## 2023-04-17 PROCEDURE — 83735 ASSAY OF MAGNESIUM: CPT | Performed by: FAMILY MEDICINE

## 2023-04-17 PROCEDURE — 99233 SBSQ HOSP IP/OBS HIGH 50: CPT | Performed by: FAMILY MEDICINE

## 2023-04-17 PROCEDURE — 250N000013 HC RX MED GY IP 250 OP 250 PS 637: Performed by: FAMILY MEDICINE

## 2023-04-17 PROCEDURE — C9113 INJ PANTOPRAZOLE SODIUM, VIA: HCPCS | Performed by: INTERNAL MEDICINE

## 2023-04-17 PROCEDURE — 250N000011 HC RX IP 250 OP 636: Performed by: INTERNAL MEDICINE

## 2023-04-17 RX ORDER — MULTIVITAMIN,THERAPEUTIC
1 TABLET ORAL DAILY
Status: DISCONTINUED | OUTPATIENT
Start: 2023-04-17 | End: 2023-04-18 | Stop reason: HOSPADM

## 2023-04-17 RX ADMIN — MIRTAZAPINE 15 MG: 15 TABLET, FILM COATED ORAL at 21:34

## 2023-04-17 RX ADMIN — OXYCODONE HYDROCHLORIDE 2.5 MG: 5 TABLET ORAL at 00:36

## 2023-04-17 RX ADMIN — THERA TABS 1 TABLET: TAB at 11:22

## 2023-04-17 RX ADMIN — Medication 1 MG: at 21:37

## 2023-04-17 RX ADMIN — OXYCODONE HYDROCHLORIDE 2.5 MG: 5 TABLET ORAL at 20:27

## 2023-04-17 RX ADMIN — METOCLOPRAMIDE 5 MG: 5 TABLET ORAL at 18:45

## 2023-04-17 RX ADMIN — METOPROLOL SUCCINATE 12.5 MG: 25 TABLET, FILM COATED, EXTENDED RELEASE ORAL at 17:45

## 2023-04-17 RX ADMIN — INSULIN ASPART 4 UNITS: 100 INJECTION, SOLUTION INTRAVENOUS; SUBCUTANEOUS at 08:34

## 2023-04-17 RX ADMIN — INSULIN ASPART 5 UNITS: 100 INJECTION, SOLUTION INTRAVENOUS; SUBCUTANEOUS at 04:07

## 2023-04-17 RX ADMIN — PANTOPRAZOLE SODIUM 40 MG: 40 INJECTION, POWDER, FOR SOLUTION INTRAVENOUS at 08:26

## 2023-04-17 RX ADMIN — INSULIN ASPART 4 UNITS: 100 INJECTION, SOLUTION INTRAVENOUS; SUBCUTANEOUS at 00:36

## 2023-04-17 RX ADMIN — METOCLOPRAMIDE 5 MG: 5 TABLET ORAL at 21:34

## 2023-04-17 RX ADMIN — INSULIN ASPART 5 UNITS: 100 INJECTION, SOLUTION INTRAVENOUS; SUBCUTANEOUS at 08:24

## 2023-04-17 RX ADMIN — PANTOPRAZOLE SODIUM 40 MG: 40 INJECTION, POWDER, FOR SOLUTION INTRAVENOUS at 21:33

## 2023-04-17 RX ADMIN — POLYETHYLENE GLYCOL 3350 17 G: 17 POWDER, FOR SOLUTION ORAL at 08:26

## 2023-04-17 RX ADMIN — METOCLOPRAMIDE 5 MG: 5 TABLET ORAL at 11:22

## 2023-04-17 RX ADMIN — METOCLOPRAMIDE 5 MG: 5 TABLET ORAL at 06:29

## 2023-04-17 ASSESSMENT — ACTIVITIES OF DAILY LIVING (ADL)
ADLS_ACUITY_SCORE: 20

## 2023-04-17 NOTE — PLAN OF CARE
Problem: Plan of Care - These are the overarching goals to be used throughout the patient stay.    Goal: Optimal Comfort and Wellbeing  Outcome: Progressing   Goal Outcome Evaluation:             Pt. Will rest tonight with minimal rest and sleep interruptions.

## 2023-04-17 NOTE — PROGRESS NOTES
[facility] MEDICINE PROGRESS NOTE      Identification/Summary: Kevin Stephens is a 23 year old adult with a past medical history of eating disorder, binge purge behavior , seeing at [facility], glycogen-storage disease 3A, DM1, DKA, anxiety, depression, came from eating disorder clinic with complaints of abdominal pain, inability to eat due to abdominal pain for 3 months, recent weight loss about 10 pound in last 1 month.  Admitted for further GI work-up.  EGD on 4/14 revealed no acute changes, biopsy result is pending, suspect gastroparesis versus cannabis hyperemesis syndrome.  Started on Reglan with much improvement of oral intake.  Status post NG tube feeding since 4/14 to 4/17.  Outpatient GI follow-up on 4/27 with eventual gastric emptying study.  Outpatient colonoscopy scheduled at [facility] on 6/14 (needs hospital setting with [facility] due to low BMI). MiraLAX daily to prevent constipation. Avoid marijuana.  Anticipated discharge to [facility] eating disorder inpatient setting in AM.    Assessment and Plan:  Upper abdominal pain  EGD 4/14- nonrevealing.  Biopsy result is pending  Suspected gastroparesis versus cannabis hyperemesis syndrome  Started on PPI  Started on Reglan 4 times a day and bedtime with much improvement of oral intake  Colonoscopy on 6/14  Outpatient gastric emptying study  Avoid marijuana  Daily MiraLAX to prevent constipation  Appreciate GI consult    Eating disorder  Severe protein calorie malnutrition, BMI 12 kg/m   10 pound weight loss in 1 month  States appetite is stable, wants to eat but unable to eat due to abdominal pain for last 3-month  S/P NG tube feeding  Tolerating p.o. well after starting of Reglan    Hypoalbuminemia: Lowest albumin = 3.4 g/dL at 4/14/2023     Hypomagnesemia, replaced    History of uncontrolled DM1  Feeding induced hyperglycemia  Hemoglobin A1c 11  Monitor glucose twice daily with meal and at bedtime  Insulin sliding  scale  Lantus dose increased to 22 unit at night  Diabetic education    History of glycogen storage disease  Diagnosed since childhood    Hepatomegaly, chronic  LFTs are stable    History of anxiety and depression  Significant eating disorder, seeing at eating disorder clinic  Mirtazapine 15 mg daily started  Psychiatry consultation    Marijuana use, absolute cessation is advised    Code Status: Full Code  DVT prophylaxis, Low risk. early ambulation and SCDs  Barrier to discharge  Awaiting for more clinical improvement.  Anticipated discharge in few days    Diet: Consistent Carbohydrate Diet Moderate Consistent Carb (60 g CHO per Meal) Diet  Snacks/Supplements Adult: Glucerna; With Meals      Janna Valdez MD  [facility]  [facility]  Phone: #[phone]  Securely message with the Vocera Web Console (learn more here)  Text page via [employer] Paging/Directory     Interval History/Subjective:  Resting comfortably.  Appetite is much improved.  Tube feeding discontinued.  Denies abdominal pain.  No nausea or vomiting.  Afebrile.  No other concern.      Physical Exam/Objective:  Temp:  [97.6  F (36.4  C)-98.6  F (37  C)] 98.6  F (37  C)  Pulse:  [104-111] 111  Resp:  [16] 16  BP: (116-139)/(74-89) 130/84  SpO2:  [98 %-100 %] 98 %  Body mass index is 13.03 kg/m .    GENERAL:  Alert, appears comfortable, in no acute distress, appears stated age, underweight   HEAD:  Normocephalic, without obvious abnormality, atraumatic   EYES:  PERRL, conjunctiva  clear,   EOM's intact   NOSE: NG in place   THROAT: Lips, mucosa,   gums normal, mouth moist   NECK: Supple, symmetrical, trachea midline   BACK:   Symmetric, no curvature, ROM normal   LUNGS:   Clear to auscultation bilaterally, no rales, rhonchi, or wheezing, symmetric chest rise on inhalation, respirations unlabored   CHEST WALL:  No tenderness or deformity   HEART:  Regular rate and rhythm, S1 and S2 normal, no murmur    ABDOMEN:    Soft, non-tender, bowel sounds active , no masses, no organomegaly, no rebound or guarding   EXTREMITIES: No   edema    SKIN: No rashes   NEURO: Alert, oriented x3, moves all four extremities freely   PSYCH: Cooperative, behavior is appropriate      Data reviewed today: I personally reviewed all new medications, labs, imaging/diagnostics reports over the past 24 hours. Pertinent findings include:    Imaging:   Abdomen CT  Overall decrease in the amount of thickening in the colon as seen on prior examination. No acute findings. Chronic hepatomegaly. Normal gallbladder.    CXR  Lungs are clear. No pleural effusion. Normal heart size.    Labs:  Most Recent 3 CBC's:  Recent Labs   Lab Test 04/14/23  0630 04/13/23  1516 03/16/23  1953   WBC 5.7 6.6 6.4   HGB 14.8 15.1 15.6   MCV 80 81 85    295 256     Most Recent 3 BMP's:  Recent Labs   Lab Test 04/17/23  0735 04/17/23  0553 04/17/23  0406 04/17/23  0031 04/16/23  0913 04/16/23  0823 04/15/23  1227 04/15/23  0659 04/14/23  0935 04/14/23  0630 04/13/23  1959 04/13/23  1516   NA  --   --   --   --   --   --   --  137  --  134*  --  135*   POTASSIUM  --  4.4  --   --   --  4.1  --  3.8   < > 4.1  --  3.9   CHLORIDE  --   --   --   --   --   --   --  100  --  102  --  99   CO2  --   --   --   --   --   --   --  26  --  25  --  23   BUN  --   --   --   --   --   --   --  7*  --  12  --  18   CR  --   --   --   --   --   --   --  0.57*  --  0.53*  --  0.77   ANIONGAP  --   --   --   --   --   --   --  11  --  7  --  13   LUIS  --   --   --   --   --   --   --  8.9  --  8.4*  --  9.2   *  --  393* 310*   < >  --    < > 169*   < > 259*   < > 369*    < > = values in this interval not displayed.     Most Recent 2 LFT's:  Recent Labs   Lab Test 04/15/23  0659 04/14/23  0630   AST 41* 45*   ALT 47* 48*   ALKPHOS 109 112   BILITOTAL 0.8 0.7       Medications:   Personally Reviewed.  Medications       collagenase   Topical Daily     insulin aspart   Subcutaneous TID  w/meals     insulin aspart  1-6 Units Subcutaneous Q4H     insulin glargine  22 Units Subcutaneous At Bedtime     metoclopramide  5 mg Oral 4x Daily AC & HS     mirtazapine  15 mg Oral At Bedtime     multivitamin, therapeutic  1 tablet Oral Daily     pantoprazole  40 mg Intravenous BID     polyethylene glycol  17 g Oral Daily     sodium chloride (PF)  3 mL Intracatheter Q8H     Total time spent 50 min on the date of service doing chart review, history, exam, documentation & further activities per the note.

## 2023-04-17 NOTE — PROVIDER NOTIFICATION
Text page to Dr. Valdez    RM [id number] AF - FYI pt up walking and HR is 145-152. Just wanted to let you know. Thanks, Ally [phone]

## 2023-04-17 NOTE — PROGRESS NOTES
"Care Management Follow Up    Length of Stay (days): 4    Expected Discharge Date: 04/17/2023     Concerns to be Addressed:     Discharge planning  Patient plan of care discussed at interdisciplinary rounds: Yes    Anticipated Discharge Disposition:  (pending transfer to [facility])  Disposition Comments: Anticipate transfer to [facility] via HE transport per Charge RN.  Anticipated Discharge Services:  (pending transfer to [facility])  Anticipated Discharge DME: None    Patient/family educated on Medicare website which has current facility and service quality ratings:  (None indicated at this time.)  Education Provided on the Discharge Plan:    Patient/Family in Agreement with the Plan: yes    Referrals Placed by CM/SW:  (None indicated at this time.)  Private pay costs discussed: Not applicable    Additional Information:    FYI: pt identifies as \"They/Them/Theirs\"    SADIA met with pt in room to introduce self and role in care management. Pt came from [facility] inpt CD program due to GI concerns; anticipate returning when medically stable to. SADIA asked pt if it was ok to coordinate with inpt care manager on discharge planning as needed; pt gave verbal consent and pt agreeable to sign JAMEE. SADIA spoke with  who sent message to inpt care manager requesting call to be returned. Care management following.  8:58 AM    SADIA recieved call from Sherley [phone] (care manager at [facility] inpt program); pt is anticipated to admit to inpt program from hospital; initial intake scheduled for 4/20 at 1330 and a Xray appointment scheduled for 1230 same date to ensure that tube feeding is correctly in place (apart of admissions process). If pt is not medically stable for discharge by 4/20 pt will need to remain here if not safe to retun home as facility does not take admissions Friday-Sunday. SADIA spoke with dietician who stated that tube feeds have stopped and they plan to take tube out; dietician will follow up with Sherley at [facility]. " If pt is to discharge on tube feeds they are requesting ENFit compatible tube feeds as they use a kangaroo pump. SW will follow with dietician and Sherley on discharge planning.  1:23 PM      ABRIL Driscoll  4/17/2023

## 2023-04-17 NOTE — PLAN OF CARE
"Problem: Electrolyte Imbalance  Goal: Electrolyte Imbalance: Plan of Care  Outcome: Progressing       Pt is K and Mag protocols with recheck labs in AM. Q 8 hr phosphorus check was WNL 3.9. VSS. Tele is sinus tachycardia.  AC , gave additional one time 6 unit sliding scale insulin for coverage per Dr. Valdez order. HS .     Problem: Pain Acute  Goal: Optimal Pain Control and Function  Outcome: Progressing    Problem: Enteral Nutrition  Goal: Absence of Aspiration Signs and Symptoms  Outcome: Progressing  Pt rates abdominal pain </= \"5/10\" and denies PRN pain meds when offered. Denies nausea; tolerating continuous tube feed and q 4hr free water flushes.         "

## 2023-04-17 NOTE — PLAN OF CARE
New orders for EKG, labs, and dose of Metoprolol for elevated HR. Patient asymptomatic. Continuing cardiac monitoring.     Tolerating oral intake. Monitoring blood sugars.

## 2023-04-17 NOTE — PROGRESS NOTES
MD NOTIFICATION    Dr. Valdez sent page regarding:  FYI Patient's heart rate tachy, 107-110s even at rest.  Tele shows sinus tachycardia.  Denies pain.  Please advise if want any changes.      Dr. Valdez called back, no order changes.  Nursing will continue to monitor.    Patient denies pain.  Patient ate 75% of breakfast, Registered Dietician following.  MD ordered to discontinue tube feedings, water flushes and NG tube.  NG tube removed, patient tolerated, had small bloody nose post that resolved quickly.  Patient tolerating getting up ambulating nursing unit independently with mom.  Blood glucose elevated 368 this AM, Dr. Valdez notified on AM rounds, sliding scale insulin given and carb counting.  Diabetic Educator following.  Discharge pending.

## 2023-04-17 NOTE — CONSULTS
DIABETES CARE    Situation:  Consulted by Provider for Diabetes Education.  Brianna is a 23 year old with Type 1 Diabetes vs secondary Diabetes. Patient was admitted for complaints of abdominal pain, inability to eat, recent weight loss about 10 pounds in the last 1 month.    Background:  Related Co-morbidities include: Amylo-1,6-glucosidase deficiency, severe malnutrition, eating disorder/binge purge behavior   PCP: Vanessa Dobson  Endocrinology: Niya Underwood MD ([facility] 3/7/23 - [facility])  Social: lives at home with parents    Diabetes History:   Patient diagnosed at 18 months with Amylo-1,6-glucosidase deficiency. Because of this, at high risk for hypoglycemic events. Given glucosidase deficiency, glucagon will not be effective. Type 1 diabetes diagnosed age 16. Patient has expressed interest in an insulin pump before. Patient has had DKA before.     Meds for BG Management PTA:  Sliding Scale Novolog - nonadherent  16 units Lantus q PM  Freestyle Yuan 2    Current Inpatient Meds for BG Management:  22 units Lantus q PM  1:10 grams CHO Novolog  1/50 medium resistance correction scale >140 mg/dL q 4 hours    Labs:  Hemoglobin A1C: 11.5% 2/28/23 (estimated average  mg/dL)   Hgb: 15.3 g/dL  SCr: 0.57 mg/dL   GFR: >90 mL/min/1.73m^2    Blood Glucose POC:    04/16/23 17:19 04/16/23 21:21 04/17/23 00:31 04/17/23 04:06 04/17/23 07:35   GLUCOSE BY METER POCT 437 (H) 362 (H) 310 (H) 393 (H) 368 (H)     Diet Order: 60 grams CHO + Osmolite 1.5 continuous TF at 50 mL/hr (244 grams CHO/d = 10 grams CHO/hr)    Intake: %  Weight: 34.4 kg    BMI: 13.03 kg/m^2       Assessment:  Spoke with dietitian this AM regarding finding an alternative formula lower in CHO load. Upon assessment, RD thought OK to discontinue TF which should greatly improve BG trends.     Patient reports doing 32 units Lantus q day with inconsistent meal Novolog use (1:25 grams CHO). Has trouble with CGMS sensors due to limited fat  stores for subcutaneous adherence. Discussed other site use, noting that studies for CGMS have been done with back of arm - good to have back up meter should BG not respond in the way expected after insulin use.       Should TF be necessary in the future, consider the following:     A concerted effort should be made to attain a glycemic goal of 140-180 mg/dL in hospitalized adult patients receiving special nutrition by way of EN.    Because continuous EN results in a continuous postprandial state, any attempt to bring blood glucose levels to below 140 mg/dL substantially increases the risk of hypoglycemia.    Most patients receiving basal insulin should continue with their basal dose, while the dose of insulin for the total daily nutritional component may be calculated as 1 unit of insulin for every 10-15 g carbohydrate in the formula.    Adjustments in insulin doses must be made frequently.    Correctional insulin should be administered subcutaneously every 6 h using human regular insulin or every 4 h using a rapid-acting insulin. If enteral nutrition is interrupted, a 10% dextrose infusion must be started immediately to prevent hypoglycemia and to allow time to select more appropriate insulin doses. For this reason, using basal insulin as compensation for continuous CHO load could be problematic on an outpatient basis (hypoglycemia risk).     Optional insulin plans:    NPH insulin administered every 8 h (40-50% of TDD) in addition to rapid-/short-acting nutritional insulin (50- 60% of TDD) plus rapid-/short-acting correction scale insulin administered every 4 to 6 h.    Biphasic 70/30 insulin administered every 8 h in addition to short-acting correction scale insulin administered every 8 h.    Basal insulin administered daily (glargine) or every 12-h (detemir) (40-50% of TDD) in addition to rapid-/shortacting nutritional insulin (~60% of TDD) plus rapid-/short-acting correction scale insulin administered every 4  to 6 h.    Recommendations:  1. Monitor BG trends and ability to transition back to home insulin regimen (32 units Lantus, 1:25 grams CHO Novolog).   2. Patient expressed understanding of importance of daily use Lantus (avoid DKA) and consistent use of Novolog (I:C ratio) to help improve A1c trends.     F/U Plans:  Patient should follow-up with Endocrinology and/or Diabetes Educator within the next 2-4 weeks for ongoing monitoring and evaluation. Due for updated A1c level next month.     DISCHARGE NEEDS:  1. Accu-Chek Guide strips, 2 boxes of 50  2. 28-day supply of FreeStyle Yuan 2 sensors (2 sensors) Refills: PRN      References:  1. Rosangela Terry, Delicia Coon, Marion De Oliveira, et al.; on behalf of the American Diabetes Association, 16. Diabetes Care in the Hospital: Standards of Care in Diabetes--2023. Diabetes Care. 1 January 2023; 46 (Supplement_1): B651-A608. https://doi.org/10.2337/os32-H463  2. Tara Boss, Yakelin Aviles, et al. Insulin Therapy and Glycemic Control in Hospitalized Patients With Diabetes During Enteral Nutrition Therapy: A randomized controlled clinical trial. Diabetes Care. 1 April 2009; 32 (4): 594-596. https://doi.org/10.2337/es11-6726  3. FENG N[place]., CELESTINO Fountain. & BONNY Naidu. Nutrition and Hyperglycemia Management in the Inpatient Setting (Meals on Demand, Parenteral, or Enteral Nutrition). Curr Diab Rep 17, 59 (2017). https://doi.org/10.1007/[email or web address]      Thank you,   Eleonora Oates RDN, CSPCC, LD, Diabetes Educator    [facility]  [address] [place] [address]  [email or web address]   Office: [phone]  Pager: [phone]

## 2023-04-17 NOTE — CONSULTS
"ACUPUNCTURIST TREATMENT NOTE    Name: Kevin Stephens  :  2000  MRN:  [id number]    Acupuncture Treatment  Patient Type: Medical  Intervention Reason: Pain  Pain Location: Low back  Pre-session Pain Rating: 3  Post-session Pain Ratin  Patient complaint:: Low back apin and relaxation  Initial insertions: Chloé foster, (L) Ling gu, (L) Si 3, (R) Bl 60, 62, 65  Number of needles inserted: 6  Number of needles removed: 6         \"Risks and benefits of acupuncture were discussed with patient. Consent for treatment was given. We thank you for the referral.\"     Avery Richardson    Date:  2023  Time:  12:17 PM    "

## 2023-04-17 NOTE — SIGNIFICANT EVENT
Message sent via [employer] to MD to report pt. BG at 393. Pt was asymptomatic, appears to tolerate TF well. BG was covered per Insulin sliding scale. Pt. Resting at this time. Will continue with Q4 hour BG's and sliding scale insulin as ordered.

## 2023-04-18 ENCOUNTER — APPOINTMENT (OUTPATIENT)
Dept: CARDIOLOGY | Facility: CLINIC | Age: 23
End: 2023-04-18
Attending: FAMILY MEDICINE
Payer: COMMERCIAL

## 2023-04-18 ENCOUNTER — HOSPITAL ENCOUNTER (OUTPATIENT)
Dept: CARDIOLOGY | Facility: CLINIC | Age: 23
Discharge: HOME | End: 2023-04-18
Attending: FAMILY MEDICINE
Payer: COMMERCIAL

## 2023-04-18 VITALS
WEIGHT: 75.4 LBS | SYSTOLIC BLOOD PRESSURE: 141 MMHG | RESPIRATION RATE: 16 BRPM | OXYGEN SATURATION: 97 % | DIASTOLIC BLOOD PRESSURE: 95 MMHG | BODY MASS INDEX: 12.94 KG/M2 | HEART RATE: 109 BPM | TEMPERATURE: 98.3 F

## 2023-04-18 DIAGNOSIS — R00.0 TACHYCARDIA: ICD-10-CM

## 2023-04-18 LAB
ATRIAL RATE - MUSE: 121 BPM
DIASTOLIC BLOOD PRESSURE - MUSE: NORMAL MMHG
GLUCOSE BLDC GLUCOMTR-MCNC: 126 MG/DL (ref 70–99)
GLUCOSE BLDC GLUCOMTR-MCNC: 167 MG/DL (ref 70–99)
GLUCOSE BLDC GLUCOMTR-MCNC: 282 MG/DL (ref 70–99)
INTERPRETATION ECG - MUSE: NORMAL
LVEF ECHO: NORMAL
MAGNESIUM SERPL-MCNC: 1.8 MG/DL (ref 1.8–2.6)
P AXIS - MUSE: 72 DEGREES
PATH REPORT.COMMENTS IMP SPEC: NORMAL
PATH REPORT.FINAL DX SPEC: NORMAL
PATH REPORT.GROSS SPEC: NORMAL
PATH REPORT.MICROSCOPIC SPEC OTHER STN: NORMAL
PATH REPORT.RELEVANT HX SPEC: NORMAL
PHOSPHATE SERPL-MCNC: 4.8 MG/DL (ref 2.5–4.5)
PHOSPHATE SERPL-MCNC: 5.4 MG/DL (ref 2.5–4.5)
PHOTO IMAGE: NORMAL
POTASSIUM BLD-SCNC: 4.1 MMOL/L (ref 3.5–5)
PR INTERVAL - MUSE: 112 MS
QRS DURATION - MUSE: 72 MS
QT - MUSE: 304 MS
QTC - MUSE: 431 MS
R AXIS - MUSE: 79 DEGREES
SYSTOLIC BLOOD PRESSURE - MUSE: NORMAL MMHG
T AXIS - MUSE: 60 DEGREES
VENTRICULAR RATE- MUSE: 121 BPM

## 2023-04-18 PROCEDURE — 84100 ASSAY OF PHOSPHORUS: CPT | Performed by: INTERNAL MEDICINE

## 2023-04-18 PROCEDURE — 83735 ASSAY OF MAGNESIUM: CPT | Performed by: FAMILY MEDICINE

## 2023-04-18 PROCEDURE — 93270 REMOTE 30 DAY ECG REV/REPORT: CPT

## 2023-04-18 PROCEDURE — 36415 COLL VENOUS BLD VENIPUNCTURE: CPT | Performed by: FAMILY MEDICINE

## 2023-04-18 PROCEDURE — C9113 INJ PANTOPRAZOLE SODIUM, VIA: HCPCS | Performed by: INTERNAL MEDICINE

## 2023-04-18 PROCEDURE — 250N000013 HC RX MED GY IP 250 OP 250 PS 637: Performed by: FAMILY MEDICINE

## 2023-04-18 PROCEDURE — 93306 TTE W/DOPPLER COMPLETE: CPT | Mod: 26 | Performed by: INTERNAL MEDICINE

## 2023-04-18 PROCEDURE — 93306 TTE W/DOPPLER COMPLETE: CPT

## 2023-04-18 PROCEDURE — 250N000013 HC RX MED GY IP 250 OP 250 PS 637: Performed by: INTERNAL MEDICINE

## 2023-04-18 PROCEDURE — 36415 COLL VENOUS BLD VENIPUNCTURE: CPT | Performed by: INTERNAL MEDICINE

## 2023-04-18 PROCEDURE — 99239 HOSP IP/OBS DSCHRG MGMT >30: CPT | Performed by: FAMILY MEDICINE

## 2023-04-18 PROCEDURE — 84132 ASSAY OF SERUM POTASSIUM: CPT | Performed by: FAMILY MEDICINE

## 2023-04-18 PROCEDURE — 250N000011 HC RX IP 250 OP 636: Performed by: INTERNAL MEDICINE

## 2023-04-18 PROCEDURE — 99255 IP/OBS CONSLTJ NEW/EST HI 80: CPT | Performed by: NURSE PRACTITIONER

## 2023-04-18 RX ORDER — RAMELTEON 8 MG/1
8 TABLET ORAL AT BEDTIME
Status: DISCONTINUED | OUTPATIENT
Start: 2023-04-18 | End: 2023-04-18 | Stop reason: HOSPADM

## 2023-04-18 RX ORDER — ACETAMINOPHEN 500 MG
500-1000 TABLET ORAL EVERY 6 HOURS PRN
COMMUNITY
Start: 2023-04-18 | End: 2023-11-30

## 2023-04-18 RX ORDER — MIRTAZAPINE 15 MG/1
15 TABLET, FILM COATED ORAL AT BEDTIME
Qty: 30 TABLET | Refills: 1 | Status: SHIPPED | OUTPATIENT
Start: 2023-04-18 | End: 2023-11-30

## 2023-04-18 RX ORDER — MULTIVITAMIN,THERAPEUTIC
1 TABLET ORAL DAILY
COMMUNITY
Start: 2023-04-18 | End: 2023-11-30

## 2023-04-18 RX ORDER — POLYETHYLENE GLYCOL 3350 17 G/17G
17 POWDER, FOR SOLUTION ORAL DAILY
Qty: 510 G | Refills: 0 | Status: SHIPPED | OUTPATIENT
Start: 2023-04-18

## 2023-04-18 RX ORDER — OMEPRAZOLE 40 MG/1
40 CAPSULE, DELAYED RELEASE ORAL DAILY
Qty: 30 CAPSULE | Refills: 1 | Status: SHIPPED | OUTPATIENT
Start: 2023-04-18 | End: 2023-11-30

## 2023-04-18 RX ORDER — METOCLOPRAMIDE 5 MG/1
5 TABLET ORAL
Qty: 150 TABLET | Refills: 1 | Status: SHIPPED | OUTPATIENT
Start: 2023-04-18

## 2023-04-18 RX ORDER — RAMELTEON 8 MG/1
8 TABLET ORAL AT BEDTIME
Qty: 7 TABLET | Refills: 0 | Status: SHIPPED | OUTPATIENT
Start: 2023-04-18 | End: 2023-11-30

## 2023-04-18 RX ORDER — IBUPROFEN 200 MG
200 TABLET ORAL EVERY 8 HOURS PRN
COMMUNITY
Start: 2023-04-18

## 2023-04-18 RX ADMIN — POLYETHYLENE GLYCOL 3350 17 G: 17 POWDER, FOR SOLUTION ORAL at 08:52

## 2023-04-18 RX ADMIN — PANTOPRAZOLE SODIUM 40 MG: 40 INJECTION, POWDER, FOR SOLUTION INTRAVENOUS at 08:52

## 2023-04-18 RX ADMIN — COLLAGENASE SANTYL: 250 OINTMENT TOPICAL at 09:02

## 2023-04-18 RX ADMIN — THERA TABS 1 TABLET: TAB at 08:52

## 2023-04-18 RX ADMIN — METOCLOPRAMIDE 5 MG: 5 TABLET ORAL at 11:48

## 2023-04-18 RX ADMIN — METOCLOPRAMIDE 5 MG: 5 TABLET ORAL at 08:51

## 2023-04-18 ASSESSMENT — ACTIVITIES OF DAILY LIVING (ADL)
ADLS_ACUITY_SCORE: 20

## 2023-04-18 NOTE — PLAN OF CARE
Goal Outcome Evaluation:       Pt discharged home with mom. Discharge education complete. Pt verbalizes understanding of follow up appts, when to call MD, and medications. All questions answered.

## 2023-04-18 NOTE — PLAN OF CARE
Goal Outcome Evaluation:       Patient denies pain.  Patient up independently.  Patient's VS: BP (!) 141/95 (BP Location: Left arm)   Pulse 109   Temp 98.3  F (36.8  C) (Oral)   Resp 16   Wt 34.2 kg (75 lb 6.4 oz)   LMP  (LMP Unknown)   SpO2 97%   BMI 12.94 kg/m   on room air.  Patient remains on telemetry, ST.  Dr. Valdez aware of ST, orders for echocardiogram to be done prior to discharge and Holter monitor to be sent home with patient.  Echocardiogram now complete, awaiting Holter monitor and results.  Blood glucoses improved today, blood glucose 167 prior to breakfast and 124 prior to lunch, given sliding scale insulin and carb coverage per orders.  Discharge pending.  Patient's mom at bedside.

## 2023-04-18 NOTE — DISCHARGE SUMMARY
[facility] MEDICINE  DISCHARGE SUMMARY     Primary Care Physician: Vanessa Wright  Admission Date: 4/13/2023   Discharge Provider: Janna Valdez MD Discharge Date: 4/18/2023   Diet:   Snacks/Supplements Adult: Glucerna; With Meals   Consistent Carbohydrate Diet Moderate Consistent Carb (60 g CHO per Meal) Diet      Code Status: Full Code   Activity: DCACTIVITY: Activity as tolerated        Condition at Discharge: Stable     REASON FOR PRESENTATION(See Admission Note for Details)     Transferred from eating disorder clinic with complaints of abdominal pain, inability to eat for 3 months, 10 pound weight loss in 1 month    PRINCIPAL & ACTIVE DISCHARGE DIAGNOSES     Upper abdominal pain, EGD unremarkable  Suspected gastroparesis versus cannabis hyperemesis syndrome  Eating disorder  Severe protein calorie malnutrition, BMI 12 kg/m   Hypoalbuminemia, lowest albumin 3.4  Sinus tachycardia  Hypomagnesemia, replaced  History of uncontrolled DM1, hemoglobin A1c 11  History of glycogen storage disease, diagnosed at 18-month  Chronic hepatomegaly, LFTs are stable  Reactive Duodenopathy  History of anxiety and depression  Marijuana use  Gender dysphoria       PENDING LABS     Unresulted Labs Ordered in the Past 30 Days of this Admission     Date and Time Order Name Status Description    4/14/2023  2:39 PM Surgical Pathology Exam In process           PROCEDURES ( this hospitalization only)      Procedure(s):  ESOPHAGOGASTRODUODENOSCOPY with biopsies    RECOMMENDATIONS TO OUTPATIENT PROVIDER FOR F/U VISIT     Follow-up Appointments     Follow-up and recommended labs and tests       Discharge planning home prior to admitting to ED inpt program at [facility]   in [place], admission appointment 4/20 at 1330.   Follow up with pmd in 1-2 week  Follow up with cardiology in 2-3 week after holter monitoring  Follow up wit psychiatry at [facility]  Outpatient GI follow-up on 4/27 with  eventual gastric emptying study.    Outpatient colonoscopy scheduled at [facility] on 6/14 (needs hospital   setting with MAC due to low BMI).   Monitor Glucose 4 times a day  CBC and BMP in 1-2 week               DISPOSITION     Home  Admitting to ED inpt program at [facility], appointment 4/20 at 1330    SUMMARY OF HOSPITAL COURSE:      Ms.Alyah MAREK Stephens is a 23 year old adult with a past medical history of eating disorder, binge purge behavior , seeing at [place] eating disorder clinic, glycogen-storage disease 3A, DM1, DKA, anxiety, depression, came from eating disorder clinic with complaints of abdominal pain, inability to eat due to abdominal pain for 3 months, recent weight loss about 10 pound in last 1 month.  Admitted for further GI work-up.  EGD on 4/14 revealed no acute changes, biopsy result is pending, suspect gastroparesis versus cannabis hyperemesis syndrome.  Started on Reglan for gastroparesis with much improvement of oral intake.  Status post NG tube feeding since 4/14 to 4/17.  Started on PPI for possible GERD.  Mirtazapine 15 mg daily to increase appetite. Outpatient GI follow-up on 4/27 with eventual gastric emptying study. Outpatient colonoscopy scheduled at [facility] on 6/14 (needs hospital setting with MAC due to low BMI). MiraLAX daily to prevent constipation. Avoid marijuana.   Discharging home prior to admitting to ED inpatient program at [facility], appointment on 4/20 at 1330.    He has sinus tachycardia especially with activity.  Electrolytes are stable.  Echocardiogram is within normal limit. Outpatient Holter monitoring for 2 weeks.  Will follow-up with cardiology in 2 weeks.    History of uncontrolled DM 1.  Hemoglobin A1c 11.  On Lantus 32 unit insulin sliding scale and 1 unit per 25 g of carbohydrate.    Discharge Medications with Med changes:     Current Discharge Medication List      START taking these medications    Details   mirtazapine (REMERON) 15  MG tablet Take 1 tablet (15 mg) by mouth At Bedtime  Qty: 30 tablet, Refills: 1    Associated Diagnoses: Loss of weight      multivitamin, therapeutic (THERA-VIT) TABS tablet Take 1 tablet by mouth daily    Associated Diagnoses: Gastroparesis      omeprazole (PRILOSEC) 40 MG DR capsule Take 1 capsule (40 mg) by mouth daily  Qty: 30 capsule, Refills: 1    Associated Diagnoses: Gastroesophageal reflux disease with esophagitis without hemorrhage      polyethylene glycol (MIRALAX) 17 GM/Dose powder Take 17 g by mouth daily Hold with diarrhea  Qty: 510 g, Refills: 0    Associated Diagnoses: Gastroesophageal reflux disease with esophagitis without hemorrhage         CONTINUE these medications which have CHANGED    Details   !! insulin aspart (NOVOLOG PEN) 100 UNIT/ML pen Correction Scale - MEDIUM INSULIN RESISTANCE DOSING     Do Not give Correction Insulin if Pre-Meal BG less than 140.   For Pre-Meal  - 189 give 1 unit.   For Pre-Meal  - 239 give 2 units.   For Pre-Meal  - 289 give 3 units.   For Pre-Meal  - 339 give 4 units.   For Pre-Meal - 399 give 5 units.   For Pre-Meal -449 give 6 units  For Pre-Meal BG greater than or equal to 450 give 7 units.   To be given with prandial insulin, and based on pre-meal blood glucose.    Notify provider if glucose greater than or equal to 350 mg/dL after administration of correction dose.  Qty: 15 mL, Refills: 1      !! insulin aspart (NOVOLOG PEN) 100 UNIT/ML pen 1 unit per 25 gram Carbohydrate counting 3 times a day with meals  Qty: 15 mL      insulin glargine (LANTUS PEN) 100 UNIT/ML pen Inject 32 Units Subcutaneous At Bedtime  Qty: 15 mL    Comments: If Lantus is not covered by insurance, may substitute Basaglar or Semglee or other insulin glargine product per insurance preference at same dose and frequency.        metoclopramide (REGLAN) 5 MG tablet Take 1 tablet (5 mg) by mouth 4 times daily (before meals and nightly)  Qty: 150 tablet,  Refills: 1    Associated Diagnoses: Gastroparesis       !! - Potential duplicate medications found. Please discuss with provider.      CONTINUE these medications which have NOT CHANGED    Details   !! Acetone, Urine, Test (KETONE TEST) STRP 1 EACH BY IN VITRO ROUTE AS NEEDED (HYPERGLYCEMIA, CONCERN FOR DKA)  Qty: 100 strip, Refills: 0    Associated Diagnoses: Diabetic ketoacidosis without coma associated with type 1 diabetes mellitus (H)      !! acetone, Urine, test STRP Check urine ketones when two consecutive blood sugars are greater than 300 and at times of illness/vomiting.  Qty: 100 each, Refills: 11    Associated Diagnoses: Type 1 diabetes mellitus with hyperglycemia (H)      !! alcohol swab prep pads Use to swab area of injection/nigel as directed.  Qty: 120 each, Refills: 0    Associated Diagnoses: Diabetic ketoacidosis without coma associated with type 1 diabetes mellitus (H)      !! alcohol swab prep pads Use to swab area of injection/nigel as directed.  Qty: 100 each, Refills: 3    Associated Diagnoses: Diabetic ketoacidosis without coma associated with type 1 diabetes mellitus (H); Uncontrolled diabetes mellitus secondary to pancreatic insufficiency      !! blood glucose (NO BRAND SPECIFIED) test strip Use to test blood sugar 4 times daily or as directed. To accompany: Blood Glucose Monitor Brands: per insurance.  Qty: 100 strip, Refills: 6    Associated Diagnoses: Diabetic ketoacidosis without coma associated with type 1 diabetes mellitus (H); Uncontrolled diabetes mellitus secondary to pancreatic insufficiency      blood glucose calibration (NO BRAND SPECIFIED) solution To accompany: Blood Glucose Monitor Brands: per insurance.  Qty: 100 each, Refills: 0    Associated Diagnoses: Diabetic ketoacidosis without coma associated with type 1 diabetes mellitus (H); Uncontrolled diabetes mellitus secondary to pancreatic insufficiency      blood glucose monitoring (NO BRAND SPECIFIED) meter device kit Use to  test blood sugar 4 times daily or as directed. Preferred blood glucose meter OR supplies to accompany: Blood Glucose Monitor Brands: per insurance.  Qty: 1 kit, Refills: 0    Associated Diagnoses: Diabetic ketoacidosis without coma associated with type 1 diabetes mellitus (H); Uncontrolled diabetes mellitus secondary to pancreatic insufficiency      !! blood glucose monitoring (ONE TOUCH DELICA) lancets Use to test blood sugars 6 times daily.  Qty: 1 Box, Refills: 12    Associated Diagnoses: Type I (juvenile type) diabetes mellitus without mention of complication, not stated as uncontrolled      !! blood glucose monitoring (ONETOUCH VERIO IQ) test strip Use to test blood sugars 6 times daily or as directed.  Qty: 200 strip, Refills: 11    Associated Diagnoses: Hyperglycemia      collagenase (SANTYL) 250 UNIT/GM external ointment Apply topically daily Total square cm of the wound being treat is 3.96 please provide 30 days supply  Qty: 30 g, Refills: 3    Associated Diagnoses: Infected sebaceous cyst      !! Continuous Blood Gluc Sensor (DEXCOM G6 SENSOR) MISC 1 each See Admin Instructions Change every 10 days  Qty: 9 each, Refills: 3    Associated Diagnoses: Type 1 diabetes mellitus with hyperglycemia (H)      !! Continuous Blood Gluc Sensor (FREESTYLE PASQUALE 2 SENSOR) MISC 1 each every 14 days Use 1 sensor every 14 days. Use to read blood sugars per 's instructions.  Qty: 2 each, Refills: 5    Associated Diagnoses: Diabetic ketoacidosis without coma associated with type 1 diabetes mellitus (H)      Continuous Blood Gluc Transmit (DEXCOM G6 TRANSMITTER) MISC 1 each every 3 months  Qty: 1 each, Refills: 3    Associated Diagnoses: Type 1 diabetes mellitus with hyperglycemia (H)      insulin pen needle (BD CALLI U/F) 32G X 4 MM miscellaneous Use pen needles 6 times daily.  Qty: 200 each, Refills: 6    Associated Diagnoses: Hyperglycemia      !! thin (NO BRAND SPECIFIED) lancets Use with lanceting device. To  accompany: Blood Glucose Monitor Brands: per insurance.  Qty: 100 each, Refills: 6    Associated Diagnoses: Diabetic ketoacidosis without coma associated with type 1 diabetes mellitus (H); Uncontrolled diabetes mellitus secondary to pancreatic insufficiency       !! - Potential duplicate medications found. Please discuss with provider.      STOP taking these medications       cephALEXin (KEFLEX) 500 MG capsule Comments:   Reason for Stopping:         ibuprofen (ADVIL/MOTRIN) 200 MG tablet Comments:   Reason for Stopping:                     Rationale for medication changes:      Started on Reglan, PPI and mirtazapine        Consults   Gastroenterology  Diabetic education    Immunizations given this encounter     Most Recent Immunizations   Administered Date(s) Administered     COVID-19 Vaccine 12+ (Pfizer) 06/15/2021     Comvax (HIB/HepB) 08/01/2001     DTAP (<7y) 09/19/2005     HEPA 08/27/2012     HEPATITIS A (PEDS 12M-18Y) 03/16/2015     HPV Quadrivalent 03/16/2015     HPV9 01/26/2016     HepA-Peds, Unspecified 08/27/2012     HepB, Unspecified 08/01/2001     Hepatits B (Peds <19Y) 2000     Hib, Unspecified 08/01/2001     Historical DTP/aP 09/19/2005     Influenza (IIV3) PF 09/26/2012     Influenza Vaccine >6 months (Alfuria,Fluzone) 11/23/2022     MMR 09/19/2005     Meningococcal ACWY (Menveo ) 06/17/2016     Pneumococcal (PCV 7) 01/16/2001     Polio, Unspecified  2000     Poliovirus, inactivated (IPV) 09/19/2005     TDAP (Adacel,Boostrix) 08/27/2012     Varicella 08/27/2012           Anticoagulation Information      None      SIGNIFICANT IMAGING FINDINGS   Abdomen CT  Overall decrease in the amount of thickening in the colon as seen on prior examination. No acute findings.    CXR  Lungs are clear. No pleural effusion. Normal heart size.    Echocardiogram  Left ventricular size, wall motion and function are normal. The ejection  fraction is > 65%.  Normal right ventricle size and systolic  function.    EKG- Sinus tachycardia  Telemetry heart rate 100s, 120s -130s with activity      SIGNIFICANT LABORATORY FINDINGS   Phosphorus 4.8  Magnesium 1.8  Potassium 4.1      Discharge Orders        Adult Cardiology Mary Aguilera Referral      Reason for your hospital stay    Not able to eat due to abdominal pain     Activity    Your activity upon discharge: activity as tolerated     Follow-up and recommended labs and tests     Discharge planning home prior to admitting to ED inpt program at [facility], admission appointment 4/20 at 1330.   Follow up with pmd in 1-2 week  Follow up with cardiology in 2-3 week after holter monitoring  Follow up wit psychiatry at [facility]  Outpatient GI follow-up on 4/27 with eventual gastric emptying study.    Outpatient colonoscopy scheduled at [facility] on 6/14 (needs hospital setting with [facility] due to low BMI).   Monitor Glucose 4 times a day  CBC and BMP in 1-2 week     Adult Cardiac Event Monitor     Diet    Follow this diet upon discharge: diabetic diet       Examination   Physical Exam   Temp:  [98  F (36.7  C)-98.4  F (36.9  C)] 98.3  F (36.8  C)  Pulse:  [102-124] 109  Resp:  [16] 16  BP: (123-141)/(80-95) 141/95  SpO2:  [97 %-99 %] 97 %  Wt Readings from Last 1 Encounters:   04/18/23 34.2 kg (75 lb 6.4 oz)     GENERAL:  Alert, appears comfortable, in no acute distress, appears stated age, underweight   HEAD:  Normocephalic, without obvious abnormality, atraumatic   EYES:  PERRL, conjunctiva  clear,   EOM's intact   NOSE: NG in place   THROAT: Lips, mucosa,   gums normal, mouth moist   NECK: Supple, symmetrical, trachea midline   BACK:   Symmetric, no curvature, ROM normal   LUNGS:   Clear to auscultation bilaterally, no rales, rhonchi, or wheezing, symmetric chest rise on inhalation, respirations unlabored   CHEST WALL:  No tenderness or deformity   HEART:  Regular  rhythm, sinus tachycardia with activity, S1 and S2 normal, no murmur    ABDOMEN:    Soft, non-tender, bowel sounds active , no masses, no organomegaly, no rebound or guarding   EXTREMITIES: No  edema    SKIN: No rashes   NEURO: Alert, oriented x3, moves all four extremities freely   PSYCH: Cooperative, behavior is appropriate            Please see EMR for more detailed significant labs, imaging, consultant notes etc.    Janna PEOPLES MD, personally saw the patient today and spent greater than 30 minutes discharging this patient.    Janna Valdez MD  [facility]    CC:Vanessa Wright

## 2023-04-18 NOTE — PLAN OF CARE
Alert and oriented x4. Difficulty falling asleep. No c/o pain @ 0400. Independent. HR in low 100's. Intermittent tach. Otherwise HR in 90's while resting. BG taken @ 0000 282.  When ambulating HR jumps to 150's. Pt asymptomatic. Had shower yesterday night. Ate approx 90% of dinner. Carbs approx 24. No Insulin given for carb count Novolog. 1 Unit given for sliding scale. VSS.  Problem: Electrolyte Imbalance  Goal: Electrolyte Imbalance: Plan of Care  Outcome: Progressing     Problem: Hyperglycemia  Goal: Blood Glucose Level Within Targeted Range  Outcome: Progressing

## 2023-04-18 NOTE — PROGRESS NOTES
Care Management Follow Up    Length of Stay (days): 5    Expected Discharge Date: 04/19/2023     Concerns to be Addressed:       Patient plan of care discussed at interdisciplinary rounds: Yes    Anticipated Discharge Disposition: Home  Disposition Comments: Inpt admissions to [facility] ED program 4/20  Anticipated Discharge Services: n/a  Anticipated Discharge DME: None    Patient/family educated on Medicare website which has current facility and service quality ratings:  (None indicated at this time.)  Education Provided on the Discharge Plan:  Yes - will fax discharge paperwork to [facility] in ED   Patient/Family in Agreement with the Plan: yes    Referrals Placed by CM/SW:  (None indicated at this time.)  Private pay costs discussed: Not applicable    Additional Information:  SW met with pt and mother in room to discuss discharge planning home prior to admitting to ED inpt program at [facility] in [place]. Pt stated that they feels safe returning home with family and that they will make it to admission appointment 4/20 at 1330. Xray appointment not needed due to pt no longer receiving tube feeds. SW will fax discharge orders/paperwork to [facility]. No other needs from care management.  10:45 AM    ABRIL Driscoll  4/18/2023

## 2023-04-18 NOTE — CONSULTS
INITIAL PSYCHIATRIC CONSULTATION                  REASON FOR REQUEST: EATING DISORDER      ASSESSMENT/RECOMMENDATIONS/PLAN :    Sleep difficulties.  Anxiety and depression by history   Eating disorder/binge purge behavior: Awaiting discharge to [facility].    Recommendations:  Continue mirtazapine 15 mg at bedtime.  Consider Rozerem 8 mg at bedtime.    MENTAL STATUS EXAMINATION:   General Appearance: Not in acute distress, resting comfortably in bed.   Behavior: Good eye contact, no bizarre ideations  Speech: Coherent.  Thought Process: Linear, clear.  Thought content: No evidence of hallucinations, delusions or paranoia.    Thought Formation: Associations are connected  Judgment: Fair  Insight : Intact  Attention : Adequate  Memory: Sufficient  Fund Of Knowledge: Average  Affect: Neutral  Mood: Congruent  Alert : Awake  Suicidal ideation: Absent  Homicidal ideation: Absent  Orientation: X 4  Comprehension: Sufficient pertaining to current medical needs  Generative thought content: Adequate.  Spontaneous conversation  Language: Intact  Gait and Ambulation: Gait and ambulation at baseline.    Musculoskeletal: No tonal abnormalities      BP (!) 141/95 (BP Location: Left arm)   Pulse 109   Temp 98.3  F (36.8  C) (Oral)   Resp 16   Wt 34.2 kg (75 lb 6.4 oz)   LMP  (LMP Unknown)   SpO2 97%   BMI 12.94 kg/m         HISTORY OF PRESENT ILLNESS:   Presenting history to include: Per [facility]:   Lenayady MAREK Kat is a 22 year old adult past medical history significant for eating disorder/binge purge behavior most recently was at [place] [facility] clinic 4 weeks ago, glycogen-storage disease 3A, diabetes mellitus type 1, DKA, anxiety depression, presented from eating disorder clinic with complaints of abdominal pain, inability to eat, recent weight loss about 10 pounds in the last 1 month.  About 4 weeks ago he was admitted to the eating disorder program but there he has noticed more pain after eating and  "also upper abdominal pain when he is hungry.  Pain is located in the upper abdomen and left upper quadrant. denied any vomiting.  He feels hungry and would like to eat.  Denied any diarrhea.  No other urinary or bowel complaints.  History is obtained from the patient and his mom in the room.  Most recently was seen in the ED on 3/9/2023 showing colitis but did not finish antibiotics.  He is being followed by [facility] and was scheduled to have colonoscopy and endoscopy but was canceled on this Monday 4 days ago, and recommended to have it done as an inpatient.. In ED vitally tachycardic with a heart rate of 110/min, lab work-up was unrevealing.  Blood sugar elevated at 360.  He has nasogastric tube placed in the ED.  We have no beds available anywhere in the system including the [facility] and there is being admitted at [facility].      Patient prefers to be addressed as they/them  Upon assessment patient was noted to be seated on the couch, visiting with their mother, pleasant and cooperative.   They did not offer any concerns or complaints in regards to hallucinations, delusions or paranoia.  Awaiting discharge to [facility] which is taking place on Thursday.  Patient is looking forward to starting eating disorder treatment.  Denies any mood fluctuations, apathy, anhedonia, lack of motivation or lack of interest.  Appetite is improving.  They are complaining of difficulty sleeping at night especially in hospital.  Has been no trials of sleeping in the past that has worked.  They said, \"it works for a little bit but does not last all night\".  Patient does not remember the name of the medication that they have tried.  No thoughts of self-harm or suicidality.  Feeling safe in hospital.  No complaints of pain or physical discomfort.  Does not report of worsening anxiety in the hospital.    Review of Systems:As per HPI. Remainders of 12 point review of systems negative.  Psychiatric ROS:  Change of " Interest/Anhedonia:  No  Appetite/Weight Changes: No  Concentration Changes:No  Impaired Energy:No  Impaired Sleep: y   Anxiety/Panic:No  Tearfulness:No  Depression:No  Psychosis: No  Irritability:No  SI/VI/HI: No,No,No  Lucía: No      Total time:  80 minutes spent on chart and medication and labs review  pre-charting, face to face assessment, counseling and/or coordination of care.     PFSH reviewed  and not pertinent to chief complaint/reason for visit  BP (!) 141/95 (BP Location: Left arm)   Pulse 109   Temp 98.3  F (36.8  C) (Oral)   Resp 16   Wt 34.2 kg (75 lb 6.4 oz)   LMP  (LMP Unknown)   SpO2 97%   BMI 12.94 kg/m    No results found for: AMPHET, PCP, BARBIT, OXYCODONE, THC, ETOH  @24HOURRESULTS@  Recent Results (from the past 72 hour(s))   Magnesium    Collection Time: 04/15/23  1:40 PM   Result Value Ref Range    Magnesium 1.8 1.8 - 2.6 mg/dL   Phosphorus    Collection Time: 04/15/23  1:40 PM   Result Value Ref Range    Phosphorus 3.9 2.5 - 4.5 mg/dL   Glucose by meter    Collection Time: 04/15/23  2:14 PM   Result Value Ref Range    GLUCOSE BY METER POCT 420 (H) 70 - 99 mg/dL   Glucose by meter    Collection Time: 04/15/23  5:14 PM   Result Value Ref Range    GLUCOSE BY METER POCT 360 (H) 70 - 99 mg/dL   Glucose by meter    Collection Time: 04/15/23  9:13 PM   Result Value Ref Range    GLUCOSE BY METER POCT 443 (H) 70 - 99 mg/dL   Glucose by meter    Collection Time: 04/15/23 10:52 PM   Result Value Ref Range    GLUCOSE BY METER POCT 381 (H) 70 - 99 mg/dL   Magnesium    Collection Time: 04/15/23 11:31 PM   Result Value Ref Range    Magnesium 2.2 1.8 - 2.6 mg/dL   Phosphorus    Collection Time: 04/15/23 11:31 PM   Result Value Ref Range    Phosphorus 4.5 2.5 - 4.5 mg/dL   Glucose by meter    Collection Time: 04/16/23  2:32 AM   Result Value Ref Range    GLUCOSE BY METER POCT 282 (H) 70 - 99 mg/dL   Glucose by meter    Collection Time: 04/16/23  6:27 AM   Result Value Ref Range    GLUCOSE BY METER  POCT 286 (H) 70 - 99 mg/dL   Magnesium    Collection Time: 04/16/23  8:23 AM   Result Value Ref Range    Magnesium 1.8 1.8 - 2.6 mg/dL   Phosphorus    Collection Time: 04/16/23  8:23 AM   Result Value Ref Range    Phosphorus 4.0 2.5 - 4.5 mg/dL   Potassium    Collection Time: 04/16/23  8:23 AM   Result Value Ref Range    Potassium 4.1 3.5 - 5.0 mmol/L   Glucose by meter    Collection Time: 04/16/23  9:13 AM   Result Value Ref Range    GLUCOSE BY METER POCT 249 (H) 70 - 99 mg/dL   Glucose by meter    Collection Time: 04/16/23  1:15 PM   Result Value Ref Range    GLUCOSE BY METER POCT 338 (H) 70 - 99 mg/dL   ECG 12-LEAD WITH MUSE (LHE)    Collection Time: 04/16/23  2:05 PM   Result Value Ref Range    Systolic Blood Pressure  mmHg    Diastolic Blood Pressure  mmHg    Ventricular Rate 111 BPM    Atrial Rate 111 BPM    NM Interval 116 ms    QRS Duration 70 ms     ms    QTc 435 ms    P Axis 69 degrees    R AXIS 69 degrees    T Axis 52 degrees    Interpretation ECG       Sinus tachycardia  Otherwise normal ECG  When compared with ECG of 09-MAY-2022 18:24,  ST no longer depressed in Anterior leads  Nonspecific T wave abnormality no longer evident in Lateral leads  Confirmed by LARRY ELENA MD LOC:WW ([id number]) on 4/17/2023 8:09:02 AM     Magnesium    Collection Time: 04/16/23  2:21 PM   Result Value Ref Range    Magnesium 1.6 (L) 1.8 - 2.6 mg/dL   Phosphorus    Collection Time: 04/16/23  2:21 PM   Result Value Ref Range    Phosphorus 3.7 2.5 - 4.5 mg/dL   Glucose by meter    Collection Time: 04/16/23  5:19 PM   Result Value Ref Range    GLUCOSE BY METER POCT 437 (H) 70 - 99 mg/dL   Glucose by meter    Collection Time: 04/16/23  9:21 PM   Result Value Ref Range    GLUCOSE BY METER POCT 362 (H) 70 - 99 mg/dL   Phosphorus    Collection Time: 04/16/23  9:56 PM   Result Value Ref Range    Phosphorus 3.9 2.5 - 4.5 mg/dL   Glucose by meter    Collection Time: 04/17/23 12:31 AM   Result Value Ref Range    GLUCOSE BY METER POCT  310 (H) 70 - 99 mg/dL   Glucose by meter    Collection Time: 04/17/23  4:06 AM   Result Value Ref Range    GLUCOSE BY METER POCT 393 (H) 70 - 99 mg/dL   Phosphorus    Collection Time: 04/17/23  5:53 AM   Result Value Ref Range    Phosphorus 3.7 2.5 - 4.5 mg/dL   Magnesium    Collection Time: 04/17/23  5:53 AM   Result Value Ref Range    Magnesium 1.8 1.8 - 2.6 mg/dL   Potassium    Collection Time: 04/17/23  5:53 AM   Result Value Ref Range    Potassium 4.4 3.5 - 5.0 mmol/L   Glucose by meter    Collection Time: 04/17/23  7:35 AM   Result Value Ref Range    GLUCOSE BY METER POCT 368 (H) 70 - 99 mg/dL   Glucose by meter    Collection Time: 04/17/23 12:44 PM   Result Value Ref Range    GLUCOSE BY METER POCT 313 (H) 70 - 99 mg/dL   Phosphorus    Collection Time: 04/17/23  2:10 PM   Result Value Ref Range    Phosphorus 3.9 2.5 - 4.5 mg/dL   ECG 12-LEAD WITH MUSE (LHE)    Collection Time: 04/17/23  5:09 PM   Result Value Ref Range    Systolic Blood Pressure  mmHg    Diastolic Blood Pressure  mmHg    Ventricular Rate 121 BPM    Atrial Rate 121 BPM    DE Interval 112 ms    QRS Duration 72 ms     ms    QTc 431 ms    P Axis 72 degrees    R AXIS 79 degrees    T Axis 60 degrees    Interpretation ECG       Sinus tachycardia  Right atrial enlargement  Borderline ECG  When compared with ECG of 16-APR-2023 14:05,  No significant change was found     Magnesium    Collection Time: 04/17/23  5:19 PM   Result Value Ref Range    Magnesium 1.9 1.8 - 2.6 mg/dL   Potassium    Collection Time: 04/17/23  5:19 PM   Result Value Ref Range    Potassium 4.2 3.5 - 5.0 mmol/L   Glucose by meter    Collection Time: 04/17/23  6:48 PM   Result Value Ref Range    GLUCOSE BY METER POCT 159 (H) 70 - 99 mg/dL   Glucose by meter    Collection Time: 04/17/23  9:16 PM   Result Value Ref Range    GLUCOSE BY METER POCT 341 (H) 70 - 99 mg/dL   Phosphorus    Collection Time: 04/17/23 10:10 PM   Result Value Ref Range    Phosphorus 5.1 (H) 2.5 - 4.5 mg/dL    Glucose by meter    Collection Time: 04/17/23 11:58 PM   Result Value Ref Range    GLUCOSE BY METER POCT 282 (H) 70 - 99 mg/dL   Phosphorus    Collection Time: 04/18/23  7:10 AM   Result Value Ref Range    Phosphorus 4.8 (H) 2.5 - 4.5 mg/dL   Magnesium    Collection Time: 04/18/23  7:10 AM   Result Value Ref Range    Magnesium 1.8 1.8 - 2.6 mg/dL   Potassium    Collection Time: 04/18/23  7:10 AM   Result Value Ref Range    Potassium 4.1 3.5 - 5.0 mmol/L   Glucose by meter    Collection Time: 04/18/23  7:22 AM   Result Value Ref Range    GLUCOSE BY METER POCT 167 (H) 70 - 99 mg/dL   Glucose by meter    Collection Time: 04/18/23 11:19 AM   Result Value Ref Range    GLUCOSE BY METER POCT 126 (H) 70 - 99 mg/dL       PMH:   Past Medical History:   Diagnosis Date     Acute kidney injury (H) 5/11/2022     Amylo-1,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 There is no known cause, and no genetic testing has been completed.     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 History of GH treatment for a few years from age [age] 02/2022: IGF-1 72, Cortisol 15, ACTH 21, TSH 1.72     Prolonged QT interval 5/11/2022     Severe protein-calorie malnutrition (H) 5/11/2022     Transaminitis 5/11/2022     Uncontrolled diabetes mellitus secondary to pancreatic insufficiency  11/30/2014    Problem list name updated by automated process. Provider to review     Vitamin D deficiency 3/11/2012           Current Medications:Scheduled Meds:    collagenase   Topical Daily     insulin aspart  1-7 Units Subcutaneous TID AC     insulin aspart   Subcutaneous TID w/meals     insulin glargine  30 Units Subcutaneous At Bedtime     metoclopramide  5 mg Oral 4x Daily AC & HS     mirtazapine  15 mg Oral At Bedtime     multivitamin, therapeutic  1 tablet Oral Daily     pantoprazole  40 mg Intravenous BID     polyethylene glycol  17 g Oral Daily     sodium chloride (PF)  3 mL Intracatheter Q8H     Continuous Infusions:  PRN Meds:.glucose **OR** dextrose **OR** glucagon, lidocaine 4%, lidocaine (buffered or not buffered), melatonin, naloxone **OR** naloxone **OR** naloxone **OR** naloxone, ondansetron **OR** ondansetron, oxyCODONE, oxymetazoline, phenol MT, sodium chloride (PF)                Family History: PERSONALLY REVIEWED.  Family History   Problem Relation Age of Onset     Hearing Loss Father      Pertinent Family hx not pertinent to Chief Complaint or reason for visit.     Social History:  PERSONALLY REVIEWED.  Social History     Socioeconomic History     Marital status: Single     Spouse name: Not on file     Number of children: Not on file     Years of education: Not on file     Highest education level: Not on file   Occupational History     Not on file   Tobacco Use     Smoking status: Never     Smokeless tobacco: Never     Tobacco comments:     no second hand smoke exposure at home   Vaping Use     Vaping status: Some Days     Substances: Nicotine, THC   Substance and Sexual Activity     Alcohol use: No     Drug use: Yes     Types: Marijuana     Comment: every other day user     Sexual activity: Not on file   Other Topics Concern     Not on file   Social History Narrative    Lives with parents and younger brother.  Currently in 12th grade.  Loves to sing     Social Determinants of Health      Financial Resource Strain: Not on file   Food Insecurity: Not on file   Transportation Needs: Not on file   Physical Activity: Not on file   Stress: Not on file   Social Connections: Not on file   Intimate Partner Violence: Not on file   Housing Stability: Not on file    not pertinent to Chief Complaint or reason for visit.             Allergies as of 06/01/2014 Reviewed     Review of Systems:As per HPI. Remainders of 12 point review of systems negative.    Review of Pertinent Laboratory:      PERSONALLY REVIEWED.    Physical Exam: Temp:  [98  F (36.7  C)-98.4  F (36.9  C)] 98.3  F (36.8  C)  Pulse:  [102-124] 109  Resp:  [16] 16  BP: (123-141)/(80-95) 141/95  SpO2:  [97 %-99 %] 97 %   Vitals: reviewed in chart     Physical exam as per medical team: reviewed in chart      diagnoses, risk and benefits of medications discussed with staff. Care coordination with care management team.   Thank you for this consultation.       Claire Larson; NP  [facility]        This note was created with the help of Dragon dictation system. Grammatical and typing errors are not intentional.

## 2023-04-20 ENCOUNTER — PATIENT OUTREACH (OUTPATIENT)
Dept: CARE COORDINATION | Facility: CLINIC | Age: 23
End: 2023-04-20
Payer: COMMERCIAL

## 2023-04-20 NOTE — PROGRESS NOTES
[facility] Care Resource [place]    Background: Transitional Care Management program identified per system criteria and reviewed by [facility] Resource Center team for possible outreach.    Assessment: Upon chart review, CCR Team member will not proceed with patient outreach related to this episode of Transitional Care Management program due to reason below:    Patient has presented to Emergency Department, been readmitted to hospital, or transferred to another hospital.  Per chart review, Admitting to ED inpt program at [facility] in [place], appointment 4/20 at 1330.    Plan: Transitional Care Management episode addressed appropriately per reason noted above.      Alisa Diaz RN  Connected Care Resource Center, [facility]    *Connected Care Resource Team does NOT follow patient ongoing. Referrals are identified based on internal discharge reports and the outreach is to ensure patient has an understanding of their discharge instructions.

## 2023-04-21 ENCOUNTER — TRANSFERRED RECORDS (OUTPATIENT)
Dept: HEALTH INFORMATION MANAGEMENT | Facility: CLINIC | Age: 23
End: 2023-04-21

## 2023-04-26 ENCOUNTER — VIRTUAL VISIT (OUTPATIENT)
Dept: PEDIATRICS | Facility: CLINIC | Age: 23
End: 2023-04-26
Attending: MEDICAL GENETICS
Payer: COMMERCIAL

## 2023-04-26 VITALS — HEIGHT: 64 IN | BODY MASS INDEX: 13.66 KG/M2 | WEIGHT: 80 LBS

## 2023-04-26 DIAGNOSIS — E74.03 GLYCOGEN STORAGE DISEASE III (H): Primary | ICD-10-CM

## 2023-04-26 PROCEDURE — 99214 OFFICE O/P EST MOD 30 MIN: CPT | Mod: VID | Performed by: MEDICAL GENETICS

## 2023-04-26 PROCEDURE — G0463 HOSPITAL OUTPT CLINIC VISIT: HCPCS | Mod: PN,GT | Performed by: MEDICAL GENETICS

## 2023-04-26 ASSESSMENT — PAIN SCALES - GENERAL: PAINLEVEL: NO PAIN (0)

## 2023-04-26 NOTE — NURSING NOTE
Is the patient currently in the state of [place]? YES    Visit mode:VIDEO    If the visit is dropped, the patient can be reconnected by: VIDEO VISIT: Text to cell phone: [phone]    Will anyone else be joining the visit? NO      How would you like to obtain your AVS? MyChart    Are changes needed to the allergy or medication list? NO    Reason for visit: Video Visit (Follow up)

## 2023-04-26 NOTE — LETTER
2023      RE: Kevin Stephens  [address] [place] [address]     Dear Colleague,    Thank you for the opportunity to participate in the care of your patient, Kevin Stephens, at the [facility] at [facility]. Please see a copy of my visit note below.    Pediatric Metabolism Clinic Return Patient Video Visit  Name:  Kevin Stephens  :   2000  MRN:   [id number]  PCP:  Vanessa Wright  Date of Visit: 2023    Managing Metabolic Center:  [facility]     Chief Complaint:  Brianna is a 23 year old adult whom I saw in follow up via video visit for glycogen storage disease IIIa.    Assessment:    Brianna has significant health issues related to her ongoing eating disorder and complex diabetes mellitus.  She has glycogen storage disease, type IIIa and so has some persisting elevated liver enzyme tests and CK elevation related to this combined muscle-liver glycogen storage disease.  As is the case for many individuals with this particular form of GSD, her liver size has become less prominent with time and the degree of biochemical disturbance with lactic acidosis and hypoglycemia have considerably ameliorated.  They continue to be impacted by the potential for ongoing muscle damage as evidenced by chronic elevation of CK and for that reason should continue to be monitored for this condition.  They have had a recent echocardiogram (2023) and given this normal finding does not need a repeat echo for another year.  Of note, their blood pressure was noted to be elevated on the recorded blood pressure on the day of that visit.  They asked whether Reglan impacts blood pressure or heart rate; I will seek information about this and get back to them.    Plan:    1. Ordered at this visit:  No orders of the defined types were placed in this encounter.  I will place future orders for GSDIII  monitoring ( has had CMP evaluations, so needs only CK at this time)  2. Medications: Continue medications as noted below.  There are not specific medications prescribed for their glycogen storage disease  3. Continue to observe emergency precautions as previously discussed.  Our on-call metabolic service is available 24 hours/day by calling the page  ([phone]) and asking for the Genetics and Metabolism doctor on call.    4. Return to the Pediatric [facility] in 3 months for follow-up.      ___________  History of Present Illness:  Visit Diagnosis:  Glycogen storage disease IIIa (H)    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 development and puberty, not elsewhere classified    Vitamin D deficiency    Family history of congenital hearing loss    Uncontrolled diabetes mellitus secondary to pancreatic insufficiency    Depression with suicidal ideation    Hyperglycemia    Diabetic ketoacidosis without coma associated with type 1 diabetes mellitus (H)    Infected sebaceous cyst    Transaminitis    Prolonged QT interval    Acute kidney injury (H)    Severe protein-calorie malnutrition (H)    Binge-purge behavior    Partial hypopituitarism (H)    Amylo-1,6-glucosidase deficiency (H)    SAMEERA (generalized anxiety disorder)    Gender dysphoria    Moderate episode of recurrent major depressive disorder (H)    Uncontrolled type 1 diabetes mellitus with hyperglycemia (H)    Loss of weight     Interval information noted at this visit:  Brianna has been undergoing treatment for their notable eating disorder through the [facility].  They feel that they have been making some progress but indicates that the process of this treatment plan is very challenging.  They find it even more difficult because of their gastroparesis which is requiring them to have relatively small meals eaten frequently.    Brianna was last seen in our clinic on 1/25/2023.  Since  the last clinic visit, Brianna was hospitalized twice, on 4/13 and again on 4/15-18.  They have been experiencing persisting abdominal pain and is in the process of further evaluation for colitis.  They will have a colonoscopy in mid June.    Other health services currently received: primary care, gastroenterology, and psychiatry   Current research study participation: None             Personal History:  Currently receiving treatment for their eating disorder, see above  Current insurance status state/federal program (Medicaid/Medicare).       Family History Update: There was no new family history elicited at this visit     I have reviewed Kevin's past medical history, family history, social history, medications and allergies as documented in the patient's electronic medical record.  There were no additional findings except as noted.    Medications:  Current Outpatient Medications   Medication    blood glucose monitoring (NO BRAND SPECIFIED) meter device kit    blood glucose monitoring (ONE TOUCH DELICA) lancets    blood glucose monitoring (ONETOUCH VERIO IQ) test strip    collagenase (SANTYL) 250 UNIT/GM external ointment    Continuous Blood Gluc Sensor (DEXCOM G6 SENSOR) MISC    Continuous Blood Gluc Sensor (FREESTYLE PASQUALE 2 SENSOR) MISC    Continuous Blood Gluc Transmit (DEXCOM G6 TRANSMITTER) MISC    ibuprofen (ADVIL/MOTRIN) 200 MG tablet    insulin aspart (NOVOLOG PEN) 100 UNIT/ML pen    insulin aspart (NOVOLOG PEN) 100 UNIT/ML pen    insulin glargine (LANTUS PEN) 100 UNIT/ML pen    insulin pen needle (BD CALLI U/F) 32G X 4 MM miscellaneous    metoclopramide (REGLAN) 5 MG tablet    mirtazapine (REMERON) 15 MG tablet    multivitamin, therapeutic (THERA-VIT) TABS tablet    omeprazole (PRILOSEC) 40 MG DR capsule    polyethylene glycol (MIRALAX) 17 GM/Dose powder    ramelteon (ROZEREM) 8 MG tablet    thin (NO BRAND SPECIFIED) lancets    acetaminophen (TYLENOL) 500 MG tablet    Acetone, Urine, Test (KETONE TEST) STRP     acetone, Urine, test STRP    alcohol swab prep pads    alcohol swab prep pads    blood glucose (NO BRAND SPECIFIED) test strip    blood glucose calibration (NO BRAND SPECIFIED) solution     No current facility-administered medications for this visit.      Allergies:  Allergies   Allergen Reactions    Fluoxetine      Other reaction(s): Mental Status Change  Suicidal thoughts, previously tolerated sertraline    Morphine Sulfate      No exposure but grandfather has the allergy to it    Tylenol [Acetaminophen]      Has glycogen storage disease and should not receive Tylenol, per GI.      Physical Examination:  GENERAL: Very slender young person alert and interactive  EYES: Eyes grossly normal to inspection.  No discharge or erythema, or obvious scleral/conjunctival abnormalities.  RESP: No audible wheeze, cough, or visible cyanosis.  No visible retractions or increased work of breathing.    SKIN: Visible skin clear. No significant rash, abnormal pigmentation or lesions.  NEURO: Cranial nerves grossly intact.  Mentation and speech appropriate for age.  PSYCH: Mentation appears normal, affect normal/bright, judgement and insight intact, normal speech and appearance well-groomed.  ---------------  HIRAL GRAVES M.D., FAAP, [facility]  Professor   Division of Genetics and Metabolism  Department of Pediatrics  [facility]    Routed to family in Comm Mgt  Also to  Vanessa Wright  Care Team   -------  Video-Visit Details  Type of service:  Video Visit  Video Start Time (time video started): 9:37 AM  Video End Time (time video stopped): 9:49 AM  Originating Location (pt. Location): [facility]  Distant Location (provider location):  On-site  Mode of Communication:  Video Conference via [employer]  Physician has received verbal consent for a Video Visit from the patient? Yes    30 minutes spent on the date of the encounter doing chart review, history and exam, documentation and further activities as noted  above        Please do not hesitate to contact me if you have any questions/concerns.     Sincerely,       Avani Oliver MD

## 2023-04-26 NOTE — PATIENT INSTRUCTIONS
Adult Metabolism Clinic  [facility]     If questions/concerns, feel free to reach our nurse coordinator at the below number or send a [employer] message for any non-urgent questions/concerns.    If any immediate needs because of illness or symptoms, contact the Pediatric Genetic/Metabolic physician on-call via [phone].    Team contact numbers:   RN Care Coordinator: [phone]   Suzette Lindsay RD, LD (dietitian): [phone] or [email or web address]  Genetic/Metabolic Physician On-call: [phone]     Scheduling numbers:  General scheduling (Adult Call Center): [phone]                Our staff will make every effort to schedule your follow-up appointment in a timely fashion. If you don't hear from us in the next two weeks, please contact us for this scheduling.

## 2023-05-03 PROCEDURE — 93272 ECG/REVIEW INTERPRET ONLY: CPT | Performed by: INTERNAL MEDICINE

## 2023-05-16 NOTE — PROGRESS NOTES
Pediatric Metabolism Clinic Return Patient Video Visit  Name:  Kevin Stephens  :   2000  MRN:   [id number]  PCP:  Vanessa Wright  Date of Visit: 2023    Managing Metabolic Center:  [facility] Pediatric [facility]     Chief Complaint:  Brianna is a 23 year old adult whom I saw in follow up via video visit for glycogen storage disease IIIa.    Assessment:    Brianna has significant health issues related to her ongoing eating disorder and complex diabetes mellitus.  She has glycogen storage disease, type IIIa and so has some persisting elevated liver enzyme tests and CK elevation related to this combined muscle-liver glycogen storage disease.  As is the case for many individuals with this particular form of GSD, her liver size has become less prominent with time and the degree of biochemical disturbance with lactic acidosis and hypoglycemia have considerably ameliorated.  They continue to be impacted by the potential for ongoing muscle damage as evidenced by chronic elevation of CK and for that reason should continue to be monitored for this condition.  They have had a recent echocardiogram (2023) and given this normal finding does not need a repeat echo for another year.  Of note, their blood pressure was noted to be elevated on the recorded blood pressure on the day of that visit.  They asked whether Reglan impacts blood pressure or heart rate; I will seek information about this and get back to them.    Plan:    1. Ordered at this visit:  No orders of the defined types were placed in this encounter.  I will place future orders for GSDIII monitoring ( has had CMP evaluations, so needs only CK at this time)  2. Medications: Continue medications as noted below.  There are not specific medications prescribed for their glycogen storage disease  3. Continue to observe emergency precautions as previously discussed.  Our on-call metabolic service is available 24 hours/day by calling the page   ([phone]) and asking for the Genetics and Metabolism doctor on call.    4. Return to the Pediatric [facility] in 3 months for follow-up.      ___________  History of Present Illness:  Visit Diagnosis:  Glycogen storage disease IIIa (H)    Patient Active Problem List   Diagnosis     GH Deficiency     Glycogen storage disease IIIa      Delay in sexual development and puberty, not elsewhere classified     Vitamin D deficiency     Family history of congenital hearing loss     Uncontrolled diabetes mellitus secondary to pancreatic insufficiency     Depression with suicidal ideation     Hyperglycemia     Diabetic ketoacidosis without coma associated with type 1 diabetes mellitus (H)     Infected sebaceous cyst     Transaminitis     Prolonged QT interval     Acute kidney injury (H)     Severe protein-calorie malnutrition (H)     Binge-purge behavior     Partial hypopituitarism (H)     Amylo-1,6-glucosidase deficiency (H)     SAMEERA (generalized anxiety disorder)     Gender dysphoria     Moderate episode of recurrent major depressive disorder (H)     Uncontrolled type 1 diabetes mellitus with hyperglycemia (H)     Loss of weight     Interval information noted at this visit:  Brianna has been undergoing treatment for their notable eating disorder through the [facility].  They feel that they have been making some progress but indicates that the process of this treatment plan is very challenging.  They find it even more difficult because of their gastroparesis which is requiring them to have relatively small meals eaten frequently.    Brianna was last seen in our clinic on 1/25/2023.  Since the last clinic visit, Brianna was hospitalized twice, on 4/13 and again on 4/15-18.  They have been experiencing persisting abdominal pain and is in the process of further evaluation for colitis.  They will have a colonoscopy in mid June.    Other health services currently received: primary care, gastroenterology, and psychiatry    Current research study participation: None             Personal History:  Currently receiving treatment for their eating disorder, see above  Current insurance status state/federal program (Medicaid/Medicare).       Family History Update: There was no new family history elicited at this visit     I have reviewed Kevin's past medical history, family history, social history, medications and allergies as documented in the patient's electronic medical record.  There were no additional findings except as noted.    Medications:  Current Outpatient Medications   Medication     blood glucose monitoring (NO BRAND SPECIFIED) meter device kit     blood glucose monitoring (ONE TOUCH DELICA) lancets     blood glucose monitoring (ONETOUCH VERIO IQ) test strip     collagenase (SANTYL) 250 UNIT/GM external ointment     Continuous Blood Gluc Sensor (DEXCOM G6 SENSOR) MISC     Continuous Blood Gluc Sensor (FREESTYLE PASQUALE 2 SENSOR) MISC     Continuous Blood Gluc Transmit (DEXCOM G6 TRANSMITTER) MISC     ibuprofen (ADVIL/MOTRIN) 200 MG tablet     insulin aspart (NOVOLOG PEN) 100 UNIT/ML pen     insulin aspart (NOVOLOG PEN) 100 UNIT/ML pen     insulin glargine (LANTUS PEN) 100 UNIT/ML pen     insulin pen needle (BD CALLI U/F) 32G X 4 MM miscellaneous     metoclopramide (REGLAN) 5 MG tablet     mirtazapine (REMERON) 15 MG tablet     multivitamin, therapeutic (THERA-VIT) TABS tablet     omeprazole (PRILOSEC) 40 MG DR capsule     polyethylene glycol (MIRALAX) 17 GM/Dose powder     ramelteon (ROZEREM) 8 MG tablet     thin (NO BRAND SPECIFIED) lancets     acetaminophen (TYLENOL) 500 MG tablet     Acetone, Urine, Test (KETONE TEST) STRP     acetone, Urine, test STRP     alcohol swab prep pads     alcohol swab prep pads     blood glucose (NO BRAND SPECIFIED) test strip     blood glucose calibration (NO BRAND SPECIFIED) solution     No current facility-administered medications for this visit.      Allergies:  Allergies   Allergen Reactions      Fluoxetine      Other reaction(s): Mental Status Change  Suicidal thoughts, previously tolerated sertraline     Morphine Sulfate      No exposure but grandfather has the allergy to it     Tylenol [Acetaminophen]      Has glycogen storage disease and should not receive Tylenol, per GI.      Physical Examination:  GENERAL: Very slender young person alert and interactive  EYES: Eyes grossly normal to inspection.  No discharge or erythema, or obvious scleral/conjunctival abnormalities.  RESP: No audible wheeze, cough, or visible cyanosis.  No visible retractions or increased work of breathing.    SKIN: Visible skin clear. No significant rash, abnormal pigmentation or lesions.  NEURO: Cranial nerves grossly intact.  Mentation and speech appropriate for age.  PSYCH: Mentation appears normal, affect normal/bright, judgement and insight intact, normal speech and appearance well-groomed.  ---------------  HIRAL GRAVES M.D., FAAP, [facility]  Professor   Division of Genetics and Metabolism  Department of Pediatrics  [facility]    Routed to family in Comm Mgt  Also to  Vanessa Wright  Care Team   -------  Video-Visit Details  Type of service:  Video Visit  Video Start Time (time video started): 9:37 AM  Video End Time (time video stopped): 9:49 AM  Originating Location (pt. Location): [facility]  Distant Location (provider location):  On-site  Mode of Communication:  Video Conference via [facility]  Physician has received verbal consent for a Video Visit from the patient? Yes    30 minutes spent on the date of the encounter doing chart review, history and exam, documentation and further activities as noted above

## 2023-06-01 ENCOUNTER — HEALTH MAINTENANCE LETTER (OUTPATIENT)
Age: 23
End: 2023-06-01

## 2023-06-07 ENCOUNTER — HOSPITAL ENCOUNTER (EMERGENCY)
Facility: CLINIC | Age: 23
Discharge: HOME | End: 2023-06-07
Attending: FAMILY MEDICINE | Admitting: FAMILY MEDICINE
Payer: COMMERCIAL

## 2023-06-07 VITALS
HEART RATE: 95 BPM | OXYGEN SATURATION: 99 % | SYSTOLIC BLOOD PRESSURE: 120 MMHG | BODY MASS INDEX: 13.39 KG/M2 | RESPIRATION RATE: 25 BRPM | TEMPERATURE: 98.3 F | DIASTOLIC BLOOD PRESSURE: 75 MMHG | WEIGHT: 78 LBS

## 2023-06-07 DIAGNOSIS — E43 SEVERE PROTEIN-CALORIE MALNUTRITION (H): ICD-10-CM

## 2023-06-07 DIAGNOSIS — E74.03 GLYCOGEN STORAGE DISEASE III (H): ICD-10-CM

## 2023-06-07 DIAGNOSIS — F50.9 EATING DISORDER, UNSPECIFIED TYPE: ICD-10-CM

## 2023-06-07 DIAGNOSIS — E10.65 UNCONTROLLED TYPE 1 DIABETES MELLITUS WITH HYPERGLYCEMIA (H): ICD-10-CM

## 2023-06-07 LAB
ALBUMIN SERPL-MCNC: 4.1 G/DL (ref 3.5–5)
ALP SERPL-CCNC: 158 U/L (ref 45–120)
ALT SERPL W P-5'-P-CCNC: 58 U/L (ref 0–45)
ANION GAP SERPL CALCULATED.3IONS-SCNC: 14 MMOL/L (ref 5–18)
AST SERPL W P-5'-P-CCNC: 63 U/L (ref 0–40)
BILIRUB DIRECT SERPL-MCNC: 0.2 MG/DL
BILIRUB SERPL-MCNC: 0.9 MG/DL (ref 0–1)
BUN SERPL-MCNC: 22 MG/DL (ref 8–22)
CALCIUM SERPL-MCNC: 9.2 MG/DL (ref 8.5–10.5)
CHLORIDE BLD-SCNC: 97 MMOL/L (ref 98–107)
CK SERPL-CCNC: 238 U/L (ref 30–190)
CO2 SERPL-SCNC: 24 MMOL/L (ref 22–31)
CREAT SERPL-MCNC: 0.81 MG/DL (ref 0.6–1.3)
ERYTHROCYTE [DISTWIDTH] IN BLOOD BY AUTOMATED COUNT: 12.4 % (ref 10–15)
GFR SERPL CREATININE-BSD FRML MDRD: >90 ML/MIN/1.73M2
GLUCOSE BLD-MCNC: 377 MG/DL (ref 70–125)
GLUCOSE BLDC GLUCOMTR-MCNC: 213 MG/DL (ref 70–99)
GLUCOSE BLDC GLUCOMTR-MCNC: 351 MG/DL (ref 70–99)
GLUCOSE BLDC GLUCOMTR-MCNC: 352 MG/DL (ref 70–99)
GLUCOSE BLDC GLUCOMTR-MCNC: 396 MG/DL (ref 70–99)
GLUCOSE BLDC GLUCOMTR-MCNC: 413 MG/DL (ref 70–99)
HCT VFR BLD AUTO: 39 % (ref 35–53)
HGB BLD-MCNC: 12.9 G/DL (ref 11.7–17.7)
KETONES BLD-SCNC: 1.51 MMOL/L
MAGNESIUM SERPL-MCNC: 1.9 MG/DL (ref 1.8–2.6)
MCH RBC QN AUTO: 27.5 PG (ref 26.5–33)
MCHC RBC AUTO-ENTMCNC: 33.1 G/DL (ref 31.5–36.5)
MCV RBC AUTO: 83 FL (ref 78–100)
PLATELET # BLD AUTO: 233 10E3/UL (ref 150–450)
POTASSIUM BLD-SCNC: 4.9 MMOL/L (ref 3.5–5)
PROT SERPL-MCNC: 8.1 G/DL (ref 6–8)
RBC # BLD AUTO: 4.69 10E6/UL (ref 3.8–5.9)
SODIUM SERPL-SCNC: 135 MMOL/L (ref 136–145)
WBC # BLD AUTO: 6.8 10E3/UL (ref 4–11)

## 2023-06-07 PROCEDURE — 96361 HYDRATE IV INFUSION ADD-ON: CPT

## 2023-06-07 PROCEDURE — 83735 ASSAY OF MAGNESIUM: CPT | Performed by: FAMILY MEDICINE

## 2023-06-07 PROCEDURE — 258N000003 HC RX IP 258 OP 636: Performed by: EMERGENCY MEDICINE

## 2023-06-07 PROCEDURE — 82310 ASSAY OF CALCIUM: CPT | Performed by: FAMILY MEDICINE

## 2023-06-07 PROCEDURE — 99284 EMERGENCY DEPT VISIT MOD MDM: CPT | Mod: 25

## 2023-06-07 PROCEDURE — 250N000012 HC RX MED GY IP 250 OP 636 PS 637: Performed by: FAMILY MEDICINE

## 2023-06-07 PROCEDURE — 82962 GLUCOSE BLOOD TEST: CPT

## 2023-06-07 PROCEDURE — 96374 THER/PROPH/DIAG INJ IV PUSH: CPT

## 2023-06-07 PROCEDURE — 250N000012 HC RX MED GY IP 250 OP 636 PS 637: Performed by: EMERGENCY MEDICINE

## 2023-06-07 PROCEDURE — 82248 BILIRUBIN DIRECT: CPT | Performed by: FAMILY MEDICINE

## 2023-06-07 PROCEDURE — 93005 ELECTROCARDIOGRAM TRACING: CPT | Performed by: FAMILY MEDICINE

## 2023-06-07 PROCEDURE — 82550 ASSAY OF CK (CPK): CPT | Performed by: FAMILY MEDICINE

## 2023-06-07 PROCEDURE — 82010 KETONE BODYS QUAN: CPT | Performed by: FAMILY MEDICINE

## 2023-06-07 PROCEDURE — 258N000003 HC RX IP 258 OP 636: Performed by: FAMILY MEDICINE

## 2023-06-07 PROCEDURE — 96376 TX/PRO/DX INJ SAME DRUG ADON: CPT

## 2023-06-07 PROCEDURE — 85027 COMPLETE CBC AUTOMATED: CPT | Performed by: FAMILY MEDICINE

## 2023-06-07 PROCEDURE — 36415 COLL VENOUS BLD VENIPUNCTURE: CPT | Performed by: FAMILY MEDICINE

## 2023-06-07 RX ORDER — SODIUM CHLORIDE 9 MG/ML
INJECTION, SOLUTION INTRAVENOUS CONTINUOUS
Status: DISCONTINUED | OUTPATIENT
Start: 2023-06-07 | End: 2023-06-07 | Stop reason: HOSPADM

## 2023-06-07 RX ADMIN — SODIUM CHLORIDE 4 UNITS: 9 INJECTION, SOLUTION INTRAVENOUS at 14:51

## 2023-06-07 RX ADMIN — SODIUM CHLORIDE 1000 ML: 9 INJECTION, SOLUTION INTRAVENOUS at 17:13

## 2023-06-07 RX ADMIN — SODIUM CHLORIDE 6 UNITS: 9 INJECTION, SOLUTION INTRAVENOUS at 17:13

## 2023-06-07 RX ADMIN — SODIUM CHLORIDE 6 UNITS: 9 INJECTION, SOLUTION INTRAVENOUS at 15:46

## 2023-06-07 ASSESSMENT — ENCOUNTER SYMPTOMS
ABDOMINAL PAIN: 1
SORE THROAT: 0
APPETITE CHANGE: 1
VOMITING: 0
LIGHT-HEADEDNESS: 1
FEVER: 0
UNEXPECTED WEIGHT CHANGE: 1

## 2023-06-07 ASSESSMENT — ACTIVITIES OF DAILY LIVING (ADL)
ADLS_ACUITY_SCORE: 37
ADLS_ACUITY_SCORE: 37

## 2023-06-07 NOTE — ED PROVIDER NOTES
EMERGENCY DEPARTMENT ENCOUNTER      NAME: Kevin Stephens  AGE: 23 year old adult  YOB: 2000  MRN: [id number]  EVALUATION DATE & TIME: 6/7/2023  1:14 PM    PCP: Vanessa Wright    ED PROVIDER: Yobany Durán M.D.    Chief Complaint   Patient presents with     Eating Disorder       FINAL IMPRESSION:  1. Glycogen storage disease IIIa     2. Uncontrolled type 1 diabetes mellitus with hyperglycemia (H)    3. Severe protein-calorie malnutrition (H)    4. Eating disorder, unspecified type        ED COURSE & MEDICAL DECISION MAKING:    Pertinent Labs & Imaging studies independently interpreted by me. (See chart for details)  1:18 PM Patient seen and examined,, reviewed eating disorder clinic note from today as well as prior emergency department and hospital admission from April 13 to 16, 2023. Patient presents today with ongoing weight loss, lightheadedness and tachycardia.  Patient with glycogen-storage disorder and has episodes of hypoglycemia as well as transition to DKA fairly easily.  Also with eating disorder and concern for possible electrolyte disturbances secondary to this.  Review of prior chart shows patient has been evaluated and admitted for hyponatremia and hyperglycemia in April.  No focal neurologic deficits on exam here, tachycardic but otherwise vitally stable.  Labs and fluids are ordered.  If laboratory work-up is reassuring, patient can be discharged for admission to inpatient eating disorder center.  2:46 PM basic panel reviewed by me reassuring other than hyperglycemia, no evidence for acidosis.  AST and ALT are slightly elevated along with alkaline phosphatase.  Regular insulin ordered for hyperglycemia and will recheck sugar.  Patient has history of hyperglycemia and poorly controlled diabetes and so this is not unusual for him.  2:52 PM Called [facility] to discuss disposition.  They will check with provider and call back.  Will continue with blood sugar management in the  meantime.  3:10 PM callback from [facility] [place], patient is to go there tomorrow at 7 AM for admission.  Signout to Dr. Lang to follow-up on blood glucose and patient can be discharged   3:34 PM blood glucose increased to 415, patient apparently had a soda and a cookie in the parking lot.  Additional insulin is ordered and still plan to discharge    At the conclusion of the encounter I discussed the results of all of the tests and the disposition. The questions were answered. The patient or family acknowledged understanding and was agreeable with the care plan.     Medical Decision Making    History:    Supplemental history from: Documented in chart, if applicable    External Record(s) reviewed: Documented in chart, if applicable.    Work Up:    Chart documentation includes differential considered and any EKGs or imaging independently interpreted by provider, where specified.    In additional to work up documented, I considered the following work up: Documented in chart, if applicable.    External consultation:    Discussion of management with another provider: Documented in chart, if applicable    Complicating factors:    Care impacted by chronic illness: Diabetes, Mental Health and Other: Glycogen storaged disease IIIa    Care affected by social determinants of health: Alcohol Abuse and/or Recreational Drug Use and Medication Noncompliance    Disposition considerations: Discharge. No recommendations on prescription strength medication(s).  Considered admission but medically stable and has outpatient follow-up plan    EKG:    Performed at: 1:53 PM  Impression: Normal EKG  Rate: 88  Rhythm: Sinus  Axis: Normal  MS Interval: 126  QRS Interval: 92  QTc Interval: 433  ST Changes: No acute ischemic changes  Comparison: April 17, no acute change    I have independently reviewed and interpreted the EKG(s) documented above.    PROCEDURES:       MEDICATIONS GIVEN IN THE EMERGENCY:  Medications   insulin regular 1  "unit/mL injection 4 Units (4 Units Intravenous $Given 6/7/23 4183)       NEW PRESCRIPTIONS STARTED AT TODAY'S ER VISIT  New Prescriptions    No medications on file       =================================================================    HPI    Patient information was obtained from: patient      Kevin Stephens is a 23 year old adult with a pertinent history of anxiety, depressive disorder, glycogen storage disease IIIa, uncontrolled type 1 diabetes mellitus, binge-purge behavior, anorexia nervosa, who presents to this ED via walk-in with mother for evaluation of eating disorder.    Patient reports gradually worsening decrease in appetite, significant weight loss, generalized weakness, and light headedness with standing for \"a while now.\" They have a history of uncontrolled type 1 diabetes mellitus and glycogen storage disease IIIa but has not been checking their blood sugars or taking insulin. They also endorse ongoing abdominal pain that is worse with eating that remains unchanged. They were seen by [facility] and was recommended to come to ED for further evaluation due to hypotension.    They deny associating vomiting, sore throat, and fever.    There were no other concerns/complaints at this time.    SHx: They deny alcohol use. They endorse marijuana use.    REVIEW OF SYSTEMS   Review of Systems   Constitutional: Positive for appetite change (decrease) and unexpected weight change (significant weight loss). Negative for fever.        Endorses generalized weakness.   HENT: Negative for sore throat.    Gastrointestinal: Positive for abdominal pain. Negative for vomiting.   Neurological: Positive for light-headedness (with standing).   All other systems reviewed and are negative.     All other systems reviewed and negative    PAST MEDICAL HISTORY:  Past Medical History:   Diagnosis Date     Acute kidney injury (H) 5/11/2022     Amylo-1,6-glucosidase deficiency (H) 2/17/2012     Anxiety      " Binge-purge behavior 1/13/2023     Delay in sexual development and puberty, not elsewhere classified 2/17/2012     Depression with suicidal ideation 5/18/2017     Depressive disorder      Diabetes mellitus (H)      Diabetic ketoacidosis without coma associated with type 1 diabetes mellitus (H) 5/9/2022     Family history of congenital hearing loss 9/26/2012    Kevin's father has bilateral, congenital hearing loss. There is no known cause, and no genetic testing has been completed.     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 History of GH treatment for a few years from age [age] 02/2022: IGF-1 72, Cortisol 15, ACTH 21, TSH 1.72     Prolonged QT interval 5/11/2022     Severe protein-calorie malnutrition (H) 5/11/2022     Transaminitis 5/11/2022     Uncontrolled diabetes mellitus secondary to pancreatic insufficiency 11/30/2014    Problem list name updated by automated process. Provider to review     Vitamin D deficiency 3/11/2012       PAST SURGICAL HISTORY:  Past Surgical History:   Procedure Laterality Date     ESOPHAGOSCOPY, GASTROSCOPY, DUODENOSCOPY (EGD), COMBINED N/A 4/14/2023    Procedure: ESOPHAGOGASTRODUODENOSCOPY with biopsies;  Surgeon: Rafael Villagomez MD;  Location: [facility] OR     TONSILLECTOMY Bilateral 4/2015       CURRENT MEDICATIONS:    No current facility-administered medications for this encounter.     Current Outpatient Medications   Medication     acetaminophen (TYLENOL) 500 MG tablet     Acetone, Urine, Test (KETONE TEST) STRP     acetone, Urine, test STRP     alcohol swab prep pads     alcohol swab prep pads     blood glucose (NO BRAND SPECIFIED) test strip     blood glucose calibration (NO BRAND SPECIFIED) solution     blood glucose  monitoring (NO BRAND SPECIFIED) meter device kit     blood glucose monitoring (ONE TOUCH DELICA) lancets     blood glucose monitoring (ONETOUCH VERIO IQ) test strip     collagenase (SANTYL) 250 UNIT/GM external ointment     Continuous Blood Gluc Sensor (DEXCOM G6 SENSOR) MISC     Continuous Blood Gluc Sensor (FREESTYLE PASQUALE 2 SENSOR) MISC     Continuous Blood Gluc Transmit (DEXCOM G6 TRANSMITTER) MISC     ibuprofen (ADVIL/MOTRIN) 200 MG tablet     insulin aspart (NOVOLOG PEN) 100 UNIT/ML pen     insulin aspart (NOVOLOG PEN) 100 UNIT/ML pen     insulin glargine (LANTUS PEN) 100 UNIT/ML pen     insulin pen needle (BD CALLI U/F) 32G X 4 MM miscellaneous     metoclopramide (REGLAN) 5 MG tablet     mirtazapine (REMERON) 15 MG tablet     multivitamin, therapeutic (THERA-VIT) TABS tablet     omeprazole (PRILOSEC) 40 MG DR capsule     polyethylene glycol (MIRALAX) 17 GM/Dose powder     ramelteon (ROZEREM) 8 MG tablet     thin (NO BRAND SPECIFIED) lancets       ALLERGIES:  Allergies   Allergen Reactions     Fluoxetine      Other reaction(s): Mental Status Change  Suicidal thoughts, previously tolerated sertraline     Morphine Sulfate      No exposure but grandfather has the allergy to it     Tylenol [Acetaminophen]      Has glycogen storage disease and should not receive Tylenol, per GI.       FAMILY HISTORY:  Family History   Problem Relation Age of Onset     Hearing Loss Father        SOCIAL HISTORY:   Social History     Socioeconomic History     Marital status: Single   Tobacco Use     Smoking status: Never     Smokeless tobacco: Never     Tobacco comments:     no second hand smoke exposure at home   Vaping Use     Vaping status: Some Days     Substances: Nicotine, THC   Substance and Sexual Activity     Alcohol use: No     Drug use: Yes     Types: Marijuana     Comment: every other day user   Social History Narrative    Lives with parents and younger brother.  Currently in 12th grade.  Loves to sing       VITALS:  BP  109/63   Pulse 102   Temp 98.3  F (36.8  C) (Oral)   Resp (!) 46   Wt 35.4 kg (78 lb)   SpO2 97%   BMI 13.39 kg/m      PHYSICAL EXAM:  Physical Exam  Vitals and nursing note reviewed.   Constitutional:       Comments: Cachectic.   HENT:      Head: Normocephalic and atraumatic.      Right Ear: External ear normal.      Left Ear: External ear normal.      Nose: Nose normal.      Mouth/Throat:      Mouth: Mucous membranes are moist.   Eyes:      Extraocular Movements: Extraocular movements intact.      Conjunctiva/sclera: Conjunctivae normal.      Pupils: Pupils are equal, round, and reactive to light.   Cardiovascular:      Rate and Rhythm: Regular rhythm. Tachycardia present.   Pulmonary:      Effort: Pulmonary effort is normal.      Breath sounds: Normal breath sounds. No wheezing or rales.   Abdominal:      General: Abdomen is flat. There is no distension.      Palpations: Abdomen is soft.      Tenderness: There is no abdominal tenderness. There is no guarding.   Musculoskeletal:         General: Normal range of motion.      Cervical back: Normal range of motion and neck supple.      Right lower leg: No edema.      Left lower leg: No edema.      Comments: Numerous areas of excoriation in lower extremities.   Lymphadenopathy:      Cervical: No cervical adenopathy.   Skin:     General: Skin is warm and dry.   Neurological:      General: No focal deficit present.      Mental Status: Brianna Stephens is alert and oriented to person, place, and time. Mental status is at baseline.      Comments: No gross focal neurologic deficits   Psychiatric:         Mood and Affect: Mood normal.         Behavior: Behavior normal.         Thought Content: Thought content normal.          LAB:  All pertinent labs reviewed and interpreted.  Results for orders placed or performed during the hospital encounter of 06/07/23   Basic metabolic panel   Result Value Ref Range    Sodium 135 (L) 136 - 145 mmol/L    Potassium 4.9 3.5 - 5.0 mmol/L     Chloride 97 (L) 98 - 107 mmol/L    Carbon Dioxide (CO2) 24 22 - 31 mmol/L    Anion Gap 14 5 - 18 mmol/L    Urea Nitrogen 22 8 - 22 mg/dL    Creatinine 0.81 0.60 - 1.30 mg/dL    Calcium 9.2 8.5 - 10.5 mg/dL    Glucose 377 (H) 70 - 125 mg/dL    GFR Estimate >90 >60 mL/min/1.73m2   Result Value Ref Range    Magnesium 1.9 1.8 - 2.6 mg/dL   CBC (+ platelets, no diff)   Result Value Ref Range    WBC Count 6.8 4.0 - 11.0 10e3/uL    RBC Count 4.69 3.80 - 5.90 10e6/uL    Hemoglobin 12.9 11.7 - 17.7 g/dL    Hematocrit 39.0 35.0 - 53.0 %    MCV 83 78 - 100 fL    MCH 27.5 26.5 - 33.0 pg    MCHC 33.1 31.5 - 36.5 g/dL    RDW 12.4 10.0 - 15.0 %    Platelet Count 233 150 - 450 10e3/uL   Result Value Ref Range     (H) 30 - 190 U/L   Hepatic function panel   Result Value Ref Range    Bilirubin Total 0.9 0.0 - 1.0 mg/dL    Bilirubin Direct 0.2 <=0.5 mg/dL    Protein Total 8.1 (H) 6.0 - 8.0 g/dL    Albumin 4.1 3.5 - 5.0 g/dL    Alkaline Phosphatase 158 (H) 45 - 120 U/L    AST 63 (H) 0 - 40 U/L    ALT 58 (H) 0 - 45 U/L   Ketone Beta-Hydroxybutyrate Quantitative   Result Value Ref Range    Ketone (Beta-Hydroxybutyrate) Quantitative 1.51 (H) <=0.3 mmol/L   Glucose by meter   Result Value Ref Range    GLUCOSE BY METER POCT 396 (H) 70 - 99 mg/dL       RADIOLOGY:  Reviewed all pertinent imaging. Please see official radiology report.  No orders to display       I, Rand Espino, am serving as a scribe to document services personally performed by Dr. Durán based on my observation and the provider's statements to me. I, Yobany Durán MD attest that Rand Espino is acting in a scribe capacity, has observed my performance of the services and has documented them in accordance with my direction.    Yobany Durán M.D.  Emergency Medicine  [facility] EMERGENCY ROOM  [address] [facility] [id number] [phone]  Dept: [phone]     Yobany Durán,  MD  06/07/23 1529       Yobany Durán MD  06/07/23 1539

## 2023-06-07 NOTE — ED TRIAGE NOTES
Here for eating disorder     Triage Assessment     Row Name 06/07/23 1313       Triage Assessment (Adult)    Airway WDL WDL       Respiratory WDL    Respiratory WDL WDL       Skin Circulation/Temperature WDL    Skin Circulation/Temperature WDL WDL       Cardiac WDL    Cardiac WDL WDL       Peripheral/Neurovascular WDL    Peripheral Neurovascular WDL WDL       Cognitive/Neuro/Behavioral WDL    Cognitive/Neuro/Behavioral WDL WDL

## 2023-06-07 NOTE — DISCHARGE INSTRUCTIONS
Check your blood sugars this evening and use your sliding scale as prescribed    Follow-up at [facility] tomorrow for admission

## 2023-06-07 NOTE — ED NOTES
Expected Patient Referral to ED  10:16 AM    Referring Clinic[facility]Provider:  [facility]    Reason for referral/Clinical facts:  Transgender male, needs to go back to eating disorder center    Orthostatic hypotension  Has a h/o glycogen storage issues, DM I poorly managed    Needs medical evaluation, if stable, can take him today, will leave note with number to call if patient can be admitted    77 lbs to 86 lbs at discharge (5/10/23), back down to 77 lbs (goal weight of 120 lbs)    Recommendations provided:  Send to ED for further evaluation    Caller was informed that this institution does possess the capabilities and/or resources to provide for patient and should be transferred to our facility.    Discussed that if direct admit is sought and any hurdles are encountered, this ED would be happy to see the patient and evaluate.    Informed caller that recommendations provided are recommendations based only on the facts provided and that they responsible to accept or reject the advice, or to seek a formal in person consultation as needed and that this ED will see/treat patient should they arrive.      Soheila Phillips MD  [facility] EMERGENCY ROOM  [address] [place] [id number]  [phone]       Soheila Phillips MD  06/07/23 1018

## 2023-06-07 NOTE — ED NOTES
"EMERGENCY DEPARTMENT SIGN OUT NOTE        ED COURSE AND MEDICAL DECISION MAKING  3:00 PM Patient was signed out to me by Dr Yobany Durán.  6:18 PM My exam is benign, discussed findings and discharge.    In brief, Kevin tSephens is a 23 year old adult who initially presented for an eating disorder. Patient with a history of diabetes mellitus type 1 and glycogen storage disorder presented for a gradually worsening appetite with weight loss, generalized weakness and lightheadedness \"for a while now\". Labs remarkable for hyperglycemia to 396, no evidence for acidosis. AST, ALT, alkaline phosphatase slightly elevated. Insulin ordered for hyperglycemia. [facility] was consulted and the patient would be followed up with tomorrow in the morning.    At time of sign out, disposition was pending blood glucose follow up and discharge.  Following fluids and insulin patient had marked improvement and glucose.  On my exam patient appears quite well and comfortable.  Feel patient is safe for discharge, given reassuring exam and vitals, will recommend close follow-up at the [facility] outpatient treatment.  Discussed all these findings recommendations with patient and family and they felt reassured and comfortable discharge.  Return precautions provided.    FINAL IMPRESSION    1. Glycogen storage disease IIIa     2. Uncontrolled type 1 diabetes mellitus with hyperglycemia (H)    3. Severe protein-calorie malnutrition (H)    4. Eating disorder, unspecified type        ED MEDS  Medications   0.9% sodium chloride BOLUS (1,000 mLs Intravenous $New Bag 6/7/23 1713)     Followed by   sodium chloride 0.9% infusion (has no administration in time range)   insulin regular 1 unit/mL injection 4 Units (4 Units Intravenous $Given 6/7/23 1451)   insulin regular 1 unit/mL injection 6 Units (6 Units Intravenous $Given 6/7/23 1546)   insulin regular 1 unit/mL injection 6 Units (6 Units Intravenous $Given 6/7/23 1713)       LAB  Labs Ordered and Resulted " from Time of ED Arrival to Time of ED Departure   BASIC METABOLIC PANEL - Abnormal       Result Value    Sodium 135 (*)     Potassium 4.9      Chloride 97 (*)     Carbon Dioxide (CO2) 24      Anion Gap 14      Urea Nitrogen 22      Creatinine 0.81      Calcium 9.2      Glucose 377 (*)     GFR Estimate >90     CK TOTAL - Abnormal     (*)    HEPATIC FUNCTION PANEL - Abnormal    Bilirubin Total 0.9      Bilirubin Direct 0.2      Protein Total 8.1 (*)     Albumin 4.1      Alkaline Phosphatase 158 (*)     AST 63 (*)     ALT 58 (*)    KETONE BETA-HYDROXYBUTYRATE QUANTITATIVE, RAPID - Abnormal    Ketone (Beta-Hydroxybutyrate) Quantitative 1.51 (*)    GLUCOSE BY METER - Abnormal    GLUCOSE BY METER POCT 396 (*)    GLUCOSE BY METER - Abnormal    GLUCOSE BY METER POCT 413 (*)    GLUCOSE BY METER - Abnormal    GLUCOSE BY METER POCT 351 (*)    GLUCOSE BY METER - Abnormal    GLUCOSE BY METER POCT 352 (*)    GLUCOSE BY METER - Abnormal    GLUCOSE BY METER POCT 213 (*)    MAGNESIUM - Normal    Magnesium 1.9     CBC WITH PLATELETS - Normal    WBC Count 6.8      RBC Count 4.69      Hemoglobin 12.9      Hematocrit 39.0      MCV 83      MCH 27.5      MCHC 33.1      RDW 12.4      Platelet Count 233     GLUCOSE MONITOR NURSING POCT       EKG      RADIOLOGY    No orders to display       DISCHARGE MEDS  New Prescriptions    No medications on file       I, Nicholas Feliciano, am serving as a scribe to document services personally performed by Dr. Lane Lang, based on my observations and the provider's statements to me.  I, Lane Lang MD, attest that Nicholas Feliciano is acting in a scribe capacity, has observed my performance of the services and has documented them in accordance with my direction.     Lane Lang MD  [facility] EMERGENCY ROOM  [address] [facility] [facility] [id number] [phone]     Lane Lang MD  06/07/23 3047

## 2023-06-08 LAB
ATRIAL RATE - MUSE: 88 BPM
DIASTOLIC BLOOD PRESSURE - MUSE: 58 MMHG
INTERPRETATION ECG - MUSE: NORMAL
P AXIS - MUSE: 69 DEGREES
PR INTERVAL - MUSE: 126 MS
QRS DURATION - MUSE: 92 MS
QT - MUSE: 358 MS
QTC - MUSE: 433 MS
R AXIS - MUSE: 68 DEGREES
SYSTOLIC BLOOD PRESSURE - MUSE: 104 MMHG
T AXIS - MUSE: 41 DEGREES
VENTRICULAR RATE- MUSE: 88 BPM

## 2023-06-13 ENCOUNTER — TELEPHONE (OUTPATIENT)
Dept: PEDIATRICS | Facility: CLINIC | Age: 23
End: 2023-06-13
Payer: COMMERCIAL

## 2023-06-13 NOTE — TELEPHONE ENCOUNTER
Stephanie from [facility] called informing that patient will likely be in their care for some time. Nursing staff questioned whether or not Glucagon should be on Emergency list for low sugars.    RN informed that Glucagon is not prescribed in diagnosis as it is not effective. However, this RN stated they would confirm with provider as this patient as type one diabetes.    RN informed Stephanie will call back with clarification.    Stephanie verbalized understanding and had no further questions.

## 2023-06-15 NOTE — TELEPHONE ENCOUNTER
Spoke to Stephanie informed that Glucagon will not work for [facility] patients per Dr. Oliver. Glucose gel could work but would defer that ordering to ENDO that is managing type 1 diabetes. Informed that our patients if having low BS are recommended to do cornstarch to keep levels up. Patient reporting having higher vs low BS values. Stephanie was not able to find ED letter. This RN asked for JAMEE to be faxed so RN can send ED letter for their records.     Stephanie verbalized understanding and will fax JAMEE over.    Carley Carmona BSN, RN, PHN  Genetics and Metabolism  RN Care Coordinator  [address] [place] [address]  Ph. [phone] Fax [phone]

## 2023-06-15 NOTE — TELEPHONE ENCOUNTER
Left message for Stephanie to return this RN Call.    Provided office number.    Carley Carmona BSN, RN, PHN  Genetics and Metabolism  RN Care Coordinator  [address] [place] [address]. [phone] Fax [phone]

## 2023-08-23 ENCOUNTER — OFFICE VISIT (OUTPATIENT)
Dept: CONSULT | Facility: CLINIC | Age: 23
End: 2023-08-23
Attending: MEDICAL GENETICS
Payer: COMMERCIAL

## 2023-08-23 ENCOUNTER — OFFICE VISIT (OUTPATIENT)
Dept: PEDIATRICS | Facility: CLINIC | Age: 23
End: 2023-08-23
Attending: MEDICAL GENETICS
Payer: COMMERCIAL

## 2023-08-23 VITALS
SYSTOLIC BLOOD PRESSURE: 83 MMHG | HEIGHT: 64 IN | DIASTOLIC BLOOD PRESSURE: 54 MMHG | HEART RATE: 114 BPM | OXYGEN SATURATION: 100 % | WEIGHT: 94.14 LBS | BODY MASS INDEX: 16.07 KG/M2

## 2023-08-23 DIAGNOSIS — Z71.83 ENCOUNTER FOR NONPROCREATIVE GENETIC COUNSELING: Primary | ICD-10-CM

## 2023-08-23 DIAGNOSIS — E74.03 GLYCOGEN STORAGE DISEASE III (H): ICD-10-CM

## 2023-08-23 DIAGNOSIS — E74.03 GLYCOGEN STORAGE DISEASE III (H): Primary | ICD-10-CM

## 2023-08-23 LAB
ALBUMIN SERPL BCG-MCNC: 4.1 G/DL (ref 3.5–5.2)
ALP SERPL-CCNC: 123 U/L (ref 35–129)
ALT SERPL W P-5'-P-CCNC: 64 U/L (ref 0–70)
AST SERPL W P-5'-P-CCNC: 61 U/L (ref 0–45)
BILIRUB DIRECT SERPL-MCNC: <0.2 MG/DL (ref 0–0.3)
BILIRUB SERPL-MCNC: 0.5 MG/DL
CHOLEST SERPL-MCNC: 154 MG/DL
CK SERPL-CCNC: 423 U/L (ref 26–308)
HDLC SERPL-MCNC: 44 MG/DL
INR PPP: 1.03 (ref 0.85–1.15)
LDLC SERPL CALC-MCNC: 87 MG/DL
NONHDLC SERPL-MCNC: 110 MG/DL
NT-PROBNP SERPL-MCNC: <36 PG/ML (ref 0–450)
PROT SERPL-MCNC: 7.1 G/DL (ref 6.4–8.3)
TRIGL SERPL-MCNC: 116 MG/DL

## 2023-08-23 PROCEDURE — 83880 ASSAY OF NATRIURETIC PEPTIDE: CPT | Performed by: MEDICAL GENETICS

## 2023-08-23 PROCEDURE — 999N000069 HC STATISTIC GENETIC COUNSELING, < 16 MIN

## 2023-08-23 PROCEDURE — G0463 HOSPITAL OUTPT CLINIC VISIT: HCPCS | Performed by: MEDICAL GENETICS

## 2023-08-23 PROCEDURE — 99215 OFFICE O/P EST HI 40 MIN: CPT | Performed by: MEDICAL GENETICS

## 2023-08-23 PROCEDURE — 80061 LIPID PANEL: CPT | Performed by: MEDICAL GENETICS

## 2023-08-23 PROCEDURE — 36415 COLL VENOUS BLD VENIPUNCTURE: CPT | Performed by: MEDICAL GENETICS

## 2023-08-23 PROCEDURE — 82040 ASSAY OF SERUM ALBUMIN: CPT | Performed by: MEDICAL GENETICS

## 2023-08-23 PROCEDURE — 82550 ASSAY OF CK (CPK): CPT | Performed by: MEDICAL GENETICS

## 2023-08-23 PROCEDURE — 85610 PROTHROMBIN TIME: CPT | Performed by: MEDICAL GENETICS

## 2023-08-23 NOTE — PROGRESS NOTES
Metabolism Clinic Return Patient Visit  Name:  Kevin Stephens  :   2000  MRN:   [id number]  PCP:  Vanessa Wright  Date of Visit: Aug 23, 2023  Managing Metabolic Center:  [facility]     Chief Complaint:  Kevin is a 23 year old adult whom I saw in follow up in the Pediatric Metabolism Clinic for Glycogen storage disease III. Brianna Stephens also saw our genetic counselor at this visit.      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  Like many adults with this condition, the degree of hepatic enlargement has reduced with time and alteration in hepatic function has also become less evident.  Individuals impacted by this condition remain at risk for hepatic injury and should have regular surveillance with abdominal ultrasonography.  Fortunately, recent abdominal ultrasounds suggest a stable hepatic state.  Individuals with this condition will also experience elevated CK related to muscle involvement in their condition.  Routine surveillance of cardiac status is also warranted; a recent echocardiogram was normal.  Brianna's case is complicated by the concurrent comorbidity of their eating challenges and diabetes.  I encouraged them to do their best to maintain care for their conditions.    Plan:    1. Ordered at this visit:  Orders Placed This Encounter   Procedures    Hepatic panel    N terminal pro BNP outpatient    CK total    Lipid panel reflex to direct LDL Fasting    INR      Results are as noted, below.  Additional recommendations based on these laboratory results: Liver enzymes were overall improved from previously.  CK remains modestly elevated.  The lipid panel was normal.  N-terminal BNP was in the normal range.  2. Medications: Continue medications as noted below.    3. Genetic counseling with Ramona Kim MS, [facility] to review options for additional confirmatory genetic testing.  4. Continue to observe emergency precautions as previously discussed.  Our on-call  metabolic service is available 24 hours/day by calling the page  ([phone]) and asking for the Genetics and Metabolism doctor on call.    5. Return to the Pediatric Metabolism Clinic in 3 months for follow-up.      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, not elsewhere classified    Vitamin D deficiency    Family history of congenital hearing loss    Uncontrolled diabetes mellitus secondary to pancreatic insufficiency    Depression with suicidal ideation    Hyperglycemia    Diabetic ketoacidosis without coma associated with type 1 diabetes mellitus (H)    Infected sebaceous cyst    Transaminitis    Prolonged QT interval    Acute kidney injury (H)    Severe protein-calorie malnutrition (H)    Binge-purge behavior    Partial hypopituitarism (H)    Amylo-1,6-glucosidase deficiency (H)    SAMEERA (generalized anxiety disorder)    Gender dysphoria    Moderate episode of recurrent major depressive disorder (H)    Uncontrolled type 1 diabetes mellitus with hyperglycemia (H)    Loss of weight     Interval information noted at this visit:  Brianna indicated that they have been struggling with her cares in the recent past.  They have been working hard on their eating disorder but have found it a challenge to attend all appointments and maintain consistent care for their chronic issues.    Brianna was last seen in our clinic on 4/26/2023 (virtual).  Since the last clinic visit Brianna was not seen for any urgent clinic visits.  Brianna Stephens was not seen in the emergency department.  Brianna Stephens currently has a written emergency letter for this condition.  No hospitalizations occurred.  Brianna Stephens had no general anesthesia and no surgical procedures.      Other health services currently received: primary care, endocrinology and psychiatry   Current  research study participation: none             Past Medical History  Past Medical History:   Diagnosis Date    Acute kidney injury (H) 5/11/2022    Amylo-1,6-glucosidase deficiency (H) 2/17/2012    Anxiety     Binge-purge behavior 1/13/2023    Delay in sexual development and puberty, not elsewhere classified 2/17/2012    Depression with suicidal ideation 5/18/2017    Depressive disorder     Diabetes mellitus (H)     Diabetic ketoacidosis without coma associated with type 1 diabetes mellitus (H) 5/9/2022    Family history of congenital hearing loss 9/26/2012    Kevin's father has bilateral, congenital hearing loss. There is no known cause, and no genetic testing has been completed.    GH Deficiency 2/17/2012    Glycogen storage disease IIIa - see Emergency Letter in EPIC dated 05/07/12 2/17/2012    Hyperglycemia 4/18/2018    Infected sebaceous cyst 5/11/2022    Partial hypopituitarism (H) 2/17/2022    Formatting of this note might be different from the original. History of GH treatment for a few years from age [age] 02/2022: IGF-1 72, Cortisol 15, ACTH 21, TSH 1.72    Prolonged QT interval 5/11/2022    Severe protein-calorie malnutrition (H) 5/11/2022    Transaminitis 5/11/2022    Uncontrolled diabetes mellitus secondary to pancreatic insufficiency 11/30/2014    Problem list name updated by automated process. Provider to review    Vitamin D deficiency 3/11/2012     Personal History:  Lives with her parents.  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     Family History Update: There was no new family history elicited at this visit   Family History   Problem Relation Age of Onset    Hearing Loss Father        I have reviewed Brianna's past medical history, family history, social history, medications and allergies as documented in the patient's electronic medical record.  There were no additional findings except as  noted.    Review of Systems:   Constitional: Continued significant challenges with eating disorder with improved weight.  Eyes: Noticing some changes in her vision and will have a eye check soon.  Ears/Nose/Throat: negative - normal hearing  Respiratory: negative  Cardiovascular: negative, normal echocardiogram 4/13/2023  Gastrointestinal: GSD 3a.  Unremarkable abdominal ultrasounds 3/9 and 2/3  Genitourinary: negative  Hematologic/Lymphatic: negative  Allergy/Immunologic: negative - no drug allergies  Musculoskeletal: History of elevated CK related to GSD  Endocrine: Diabetes   Integument: Has continued to have challenges with skin abscesses  Neurologic: negative  Psychiatric: Anxiety, depression.    Medications:  Current Outpatient Medications   Medication Sig Dispense Refill    blood glucose monitoring (NO BRAND SPECIFIED) meter device kit Use to test blood sugar 4 times daily or as directed. Preferred blood glucose meter OR supplies to accompany: Blood Glucose Monitor Brands: per insurance. 1 kit 0    blood glucose monitoring (ONE TOUCH DELICA) lancets Use to test blood sugars 6 times daily. 1 Box 12    blood glucose monitoring (ONETOUCH VERIO IQ) test strip Use to test blood sugars 6 times daily or as directed. 200 strip 11    collagenase (SANTYL) 250 UNIT/GM external ointment Apply topically daily Total square cm of the wound being treat is 3.96 please provide 30 days supply 30 g 3    Continuous Blood Gluc Sensor (DEXCOM G6 SENSOR) MISC 1 each See Admin Instructions Change every 10 days 9 each 3    Continuous Blood Gluc Sensor (FREESTYLE PASQUALE 2 SENSOR) MISC 1 each every 14 days Use 1 sensor every 14 days. Use to read blood sugars per 's instructions. 2 each 5    Continuous Blood Gluc Transmit (DEXCOM G6 TRANSMITTER) MISC 1 each every 3 months 1 each 3    ibuprofen (ADVIL/MOTRIN) 200 MG tablet Take 1 tablet (200 mg) by mouth every 8 hours as needed for pain      insulin aspart (NOVOLOG PEN) 100  UNIT/ML pen Correction Scale - MEDIUM INSULIN RESISTANCE DOSING     Do Not give Correction Insulin if Pre-Meal BG less than 140.   For Pre-Meal  - 189 give 1 unit.   For Pre-Meal  - 239 give 2 units.   For Pre-Meal  - 289 give 3 units.   For Pre-Meal  - 339 give 4 units.   For Pre-Meal - 399 give 5 units.   For Pre-Meal -449 give 6 units  For Pre-Meal BG greater than or equal to 450 give 7 units.   To be given with prandial insulin, and based on pre-meal blood glucose.    Notify provider if glucose greater than or equal to 350 mg/dL after administration of correction dose. 15 mL 1    insulin aspart (NOVOLOG PEN) 100 UNIT/ML pen 1 unit per 25 gram Carbohydrate counting 3 times a day with meals 15 mL     insulin glargine (LANTUS PEN) 100 UNIT/ML pen Inject 32 Units Subcutaneous At Bedtime 15 mL     insulin pen needle (BD CALLI U/F) 32G X 4 MM miscellaneous Use pen needles 6 times daily. 200 each 6    metoclopramide (REGLAN) 5 MG tablet Take 1 tablet (5 mg) by mouth 4 times daily (before meals and nightly) 150 tablet 1    polyethylene glycol (MIRALAX) 17 GM/Dose powder Take 17 g by mouth daily Hold with diarrhea 510 g 0    thin (NO BRAND SPECIFIED) lancets Use with lanceting device. To accompany: Blood Glucose Monitor Brands: per insurance. 100 each 6    acetaminophen (TYLENOL) 500 MG tablet Take 1-2 tablets (500-1,000 mg) by mouth every 6 hours as needed for mild pain (Patient not taking: Reported on 4/26/2023)      Acetone, Urine, Test (KETONE TEST) STRP 1 EACH BY IN VITRO ROUTE AS NEEDED (HYPERGLYCEMIA, CONCERN FOR DKA) (Patient not taking: Reported on 4/26/2023) 100 strip 0    acetone, Urine, test STRP Check urine ketones when two consecutive blood sugars are greater than 300 and at times of illness/vomiting. (Patient not taking: Reported on 4/26/2023) 100 each 11    alcohol swab prep pads Use to swab area of injection/nigel as directed. (Patient not taking: Reported on 4/26/2023)  "120 each 0    alcohol swab prep pads Use to swab area of injection/nigel as directed. (Patient not taking: Reported on 4/26/2023) 100 each 3    blood glucose (NO BRAND SPECIFIED) test strip Use to test blood sugar 4 times daily or as directed. To accompany: Blood Glucose Monitor Brands: per insurance. (Patient not taking: Reported on 4/26/2023) 100 strip 6    blood glucose calibration (NO BRAND SPECIFIED) solution To accompany: Blood Glucose Monitor Brands: per insurance. (Patient not taking: Reported on 4/26/2023) 100 each 0    mirtazapine (REMERON) 15 MG tablet Take 1 tablet (15 mg) by mouth At Bedtime (Patient not taking: Reported on 8/23/2023) 30 tablet 1    multivitamin, therapeutic (THERA-VIT) TABS tablet Take 1 tablet by mouth daily (Patient not taking: Reported on 8/23/2023)      omeprazole (PRILOSEC) 40 MG DR capsule Take 1 capsule (40 mg) by mouth daily (Patient not taking: Reported on 8/23/2023) 30 capsule 1    ramelteon (ROZEREM) 8 MG tablet Take 1 tablet (8 mg) by mouth At Bedtime (Patient not taking: Reported on 8/23/2023) 7 tablet 0     Allergies:  Allergies   Allergen Reactions    Fluoxetine      Other reaction(s): Mental Status Change  Suicidal thoughts, previously tolerated sertraline    Morphine Sulfate      No exposure but grandfather has the allergy to it    Tylenol [Acetaminophen]      Has glycogen storage disease and should not receive Tylenol, per GI.     Physical Examination:  Blood pressure (!) 83/54, pulse 114, height 5' 3.78\" (162 cm), weight 94 lb 2.2 oz (42.7 kg), head circumference 51 cm (20.08\"), SpO2 100 %.  Body Surface Area: Body surface area is 1.39 meters squared.  General: This was a slender adult who responded appropriately to all requests during the examination.    Head and Neck: Head was proportionate in appearance.    Eyes:  The pupils were equal, round, and reacted to light.   The conjunctivae were clear.   Ears:  Brianna Stephens's ears were normal in architecture and placement. " "  Nose: The nose was clear.    Mouth and Throat: Brianna Stephens's dentition was normal.  The throat was without erythema.    Respiratory: The chest was clear to auscultation and had a symmetric appearance.    CV:  On examination of the heart, the rhythm was regular and there was no murmur.    GI: The abdomen was soft and had normal bowel sounds.  The liver is easily palpable 5-6 cm below the costal margin in the midclavicular line.  : I deferred a  examination.   Musculoskeletal: There was a full range of motion on the extremity exam, and diminishedmuscular volume and bulk.   Neurologic: The neurologic exam showed normal cranial nerves on inspection, hard to elicit deep tendon reflexes, apparently normal sensation, and a normal gait. Brianna Stephens had normal tone.   Integument: The skin had no rashes or unusual pigmentation.      Results of laboratory studies collected at this visit and available when this note was completed:   Results for orders placed or performed in visit on 08/23/23   Hepatic panel     Status: Abnormal   Result Value Ref Range    Protein Total 7.1 6.4 - 8.3 g/dL    Albumin 4.1 3.5 - 5.2 g/dL    Bilirubin Total 0.5 <=1.2 mg/dL    Alkaline Phosphatase 123 35 - 129 U/L    AST 61 (H) 0 - 45 U/L    ALT 64 0 - 70 U/L    Bilirubin Direct <0.20 0.00 - 0.30 mg/dL    Narrative    The generation of reference intervals for this test is currently based on binary male or female sex. If the electronic health record information indicates another gender identity or if Legal Sex is recorded as \"Unknown\", both male and female reference intervals are provided where applicable, and should be considered according to the individual's appropriate clinical context.   N terminal pro BNP outpatient     Status: Normal   Result Value Ref Range    N Terminal Pro BNP Outpatient <36 0 - 450 pg/mL   CK total     Status: Abnormal   Result Value Ref Range     (H) 26 - 308 U/L    Narrative    The generation of reference " "intervals for this test is currently based on binary male or female sex. If the electronic health record information indicates another gender identity or if Legal Sex is recorded as \"Unknown\", both male and female reference intervals are provided where applicable, and should be considered according to the individual's appropriate clinical context.   Lipid panel reflex to direct LDL Fasting     Status: Normal   Result Value Ref Range    Cholesterol 154 <200 mg/dL    Triglycerides 116 <150 mg/dL    Direct Measure HDL 44 >=40 mg/dL    LDL Cholesterol Calculated 87 <=100 mg/dL    Non HDL Cholesterol 110 <130 mg/dL    Narrative    Cholesterol  Desirable:  <200 mg/dL    Triglycerides  Normal:  Less than 150 mg/dL  Borderline High:  150-199 mg/dL  High:  200-499 mg/dL  Very High:  Greater than or equal to 500 mg/dL    Direct Measure HDL  Female:  Greater than or equal to 50 mg/dL   Male:  Greater than or equal to 40 mg/dL    LDL Cholesterol  Desirable:  <100mg/dL  Above Desirable:  100-129 mg/dL   Borderline High:  130-159 mg/dL   High:  160-189 mg/dL   Very High:  >= 190 mg/dL    Non HDL Cholesterol  Desirable:  130 mg/dL  Above Desirable:  130-159 mg/dL  Borderline High:  160-189 mg/dL  High:  190-219 mg/dL  Very High:  Greater than or equal to 220 mg/dL   INR     Status: Normal   Result Value Ref Range    INR 1.03 0.85 - 1.15       HIRAL GRAVES M.D., FAAP, FACMG  Professor   Division of Genetics and Metabolism  Department of Pediatrics  [facility]    Routed to family in Comm [facility]  Also to  Vanessa Wright, Care Team    45 minutes spent on the date of the encounter doing chart review, history and exam, documentation and further activities per the note        "

## 2023-08-23 NOTE — PATIENT INSTRUCTIONS
Plan  1. Reviewed option of updated genetic testing to ascertain second AGL allele; Li opted to defer at this time  2. Contact information provided should Li change their mind before their next appointment, otherwise, we will plan to check in in 3 months  3. Follow up with Dr. Oliver in 3 months.  4. Additional recommendations per Dr. Oliver     Please do not hesitate to reach out with any questions or concerns. It was a pleasure to participate in Li's care today.       Ramona Kim MS, [facility]  Genetic Counseling  [facility]  Phone: [phone]  Fax: [phone]

## 2023-08-23 NOTE — LETTER
2023      RE: Kevin Stephens  [address] [place] [address]     Dear Colleague,    Thank you for the opportunity to participate in the care of your patient, Kevin Stephens, at the [facility] at [facility]. Please see a copy of my visit note below.    Metabolism Clinic Return Patient Visit  Name:  Kevin Stephens  :   2000  MRN:   [id number]  PCP:  Vanessa Wright  Date of Visit: Aug 23, 2023  Managing Metabolic Center:  [facility]     Chief Complaint:  Kevin is a 23 year old adult whom I saw in follow up in the Pediatric Metabolism Clinic for Glycogen storage disease III. Brianna Stephens also saw our genetic counselor at this visit.      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  Like many adults with this condition, the degree of hepatic enlargement has reduced with time and alteration in hepatic function has also become less evident.  Individuals impacted by this condition remain at risk for hepatic injury and should have regular surveillance with abdominal ultrasonography.  Fortunately, recent abdominal ultrasounds suggest a stable hepatic state.  Individuals with this condition will also experience elevated CK related to muscle involvement in their condition.  Routine surveillance of cardiac status is also warranted; a recent echocardiogram was normal.  Brianna's case is complicated by the concurrent comorbidity of their eating challenges and diabetes.  I encouraged them to do their best to maintain care for their conditions.    Plan:    1. Ordered at this visit:  Orders Placed This Encounter   Procedures     Hepatic panel     N terminal pro BNP outpatient     CK total     Lipid panel reflex to direct LDL Fasting     INR      Results are as noted, below.  Additional recommendations based on these laboratory results: Liver enzymes were overall improved from previously.   CK remains modestly elevated.  The lipid panel was normal.  N-terminal BNP was in the normal range.  2. Medications: Continue medications as noted below.    3. Genetic counseling with Ramona Kim MS, [facility] to review options for additional confirmatory genetic testing.  4. Continue to observe emergency precautions as previously discussed.  Our on-call metabolic service is available 24 hours/day by calling the page  ([phone]) and asking for the Genetics and Metabolism doctor on call.    5. Return to the Pediatric Metabolism Clinic in 3 months for follow-up.      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, not elsewhere classified     Vitamin D deficiency     Family history of congenital hearing loss     Uncontrolled diabetes mellitus secondary to pancreatic insufficiency     Depression with suicidal ideation     Hyperglycemia     Diabetic ketoacidosis without coma associated with type 1 diabetes mellitus (H)     Infected sebaceous cyst     Transaminitis     Prolonged QT interval     Acute kidney injury (H)     Severe protein-calorie malnutrition (H)     Binge-purge behavior     Partial hypopituitarism (H)     Amylo-1,6-glucosidase deficiency (H)     SAMEERA (generalized anxiety disorder)     Gender dysphoria     Moderate episode of recurrent major depressive disorder (H)     Uncontrolled type 1 diabetes mellitus with hyperglycemia (H)     Loss of weight     Interval information noted at this visit:  Brianna indicated that they have been struggling with her cares in the recent past.  They have been working hard on their eating disorder but have found it a challenge to attend all appointments and maintain consistent care for their chronic issues.    Brianna was last seen in our clinic on 4/26/2023 (virtual).  Since the last clinic  visit Brianna was not seen for any urgent clinic visits.  Brianna Stephens was not seen in the emergency department.  Brianna Stephens currently has a written emergency letter for this condition.  No hospitalizations occurred.  Brianna Stephens had no general anesthesia and no surgical procedures.      Other health services currently received: primary care, endocrinology and psychiatry   Current research study participation: none             Past Medical History  Past Medical History:   Diagnosis Date     Acute kidney injury (H) 5/11/2022     Amylo-1,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 There is no known cause, and no genetic testing has been completed.     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 History of GH treatment for a few years from age [age] 02/2022: IGF-1 72, Cortisol 15, ACTH 21, TSH 1.72     Prolonged QT interval 5/11/2022     Severe protein-calorie malnutrition (H) 5/11/2022     Transaminitis 5/11/2022     Uncontrolled diabetes mellitus secondary to pancreatic insufficiency 11/30/2014    Problem list name updated by automated process. Provider to review     Vitamin D deficiency 3/11/2012     Personal History:  Lives with her parents.  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     Family History Update: There was no new family history elicited at this visit   Family History   Problem Relation Age of Onset     Hearing Loss Father        I have reviewed Brianna's past medical history, family history, social history, medications and allergies as documented in the patient's electronic medical record.  There were no additional findings except as noted.    Review of Systems:   Constitional: Continued significant challenges with eating disorder with improved weight.  Eyes: Noticing some changes in her vision and will have a eye check soon.  Ears/Nose/Throat: negative - normal hearing  Respiratory: negative  Cardiovascular: negative, normal echocardiogram 4/13/2023  Gastrointestinal: GSD 3a.  Unremarkable abdominal ultrasounds 3/9 and 2/3  Genitourinary: negative  Hematologic/Lymphatic: negative  Allergy/Immunologic: negative - no drug allergies  Musculoskeletal: History of elevated CK related to GSD  Endocrine: Diabetes   Integument: Has continued to have challenges with skin abscesses  Neurologic: negative  Psychiatric: Anxiety, depression.    Medications:  Current Outpatient Medications   Medication Sig Dispense Refill     blood glucose monitoring (NO BRAND SPECIFIED) meter device kit Use to test blood sugar 4 times daily or as directed. Preferred blood glucose meter OR supplies to accompany: Blood Glucose Monitor Brands: per insurance. 1 kit 0     blood glucose monitoring (ONE TOUCH DELICA) lancets Use to test blood sugars 6 times daily. 1 Box 12     blood glucose monitoring (ONETOUCH VERIO IQ) test strip Use to test blood sugars 6 times daily or as directed. 200 strip 11     collagenase (SANTYL) 250 UNIT/GM external ointment Apply topically daily Total square cm of the wound being treat is 3.96 please provide 30 days supply 30 g 3     Continuous Blood Gluc Sensor (DEXCOM G6 SENSOR) MISC 1 each See Admin Instructions Change every 10 days 9 each 3     Continuous Blood Gluc  Sensor (FREESTYLE PASQUALE 2 SENSOR) [facility] 1 each every 14 days Use 1 sensor every 14 days. Use to read blood sugars per 's instructions. 2 each 5     Continuous Blood Gluc Transmit (DEXCOM G6 TRANSMITTER) MISC 1 each every 3 months 1 each 3     ibuprofen (ADVIL/MOTRIN) 200 MG tablet Take 1 tablet (200 mg) by mouth every 8 hours as needed for pain       insulin aspart (NOVOLOG PEN) 100 UNIT/ML pen Correction Scale - MEDIUM INSULIN RESISTANCE DOSING     Do Not give Correction Insulin if Pre-Meal BG less than 140.   For Pre-Meal  - 189 give 1 unit.   For Pre-Meal  - 239 give 2 units.   For Pre-Meal  - 289 give 3 units.   For Pre-Meal  - 339 give 4 units.   For Pre-Meal - 399 give 5 units.   For Pre-Meal -449 give 6 units  For Pre-Meal BG greater than or equal to 450 give 7 units.   To be given with prandial insulin, and based on pre-meal blood glucose.    Notify provider if glucose greater than or equal to 350 mg/dL after administration of correction dose. 15 mL 1     insulin aspart (NOVOLOG PEN) 100 UNIT/ML pen 1 unit per 25 gram Carbohydrate counting 3 times a day with meals 15 mL      insulin glargine (LANTUS PEN) 100 UNIT/ML pen Inject 32 Units Subcutaneous At Bedtime 15 mL      insulin pen needle (BD CALLI U/F) 32G X 4 MM miscellaneous Use pen needles 6 times daily. 200 each 6     metoclopramide (REGLAN) 5 MG tablet Take 1 tablet (5 mg) by mouth 4 times daily (before meals and nightly) 150 tablet 1     polyethylene glycol (MIRALAX) 17 GM/Dose powder Take 17 g by mouth daily Hold with diarrhea 510 g 0     thin (NO BRAND SPECIFIED) lancets Use with lanceting device. To accompany: Blood Glucose Monitor Brands: per insurance. 100 each 6     acetaminophen (TYLENOL) 500 MG tablet Take 1-2 tablets (500-1,000 mg) by mouth every 6 hours as needed for mild pain (Patient not taking: Reported on 4/26/2023)       Acetone, Urine, Test (KETONE TEST) STRP 1 EACH BY IN VITRO ROUTE AS  "NEEDED (HYPERGLYCEMIA, CONCERN FOR DKA) (Patient not taking: Reported on 4/26/2023) 100 strip 0     acetone, Urine, test STRP Check urine ketones when two consecutive blood sugars are greater than 300 and at times of illness/vomiting. (Patient not taking: Reported on 4/26/2023) 100 each 11     alcohol swab prep pads Use to swab area of injection/nigel as directed. (Patient not taking: Reported on 4/26/2023) 120 each 0     alcohol swab prep pads Use to swab area of injection/nigel as directed. (Patient not taking: Reported on 4/26/2023) 100 each 3     blood glucose (NO BRAND SPECIFIED) test strip Use to test blood sugar 4 times daily or as directed. To accompany: Blood Glucose Monitor Brands: per insurance. (Patient not taking: Reported on 4/26/2023) 100 strip 6     blood glucose calibration (NO BRAND SPECIFIED) solution To accompany: Blood Glucose Monitor Brands: per insurance. (Patient not taking: Reported on 4/26/2023) 100 each 0     mirtazapine (REMERON) 15 MG tablet Take 1 tablet (15 mg) by mouth At Bedtime (Patient not taking: Reported on 8/23/2023) 30 tablet 1     multivitamin, therapeutic (THERA-VIT) TABS tablet Take 1 tablet by mouth daily (Patient not taking: Reported on 8/23/2023)       omeprazole (PRILOSEC) 40 MG DR capsule Take 1 capsule (40 mg) by mouth daily (Patient not taking: Reported on 8/23/2023) 30 capsule 1     ramelteon (ROZEREM) 8 MG tablet Take 1 tablet (8 mg) by mouth At Bedtime (Patient not taking: Reported on 8/23/2023) 7 tablet 0     Allergies:  Allergies   Allergen Reactions     Fluoxetine      Other reaction(s): Mental Status Change  Suicidal thoughts, previously tolerated sertraline     Morphine Sulfate      No exposure but grandfather has the allergy to it     Tylenol [Acetaminophen]      Has glycogen storage disease and should not receive Tylenol, per GI.     Physical Examination:  Blood pressure (!) 83/54, pulse 114, height 5' 3.78\" (162 cm), weight 94 lb 2.2 oz (42.7 kg), head " "circumference 51 cm (20.08\"), SpO2 100 %.  Body Surface Area: Body surface area is 1.39 meters squared.  General: This was a slender adult who responded appropriately to all requests during the examination.    Head and Neck: Head was proportionate in appearance.    Eyes:  The pupils were equal, round, and reacted to light.   The conjunctivae were clear.   Ears:  Brianna Kate ears were normal in architecture and placement.   Nose: The nose was clear.    Mouth and Throat: Brianna aKte dentition was normal.  The throat was without erythema.    Respiratory: The chest was clear to auscultation and had a symmetric appearance.    CV:  On examination of the heart, the rhythm was regular and there was no murmur.    GI: The abdomen was soft and had normal bowel sounds.  The liver is easily palpable 5-6 cm below the costal margin in the midclavicular line.  : I deferred a  examination.   Musculoskeletal: There was a full range of motion on the extremity exam, and diminishedmuscular volume and bulk.   Neurologic: The neurologic exam showed normal cranial nerves on inspection, hard to elicit deep tendon reflexes, apparently normal sensation, and a normal gait. Brianna Stephens had normal tone.   Integument: The skin had no rashes or unusual pigmentation.      Results of laboratory studies collected at this visit and available when this note was completed:   Results for orders placed or performed in visit on 08/23/23   Hepatic panel     Status: Abnormal   Result Value Ref Range    Protein Total 7.1 6.4 - 8.3 g/dL    Albumin 4.1 3.5 - 5.2 g/dL    Bilirubin Total 0.5 <=1.2 mg/dL    Alkaline Phosphatase 123 35 - 129 U/L    AST 61 (H) 0 - 45 U/L    ALT 64 0 - 70 U/L    Bilirubin Direct <0.20 0.00 - 0.30 mg/dL    Narrative    The generation of reference intervals for this test is currently based on binary male or female sex. If the electronic health record information indicates another gender identity or if Legal Sex is recorded as " "\"Unknown\", both male and female reference intervals are provided where applicable, and should be considered according to the individual's appropriate clinical context.   N terminal pro BNP outpatient     Status: Normal   Result Value Ref Range    N Terminal Pro BNP Outpatient <36 0 - 450 pg/mL   CK total     Status: Abnormal   Result Value Ref Range     (H) 26 - 308 U/L    Narrative    The generation of reference intervals for this test is currently based on binary male or female sex. If the electronic health record information indicates another gender identity or if Legal Sex is recorded as \"Unknown\", both male and female reference intervals are provided where applicable, and should be considered according to the individual's appropriate clinical context.   Lipid panel reflex to direct LDL Fasting     Status: Normal   Result Value Ref Range    Cholesterol 154 <200 mg/dL    Triglycerides 116 <150 mg/dL    Direct Measure HDL 44 >=40 mg/dL    LDL Cholesterol Calculated 87 <=100 mg/dL    Non HDL Cholesterol 110 <130 mg/dL    Narrative    Cholesterol  Desirable:  <200 mg/dL    Triglycerides  Normal:  Less than 150 mg/dL  Borderline High:  150-199 mg/dL  High:  200-499 mg/dL  Very High:  Greater than or equal to 500 mg/dL    Direct Measure HDL  Female:  Greater than or equal to 50 mg/dL   Male:  Greater than or equal to 40 mg/dL    LDL Cholesterol  Desirable:  <100mg/dL  Above Desirable:  100-129 mg/dL   Borderline High:  130-159 mg/dL   High:  160-189 mg/dL   Very High:  >= 190 mg/dL    Non HDL Cholesterol  Desirable:  130 mg/dL  Above Desirable:  130-159 mg/dL  Borderline High:  160-189 mg/dL  High:  190-219 mg/dL  Very High:  Greater than or equal to 220 mg/dL   INR     Status: Normal   Result Value Ref Range    INR 1.03 0.85 - 1.15       HIRAL GRAVES M.D., FAAP, FAC  Professor   Division of Genetics and Metabolism  Department of Pediatrics  [facility]    Routed to family in Comm Mgt  Also to  " Vanessa Wright, Care Team    45 minutes spent on the date of the encounter doing chart review, history and exam, documentation and further activities per the note          Please do not hesitate to contact me if you have any questions/concerns.     Sincerely,       Avani Oliver MD

## 2023-08-23 NOTE — LETTER
"2023      RE: Kevin Stephens  [address] [place] [address]     Dear Colleague,    Thank you for the opportunity to participate in the care of your patient, Kevin Stephens, at the [facility] at [facility]. Please see a copy of my visit note below.    GENETICS CLINIC CONSULTATION     Name:  Kevin Stephens \"Li\"  :   2000  MRN:   [id number]  Date of service: Aug 23, 2023  Primary Provider: Vanessa Wright  Referring Provider: Referred Self    Presenting Information:  Kevin \"Li\" MAREK Stephens is a 23 year old returning to metabolism genetics clinic for follow-up regarding their diagnosis of glycogen storage disease type 3 (GSD3). Brianna was unaccompanied to today's visit. I met with Brianna at the request of Dr. Oliver to review the option of additional genetic testing.     HPI:  Please see Dr. Oliver's note for interval history.  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 development and puberty, not elsewhere classified    Vitamin D deficiency    Family history of congenital hearing loss    Uncontrolled diabetes mellitus secondary to pancreatic insufficiency    Depression with suicidal ideation    Hyperglycemia    Diabetic ketoacidosis without coma associated with type 1 diabetes mellitus (H)    Infected sebaceous cyst    Transaminitis    Prolonged QT interval    Acute kidney injury (H)    Severe protein-calorie malnutrition (H)    Binge-purge behavior    Partial hypopituitarism (H)    Amylo-1,6-glucosidase deficiency (H)    SAMEERA (generalized anxiety disorder)    Gender dysphoria    Moderate episode of recurrent major depressive disorder (H)    Uncontrolled type 1 diabetes mellitus with hyperglycemia (H)    Loss of weight      Past Medical History:   Diagnosis Date    Acute kidney injury (H) 2022    Amylo-1,6-glucosidase deficiency (H) 2012    Anxiety  "    Binge-purge behavior 1/13/2023    Delay in sexual development and puberty, not elsewhere classified 2/17/2012    Depression with suicidal ideation 5/18/2017    Depressive disorder     Diabetes mellitus (H)     Diabetic ketoacidosis without coma associated with type 1 diabetes mellitus (H) 5/9/2022    Family history of congenital hearing loss 9/26/2012    Kevin's father has bilateral, congenital hearing loss. There is no known cause, and no genetic testing has been completed.    GH Deficiency 2/17/2012    Glycogen storage disease IIIa - see Emergency Letter in EPIC dated 05/07/12 2/17/2012    Hyperglycemia 4/18/2018    Infected sebaceous cyst 5/11/2022    Partial hypopituitarism (H) 2/17/2022    Formatting of this note might be different from the original. History of GH treatment for a few years from age [age] 02/2022: IGF-1 72, Cortisol 15, ACTH 21, TSH 1.72    Prolonged QT interval 5/11/2022    Severe protein-calorie malnutrition (H) 5/11/2022    Transaminitis 5/11/2022    Uncontrolled diabetes mellitus secondary to pancreatic insufficiency 11/30/2014    Problem list name updated by automated process. Provider to review    Vitamin D deficiency 3/11/2012     Past Surgical History:   Procedure Laterality Date    ESOPHAGOSCOPY, GASTROSCOPY, DUODENOSCOPY (EGD), COMBINED N/A 4/14/2023    Procedure: ESOPHAGOGASTRODUODENOSCOPY with biopsies;  Surgeon: Rafael Villagomez MD;  Location: [facility] Main OR    TONSILLECTOMY Bilateral 4/2015     Previous Genetic Testing  [facility] (10/25/2006) AGL gene sequencing: a single c.2496insAT ([id number]) mutation was identified    Family History:   A three generation pedigree was last updated on 8/12/2020 by May Guidry by patient report and scanned into the EMR.     Discussion:    We reviewed Li's previous genetic testing which found a single mutation in the AGL gene (c.2496insAT ([id number])). We also discussed the option of additional genetic testing, given  Brianna's clinical diagnosis of GSD3 and the improved genetic testing technology since they were last tested in 2006.    We first reviewed the genetics of GSD3.  Genes are long stretches of DNA that are responsible for how our bodies look and how our bodies work.  We all have two copies of every gene, one inherited from the mother and one inherited from the father.  When there is a change, called a mutation, in a gene it can cause it to not do its job correctly which can cause the signs and symptoms of a genetic condition.  GSD3 is caused by mutations in a gene called AGL.      GSD3 is inherited in an autosomal recessive pattern.  This means that to be affected, an individual must inherit a mutation in both copies of the AGL gene (one from each parent).  Individuals with just one mutation in the AGL gene are said to be carriers.  Carriers do not have GSD3 disease but can have an affected child if their partner is also a carrier.  When both parents are carriers, with each pregnancy there is a 25% chance for the child to be affected.     Brianna's prior genetic testing was able to detect one mutation in the AGL gene, but given Brianna's clinical presentation, they have a full diagnosis of GSD3. We presume there is a second AGL mutation that we were unable to detect. This previous genetic testing included sequencing analysis but not deletion/duplication analysis. We discussed that we could resend this testing to include both updated sequencing analysis and deletion/duplication analysis.    Although identifying a second AGL mutation would not change Brianna's diagnosis or care at this time, it can still be helpful to know. We specifically discussed the importance for other family members. Identifying Brianna's second AGL mutation would be necessary in order to offer family members good carrier testing for GSD III. Current targeted carrier testing may only be available for one side of Brianna's family. Reanalysis of the AGL gene would be available at  any time, and this process was briefly reviewed today. Brianna elected to think about the option of additional genetic testing and reach out if wanting to pursue it in the meantime.      We additionally discussed the chance for Brianna's future children to have GSD3.  Because we presume both copies of their AGL gene have a mutation, no matter which copy they pass on, their children will inherit a mutation.  Whether or not they are actually affected depends on whether or not Brianna's partner was a carrier.  If Brianna's partner was found to be a carrier, with each pregnancy there would be a 50% chance for a child to actually be affected.      We reviewed that other family members could also be a carrier for GSD type III and that it was important for this information be shared with extended family.  For example, Brianna's brother, who does not have GSD, would have around a 2 in 3 (~67%) chance to be a carrier of GSD type III.  We also discussed that Brianna's other relatives such as aunt/uncles are at increased risk to be carriers of GSD type III. If another family member is found to be a carrier, their partner could be tested to determine if they are also a carrier. If both members of the couple are carriers, then they could have a child with GSD type III.       Plan  1. Reviewed option of updated genetic testing to ascertain second AGL allele; Brianna opted to defer at this time  2. Contact information provided should Brianna change their mind before their next appointment, otherwise, we will plan to check in in 3 months  3. Follow up with Dr. Oliver in 3 months.  4. Additional recommendations per Dr. Oliver     Please do not hesitate to reach out with any questions or concerns. It was a pleasure to participate in Brianna's care today.       Ramona Kim MS, [facility]  Genetic Counseling  [facility]  Pager: [phone]  Phone: [phone]  Fax: [phone]       Approximate Time Spent in Consultation: 10 min               Please do not hesitate to contact me if you have any questions/concerns.     Sincerely,       Ramona Kim GC

## 2023-08-23 NOTE — NURSING NOTE
"Chief Complaint   Patient presents with    Follow Up       Vitals:    08/23/23 0909   BP: (!) 83/54   BP Location: Right arm   Patient Position: Sitting   Cuff Size: Child   Pulse: 114   SpO2: 100%   Weight: 94 lb 2.2 oz (42.7 kg)   Height: 5' 3.78\" (162 cm)   HC: 51 cm (20.08\")       Patient MyChart Active? Yes  If no, would they like to sign up? N/A    Has patient signed a Consent to Communicate form to discuss health information with guardians if applicable? Yes    Does patient need PHQ-2 completed today? No    Depression Response    Patient completed the PHQ-9 assessment for depression and scored >9? Does not apply   Question 9 on the PHQ-9 was positive for suicidality? Does not apply   Does patient have current mental health provider? Does not apply     I personally notified the following: clinic nurse     Zamzam Bai, EMT  August 23, 2023  "

## 2023-08-23 NOTE — PATIENT INSTRUCTIONS
Pediatric Metabolism/PKU Clinic  [facility]  Pediatric Specialty Clinic ([facility])     For non-urgent questions or requests, contact the Pediatric Metabolism and Genetics RN Care Coordinator at the number listed below or send an Epic MyChart message to your provider.    For any immediate needs due to illness or concerning symptoms, contact the Pediatric Metabolism and Genetics Physician On-Call at [phone].      Care Team Contact Numbers:   RN Care Coordinator: [phone]  Genetic Counselor: Ramona Kim  MS, [facility]: [phone]  Suzette Lindsay RD, LD (dietitian): [phone] or [email or web address]  Toya Talbot RD, LD (dietitian): [phone] or [email or web address]  Genetic/Metabolic Physician On-call:  [phone]     Scheduling Numbers:  General Scheduling: [phone]             Neuropsychology Scheduling: [phone]   Radiology/Imaging/Echocardiogram: [phone]

## 2023-08-23 NOTE — PROGRESS NOTES
"GENETICS CLINIC CONSULTATION     Name:  Kevin Stephens \"Li\"  :   2000  MRN:   [id number]  Date of service: Aug 23, 2023  Primary Provider: Vanessa Wright  Referring Provider: Referred Self    Presenting Information:  Kevin \"Li\" MAREK Stephens is a 23 year old returning to metabolism genetics clinic for follow-up regarding their diagnosis of glycogen storage disease type 3 (GSD3). Brianna was unaccompanied to today's visit. I met with Brianna at the request of Dr. Oliver to review the option of additional genetic testing.     HPI:  Please see Dr. Oliver's note for interval history.  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 development and puberty, not elsewhere classified    Vitamin D deficiency    Family history of congenital hearing loss    Uncontrolled diabetes mellitus secondary to pancreatic insufficiency    Depression with suicidal ideation    Hyperglycemia    Diabetic ketoacidosis without coma associated with type 1 diabetes mellitus (H)    Infected sebaceous cyst    Transaminitis    Prolonged QT interval    Acute kidney injury (H)    Severe protein-calorie malnutrition (H)    Binge-purge behavior    Partial hypopituitarism (H)    Amylo-1,6-glucosidase deficiency (H)    SAMEERA (generalized anxiety disorder)    Gender dysphoria    Moderate episode of recurrent major depressive disorder (H)    Uncontrolled type 1 diabetes mellitus with hyperglycemia (H)    Loss of weight      Past Medical History:   Diagnosis Date    Acute kidney injury (H) 2022    Amylo-1,6-glucosidase deficiency (H) 2012    Anxiety     Binge-purge behavior 2023    Delay in sexual development and puberty, not elsewhere classified 2012    Depression with suicidal ideation 2017    Depressive disorder     Diabetes mellitus (H)     Diabetic ketoacidosis without coma associated with type 1 diabetes mellitus (H) 2022    Family history of congenital hearing loss " 9/26/2012    Kevin's father has bilateral, congenital hearing loss. There is no known cause, and no genetic testing has been completed.    GH Deficiency 2/17/2012    Glycogen storage disease IIIa - see Emergency Letter in EPIC dated 05/07/12 2/17/2012    Hyperglycemia 4/18/2018    Infected sebaceous cyst 5/11/2022    Partial hypopituitarism (H) 2/17/2022    Formatting of this note might be different from the original. History of GH treatment for a few years from age [age] 02/2022: IGF-1 72, Cortisol 15, ACTH 21, TSH 1.72    Prolonged QT interval 5/11/2022    Severe protein-calorie malnutrition (H) 5/11/2022    Transaminitis 5/11/2022    Uncontrolled diabetes mellitus secondary to pancreatic insufficiency 11/30/2014    Problem list name updated by automated process. Provider to review    Vitamin D deficiency 3/11/2012     Past Surgical History:   Procedure Laterality Date    ESOPHAGOSCOPY, GASTROSCOPY, DUODENOSCOPY (EGD), COMBINED N/A 4/14/2023    Procedure: ESOPHAGOGASTRODUODENOSCOPY with biopsies;  Surgeon: Rafael Villagomez MD;  Location: [facility] Main OR    TONSILLECTOMY Bilateral 4/2015     Previous Genetic Testing  [facility] (10/25/2006) AGL gene sequencing: a single c.2496insAT ([id number]) mutation was identified    Family History:   A three generation pedigree was last updated on 8/12/2020 by May Guidry by patient report and scanned into the EMR.     Discussion:    We reviewed Brianna's previous genetic testing which found a single mutation in the AGL gene (c.2496insAT ([id number])). We also discussed the option of additional genetic testing, given Brianna's clinical diagnosis of GSD3 and the improved genetic testing technology since they were last tested in 2006.    We first reviewed the genetics of GSD3.  Genes are long stretches of DNA that are responsible for how our bodies look and how our bodies work.  We all have two copies of every gene, one inherited from the mother and one inherited  from the father.  When there is a change, called a mutation, in a gene it can cause it to not do its job correctly which can cause the signs and symptoms of a genetic condition.  GSD3 is caused by mutations in a gene called AGL.      GSD3 is inherited in an autosomal recessive pattern.  This means that to be affected, an individual must inherit a mutation in both copies of the AGL gene (one from each parent).  Individuals with just one mutation in the AGL gene are said to be carriers.  Carriers do not have GSD3 disease but can have an affected child if their partner is also a carrier.  When both parents are carriers, with each pregnancy there is a 25% chance for the child to be affected.     Brianna's prior genetic testing was able to detect one mutation in the AGL gene, but given Brianna's clinical presentation, they have a full diagnosis of GSD3. We presume there is a second AGL mutation that we were unable to detect. This previous genetic testing included sequencing analysis but not deletion/duplication analysis. We discussed that we could resend this testing to include both updated sequencing analysis and deletion/duplication analysis.    Although identifying a second AGL mutation would not change Brianna's diagnosis or care at this time, it can still be helpful to know. We specifically discussed the importance for other family members. Identifying Brianna's second AGL mutation would be necessary in order to offer family members good carrier testing for GSD III. Current targeted carrier testing may only be available for one side of Brianna's family. Reanalysis of the AGL gene would be available at any time, and this process was briefly reviewed today. Brianna elected to think about the option of additional genetic testing and reach out if wanting to pursue it in the meantime.      We additionally discussed the chance for Brianna's future children to have GSD3.  Because we presume both copies of their AGL gene have a mutation, no matter which copy  they pass on, their children will inherit a mutation.  Whether or not they are actually affected depends on whether or not Brianna's partner was a carrier.  If Brianna's partner was found to be a carrier, with each pregnancy there would be a 50% chance for a child to actually be affected.      We reviewed that other family members could also be a carrier for GSD type III and that it was important for this information be shared with extended family.  For example, Brianna's brother, who does not have GSD, would have around a 2 in 3 (~67%) chance to be a carrier of GSD type III.  We also discussed that Brianna's other relatives such as aunt/uncles are at increased risk to be carriers of GSD type III. If another family member is found to be a carrier, their partner could be tested to determine if they are also a carrier. If both members of the couple are carriers, then they could have a child with GSD type III.       Plan  1. Reviewed option of updated genetic testing to ascertain second AGL allele; Brianna opted to defer at this time  2. Contact information provided should Brianna change their mind before their next appointment, otherwise, we will plan to check in in 3 months  3. Follow up with Dr. Oliver in 3 months.  4. Additional recommendations per Dr. Oliver     Please do not hesitate to reach out with any questions or concerns. It was a pleasure to participate in Brianna's care today.       Ramona Kim MS, [facility]  Genetic Counseling  [facility]  Pager: [phone]  Phone: [phone]  Fax: [phone]       Approximate Time Spent in Consultation: 10 min

## 2023-09-10 ENCOUNTER — HEALTH MAINTENANCE LETTER (OUTPATIENT)
Age: 23
End: 2023-09-10

## 2023-11-30 ENCOUNTER — HOSPITAL ENCOUNTER (EMERGENCY)
Facility: CLINIC | Age: 23
Discharge: HOME | End: 2023-11-30
Admitting: EMERGENCY MEDICINE
Payer: COMMERCIAL

## 2023-11-30 VITALS
SYSTOLIC BLOOD PRESSURE: 110 MMHG | TEMPERATURE: 98.6 F | BODY MASS INDEX: 16.16 KG/M2 | RESPIRATION RATE: 16 BRPM | OXYGEN SATURATION: 100 % | HEIGHT: 64 IN | HEART RATE: 95 BPM | DIASTOLIC BLOOD PRESSURE: 70 MMHG

## 2023-11-30 DIAGNOSIS — J06.9 VIRAL URI: ICD-10-CM

## 2023-11-30 PROBLEM — F50.029 ANOREXIA NERVOSA, BINGE-EATING PURGING TYPE: Status: ACTIVE | Noted: 2023-07-26

## 2023-11-30 LAB
FLUAV RNA SPEC QL NAA+PROBE: NEGATIVE
FLUBV RNA RESP QL NAA+PROBE: NEGATIVE
GROUP A STREP BY PCR: NOT DETECTED
RSV RNA SPEC NAA+PROBE: NEGATIVE
SARS-COV-2 RNA RESP QL NAA+PROBE: NEGATIVE

## 2023-11-30 PROCEDURE — 87637 SARSCOV2&INF A&B&RSV AMP PRB: CPT | Performed by: EMERGENCY MEDICINE

## 2023-11-30 PROCEDURE — 87651 STREP A DNA AMP PROBE: CPT | Performed by: EMERGENCY MEDICINE

## 2023-11-30 PROCEDURE — 99283 EMERGENCY DEPT VISIT LOW MDM: CPT

## 2023-12-01 NOTE — ED PROVIDER NOTES
EMERGENCY DEPARTMENT ENCOUNTER   NAME: Kevin Stephens ; AGE: 23 year old adult ; YOB: 2000 ; MRN: [id number] ; PCP: Vanessa Wright     Evaluation Date & Time: No admission date for patient encounter.    ED Provider: Olga José PA-C    CHIEF COMPLAINT     Pharyngitis, Jaw Pain, and Cough      FINAL ASSESSMENT     Viral URI    ED COURSE, MEDICAL DECISION MAKING, PLAN     ED course     10:08 PM Due to a shortage of available emergency department rooms, with the patient's permission I met with the patient for the initial interview and physical examination in a triage room. Discussed plan for treatment and workup in the ED. The patient declines pain medication at this time.   10:14 PM I discussed the plan for discharge with the patient, and patient is agreeable. We discussed supportive cares at home and reasons for return to the ER including new or worsening symptoms - all questions and concerns addressed. Patient to be discharged by RN.      _____________________________________________________________________    Kevin, akbertha Reed, is a 23-year-old presenting for sore throat, runny stuffy nose, cough, ear pain x3 days.  Whole family has been ill with similar.    On exam patient is nontoxic-appearing.  No acute distress.  Pharynx is nonerythematous and nonedematous.  No exudates.  No cervical lymphadenopathy appreciated.  TMs visualized and appear clear.  Heart and lungs sound good.  Vitally normal.    Concern for viral URI, strep pharyngitis, otitis media.    Rapid COVID/influenza/RSV test were negative.  Rapid strep was negative.    Overall, signs and symptoms consistent with a viral URI.  No red flag signs or symptoms present to suggest an acutely serious or life-threatening condition.    Recommended supportive cares at home and close monitoring.    Expected timeline for improvement discussed with patient and parent.    Indications for sooner reevaluation back in the ER discussed with patient and  parent.      *All pertinent lab & imaging studies independently reviewed. (See chart for details)   *Discussed the results of all the tests and plan with patient and family/guardians.   *All questions were answered.   *The patient and/or family/guardian acknowledged understanding and was agreeable with the care plan.      History:  Supplemental history from: Documented in chart, if applicable and Family Member/Significant Other  External Record(s) reviewed: Documented in chart, if applicable.    Work Up:  Chart documentation includes differentials considered and any EKGs or imaging independently interpreted by provider, where specified.  In additional to work up documented, I considered the following work up: Documented in chart, if applicable.    External consultation:  Discussion of management with another provider: Documented in chart, if applicable    Complicating factors:  Care impacted by chronic illness: Diabetes and Other: Glycogen storage disease type Illa  Care affected by social determinants of health: N/A    Disposition considerations: Discharge. No recommendations on prescription strength medication(s). See documentation for any additional details.    FINAL IMPRESSION:    ICD-10-CM    1. Viral URI  J06.9             MEDICATIONS GIVEN IN THE EMERGENCY DEPARTMENT:  Medications - No data to display      NEW PRESCRIPTIONS STARTED AT TODAY'S ED VISIT:  Current Discharge Medication List          HISTORY OF PRESENT ILLNESS   Patient information was obtained from: the patient    Use of Intrepreter: N/A     Kevin Stephens is a 23 year old adult with a pertinent history of anxiety, Glycogen storage disease type Illa with secondary type 1 diabetes mellitus, anorexia, s/p tonsillectomy, and s/p EGD (4/14/23) who presents to the ED by private vehicle for evaluation of sore throat and cough.     The patient reports the onset of sore throat, cough, and rhinorrhea three days ago (11/27). They state that their sore  throat is now causing radiating pain into their jaw and ears. They have not taken any medications at home. They note that their family is sick with similar symptoms, and they all have tested negative for COVID-19 with home tests. The patient denies abdominal pain, fever, chills, and any other symptoms or complaints at this time.       MEDICAL HISTORY     Past Medical History:   Diagnosis Date    Acute kidney injury (H24) 5/11/2022    Amylo-1,6-glucosidase deficiency (H) 2/17/2012    Anxiety     Binge-purge behavior 1/13/2023    Delay in sexual development and puberty, not elsewhere classified 2/17/2012    Depression with suicidal ideation 5/18/2017    Depressive disorder     Diabetes mellitus (H)     Diabetic ketoacidosis without coma associated with type 1 diabetes mellitus (H) 5/9/2022    Family history of congenital hearing loss 9/26/2012    GH Deficiency 2/17/2012    Glycogen storage disease IIIa - see Emergency Letter in EPIC dated 05/07/12 2/17/2012    Hyperglycemia 4/18/2018    Infected sebaceous cyst 5/11/2022    Partial hypopituitarism (H24) 2/17/2022    Prolonged QT interval 5/11/2022    Severe protein-calorie malnutrition (H24) 5/11/2022    Transaminitis 5/11/2022    Uncontrolled diabetes mellitus secondary to pancreatic insufficiency 11/30/2014    Vitamin D deficiency 3/11/2012       Past Surgical History:   Procedure Laterality Date    ESOPHAGOSCOPY, GASTROSCOPY, DUODENOSCOPY (EGD), COMBINED N/A 4/14/2023    Procedure: ESOPHAGOGASTRODUODENOSCOPY with biopsies;  Surgeon: Rafael Villagomez MD;  Location: [facility] Main OR    TONSILLECTOMY Bilateral 4/2015       Family History   Problem Relation Age of Onset    Hearing Loss Father        Social History     Tobacco Use    Smoking status: Never    Smokeless tobacco: Never    Tobacco comments:     no second hand smoke exposure at home   Vaping Use    Vaping Use: Some days    Substances: Nicotine, THC   Substance Use Topics    Alcohol use: No    Drug  "use: Yes     Types: Marijuana     Comment: every other day user       blood glucose monitoring (NO BRAND SPECIFIED) meter device kit  blood glucose monitoring (ONE TOUCH DELICA) lancets  blood glucose monitoring (ONETOUCH VERIO IQ) test strip  collagenase (SANTYL) 250 UNIT/GM external ointment  Continuous Blood Gluc Sensor (DEXCOM G6 SENSOR) MISC  Continuous Blood Gluc Sensor (FREESTYLE PASQUALE 2 SENSOR) MISC  Continuous Blood Gluc Transmit (DEXCOM G6 TRANSMITTER) MISC  ibuprofen (ADVIL/MOTRIN) 200 MG tablet  insulin aspart (NOVOLOG PEN) 100 UNIT/ML pen  insulin aspart (NOVOLOG PEN) 100 UNIT/ML pen  insulin glargine (LANTUS PEN) 100 UNIT/ML pen  insulin pen needle (BD CALLI U/F) 32G X 4 MM miscellaneous  metoclopramide (REGLAN) 5 MG tablet  polyethylene glycol (MIRALAX) 17 GM/Dose powder  thin (NO BRAND SPECIFIED) lancets          PHYSICAL EXAM     First Vitals:  Patient Vitals for the past 24 hrs:   BP Temp Temp src Pulse Resp SpO2 Height   11/30/23 2035 110/70 98.6  F (37  C) Temporal 95 16 100 % 1.626 m (5' 4\")         PHYSICAL EXAM:   Constitutional: No acute distress.  Neuro: Awake and alert. No focal deficits.  Psych: Calm and cooperative.  Eyes: PERRL. EOMI. Conjunctivae clear. No crusting or mattering of the lids or lashes.  Ears: TMs visualized and are normal. External canals clear. Pinnae non-edematous and non-erythematous. Mastoids non-tender and without bogginess.    Nose: Some nasal turbinate inflammation.  Mouth: Pink and moist. No erythema or edema of the posterior pharynx. No exudates. No trismus or muffled voice.  Neck: FROM.   Lymph: No cervical, tonsillar, or supraclavicular lymphadenopathy.  Cardio: Regular rate. Adequate perfusion to extremities. Regular rhythm. No murmurs.  Pulmonary: Oxygenating well on RA. No labored breathing. CTA b/l.  Upper extremities: Moves freely.   Lower extremities: Moves freely. No edema.  Skin: Natural color. Warm, dry, intact.         RESULTS     LAB:  All pertinent " labs reviewed and interpreted  Labs Ordered and Resulted from Time of ED Arrival to Time of ED Departure   INFLUENZA A/B, RSV, & SARS-COV2 PCR - Normal       Result Value    Influenza A PCR Negative      Influenza B PCR Negative      RSV PCR Negative      SARS CoV2 PCR Negative     GROUP A STREPTOCOCCUS PCR THROAT SWAB - Normal    Group A strep by PCR Not Detected         RADIOLOGY:  No orders to display       ECG:  None.       PROCEDURES   None    I, Ana Maria Caba, am serving as a scribe to document services personally performed by Olga José PA-C, based on my observation and the provider's statements to me. I, Olga José PA-C attest that Ana Maria Caba is acting in a scribe capacity, has observed my performance of the services and has documented them in accordance with my direction.     Some or all of this documentation has been completed using dictation software and mild grammatical errors may be present. Please contact me with any concerns regarding this.       Olga José PA-C  Emergency Medicine   [facility] EMERGENCY ROOM      Olga José PA-C  11/30/23 [id number]

## 2023-12-01 NOTE — ED TRIAGE NOTES
Pt with sore throat, cough, and jaw pain for the last 3 days. They have jaw pain right near the ears. They are still able to eat and drink. No meds taken PTA.      Triage Assessment (Adult)       Row Name 11/30/23 2036          Triage Assessment    Airway WDL WDL        Respiratory WDL    Respiratory WDL WDL        Skin Circulation/Temperature WDL    Skin Circulation/Temperature WDL WDL        Cardiac WDL    Cardiac WDL WDL        Peripheral/Neurovascular WDL    Peripheral Neurovascular WDL WDL        Cognitive/Neuro/Behavioral WDL    Cognitive/Neuro/Behavioral WDL WDL

## 2024-04-07 ENCOUNTER — HEALTH MAINTENANCE LETTER (OUTPATIENT)
Age: 24
End: 2024-04-07

## 2024-06-16 ENCOUNTER — HEALTH MAINTENANCE LETTER (OUTPATIENT)
Age: 24
End: 2024-06-16

## 2024-07-07 ENCOUNTER — HOSPITAL ENCOUNTER (EMERGENCY)
Facility: HOSPITAL | Age: 24
Discharge: HOME | End: 2024-07-07
Payer: OTHER MISCELLANEOUS

## 2024-07-07 VITALS
WEIGHT: 100 LBS | SYSTOLIC BLOOD PRESSURE: 134 MMHG | HEART RATE: 82 BPM | RESPIRATION RATE: 18 BRPM | TEMPERATURE: 98 F | DIASTOLIC BLOOD PRESSURE: 82 MMHG | BODY MASS INDEX: 17.16 KG/M2 | OXYGEN SATURATION: 100 %

## 2024-07-07 DIAGNOSIS — S09.90XA INJURY OF HEAD, INITIAL ENCOUNTER: ICD-10-CM

## 2024-07-07 DIAGNOSIS — S00.01XA ABRASION OF SCALP, INITIAL ENCOUNTER: ICD-10-CM

## 2024-07-07 PROCEDURE — 250N000009 HC RX 250

## 2024-07-07 PROCEDURE — 90715 TDAP VACCINE 7 YRS/> IM: CPT

## 2024-07-07 PROCEDURE — 99283 EMERGENCY DEPT VISIT LOW MDM: CPT | Mod: 25

## 2024-07-07 PROCEDURE — 250N000013 HC RX MED GY IP 250 OP 250 PS 637

## 2024-07-07 PROCEDURE — 250N000011 HC RX IP 250 OP 636

## 2024-07-07 PROCEDURE — 90471 IMMUNIZATION ADMIN: CPT

## 2024-07-07 RX ORDER — METOCLOPRAMIDE 10 MG/1
10 TABLET ORAL ONCE
Status: COMPLETED | OUTPATIENT
Start: 2024-07-07 | End: 2024-07-07

## 2024-07-07 RX ORDER — GINSENG 100 MG
CAPSULE ORAL ONCE
Status: COMPLETED | OUTPATIENT
Start: 2024-07-07 | End: 2024-07-07

## 2024-07-07 RX ORDER — IBUPROFEN 600 MG/1
600 TABLET, FILM COATED ORAL ONCE
Status: COMPLETED | OUTPATIENT
Start: 2024-07-07 | End: 2024-07-07

## 2024-07-07 RX ADMIN — IBUPROFEN 600 MG: 600 TABLET, FILM COATED ORAL at 10:36

## 2024-07-07 RX ADMIN — BACITRACIN: 500 OINTMENT TOPICAL at 10:50

## 2024-07-07 RX ADMIN — METOCLOPRAMIDE 10 MG: 10 TABLET ORAL at 10:35

## 2024-07-07 RX ADMIN — CLOSTRIDIUM TETANI TOXOID ANTIGEN (FORMALDEHYDE INACTIVATED), CORYNEBACTERIUM DIPHTHERIAE TOXOID ANTIGEN (FORMALDEHYDE INACTIVATED), BORDETELLA PERTUSSIS TOXOID ANTIGEN (GLUTARALDEHYDE INACTIVATED), BORDETELLA PERTUSSIS FILAMENTOUS HEMAGGLUTININ ANTIGEN (FORMALDEHYDE INACTIVATED), BORDETELLA PERTUSSIS PERTACTIN ANTIGEN, AND BORDETELLA PERTUSSIS FIMBRIAE 2/3 ANTIGEN 0.5 ML: 5; 2; 2.5; 5; 3; 5 INJECTION, SUSPENSION INTRAMUSCULAR at 10:37

## 2024-07-07 ASSESSMENT — COLUMBIA-SUICIDE SEVERITY RATING SCALE - C-SSRS
1. IN THE PAST MONTH, HAVE YOU WISHED YOU WERE DEAD OR WISHED YOU COULD GO TO SLEEP AND NOT WAKE UP?: NO
6. HAVE YOU EVER DONE ANYTHING, STARTED TO DO ANYTHING, OR PREPARED TO DO ANYTHING TO END YOUR LIFE?: NO
2. HAVE YOU ACTUALLY HAD ANY THOUGHTS OF KILLING YOURSELF IN THE PAST MONTH?: NO

## 2024-07-07 ASSESSMENT — ACTIVITIES OF DAILY LIVING (ADL): ADLS_ACUITY_SCORE: 39

## 2024-07-07 NOTE — ED PROVIDER NOTES
EMERGENCY DEPARTMENT ENCOUNTER      NAME: Kevin Stephens  AGE: 24 year old adult  YOB: 2000  MRN: [id number]  EVALUATION DATE & TIME: No admission date for patient encounter.    PCP: Vanessa Wright    ED PROVIDER: Arlet Hernandez PA-C      Chief Complaint   Patient presents with    Head Injury         FINAL IMPRESSION:  1. Injury of head, initial encounter    2. Abrasion of scalp, initial encounter          ED COURSE & MEDICAL DECISION MAKING:    10:04 AM Met with patient for initial interview. Plan for care discussed.  11:20 AM Reevaluated and updated patient. I discussed the plan for discharge with the patient, and patient is agreeable. We discussed supportive cares at home and reasons for return to the ER including new or worsening symptoms. All questions and concerns addressed. Patient to be discharged by RN.    24 year old adult with a history of complex medical history including glycogen storage disease IIIa, amylo-1,6-glucosidase deficiency, DM I who presents to this ED for evaluation of head injury. Patient was at work today when she was standing up from crouched position and accidentally hit her head on a plastic cup dispenser around 9 AM. No LOC, confusion/amnesia, blood thinners, new focal neurologic deficit. Patient does report 1 episode of vomiting.  Reviewed Emergency care plan given patient's glycogen storage disease type IIIa and secondary diabetes.  No increased risk for intracranial hemorrhage or abnormalities per note for patient.  She does undergo routine abdominal scans, however. Upon exam, patient is afebrile, hemodynamically stable, and in no acute distress. Patient answers questions appropriately and exhibits no focal neurologic deficits. Small 0.5 cm circular scalp abrasion as pictured below, no indication for sutures. Will clean and dress with bacitracin. Per chart review, last Tdap 2012, which was updated in ED today. Low suspicion for intracranial hemorrhage; using CT  [place] Rule and shared decision making, CT deferred at this time, especially since patient has frequent radiation exposure secondary to recurrent CTs given chronic illness. Given injury <2 hours ago, will plan to monitor patient for recurrent vomiting until full 2 hours post-injury.     Patient was treated with ibuprofen and Reglan upon arrival with improvement in symptoms. Symptoms and workup most consistent with head injury. Patient was observed in the ED 2-hours post-injury with no recurrent vomiting or new neurologic deficits; therefore, using shared decision making, plan to discharge home with concussion precautions. Patient has Zofran at home that she will take as needed for nausea. Plan to discharge patient home with symptomatic management, strict return precautions, and close follow up with their PCP for reevaluation and ongoing management. The patient was stable and well appearing upon discharge. The patient was advised to return to the ER if any new or worsening symptoms develop. The patient verbalizes understanding and agrees with the plan.     Medical Decision Making  Obtained supplemental history:Supplemental history obtained?: No  Reviewed external records: External records reviewed?: Documented in chart  Care impacted by chronic illness:Diabetes  Care significantly affected by social determinants of health:N/A  Did you consider but not order tests?: Work up considered but not performed and documented in chart, if applicable  Did you interpret images independently?: Independent interpretation of ECG and images noted in documentation, when applicable.  Consultation discussion with other provider:Did you involve another provider (consultant, MH, pharmacy, etc.)?: No  Discharge. I recommended the patient continue their current prescription strength medication(s): Zofran. See documentation for any additional details.    MEDICATIONS GIVEN IN THE EMERGENCY:  Medications   Tdap  (tetanus-diphtheria-acell pertussis) (ADACEL) injection 0.5 mL (0.5 mLs Intramuscular $Given 7/7/24 1037)   metoclopramide (REGLAN) tablet 10 mg (10 mg Oral $Given 7/7/24 1035)   ibuprofen (ADVIL/MOTRIN) tablet 600 mg (600 mg Oral $Given 7/7/24 1036)   bacitracin ointment ( Topical $Given 7/7/24 1050)       NEW PRESCRIPTIONS STARTED AT TODAY'S ER VISIT  New Prescriptions    No medications on file          =================================================================    HPI    Patient information was obtained from: patient    Use of : N/A       Kevin Stephens is a 24 year old adult with a complex medical history including glycogen storage disease IIIa, amylo-1,6-glucosidase deficiency, DM I, prolonged QT who presents to this ED for evaluation of head injury. Patient was at work today when she was standing up from crouched position and accidentally hit her head on a plastic cup dispenser around 9 AM. No LOC, confusion/amnesia, blood thinners, new focal neurologic deficit. Patient does report 1 episode of vomiting.  She reports history of scalp cysts in the past and feels like she hit one of the and was concerned this may be bleeding.  She reports a headache of about a 5 out of 10.  She denies any family history or personal history of cerebral aneurysms or brain bleeds in the past.  Otherwise, denies any numbness/weakness/tingling in her arms or legs, vision changes, hearing changes, nosebleeds, speech difficulty, gait instability/balance issues, or any other complaints.  Given her complex medical history, she confirmed no increased risk of brain bleeds or intracranial abnormalities.      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 There is no known cause, and no genetic testing has been completed.    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 History of GH treatment for a few years from age [age] 02/2022: IGF-1 72, Cortisol 15, ACTH 21, TSH 1.72    Prolonged QT interval 5/11/2022    Severe protein-calorie malnutrition (H24) 5/11/2022    Transaminitis 5/11/2022    Uncontrolled diabetes mellitus secondary to pancreatic insufficiency 11/30/2014    Problem list name updated by automated process. Provider to review    Vitamin D deficiency 3/11/2012       PAST SURGICAL HISTORY:  Past Surgical History:   Procedure Laterality Date    ESOPHAGOSCOPY, GASTROSCOPY, DUODENOSCOPY (EGD), COMBINED N/A 4/14/2023    Procedure: ESOPHAGOGASTRODUODENOSCOPY with biopsies;  Surgeon: Rafael Villagomez MD;  Location: [facility] Main OR    TONSILLECTOMY Bilateral 4/2015           CURRENT MEDICATIONS:    blood glucose monitoring (NO BRAND SPECIFIED) meter device kit  blood glucose monitoring (ONE TOUCH DELICA) lancets  blood glucose monitoring (ONETOUCH VERIO IQ) test strip  collagenase (SANTYL) 250 UNIT/GM external ointment  Continuous Blood Gluc Sensor (DEXCOM G6 SENSOR) MISC  Continuous Blood Gluc Sensor (FREESTYLE PASQUALE 2 SENSOR) MISC  Continuous Blood Gluc Transmit (DEXCOM G6 TRANSMITTER) MISC  ibuprofen (ADVIL/MOTRIN) 200 MG tablet  insulin aspart (NOVOLOG PEN) 100 UNIT/ML pen  insulin aspart (NOVOLOG PEN) 100  UNIT/ML pen  insulin glargine (LANTUS PEN) 100 UNIT/ML pen  insulin pen needle (BD CALLI U/F) 32G X 4 MM miscellaneous  metoclopramide (REGLAN) 5 MG tablet  polyethylene glycol (MIRALAX) 17 GM/Dose powder  thin (NO BRAND SPECIFIED) lancets        ALLERGIES:  Allergies   Allergen Reactions    Fluoxetine      Other reaction(s): Mental Status Change  Suicidal thoughts, previously tolerated sertraline    Morphine Sulfate      No exposure but grandfather has the allergy to it    Tylenol [Acetaminophen]      Has glycogen storage disease and should not receive Tylenol, per GI.       FAMILY HISTORY:  Family History   Problem Relation Age of Onset    Hearing Loss Father        SOCIAL HISTORY:   Social History     Socioeconomic History    Marital status: Single   Tobacco Use    Smoking status: Never    Smokeless tobacco: Never    Tobacco comments:     no second hand smoke exposure at home   Vaping Use    Vaping status: Some Days    Substances: Nicotine, THC   Substance and Sexual Activity    Alcohol use: No    Drug use: Yes     Types: Marijuana     Comment: every other day user   Social History Narrative    Lives with parents and younger brother.  Currently in 12th grade.  Loves to sing     Social Determinants of Health     Financial Resource Strain: Low Risk  (5/6/2022)    Received from [facility]    Financial Resource Strain     Difficulty of Paying Living Expenses: 3   Food Insecurity: No Food Insecurity (5/6/2022)    Received from [employer], [facility] [employer] [facility]    Food Insecurity     Worried About Running Out of Food in the Last Year: 1   Transportation Needs: No Transportation Needs (5/6/2022)    [facility]    Transportation Needs     Lack of Transportation (Medical): 1    Received from  [facility], [facility]    Social Connections   Housing Stability: Low Risk  (5/6/2022)    [facility]    Housing Stability     Unable to Pay for Housing in the Last Year: 1       VITALS:  /82   Pulse 82   Temp 98  F (36.7  C) (Temporal)   Resp 18   Wt 45.4 kg (100 lb)   SpO2 100%   BMI 17.16 kg/m      PHYSICAL EXAM    Constitutional:  Alert, in no acute distress. Cooperative.  EYES: Conjunctivae clear. PERRL. EOM intact. Peripheral fields intact.  HENT:  Atraumatic, normocephalic. Oropharynx clear. Moist membranes. Tongue without deviation.  Respiratory:  Respirations even, unlabored, in no acute respiratory distress. Lungs clear to auscultation bilaterally without wheeze, rhonchi, or rales. No cough. Speaks in full sentences easily.  Cardiovascular:  Regular rate and rhythm, good peripheral perfusion. No peripheral edema. No chest wall tenderness.  GI: Soft, flat, non-distended.  Musculoskeletal:  No edema. No cyanosis. Range of motion major extremities intact.    Integument: Warm, Dry. No rash to visualized skin.   Neurologic:  Alert & oriented x4. No focal deficits noted. GCS 15. Sensation intact. Motor intact. Coordination intact. 5/5 strength.   Psych: Normal mood and affect.      LAB:  All pertinent labs reviewed and interpreted.       RADIOLOGY:  Reviewed all pertinent imaging. Please see official radiology report.  No orders to display     Arlet Hernandez PA-C  [facility] EMERGENCY DEPARTMENT  [address] [place] [id number] [phone]      Arlet Hernandez PA-C  07/07/24 9509

## 2024-07-07 NOTE — Clinical Note
Kevin Stephens was seen and treated in our emergency department on 7/7/2024.  Li may return to work on 07/08/2024.       If you have any questions or concerns, please don't hesitate to call.      Arlet Hernandez PA-C

## 2024-07-07 NOTE — DISCHARGE INSTRUCTIONS
You were seen at the ER today for your head injury.      Rest, protect your brain as it is most vulnerable at this time, refrain from any physical activity/contact sports until your symptoms have completely resolved.     Concussion cares: low stimulation/calm activity only, hydration, good nutrition, sleep hygiene, frequent rest breaks, limit screen time.    You can take ibuprofen as needed for headache.    Ibuprofen/Naproxen Discharge Instructions:  You may take ibuprofen for pain control.  The maximum dose of (ibuprofen is 3200 mg ) in a 24-hour period.    Take this medication with food to prevent stomach irritation.  With long-term use this medication can irritate the stomach causing pain and lead to development of a stomach ulcer.  If you notice stomach pain or vomiting of coffee-ground colored vomit or blood, please be seen by a healthcare provider.  Attempt to use this medication for the shortest time possible.      Please follow-up with your primary care provider for reevaluation and ongoing management.      Return to the ER if any new or worsening symptoms develop including worsening headache, confusion, repeated vomiting, increased confusion/restlessness/agitation, dizziness, weakness/numbness/decreased coordination, facial droop, speech difficulty, drowsiness or inability to wake up, seizures, or other new symptoms.    Take Care!  - Arlet Hernandez PA-C   No

## 2024-07-07 NOTE — ED TRIAGE NOTES
The pt arrives with c/o of hitting their head on a shelf this morning. Denies a LOC and is not on thinners. Has a small laceration on the top of their head as well as some vomiting since injury.      Triage Assessment (Adult)       Row Name 07/07/24 1004          Triage Assessment    Airway WDL WDL        Respiratory WDL    Respiratory WDL WDL        Peripheral/Neurovascular WDL    Peripheral Neurovascular WDL WDL

## 2024-07-07 NOTE — ED NOTES
Writer irrigated abrasion on scalp with NS and wound cleansing spray.  Applied bacitracin afterwards and left open to air.

## 2024-11-03 ENCOUNTER — HEALTH MAINTENANCE LETTER (OUTPATIENT)
Age: 24
End: 2024-11-03

## 2025-05-10 ENCOUNTER — HEALTH MAINTENANCE LETTER (OUTPATIENT)
Age: 25
End: 2025-05-10

## 2025-06-21 ENCOUNTER — HEALTH MAINTENANCE LETTER (OUTPATIENT)
Age: 25
End: 2025-06-21

## 2025-06-23 NOTE — PATIENT INSTRUCTIONS
Continue Tresiba 25 units daily in the morning  Must test blood glucose twice a day (morning and evening) and give correction doses as noted below using rapid acting insulin.  Carb ratio 1 per 15 grams - lets deliver this for meals twice a day.  Keep CGM on and use it to correct for hyperglycemia twice a day at the time of meals  Do not give hyperglycemia correction within three hours of last rapid acting insulin dose.  Urine test today  Flu shot today    Blood Glucose    Units of Insulin             181 - 220         + 1 units             221 - 260         + 2 units             261 - 300         + 3 units             301 - 340         + 4 units             341 - 380         + 5 units             381 - 420         + 6 units             421 - 460         + 7 units     461-500  + 8 units            >500         + 9 units       Will pursue insulin pump after education today.  Follow-up visit with me in 2 months - in-person  
Sarah Flores was inpatient with a severe infection. Blinda Cheadle, daughter, is asking if she should still come in to see Dr Maegan Dodge on Oct 11th with chemo or reschedule it.
0 (no pain/absence of nonverbal indicators of pain)

## (undated) DEVICE — TUBING SUCTION MEDI-VAC 1/4"X20' N620A - HE

## (undated) DEVICE — SOL WATER IRRIG 1000ML BOTTLE 2F7114

## (undated) DEVICE — FORCEP BIOPSY 2.3MM DISP COATED 000388

## (undated) DEVICE — SUCTION MANIFOLD NEPTUNE 2 SYS 1 PORT 702-025-000